# Patient Record
Sex: FEMALE | Race: BLACK OR AFRICAN AMERICAN | NOT HISPANIC OR LATINO | Employment: UNEMPLOYED | URBAN - METROPOLITAN AREA
[De-identification: names, ages, dates, MRNs, and addresses within clinical notes are randomized per-mention and may not be internally consistent; named-entity substitution may affect disease eponyms.]

---

## 2018-05-17 ENCOUNTER — HOSPITAL ENCOUNTER (OUTPATIENT)
Dept: GENERAL RADIOLOGY | Facility: HOSPITAL | Age: 58
Discharge: HOME | End: 2018-05-17
Admitting: INTERNAL MEDICINE

## 2018-05-17 ENCOUNTER — TRANSCRIBE ORDERS (OUTPATIENT)
Dept: ADMINISTRATIVE | Facility: HOSPITAL | Age: 58
End: 2018-05-17

## 2018-05-17 DIAGNOSIS — R52 PAIN: Primary | ICD-10-CM

## 2018-05-17 PROCEDURE — 73560 X-RAY EXAM OF KNEE 1 OR 2: CPT

## 2019-07-25 ENCOUNTER — OFFICE VISIT (OUTPATIENT)
Dept: GASTROENTEROLOGY | Facility: CLINIC | Age: 59
End: 2019-07-25

## 2019-07-25 VITALS
HEART RATE: 100 BPM | RESPIRATION RATE: 20 BRPM | SYSTOLIC BLOOD PRESSURE: 132 MMHG | DIASTOLIC BLOOD PRESSURE: 86 MMHG | WEIGHT: 122 LBS | BODY MASS INDEX: 27.44 KG/M2

## 2019-07-25 DIAGNOSIS — Z83.71 FAMILY HISTORY OF POLYPS IN THE COLON: ICD-10-CM

## 2019-07-25 DIAGNOSIS — K21.9 GASTROESOPHAGEAL REFLUX DISEASE, ESOPHAGITIS PRESENCE NOT SPECIFIED: ICD-10-CM

## 2019-07-25 DIAGNOSIS — K76.0 FATTY LIVER: Primary | ICD-10-CM

## 2019-07-25 PROCEDURE — 99243 OFF/OP CNSLTJ NEW/EST LOW 30: CPT | Performed by: INTERNAL MEDICINE

## 2019-07-25 NOTE — PROGRESS NOTES
PCP:  Marie Morales MD     No referring provider defined for this encounter.    Chief Complaint   Patient presents with   • GI Problem        HPI   The patient is a 58-year-old here for evaluation of elevated liver chemistries.  She has a history of fatty liver.  She has a family history of father with polyps but no other family history to her knowledge.  She does have a history of diabetes.  She also has hypercholesterolemia.  An ultrasound done in 2016 showed fatty liver.  Liver chemistries were elevated at that time with an alkaline phosphatase of 223, AST of 41 and ALT of 66.  Her bilirubin was normal.  At that time she had serologies consistent with immunity to hepatitis B.  Her antimitochondrial antibody was normal.  Her anti-smooth muscle antibody, anti-liver kidney microsomal, hepatitis C testing, and soluble liver antigen were normal.  Her LUI was negative.  Her ceruloplasmin was normal.  Her alpha-1 antitrypsin level was normal.  Her iron studies were not elevated.  She is due for a colonoscopy.  Has had some reflux symptoms but is unsure what medication she is taking at this point.  He states she has a history of Crohn's disease.    No Known Allergies       Current Outpatient Medications:   •  Insulin Pump Accessories misc, , Disp: , Rfl:   •  losartan (COZAAR) 25 MG tablet, Take 25 mg by mouth Daily., Disp: , Rfl:   •  montelukast (SINGULAIR) 10 MG tablet, Take 10 mg by mouth Every Night., Disp: , Rfl:   •  pravastatin (PRAVACHOL) 20 MG tablet, Take 20 mg by mouth Daily., Disp: , Rfl:   •  Vitamin D, Cholecalciferol, (CHOLECALCIFEROL) 400 units tablet, Take 400 Units by mouth Daily., Disp: , Rfl:      Past Medical History:   Diagnosis Date   • Diabetes mellitus (CMS/HCC)    • Hyperlipidemia    • Hypertension    • Overactive bladder        Past Surgical History:   Procedure Laterality Date   • FOOT SURGERY     Tonsillectomy  Cholecystectomy for stones    Social History     Socioeconomic History   •  Marital status:      Spouse name: Not on file   • Number of children: Not on file   • Years of education: Not on file   • Highest education level: Not on file   Tobacco Use   • Smoking status: Never Smoker   • Smokeless tobacco: Never Used   Substance and Sexual Activity   • Alcohol use: No     Frequency: Never   • Drug use: No        History reviewed. No pertinent family history.     Review of Systems   Constitutional: Negative for unexpected weight loss.   HENT: Negative for trouble swallowing.    Eyes: Negative.    Respiratory: Negative.    Gastrointestinal: Positive for GERD. Negative for abdominal distention, abdominal pain, anal bleeding, blood in stool, constipation, diarrhea, nausea, rectal pain, vomiting and indigestion.   Endocrine: Negative.    Genitourinary: Negative.    Musculoskeletal: Negative.    Skin: Negative.    Allergic/Immunologic: Negative.    Neurological: Negative.    Hematological: Negative.    Psychiatric/Behavioral: Negative.         Vitals:    07/25/19 1550   BP: 132/86   Pulse: 100   Resp: 20        Physical Exam   General Appearance: Alert, in no acute distress   Head: Normocephalic, without obvious abnormality, atraumatic   Eyes: Lids and lashes normal, conjunctivae and sclerae normal, no icterus, no pallor, corneas clear, PERRLA   Ears: Ears appear intact with no abnormalities noted   Throat: No oral lesions, no thrush, oral mucosa moist   Neck: No adenopathy, supple, trachea midline, no thyromegaly, no JVD   Lungs: Clear to auscultation,respirations regular, even and unlabored Heart: Regular rhythm and normal rate, normal S1 and S2, no murmur, no gallop, no rub, no click   Chest Wall: Symmetrical respiratory expansion   Abdomen: Normal bowel sounds, no masses, no organomegaly, soft non-tender, non-distended, no guarding, no rebound tenderness   Extremities: Moves all extremities well, no edema, no cyanosis, no redness   Skin: No bleeding, bruising or rash   Neurologic: Cranial  nerves 2 - 12 grossly intact, no focal deficits     Review of systems was reviewed    Emily was seen today for gi problem.    Diagnoses and all orders for this visit:    Fatty liver    Gastroesophageal reflux disease, esophagitis presence not specified    Family history of polyps in the colon    Impressions and plan #1 fatty liver: We will probably need to reevaluate this.  Work-up in the past has been negative.  Serologies have been negative.  May be worthwhile doing a [place] fiber sure.    #2 family history: Polyps and questionable history of Crohn's disease: He seems to be doing fairly well from that standpoint but it is time to reevaluate and she will get that scheduled.  Patients with Crohn's colitis are at increased risk for developing colorectal cancer especially after a decade.    #3 gastroesophageal reflux disease: You are going to find out what she is taking.  If she is having increasing symptoms we can try her on a proton pump inhibitor if she is not on one at this point.    Jp Obnado MD

## 2019-07-29 ENCOUNTER — TELEPHONE (OUTPATIENT)
Dept: GASTROENTEROLOGY | Facility: CLINIC | Age: 59
End: 2019-07-29

## 2019-07-29 PROBLEM — K21.9 GASTROESOPHAGEAL REFLUX DISEASE: Status: ACTIVE | Noted: 2019-07-29

## 2019-07-29 PROBLEM — K76.0 FATTY LIVER: Status: ACTIVE | Noted: 2019-07-29

## 2019-07-29 PROBLEM — Z83.719 FAMILY HISTORY OF POLYPS IN THE COLON: Status: ACTIVE | Noted: 2019-07-29

## 2019-07-29 PROBLEM — Z83.71 FAMILY HISTORY OF POLYPS IN THE COLON: Status: ACTIVE | Noted: 2019-07-29

## 2019-07-29 NOTE — TELEPHONE ENCOUNTER
Dr. Obando wanted to know what medication the patient was taking.  She called back and said she was on ranitidine 300mg at bedtime.  Patient doesn't feel like this is helping and would like to try a different medication.  Omeprazole sent to her pharmacy.

## 2019-07-31 RX ORDER — OMEPRAZOLE 40 MG/1
40 CAPSULE, DELAYED RELEASE ORAL DAILY
Qty: 90 CAPSULE | Refills: 3 | Status: SHIPPED | OUTPATIENT
Start: 2019-07-31 | End: 2020-07-06

## 2019-08-01 RX ORDER — SODIUM, POTASSIUM,MAG SULFATES 17.5-3.13G
2 SOLUTION, RECONSTITUTED, ORAL ORAL TAKE AS DIRECTED
Qty: 354 ML | Refills: 0 | Status: SHIPPED | OUTPATIENT
Start: 2019-08-01 | End: 2021-07-10

## 2019-08-29 ENCOUNTER — OUTSIDE FACILITY SERVICE (OUTPATIENT)
Dept: GASTROENTEROLOGY | Facility: CLINIC | Age: 59
End: 2019-08-29

## 2019-08-29 ENCOUNTER — LAB REQUISITION (OUTPATIENT)
Dept: LAB | Facility: HOSPITAL | Age: 59
End: 2019-08-29

## 2019-08-29 DIAGNOSIS — Z12.11 ENCOUNTER FOR SCREENING FOR MALIGNANT NEOPLASM OF COLON: ICD-10-CM

## 2019-08-29 DIAGNOSIS — Z83.71 FAMILY HISTORY OF COLONIC POLYPS: ICD-10-CM

## 2019-08-29 PROCEDURE — 88305 TISSUE EXAM BY PATHOLOGIST: CPT | Performed by: INTERNAL MEDICINE

## 2019-08-29 PROCEDURE — 45380 COLONOSCOPY AND BIOPSY: CPT | Performed by: INTERNAL MEDICINE

## 2019-08-30 LAB
CYTO UR: NORMAL
LAB AP CASE REPORT: NORMAL
LAB AP CLINICAL INFORMATION: NORMAL
PATH REPORT.FINAL DX SPEC: NORMAL
PATH REPORT.GROSS SPEC: NORMAL

## 2019-11-02 ENCOUNTER — HOSPITAL ENCOUNTER (EMERGENCY)
Facility: HOSPITAL | Age: 59
Discharge: HOME | End: 2019-11-02
Attending: EMERGENCY MEDICINE | Admitting: EMERGENCY MEDICINE

## 2019-11-02 ENCOUNTER — APPOINTMENT (OUTPATIENT)
Dept: GENERAL RADIOLOGY | Facility: HOSPITAL | Age: 59
End: 2019-11-02

## 2019-11-02 VITALS
BODY MASS INDEX: 26.85 KG/M2 | DIASTOLIC BLOOD PRESSURE: 90 MMHG | HEART RATE: 94 BPM | HEIGHT: 55 IN | TEMPERATURE: 98.2 F | SYSTOLIC BLOOD PRESSURE: 132 MMHG | OXYGEN SATURATION: 96 % | WEIGHT: 116 LBS | RESPIRATION RATE: 18 BRPM

## 2019-11-02 DIAGNOSIS — R53.1 GENERALIZED WEAKNESS: ICD-10-CM

## 2019-11-02 DIAGNOSIS — R00.0 SINUS TACHYCARDIA: Primary | ICD-10-CM

## 2019-11-02 DIAGNOSIS — R73.9 HYPERGLYCEMIA: ICD-10-CM

## 2019-11-02 LAB
ALBUMIN SERPL-MCNC: 4 G/DL (ref 3.5–5.2)
ALBUMIN/GLOB SERPL: 1.3 G/DL
ALP SERPL-CCNC: 219 U/L (ref 39–117)
ALT SERPL W P-5'-P-CCNC: 55 U/L (ref 1–33)
ANION GAP SERPL CALCULATED.3IONS-SCNC: 10 MMOL/L (ref 5–15)
ARTERIAL PATENCY WRIST A: ABNORMAL
AST SERPL-CCNC: 34 U/L (ref 1–32)
ATMOSPHERIC PRESS: ABNORMAL MM[HG]
B-OH-BUTYR SERPL-SCNC: 0.1 MMOL/L (ref 0.02–0.27)
BACTERIA UR QL AUTO: NORMAL /HPF
BASE EXCESS BLDA CALC-SCNC: 3.6 MMOL/L (ref 0–2)
BASOPHILS # BLD AUTO: 0.03 10*3/MM3 (ref 0–0.2)
BASOPHILS NFR BLD AUTO: 0.3 % (ref 0–1.5)
BILIRUB SERPL-MCNC: 0.2 MG/DL (ref 0.2–1.2)
BILIRUB UR QL STRIP: NEGATIVE
BODY TEMPERATURE: 37 C
BUN BLD-MCNC: 14 MG/DL (ref 6–20)
BUN/CREAT SERPL: 24.6 (ref 7–25)
CALCIUM SPEC-SCNC: 9.2 MG/DL (ref 8.6–10.5)
CHLORIDE SERPL-SCNC: 107 MMOL/L (ref 98–107)
CK SERPL-CCNC: 141 U/L (ref 20–180)
CLARITY UR: CLEAR
CO2 BLDA-SCNC: 30.9 MMOL/L (ref 22–33)
CO2 SERPL-SCNC: 28 MMOL/L (ref 22–29)
COHGB MFR BLD: 1 % (ref 0–2)
COLOR UR: YELLOW
CREAT BLD-MCNC: 0.57 MG/DL (ref 0.57–1)
D DIMER PPP FEU-MCNC: 0.28 MCGFEU/ML (ref 0–0.56)
DEPRECATED RDW RBC AUTO: 40.9 FL (ref 37–54)
EOSINOPHIL # BLD AUTO: 0.01 10*3/MM3 (ref 0–0.4)
EOSINOPHIL NFR BLD AUTO: 0.1 % (ref 0.3–6.2)
ERYTHROCYTE [DISTWIDTH] IN BLOOD BY AUTOMATED COUNT: 12.4 % (ref 12.3–15.4)
GFR SERPL CREATININE-BSD FRML MDRD: 133 ML/MIN/1.73
GLOBULIN UR ELPH-MCNC: 3.1 GM/DL
GLUCOSE BLD-MCNC: 192 MG/DL (ref 65–99)
GLUCOSE BLDC GLUCOMTR-MCNC: 178 MG/DL (ref 70–130)
GLUCOSE UR STRIP-MCNC: NEGATIVE MG/DL
HCO3 BLDA-SCNC: 29.4 MMOL/L (ref 20–26)
HCT VFR BLD AUTO: 40.5 % (ref 34–46.6)
HCT VFR BLD CALC: 41.4 %
HGB BLD-MCNC: 12.9 G/DL (ref 12–15.9)
HGB BLDA-MCNC: 13.5 G/DL (ref 14–18)
HGB UR QL STRIP.AUTO: NEGATIVE
HOLD SPECIMEN: NORMAL
HOLD SPECIMEN: NORMAL
HOROWITZ INDEX BLD+IHG-RTO: 21 %
HYALINE CASTS UR QL AUTO: NORMAL /LPF
IMM GRANULOCYTES # BLD AUTO: 0.04 10*3/MM3 (ref 0–0.05)
IMM GRANULOCYTES NFR BLD AUTO: 0.4 % (ref 0–0.5)
KETONES UR QL STRIP: NEGATIVE
LEUKOCYTE ESTERASE UR QL STRIP.AUTO: ABNORMAL
LYMPHOCYTES # BLD AUTO: 2.01 10*3/MM3 (ref 0.7–3.1)
LYMPHOCYTES NFR BLD AUTO: 22.2 % (ref 19.6–45.3)
MAGNESIUM SERPL-MCNC: 1.7 MG/DL (ref 1.6–2.6)
MAGNESIUM SERPL-MCNC: 1.8 MG/DL (ref 1.6–2.6)
MCH RBC QN AUTO: 28.7 PG (ref 26.6–33)
MCHC RBC AUTO-ENTMCNC: 31.9 G/DL (ref 31.5–35.7)
MCV RBC AUTO: 90.2 FL (ref 79–97)
METHGB BLD QL: 1.3 % (ref 0–1.5)
MODALITY: ABNORMAL
MONOCYTES # BLD AUTO: 0.73 10*3/MM3 (ref 0.1–0.9)
MONOCYTES NFR BLD AUTO: 8.1 % (ref 5–12)
NEUTROPHILS # BLD AUTO: 6.23 10*3/MM3 (ref 1.7–7)
NEUTROPHILS NFR BLD AUTO: 68.9 % (ref 42.7–76)
NITRITE UR QL STRIP: NEGATIVE
NOTE: ABNORMAL
NRBC BLD AUTO-RTO: 0 /100 WBC (ref 0–0.2)
NT-PROBNP SERPL-MCNC: 32.9 PG/ML (ref 5–900)
OXYHGB MFR BLDV: 91.6 % (ref 94–99)
PCO2 BLDA: 48 MM HG (ref 35–45)
PCO2 TEMP ADJ BLD: 48 MM HG (ref 35–45)
PH BLDA: 7.4 PH UNITS (ref 7.35–7.45)
PH UR STRIP.AUTO: 6.5 [PH] (ref 5–8)
PH, TEMP CORRECTED: 7.4 PH UNITS
PLATELET # BLD AUTO: 167 10*3/MM3 (ref 140–450)
PMV BLD AUTO: 11.6 FL (ref 6–12)
PO2 BLDA: 72.6 MM HG (ref 83–108)
PO2 TEMP ADJ BLD: 72.6 MM HG (ref 83–108)
POTASSIUM BLD-SCNC: 3.5 MMOL/L (ref 3.5–5.2)
PROT SERPL-MCNC: 7.1 G/DL (ref 6–8.5)
PROT UR QL STRIP: NEGATIVE
RBC # BLD AUTO: 4.49 10*6/MM3 (ref 3.77–5.28)
RBC # UR: NORMAL /HPF
REF LAB TEST METHOD: NORMAL
SODIUM BLD-SCNC: 145 MMOL/L (ref 136–145)
SP GR UR STRIP: 1.02 (ref 1–1.03)
SQUAMOUS #/AREA URNS HPF: NORMAL /HPF
TROPONIN T SERPL-MCNC: <0.01 NG/ML (ref 0–0.03)
TSH SERPL DL<=0.05 MIU/L-ACNC: 1.72 UIU/ML (ref 0.27–4.2)
UROBILINOGEN UR QL STRIP: ABNORMAL
VENTILATOR MODE: ABNORMAL
WBC NRBC COR # BLD: 9.05 10*3/MM3 (ref 3.4–10.8)
WBC UR QL AUTO: NORMAL /HPF
WHOLE BLOOD HOLD SPECIMEN: NORMAL
WHOLE BLOOD HOLD SPECIMEN: NORMAL

## 2019-11-02 PROCEDURE — 80053 COMPREHEN METABOLIC PANEL: CPT | Performed by: EMERGENCY MEDICINE

## 2019-11-02 PROCEDURE — 93005 ELECTROCARDIOGRAM TRACING: CPT | Performed by: EMERGENCY MEDICINE

## 2019-11-02 PROCEDURE — 82550 ASSAY OF CK (CPK): CPT | Performed by: PHYSICIAN ASSISTANT

## 2019-11-02 PROCEDURE — 82962 GLUCOSE BLOOD TEST: CPT

## 2019-11-02 PROCEDURE — 85025 COMPLETE CBC W/AUTO DIFF WBC: CPT | Performed by: EMERGENCY MEDICINE

## 2019-11-02 PROCEDURE — 84443 ASSAY THYROID STIM HORMONE: CPT | Performed by: PHYSICIAN ASSISTANT

## 2019-11-02 PROCEDURE — 82010 KETONE BODYS QUAN: CPT | Performed by: PHYSICIAN ASSISTANT

## 2019-11-02 PROCEDURE — 99285 EMERGENCY DEPT VISIT HI MDM: CPT

## 2019-11-02 PROCEDURE — 93005 ELECTROCARDIOGRAM TRACING: CPT | Performed by: PHYSICIAN ASSISTANT

## 2019-11-02 PROCEDURE — 85379 FIBRIN DEGRADATION QUANT: CPT | Performed by: PHYSICIAN ASSISTANT

## 2019-11-02 PROCEDURE — 36600 WITHDRAWAL OF ARTERIAL BLOOD: CPT

## 2019-11-02 PROCEDURE — 82805 BLOOD GASES W/O2 SATURATION: CPT

## 2019-11-02 PROCEDURE — 81001 URINALYSIS AUTO W/SCOPE: CPT | Performed by: EMERGENCY MEDICINE

## 2019-11-02 PROCEDURE — 83735 ASSAY OF MAGNESIUM: CPT | Performed by: EMERGENCY MEDICINE

## 2019-11-02 PROCEDURE — 83880 ASSAY OF NATRIURETIC PEPTIDE: CPT | Performed by: PHYSICIAN ASSISTANT

## 2019-11-02 PROCEDURE — 71045 X-RAY EXAM CHEST 1 VIEW: CPT

## 2019-11-02 PROCEDURE — 83735 ASSAY OF MAGNESIUM: CPT | Performed by: PHYSICIAN ASSISTANT

## 2019-11-02 PROCEDURE — 84484 ASSAY OF TROPONIN QUANT: CPT | Performed by: EMERGENCY MEDICINE

## 2019-11-02 RX ORDER — SODIUM CHLORIDE 0.9 % (FLUSH) 0.9 %
10 SYRINGE (ML) INJECTION AS NEEDED
Status: DISCONTINUED | OUTPATIENT
Start: 2019-11-02 | End: 2019-11-02 | Stop reason: HOSPADM

## 2019-11-02 RX ADMIN — SODIUM CHLORIDE 1000 ML: 9 INJECTION, SOLUTION INTRAVENOUS at 13:48

## 2019-11-02 RX ADMIN — SODIUM CHLORIDE 1000 ML: 9 INJECTION, SOLUTION INTRAVENOUS at 14:44

## 2019-11-02 NOTE — ED PROVIDER NOTES
Subjective   Emily Grove is a 59 y.o. female who presents to the ED with complaints of intermittent generalized weakness for the past 2 weeks, worsening today. Her  relates that she called him around 1230 stating she was in [employer] parking lot and felt weird. She also complains of palpitations, tremors, and nausea today. She notes that she has had bilateral leg paresthesia for a while. However, she denies any chest pain, shortness of breath, leg swelling, fever, appetite change, abdominal pain, vomiting, diarrhea, melena, hematochezia, or any urinary symptoms. She has an insulin pump for type 1 diabetes mellitus, Cozaar, and Prilosec. Her  reports that she started probiotics a month ago. She denies any past cardiac history or any thyroid disease. She has a history of HTN, HLD, and DM. Her PCP is Dr. Morales. There are no other acute complaints at this time.        History provided by:  Patient and spouse  Weakness - Generalized   Severity:  Moderate  Onset quality:  Sudden  Duration:  2 weeks  Timing:  Intermittent  Progression:  Worsening  Chronicity:  New  Relieved by:  None tried  Worsened by:  Nothing  Ineffective treatments:  None tried  Associated symptoms: nausea    Associated symptoms: no abdominal pain, no chest pain, no diarrhea, no dysuria, no fever, no frequency, no headaches, no hematochezia, no melena, no shortness of breath, no urgency and no vomiting    Risk factors: diabetes    Risk factors: no heart disease        Review of Systems   Constitutional: Positive for fatigue. Negative for appetite change and fever.   HENT: Negative for sore throat.    Eyes: Negative for visual disturbance.   Respiratory: Negative for shortness of breath.    Cardiovascular: Negative for chest pain and leg swelling.   Gastrointestinal: Positive for nausea. Negative for abdominal pain, blood in stool, diarrhea, hematochezia, melena and vomiting.        - melena   Genitourinary: Negative for decreased  urine volume, difficulty urinating, dysuria, frequency, hematuria and urgency.   Musculoskeletal: Negative for back pain and neck pain.   Skin: Negative for pallor.   Allergic/Immunologic: Negative for immunocompromised state.   Neurological: Positive for weakness (generalized) and light-headedness. Negative for headaches.        + bilateral leg paresthesia   Hematological: Negative.    Psychiatric/Behavioral: Negative.    All other systems reviewed and are negative.      Past Medical History:   Diagnosis Date   • Diabetes mellitus (CMS/HCC)    • Hyperlipidemia    • Hypertension    • Overactive bladder        No Known Allergies    Past Surgical History:   Procedure Laterality Date   • FOOT SURGERY         No family history on file.    Social History     Socioeconomic History   • Marital status:      Spouse name: Not on file   • Number of children: Not on file   • Years of education: Not on file   • Highest education level: Not on file   Tobacco Use   • Smoking status: Never Smoker   • Smokeless tobacco: Never Used   Substance and Sexual Activity   • Alcohol use: No     Frequency: Never   • Drug use: No         Objective   Physical Exam   Constitutional: She is oriented to person, place, and time. She appears well-developed and well-nourished.   HENT:   Head: Normocephalic and atraumatic.   Nose: Nose normal.   Mouth/Throat: Oropharynx is clear and moist and mucous membranes are normal.   Mucous membranes normal.   Eyes: Conjunctivae are normal. Pupils are equal, round, and reactive to light. No scleral icterus.   Neck: Normal range of motion. Neck supple.   Cardiovascular: Regular rhythm, normal heart sounds and intact distal pulses. Tachycardia present.   No murmur heard.  Tachycardic.   Pulmonary/Chest: Effort normal and breath sounds normal. No respiratory distress.   Abdominal: Soft. Bowel sounds are normal. She exhibits no distension. There is no tenderness.   Soft. Non tender.   Musculoskeletal: Normal  range of motion. She exhibits no edema or deformity.   No lower extremity edema.   Neurological: She is alert and oriented to person, place, and time.   Skin: Skin is warm and dry.   Psychiatric: She has a normal mood and affect. Her behavior is normal.   Nursing note and vitals reviewed.      Procedures         ED Course    Pt appears in no distress.  She was in sinus tach with LBBB on EKG with rate of 133.  Pt was hydrated with two liters saline and rate is now down to 93 with sinus rhythm.  She is afebrile.  Nml TSH.  Nml troponin.  Negative ketones.  Nml white count.  No anemia.  Will plan to d/c home to f/u with her PCP.  Return if worse.    ED Course as of Nov 02 1742   Sat Nov 02, 2019   1501 At bedside re-evaluating the patient and updating her on labs/imaging. -HNB  [NP]      ED Course User Index  [NP] Margaux Watson     Recent Results (from the past 24 hour(s))   Comprehensive Metabolic Panel    Collection Time: 11/02/19  1:47 PM   Result Value Ref Range    Glucose 192 (H) 65 - 99 mg/dL    BUN 14 6 - 20 mg/dL    Creatinine 0.57 0.57 - 1.00 mg/dL    Sodium 145 136 - 145 mmol/L    Potassium 3.5 3.5 - 5.2 mmol/L    Chloride 107 98 - 107 mmol/L    CO2 28.0 22.0 - 29.0 mmol/L    Calcium 9.2 8.6 - 10.5 mg/dL    Total Protein 7.1 6.0 - 8.5 g/dL    Albumin 4.00 3.50 - 5.20 g/dL    ALT (SGPT) 55 (H) 1 - 33 U/L    AST (SGOT) 34 (H) 1 - 32 U/L    Alkaline Phosphatase 219 (H) 39 - 117 U/L    Total Bilirubin 0.2 0.2 - 1.2 mg/dL    eGFR  African Amer 133 >60 mL/min/1.73    Globulin 3.1 gm/dL    A/G Ratio 1.3 g/dL    BUN/Creatinine Ratio 24.6 7.0 - 25.0    Anion Gap 10.0 5.0 - 15.0 mmol/L   Troponin    Collection Time: 11/02/19  1:47 PM   Result Value Ref Range    Troponin T <0.010 0.000 - 0.030 ng/mL   Magnesium    Collection Time: 11/02/19  1:47 PM   Result Value Ref Range    Magnesium 1.7 1.6 - 2.6 mg/dL   Light Blue Top    Collection Time: 11/02/19  1:47 PM   Result Value Ref Range    Extra Tube hold for add-on     Green Top (Gel)    Collection Time: 11/02/19  1:47 PM   Result Value Ref Range    Extra Tube Hold for add-ons.    Lavender Top    Collection Time: 11/02/19  1:47 PM   Result Value Ref Range    Extra Tube hold for add-on    CBC Auto Differential    Collection Time: 11/02/19  1:47 PM   Result Value Ref Range    WBC 9.05 3.40 - 10.80 10*3/mm3    RBC 4.49 3.77 - 5.28 10*6/mm3    Hemoglobin 12.9 12.0 - 15.9 g/dL    Hematocrit 40.5 34.0 - 46.6 %    MCV 90.2 79.0 - 97.0 fL    MCH 28.7 26.6 - 33.0 pg    MCHC 31.9 31.5 - 35.7 g/dL    RDW 12.4 12.3 - 15.4 %    RDW-SD 40.9 37.0 - 54.0 fl    MPV 11.6 6.0 - 12.0 fL    Platelets 167 140 - 450 10*3/mm3    Neutrophil % 68.9 42.7 - 76.0 %    Lymphocyte % 22.2 19.6 - 45.3 %    Monocyte % 8.1 5.0 - 12.0 %    Eosinophil % 0.1 (L) 0.3 - 6.2 %    Basophil % 0.3 0.0 - 1.5 %    Immature Grans % 0.4 0.0 - 0.5 %    Neutrophils, Absolute 6.23 1.70 - 7.00 10*3/mm3    Lymphocytes, Absolute 2.01 0.70 - 3.10 10*3/mm3    Monocytes, Absolute 0.73 0.10 - 0.90 10*3/mm3    Eosinophils, Absolute 0.01 0.00 - 0.40 10*3/mm3    Basophils, Absolute 0.03 0.00 - 0.20 10*3/mm3    Immature Grans, Absolute 0.04 0.00 - 0.05 10*3/mm3    nRBC 0.0 0.0 - 0.2 /100 WBC   BNP    Collection Time: 11/02/19  1:47 PM   Result Value Ref Range    proBNP 32.9 5.0 - 900.0 pg/mL   TSH    Collection Time: 11/02/19  1:47 PM   Result Value Ref Range    TSH 1.720 0.270 - 4.200 uIU/mL   D-dimer, Quantitative    Collection Time: 11/02/19  1:47 PM   Result Value Ref Range    D-Dimer, Quantitative 0.28 0.00 - 0.56 MCGFEU/mL   Gold Top - SST    Collection Time: 11/02/19  1:48 PM   Result Value Ref Range    Extra Tube Hold for add-ons.    Beta Hydroxybutyrate Quantitative    Collection Time: 11/02/19  1:48 PM   Result Value Ref Range    Beta-Hydroxybutyrate Quant 0.099 0.020 - 0.270 mmol/L   CK    Collection Time: 11/02/19  1:48 PM   Result Value Ref Range    Creatine Kinase 141 20 - 180 U/L   Magnesium    Collection Time: 11/02/19   1:48 PM   Result Value Ref Range    Magnesium 1.8 1.6 - 2.6 mg/dL   POC Glucose Once    Collection Time: 11/02/19  1:53 PM   Result Value Ref Range    Glucose 178 (H) 70 - 130 mg/dL   Blood Gas, Arterial With Co-Ox    Collection Time: 11/02/19  1:56 PM   Result Value Ref Range    Enrique's Test N/A     pH, Arterial 7.396 7.350 - 7.450 pH units    pCO2, Arterial 48.0 (H) 35.0 - 45.0 mm Hg    pO2, Arterial 72.6 (L) 83.0 - 108.0 mm Hg    HCO3, Arterial 29.4 (H) 20.0 - 26.0 mmol/L    Base Excess, Arterial 3.6 (H) 0.0 - 2.0 mmol/L    Hemoglobin, Blood Gas 13.5 (L) 14 - 18 g/dL    Hematocrit, Blood Gas 41.4 %    Oxyhemoglobin 91.6 (L) 94 - 99 %    Methemoglobin 1.30 0.00 - 1.50 %    Carboxyhemoglobin 1.0 0 - 2 %    CO2 Content 30.9 22 - 33 mmol/L    Temperature 37.0 C    Barometric Pressure for Blood Gas      Modality Room Air     FIO2 21 %    Ventilator Mode       Note      pH, Temp Corrected 7.396 pH Units    pCO2, Temperature Corrected 48.0 (H) 35 - 45 mm Hg    pO2, Temperature Corrected 72.6 (L) 83 - 108 mm Hg   Urinalysis With Microscopic If Indicated (No Culture) - Urine, Clean Catch    Collection Time: 11/02/19  4:00 PM   Result Value Ref Range    Color, UA Yellow Yellow, Straw    Appearance, UA Clear Clear    pH, UA 6.5 5.0 - 8.0    Specific Gravity, UA 1.018 1.001 - 1.030    Glucose, UA Negative Negative    Ketones, UA Negative Negative    Bilirubin, UA Negative Negative    Blood, UA Negative Negative    Protein, UA Negative Negative    Leuk Esterase, UA Small (1+) (A) Negative    Nitrite, UA Negative Negative    Urobilinogen, UA 0.2 E.U./dL 0.2 - 1.0 E.U./dL   Urinalysis, Microscopic Only - Urine, Clean Catch    Collection Time: 11/02/19  4:00 PM   Result Value Ref Range    RBC, UA None Seen None Seen, 0-2 /HPF    WBC, UA 0-2 None Seen, 0-2 /HPF    Bacteria, UA None Seen None Seen, Trace /HPF    Squamous Epithelial Cells, UA 0-2 None Seen, 0-2 /HPF    Hyaline Casts, UA 0-6 0 - 6 /LPF    Methodology Automated  Microscopy      Note: In addition to lab results from this visit, the labs listed above may include labs taken at another facility or during a different encounter within the last 24 hours. Please correlate lab times with ED admission and discharge times for further clarification of the services performed during this visit.    XR Chest 1 View   Preliminary Result   No evidence of active chest disease.       DICTATED:   11/02/2019   EDITED/ls :   11/02/2019                 Vitals:    11/02/19 1630 11/02/19 1635 11/02/19 1636 11/02/19 1637   BP: 129/99 (S) 136/96 (S) 139/96 (S) 138/92   Patient Position:  (S) Lying (S) Sitting (S) Standing   Pulse:  (S) 102 (S) 110 (S) 116   Resp:       Temp:       TempSrc:       SpO2:       Weight:       Height:         Medications   sodium chloride 0.9 % flush 10 mL (not administered)   sodium chloride 0.9 % bolus 1,000 mL (0 mL Intravenous Stopped 11/2/19 1444)   sodium chloride 0.9 % bolus 1,000 mL (0 mL Intravenous Stopped 11/2/19 1633)     ECG/EMG Results (last 24 hours)     Procedure Component Value Units Date/Time    ECG 12 Lead [[id number]] Collected:  11/02/19 1316     Updated:  11/02/19 1316        ECG 12 Lead   Final Result   Test Reason : tachycardia, generalized weakness   Blood Pressure : **/** mmHG   Vent. Rate : 094 BPM     Atrial Rate : 094 BPM      P-R Int : 124 ms          QRS Dur : 116 ms       QT Int : 382 ms       P-R-T Axes : 057 040 036 degrees      QTc Int : 477 ms      Normal sinus rhythm   Right bundle branch block   Abnormal ECG   When compared with ECG of 02-NOV-2019 13:16,   Non-specific change in ST segment in Inferior leads   T wave inversion no longer evident in Anterior leads   Confirmed by TERI DUNCAN MD ([id number]) on 11/2/2019 5:28:59 PM      Referred By:  DAKOTA           Confirmed By:TERI DUNCAN MD      ECG 12 Lead   Final Result   Test Reason : Weak/Dizzy/AMS protocol   Blood Pressure : **/** mmHG   Vent. Rate : 135 BPM     Atrial Rate : 135 BPM       P-R Int : 094 ms          QRS Dur : 116 ms       QT Int : 338 ms       P-R-T Axes : 038 058 024 degrees      QTc Int : 507 ms      Sinus tachycardia with short NC   Possible Left atrial enlargement   Right bundle branch block   Abnormal ECG   No previous ECGs available   Confirmed by TERI DUNCAN MD ([id number]) on 11/2/2019 5:00:17 PM      Referred By:  DAKOTA           Confirmed By:TERI DUNCAN MD                          MDM    Final diagnoses:   Sinus tachycardia   Generalized weakness   Hyperglycemia       Documentation assistance provided by vj Watson.  Information recorded by the scribe was done at my direction and has been verified and validated by me.         Margaux Watson  11/02/19 1505       Dre Martines, PA  11/02/19 [id number]

## 2019-11-02 NOTE — DISCHARGE INSTRUCTIONS
Rest.  Plenty of fluids.  Continue current meds.  Call your PCP on Monday for first available appointment.  Return to ER if worse.

## 2019-11-06 ENCOUNTER — OFFICE VISIT (OUTPATIENT)
Dept: CARDIOLOGY | Facility: HOSPITAL | Age: 59
End: 2019-11-06

## 2019-11-06 ENCOUNTER — HOSPITAL ENCOUNTER (OUTPATIENT)
Dept: CARDIOLOGY | Facility: HOSPITAL | Age: 59
Discharge: HOME | End: 2019-11-06

## 2019-11-06 ENCOUNTER — HOSPITAL ENCOUNTER (OUTPATIENT)
Dept: CARDIOLOGY | Facility: HOSPITAL | Age: 59
Discharge: HOME | End: 2019-11-06
Admitting: NURSE PRACTITIONER

## 2019-11-06 VITALS
SYSTOLIC BLOOD PRESSURE: 114 MMHG | HEIGHT: 55 IN | WEIGHT: 118.44 LBS | RESPIRATION RATE: 20 BRPM | DIASTOLIC BLOOD PRESSURE: 70 MMHG | TEMPERATURE: 97.8 F | OXYGEN SATURATION: 93 % | BODY MASS INDEX: 27.41 KG/M2 | HEART RATE: 96 BPM

## 2019-11-06 VITALS — SYSTOLIC BLOOD PRESSURE: 154 MMHG | DIASTOLIC BLOOD PRESSURE: 94 MMHG | HEART RATE: 106 BPM

## 2019-11-06 DIAGNOSIS — R42 DIZZINESS: ICD-10-CM

## 2019-11-06 DIAGNOSIS — R53.83 OTHER FATIGUE: ICD-10-CM

## 2019-11-06 DIAGNOSIS — R94.31 ABNORMAL EKG: ICD-10-CM

## 2019-11-06 DIAGNOSIS — R00.0 TACHYCARDIA: ICD-10-CM

## 2019-11-06 DIAGNOSIS — E10.65 TYPE 1 DIABETES MELLITUS WITH HYPERGLYCEMIA (HCC): ICD-10-CM

## 2019-11-06 DIAGNOSIS — R07.2 PRECORDIAL PAIN: Primary | ICD-10-CM

## 2019-11-06 DIAGNOSIS — R07.2 PRECORDIAL PAIN: ICD-10-CM

## 2019-11-06 LAB
BH CV STRESS BP STAGE 1: NORMAL
BH CV STRESS BP STAGE 2: NORMAL
BH CV STRESS DURATION MIN STAGE 1: 3
BH CV STRESS DURATION MIN STAGE 2: 3
BH CV STRESS DURATION SEC STAGE 1: 0
BH CV STRESS DURATION SEC STAGE 2: 0
BH CV STRESS GRADE STAGE 1: 10
BH CV STRESS GRADE STAGE 2: 12
BH CV STRESS HR STAGE 1: 148
BH CV STRESS HR STAGE 2: 166
BH CV STRESS METS STAGE 1: 5
BH CV STRESS METS STAGE 2: 7.5
BH CV STRESS O2 STAGE 1: 98
BH CV STRESS O2 STAGE 2: 98
BH CV STRESS PROTOCOL 1: NORMAL
BH CV STRESS RECOVERY BP: NORMAL MMHG
BH CV STRESS RECOVERY HR: 109 BPM
BH CV STRESS SPEED STAGE 1: 1.7
BH CV STRESS SPEED STAGE 2: 2.5
BH CV STRESS STAGE 1: 1
BH CV STRESS STAGE 2: 2
MAXIMAL PREDICTED HEART RATE: 163 BPM
PERCENT MAX PREDICTED HR: 101.84 %
STRESS BASELINE BP: NORMAL MMHG
STRESS BASELINE HR: 104 BPM
STRESS O2 SAT REST: 98 %
STRESS PERCENT HR: 120 %
STRESS POST ESTIMATED WORKLOAD: 7 METS
STRESS POST EXERCISE DUR MIN: 6 MIN
STRESS POST EXERCISE DUR SEC: 0 SEC
STRESS POST O2 SAT PEAK: 98 %
STRESS POST PEAK BP: NORMAL MMHG
STRESS POST PEAK HR: 166 BPM
STRESS TARGET HR: 139 BPM

## 2019-11-06 PROCEDURE — 0296T HC EXT ECG > 48HR TO 21 DAY RCRD W/CONECT INTL RCRD: CPT

## 2019-11-06 PROCEDURE — 93018 CV STRESS TEST I&R ONLY: CPT | Performed by: INTERNAL MEDICINE

## 2019-11-06 PROCEDURE — 93017 CV STRESS TEST TRACING ONLY: CPT

## 2019-11-06 PROCEDURE — 99203 OFFICE O/P NEW LOW 30 MIN: CPT | Performed by: NURSE PRACTITIONER

## 2019-11-06 RX ORDER — FLUTICASONE PROPIONATE 50 MCG
SPRAY, SUSPENSION (ML) NASAL
COMMUNITY

## 2019-11-06 RX ORDER — OXYBUTYNIN CHLORIDE 10 MG/1
10 TABLET, EXTENDED RELEASE ORAL DAILY
Refills: 1 | COMMUNITY
Start: 2019-10-12 | End: 2022-04-11

## 2019-11-06 RX ORDER — HYDROCODONE BITARTRATE AND ACETAMINOPHEN 5; 325 MG/1; MG/1
5-325 TABLET ORAL EVERY 4 HOURS PRN
COMMUNITY
End: 2021-07-10

## 2019-11-06 RX ORDER — FLASH GLUCOSE SENSOR
KIT MISCELLANEOUS SEE ADMIN INSTRUCTIONS
Refills: 6 | COMMUNITY
Start: 2019-11-01

## 2019-11-06 RX ORDER — IBUPROFEN 800 MG/1
800 TABLET ORAL AS NEEDED
COMMUNITY
End: 2021-07-10

## 2019-11-06 RX ORDER — CLOBETASOL PROPIONATE 0.46 MG/ML
SOLUTION TOPICAL
COMMUNITY

## 2019-11-06 RX ORDER — CYCLOBENZAPRINE HCL 5 MG
5 TABLET ORAL AS NEEDED
COMMUNITY
End: 2022-03-29

## 2019-11-06 RX ORDER — FLUOCINOLONE ACETONIDE 0.11 MG/ML
OIL TOPICAL
COMMUNITY
Start: 2019-09-04

## 2019-11-06 RX ORDER — IPRATROPIUM BROMIDE 42 UG/1
SPRAY, METERED NASAL
COMMUNITY

## 2019-11-06 NOTE — PROGRESS NOTES
[facility] Heart Monitor Documentation    Emily TAYLOR   1960  [id number]  11/06/19    TYLER Reyes    [] ZIO XT Patch  Model [id number] Prescribed for N/A Days    · Serial Number: (N + 9 Digits) N   · Apply-By Date on Box:   · USPS Tracking Number:   · USPS Tracking        [] [employer] MINI PLUS Mobile Cardiac Telemetry  Model BGMINIPLUS Prescribed for N/A Days    · Serial Number: (BGM + 7 Digits) BGM  · Shipped-By Date on Box:   · UPS Tracking Number: [id number]  · UPS Tracking      [x] [employer] MINI Holter Monitor  Model BGMINIEL Prescribed for 14 Days    · Serial Number: (7 Digits) [id number]  · Shipped-By Date on Box: 10/31/19  · UPS Tracking Number: [id number]  · UPS Tracking        This monitor was applied to the patient's chest and checked for proper functioning.  Ms. Emily TAYLOR [place] was instructed in the proper use of this monitor.  She was given the opportunity to ask questions and left the office with the device 's instruction manual.    Stefani Ross MA, 11:02 AM, 11/06/19                  [facility]

## 2019-11-06 NOTE — PROGRESS NOTES
[facility]  [facility]      Encounter Date:11/06/2019     Emily MILAN [facility] [id number]  [unfilled]    1960    Marie Morales MD    Emily Grove is a 59 y.o. female.      Subjective:     Chief Complaint:  Rapid Heart Rate and Establish Care       HPI 59-year-old female presents the office today for ongoing evaluation of her tachycardia.  She presented to [facility] ED on 11/2/2019 with complaints of generalized weakness for the past 2 weeks.  Patient noted associated palpitations, tremors and nausea as well as bilateral leg paresthesia.  Denied any chest pain or shortness of breath.  Past medical history significant for hypertension, hyperlipidemia and diabetes.  She does have an insulin pump.  Upon presentation to the ED her heart rate was elevated at 135 with a right bundle branch block.  She was given 2 L of saline and rate improved to 93 prior to discharge from the ED. She also reports intermittent sharp left sided chest pain. Patient has never had any cardiac testing. She is a nonsmoker. Patient has htn,HLP which is well controlled with medications.      Patient Active Problem List   Diagnosis   • Fatty liver   • Family history of polyps in the colon   • Gastroesophageal reflux disease   • Precordial pain   • Type 1 diabetes mellitus with hyperglycemia (CMS/HCC)   • Abnormal EKG   • Other fatigue   • Dizziness   • Tachycardia       Past Medical History:   Diagnosis Date   • Diabetes mellitus (CMS/HCC)    • Hyperlipidemia    • Hypertension    • Overactive bladder        Past Surgical History:   Procedure Laterality Date   • BLADDER SURGERY      GALL BLADDER   • CARPAL TUNNEL RELEASE     • FINGER FUSION     • FOOT SURGERY     • TONSILLECTOMY         Family History   Problem Relation Age of Onset   • Diabetes Mother    • Stroke Mother    • COPD Father    • Stroke Father    • Hypertension Sister    • Diabetes Brother    • Heart attack  Brother    • Parkinsonism Brother    • No Known Problems Maternal Grandmother    • Diabetes Maternal Grandfather    • Stroke Maternal Grandfather    • Heart failure Paternal Grandmother    • Diabetes Paternal Grandmother    • No Known Problems Paternal Grandfather        Social History     Socioeconomic History   • Marital status:      Spouse name: Not on file   • Number of children: Not on file   • Years of education: Not on file   • Highest education level: Not on file   Tobacco Use   • Smoking status: Never Smoker   • Smokeless tobacco: Never Used   Substance and Sexual Activity   • Alcohol use: No     Frequency: Never   • Drug use: No   • Sexual activity: Defer   Social History Narrative    CAFFEINE 0       No Known Allergies      Current Outpatient Medications:   •  clobetasol (TEMOVATE) 0.05 % external solution, clobetasol 0.05 % scalp solution, Disp: , Rfl:   •  Continuous Blood Gluc Sensor (FREESTYLE ROSA 14 DAY SENSOR) [facility], See Admin Instructions., Disp: , Rfl: 6  •  cyclobenzaprine (FLEXERIL) 5 MG tablet, Take 5 mg by mouth As Needed., Disp: , Rfl:   •  Fluocinolone Acetonide Scalp 0.01 % oil, , Disp: , Rfl:   •  fluticasone (FLONASE) 50 MCG/ACT nasal spray, fluticasone propionate 50 mcg/actuation nasal spray,suspension, Disp: , Rfl:   •  glucose blood test strip, FreeStyle Test strips, Disp: , Rfl:   •  HYDROcodone-acetaminophen (NORCO) 5-325 MG per tablet, Take 5-325 tablets by mouth., Disp: , Rfl:   •  ibuprofen (ADVIL,MOTRIN) 800 MG tablet, Take 800 mg by mouth As Needed., Disp: , Rfl:   •  insulin aspart (NOVOLOG) 100 UNIT/ML injection, Novolog U-100 Insulin aspart 100 unit/mL subcutaneous solution, Disp: , Rfl:   •  Insulin Pump Accessories misc, , Disp: , Rfl:   •  ipratropium (ATROVENT) 0.06 % nasal spray, ipratropium bromide 42 mcg (0.06 %) nasal spray  INSTILL 2 SPRAYS IN EACH NOSTRIL 3 TIMES A DAY AS NEEDED, Disp: , Rfl:   •  losartan (COZAAR) 25 MG tablet, Take 25 mg by mouth Daily.,  Disp: , Rfl:   •  montelukast (SINGULAIR) 10 MG tablet, Take 10 mg by mouth Every Night., Disp: , Rfl:   •  omeprazole (priLOSEC) 40 MG capsule, Take 1 capsule by mouth Daily., Disp: 90 capsule, Rfl: 3  •  oxybutynin XL (DITROPAN-XL) 10 MG 24 hr tablet, Take 10 mg by mouth Daily., Disp: , Rfl: 1  •  pravastatin (PRAVACHOL) 20 MG tablet, Take 40 mg by mouth Daily., Disp: , Rfl:   •  Vitamin D, Cholecalciferol, (CHOLECALCIFEROL) 400 units tablet, Take 400 Units by mouth Daily., Disp: , Rfl:   •  sodium-potassium-magnesium sulfates (SUPREP BOWEL PREP KIT) 17.5-3.13-1.6 GM/177ML solution oral solution, Take 2 bottles by mouth Take As Directed. Do not eat the day before your procedure. If you didn't receive instructions call [phone]., Disp: 354 mL, Rfl: 0    The following portions of the patient's history were reviewed today and updated as appropriate: allergies, current medications, past family history, past medical history, past social history, past surgical history and problem list     Review of Systems   Constitution: Positive for weakness and malaise/fatigue. Negative for chills, decreased appetite, diaphoresis, fever, night sweats, weight gain and weight loss.   HENT: Positive for hearing loss. Negative for congestion, hoarse voice and nosebleeds.    Eyes: Negative for blurred vision, visual disturbance and visual halos.   Cardiovascular: Positive for chest pain and irregular heartbeat. Negative for claudication, cyanosis, dyspnea on exertion, leg swelling, near-syncope, orthopnea, palpitations, paroxysmal nocturnal dyspnea and syncope.   Respiratory: Negative for cough, hemoptysis, shortness of breath, sleep disturbances due to breathing, snoring, sputum production and wheezing.    Hematologic/Lymphatic: Negative for bleeding problem. Does not bruise/bleed easily.   Skin: Negative for dry skin, itching and rash.   Musculoskeletal: Negative for arthritis, falls, joint pain, joint swelling and myalgias.  "  Gastrointestinal: Negative for bloating, abdominal pain, constipation, diarrhea, flatus, heartburn, hematemesis, hematochezia, melena, nausea and vomiting.   Genitourinary: Negative for dysuria, frequency, hematuria, nocturia and urgency.   Neurological: Positive for dizziness and light-headedness. Negative for excessive daytime sleepiness, headaches and loss of balance.   Psychiatric/Behavioral: Negative for depression. The patient does not have insomnia and is not nervous/anxious.        Objective:     Vitals:    11/06/19 1025 11/06/19 1046 11/06/19 1047   BP: 134/78 120/72 114/70   BP Location: Right arm Left arm Left arm   Patient Position: Sitting Sitting Standing   Cuff Size: Adult Adult Adult   Pulse: 102  96   Resp: 20     Temp: 97.8 °F (36.6 °C)     TempSrc: Temporal     SpO2: 98%  93%   Weight: 53.7 kg (118 lb 7 oz)     Height: 139.7 cm (55\")       Body mass index is 27.53 kg/m².  Physical Exam   Constitutional: She is oriented to person, place, and time. She appears well-developed and well-nourished. She is active and cooperative. No distress.   HENT:   Head: Normocephalic and atraumatic.   Mouth/Throat: Oropharynx is clear and moist.   Eyes: Conjunctivae and EOM are normal. Pupils are equal, round, and reactive to light.   Neck: Normal range of motion. Neck supple. No JVD present. No tracheal deviation present. No thyromegaly present.   Cardiovascular: Normal rate, regular rhythm, normal heart sounds and intact distal pulses.   Pulmonary/Chest: Effort normal and breath sounds normal.   Abdominal: Soft. Bowel sounds are normal. She exhibits no distension. There is no tenderness.   Musculoskeletal: Normal range of motion.   Neurological: She is alert and oriented to person, place, and time.   Skin: Skin is warm, dry and intact.   Psychiatric: She has a normal mood and affect. Her behavior is normal.   Nursing note and vitals reviewed.      Lab and Diagnostic Review:  Results for orders placed or " performed during the hospital encounter of 11/02/19   Comprehensive Metabolic Panel   Result Value Ref Range    Glucose 192 (H) 65 - 99 mg/dL    BUN 14 6 - 20 mg/dL    Creatinine 0.57 0.57 - 1.00 mg/dL    Sodium 145 136 - 145 mmol/L    Potassium 3.5 3.5 - 5.2 mmol/L    Chloride 107 98 - 107 mmol/L    CO2 28.0 22.0 - 29.0 mmol/L    Calcium 9.2 8.6 - 10.5 mg/dL    Total Protein 7.1 6.0 - 8.5 g/dL    Albumin 4.00 3.50 - 5.20 g/dL    ALT (SGPT) 55 (H) 1 - 33 U/L    AST (SGOT) 34 (H) 1 - 32 U/L    Alkaline Phosphatase 219 (H) 39 - 117 U/L    Total Bilirubin 0.2 0.2 - 1.2 mg/dL    eGFR  African Amer 133 >60 mL/min/1.73    Globulin 3.1 gm/dL    A/G Ratio 1.3 g/dL    BUN/Creatinine Ratio 24.6 7.0 - 25.0    Anion Gap 10.0 5.0 - 15.0 mmol/L   Troponin   Result Value Ref Range    Troponin T <0.010 0.000 - 0.030 ng/mL   Magnesium   Result Value Ref Range    Magnesium 1.7 1.6 - 2.6 mg/dL   Urinalysis With Microscopic If Indicated (No Culture) - Urine, Clean Catch   Result Value Ref Range    Color, UA Yellow Yellow, Straw    Appearance, UA Clear Clear    pH, UA 6.5 5.0 - 8.0    Specific Gravity, UA 1.018 1.001 - 1.030    Glucose, UA Negative Negative    Ketones, UA Negative Negative    Bilirubin, UA Negative Negative    Blood, UA Negative Negative    Protein, UA Negative Negative    Leuk Esterase, UA Small (1+) (A) Negative    Nitrite, UA Negative Negative    Urobilinogen, UA 0.2 E.U./dL 0.2 - 1.0 E.U./dL   CBC Auto Differential   Result Value Ref Range    WBC 9.05 3.40 - 10.80 10*3/mm3    RBC 4.49 3.77 - 5.28 10*6/mm3    Hemoglobin 12.9 12.0 - 15.9 g/dL    Hematocrit 40.5 34.0 - 46.6 %    MCV 90.2 79.0 - 97.0 fL    MCH 28.7 26.6 - 33.0 pg    MCHC 31.9 31.5 - 35.7 g/dL    RDW 12.4 12.3 - 15.4 %    RDW-SD 40.9 37.0 - 54.0 fl    MPV 11.6 6.0 - 12.0 fL    Platelets 167 140 - 450 10*3/mm3    Neutrophil % 68.9 42.7 - 76.0 %    Lymphocyte % 22.2 19.6 - 45.3 %    Monocyte % 8.1 5.0 - 12.0 %    Eosinophil % 0.1 (L) 0.3 - 6.2 %     Basophil % 0.3 0.0 - 1.5 %    Immature Grans % 0.4 0.0 - 0.5 %    Neutrophils, Absolute 6.23 1.70 - 7.00 10*3/mm3    Lymphocytes, Absolute 2.01 0.70 - 3.10 10*3/mm3    Monocytes, Absolute 0.73 0.10 - 0.90 10*3/mm3    Eosinophils, Absolute 0.01 0.00 - 0.40 10*3/mm3    Basophils, Absolute 0.03 0.00 - 0.20 10*3/mm3    Immature Grans, Absolute 0.04 0.00 - 0.05 10*3/mm3    nRBC 0.0 0.0 - 0.2 /100 WBC   BNP   Result Value Ref Range    proBNP 32.9 5.0 - 900.0 pg/mL   TSH   Result Value Ref Range    TSH 1.720 0.270 - 4.200 uIU/mL   Beta Hydroxybutyrate Quantitative   Result Value Ref Range    Beta-Hydroxybutyrate Quant 0.099 0.020 - 0.270 mmol/L   Blood Gas, Arterial With Co-Ox   Result Value Ref Range    Enrique's Test N/A     pH, Arterial 7.396 7.350 - 7.450 pH units    pCO2, Arterial 48.0 (H) 35.0 - 45.0 mm Hg    pO2, Arterial 72.6 (L) 83.0 - 108.0 mm Hg    HCO3, Arterial 29.4 (H) 20.0 - 26.0 mmol/L    Base Excess, Arterial 3.6 (H) 0.0 - 2.0 mmol/L    Hemoglobin, Blood Gas 13.5 (L) 14 - 18 g/dL    Hematocrit, Blood Gas 41.4 %    Oxyhemoglobin 91.6 (L) 94 - 99 %    Methemoglobin 1.30 0.00 - 1.50 %    Carboxyhemoglobin 1.0 0 - 2 %    CO2 Content 30.9 22 - 33 mmol/L    Temperature 37.0 C    Barometric Pressure for Blood Gas      Modality Room Air     FIO2 21 %    Ventilator Mode       Note      pH, Temp Corrected 7.396 pH Units    pCO2, Temperature Corrected 48.0 (H) 35 - 45 mm Hg    pO2, Temperature Corrected 72.6 (L) 83 - 108 mm Hg   CK   Result Value Ref Range    Creatine Kinase 141 20 - 180 U/L   D-dimer, Quantitative   Result Value Ref Range    D-Dimer, Quantitative 0.28 0.00 - 0.56 MCGFEU/mL   Urinalysis, Microscopic Only - Urine, Clean Catch   Result Value Ref Range    RBC, UA None Seen None Seen, 0-2 /HPF    WBC, UA 0-2 None Seen, 0-2 /HPF    Bacteria, UA None Seen None Seen, Trace /HPF    Squamous Epithelial Cells, UA 0-2 None Seen, 0-2 /HPF    Hyaline Casts, UA 0-6 0 - 6 /LPF    Methodology Automated Microscopy     Magnesium   Result Value Ref Range    Magnesium 1.8 1.6 - 2.6 mg/dL     EKG: Sinus tachycardia with short MI at 135 bpm  Possible Left atrial enlargement  Right bundle branch block  Abnormal ECG  No previous ECGs available  Confirmed by TERI DUNCAN MD ([id number]) on 11/2/2019 5:00:17 PM  EKG 2nd set:   Normal sinus rhythm  Right bundle branch block  Abnormal ECG  When compared with ECG of 02-NOV-2019 13:16,  Non-specific change in ST segment in Inferior leads  T wave inversion no longer evident in Anterior leads  Confirmed by TERI DUNCAN MD ([id number]) on 11/2/2019 5:28:59 PM  Assessment and Plan:   1. Precordial pain  yesy score: 1  - Treadmill Stress Test; Future    2. Type 1 diabetes mellitus with hyperglycemia (CMS/HCC)  Insulin pump in place  - Treadmill Stress Test; Future    3. Abnormal EKG    - Treadmill Stress Test; Future    4. Other fatigue    - Treadmill Stress Test; Future    5. Dizziness    - Treadmill Stress Test; Future  - Holter Monitor - 72 Hour Up To 21 Days; Future    6. Tachycardia    - Holter Monitor - 72 Hour Up To 21 Days; Future      F/u 4 weeks    It has been a pleasure to participate in the care of this patient.  Patient was instructed to call the [facility] with any questions, concerns, or worsening symptoms.    *Please note that portions of this note were completed with a voice recognition program. Efforts were made to edit the dictations, but occasionally words are mistranscribed.

## 2019-11-07 ENCOUNTER — TELEPHONE (OUTPATIENT)
Dept: CARDIOLOGY | Facility: HOSPITAL | Age: 59
End: 2019-11-07

## 2019-11-07 NOTE — TELEPHONE ENCOUNTER
Patient called in and was not sure how to operate to monitor when having a sypmtom.  Advised her on pressing the button, in the center of the monitor and checking the appropriate box on the diary. Patient verbalized understanding and had no further questions.

## 2019-12-04 ENCOUNTER — OFFICE VISIT (OUTPATIENT)
Dept: CARDIOLOGY | Facility: HOSPITAL | Age: 59
End: 2019-12-04

## 2019-12-04 VITALS
OXYGEN SATURATION: 97 % | RESPIRATION RATE: 18 BRPM | TEMPERATURE: 97.2 F | DIASTOLIC BLOOD PRESSURE: 85 MMHG | HEART RATE: 91 BPM | SYSTOLIC BLOOD PRESSURE: 134 MMHG | BODY MASS INDEX: 28.06 KG/M2 | WEIGHT: 121.25 LBS | HEIGHT: 55 IN

## 2019-12-04 DIAGNOSIS — R07.2 PRECORDIAL PAIN: ICD-10-CM

## 2019-12-04 DIAGNOSIS — E10.65 TYPE 1 DIABETES MELLITUS WITH HYPERGLYCEMIA (HCC): ICD-10-CM

## 2019-12-04 DIAGNOSIS — R00.0 TACHYCARDIA: Primary | ICD-10-CM

## 2019-12-04 PROCEDURE — 99214 OFFICE O/P EST MOD 30 MIN: CPT | Performed by: NURSE PRACTITIONER

## 2019-12-04 RX ORDER — DAPAGLIFLOZIN 5 MG/1
5 TABLET, FILM COATED ORAL DAILY
Refills: 1 | COMMUNITY
Start: 2019-11-14 | End: 2021-07-10

## 2019-12-04 RX ORDER — DILTIAZEM HYDROCHLORIDE 120 MG/1
120 CAPSULE, EXTENDED RELEASE ORAL DAILY
Qty: 30 CAPSULE | Refills: 11 | Status: SHIPPED | OUTPATIENT
Start: 2019-12-04 | End: 2020-01-02

## 2019-12-04 NOTE — PROGRESS NOTES
[facility]  Heart and Valve Center      Encounter Date:12/04/2019     Emily Grove  [address] [facility] [address]  [unfilled]    1960    Marie Morales MD    Emily Grove is a 59 y.o. female.      Subjective:     Chief Complaint:  Palpitations and Follow-up       HPI 59-year-old female presents the office today for ongoing evaluation of her palpitations.She was seen last in the office on 11/6/2019 with complaints of generalized weakness palpitations and tachycardia.  History significant for hypertension hyperlipidemia and diabetes.  She does have an insulin pump.  Patient when seen in the ED had an heart rate of 135.  She was given 2 L of saline and rate improved to 93 prior to discharge from the ED.  At that time she was noting intermittent left-sided chest pain.  And underwent a GXT on 11/6/2019 which was negative for ischemia and exercise-induced arrhythmias.  She also were extended Holter and is here today to follow-up on those results.  Notes she is feeling better overall.      Patient Active Problem List   Diagnosis   • Fatty liver   • Family history of polyps in the colon   • Gastroesophageal reflux disease   • Precordial pain   • Type 1 diabetes mellitus with hyperglycemia (CMS/HCC)   • Abnormal EKG   • Other fatigue   • Dizziness   • Tachycardia       Past Medical History:   Diagnosis Date   • Diabetes mellitus (CMS/HCC)    • Hyperlipidemia    • Hypertension    • Overactive bladder        Past Surgical History:   Procedure Laterality Date   • BLADDER SURGERY      GALL BLADDER   • CARPAL TUNNEL RELEASE     • FINGER FUSION     • FOOT SURGERY     • TONSILLECTOMY         Family History   Problem Relation Age of Onset   • Diabetes Mother    • Stroke Mother    • COPD Father    • Stroke Father    • Hypertension Sister    • Diabetes Brother    • Heart attack Brother    • Parkinsonism Brother    • No Known Problems Maternal Grandmother    • Diabetes Maternal Grandfather     • Stroke Maternal Grandfather    • Heart failure Paternal Grandmother    • Diabetes Paternal Grandmother    • No Known Problems Paternal Grandfather        Social History     Socioeconomic History   • Marital status:      Spouse name: Not on file   • Number of children: Not on file   • Years of education: Not on file   • Highest education level: Not on file   Tobacco Use   • Smoking status: Never Smoker   • Smokeless tobacco: Never Used   Substance and Sexual Activity   • Alcohol use: No     Frequency: Never   • Drug use: No   • Sexual activity: Defer   Social History Narrative    CAFFEINE 0       No Known Allergies      Current Outpatient Medications:   •  clobetasol (TEMOVATE) 0.05 % external solution, clobetasol 0.05 % scalp solution, Disp: , Rfl:   •  Continuous Blood Gluc Sensor (FREESTYLE ROSA 14 DAY SENSOR) [facility], See Admin Instructions., Disp: , Rfl: 6  •  cyclobenzaprine (FLEXERIL) 5 MG tablet, Take 5 mg by mouth As Needed., Disp: , Rfl:   •  FARXIGA 5 MG tablet tablet, Take 5 mg by mouth Daily., Disp: , Rfl: 1  •  Fluocinolone Acetonide Scalp 0.01 % oil, , Disp: , Rfl:   •  fluticasone (FLONASE) 50 MCG/ACT nasal spray, fluticasone propionate 50 mcg/actuation nasal spray,suspension, Disp: , Rfl:   •  glucose blood test strip, FreeStyle Test strips, Disp: , Rfl:   •  HYDROcodone-acetaminophen (NORCO) 5-325 MG per tablet, Take 5-325 tablets by mouth., Disp: , Rfl:   •  ibuprofen (ADVIL,MOTRIN) 800 MG tablet, Take 800 mg by mouth As Needed., Disp: , Rfl:   •  insulin aspart (NOVOLOG) 100 UNIT/ML injection, Novolog U-100 Insulin aspart 100 unit/mL subcutaneous solution, Disp: , Rfl:   •  Insulin Pump Accessories misc, , Disp: , Rfl:   •  ipratropium (ATROVENT) 0.06 % nasal spray, ipratropium bromide 42 mcg (0.06 %) nasal spray  INSTILL 2 SPRAYS IN EACH NOSTRIL 3 TIMES A DAY AS NEEDED, Disp: , Rfl:   •  losartan (COZAAR) 25 MG tablet, Take 25 mg by mouth Daily., Disp: , Rfl:   •  montelukast (SINGULAIR)  10 MG tablet, Take 10 mg by mouth Every Night., Disp: , Rfl:   •  omeprazole (priLOSEC) 40 MG capsule, Take 1 capsule by mouth Daily., Disp: 90 capsule, Rfl: 3  •  oxybutynin XL (DITROPAN-XL) 10 MG 24 hr tablet, Take 10 mg by mouth Daily., Disp: , Rfl: 1  •  pravastatin (PRAVACHOL) 20 MG tablet, Take 40 mg by mouth Daily., Disp: , Rfl:   •  sodium-potassium-magnesium sulfates (SUPREP BOWEL PREP KIT) 17.5-3.13-1.6 GM/177ML solution oral solution, Take 2 bottles by mouth Take As Directed. Do not eat the day before your procedure. If you didn't receive instructions call [phone]., Disp: 354 mL, Rfl: 0  •  Vitamin D, Cholecalciferol, (CHOLECALCIFEROL) 400 units tablet, Take 400 Units by mouth Daily., Disp: , Rfl:     The following portions of the patient's history were reviewed today and updated as appropriate: allergies, current medications, past family history, past medical history, past social history, past surgical history and problem list     Review of Systems   Constitution: Negative for chills, decreased appetite, diaphoresis, fever, weakness, malaise/fatigue, night sweats, weight gain and weight loss.   HENT: Negative for congestion, hearing loss, hoarse voice and nosebleeds.    Eyes: Negative for blurred vision, visual disturbance and visual halos.   Cardiovascular: Positive for irregular heartbeat. Negative for chest pain, claudication, cyanosis, dyspnea on exertion, leg swelling, near-syncope, orthopnea, palpitations, paroxysmal nocturnal dyspnea and syncope.   Respiratory: Negative for cough, hemoptysis, shortness of breath, sleep disturbances due to breathing, snoring, sputum production and wheezing.    Hematologic/Lymphatic: Negative for bleeding problem. Does not bruise/bleed easily.   Skin: Negative for dry skin, itching and rash.   Musculoskeletal: Positive for joint pain. Negative for arthritis, falls, joint swelling and myalgias.   Gastrointestinal: Negative for bloating, abdominal pain,  "constipation, diarrhea, flatus, heartburn, hematemesis, hematochezia, melena, nausea and vomiting.   Genitourinary: Negative for dysuria, frequency, hematuria, nocturia and urgency.   Neurological: Positive for dizziness (resolved). Negative for excessive daytime sleepiness, headaches, light-headedness and loss of balance.   Psychiatric/Behavioral: Negative for depression. The patient does not have insomnia and is not nervous/anxious.        Objective:     Vitals:    12/04/19 1228   BP: 134/85   BP Location: Left arm   Patient Position: Sitting   Cuff Size: Adult   Pulse: 91   Resp: 18   Temp: 97.2 °F (36.2 °C)   TempSrc: Temporal   SpO2: 97%   Weight: 55 kg (121 lb 4 oz)   Height: 139.7 cm (55\")     Body mass index is 28.18 kg/m².  Physical Exam   Constitutional: She is oriented to person, place, and time. She appears well-developed and well-nourished. She is active and cooperative. No distress.   HENT:   Head: Normocephalic and atraumatic.   Mouth/Throat: Oropharynx is clear and moist.   Eyes: Conjunctivae and EOM are normal. Pupils are equal, round, and reactive to light.   Neck: Normal range of motion. Neck supple. No JVD present. No tracheal deviation present. No thyromegaly present.   Cardiovascular: Normal rate, regular rhythm, normal heart sounds and intact distal pulses.   Pulmonary/Chest: Effort normal and breath sounds normal.   Abdominal: Soft. Bowel sounds are normal. She exhibits no distension. There is no tenderness.   Musculoskeletal: Normal range of motion.   Neurological: She is alert and oriented to person, place, and time.   Skin: Skin is warm, dry and intact.   Psychiatric: She has a normal mood and affect. Her behavior is normal.   Nursing note and vitals reviewed.      Lab and Diagnostic Review:  Results for orders placed or performed during the hospital encounter of 11/06/19   Treadmill Stress Test   Result Value Ref Range     CV STRESS PROTOCOL 1 Amauri     Stage 1 1     Duration Min Stage " 1 3     Duration Sec Stage 1 0     Grade Stage 1 10     Speed Stage 1 1.7     BH CV STRESS METS STAGE 1 5     Baseline  bpm    Baseline /94 mmHg    Target HR (85%) 139 bpm    Max. Pred. HR (100%) 163 bpm    HR Stage 1 148     BP Stage 1 150/90     O2 Stage 1 98     Stage 2 2     HR Stage 2 166     BP Stage 2 158/86     O2 Stage 2 98     Duration Min Stage 2 3     Duration Sec Stage 2 0     Grade Stage 2 12     Speed Stage 2 2.5     BH CV STRESS METS STAGE 2 7.5     O2 sat rest 98 %    Peak  bpm    Percent Max Pred .84 %    Percent Target  %    Peak /86 mmHg    O2 sat peak 98 %    Exercise duration (min) 6 min    Exercise duration (sec) 0 sec    Recovery /82 mmHg    Recovery  bpm    Estimated workload 7.0 METS     Extended holter: avg hr 100, PAC/PVC <1%  4 runs svt with longest lasting 4 beats     Assessment and Plan:   1. Tachycardia  Begin diltiazem 120 mg daily    2. Precordial pain  Resolved   Normal GXT    3. Type 1 diabetes mellitus with hyperglycemia (CMS/[facility])  Insulin pump    follow up in 4 weeks to reevaluate effects of diltiazem    It has been a pleasure to participate in the care of this patient.  Patient was instructed to call the [facility] with any questions, concerns, or worsening symptoms.    *Please note that portions of this note were completed with a voice recognition program. Efforts were made to edit the dictations, but occasionally words are mistranscribed.

## 2019-12-10 PROCEDURE — 0298T PR EXT ECG > 48HR TO 21 DAY REVIEW AND INTERPRETATN: CPT | Performed by: INTERNAL MEDICINE

## 2020-01-02 ENCOUNTER — OFFICE VISIT (OUTPATIENT)
Dept: CARDIOLOGY | Facility: HOSPITAL | Age: 60
End: 2020-01-02

## 2020-01-02 VITALS
SYSTOLIC BLOOD PRESSURE: 123 MMHG | WEIGHT: 121.13 LBS | DIASTOLIC BLOOD PRESSURE: 74 MMHG | HEART RATE: 95 BPM | TEMPERATURE: 97.3 F | HEIGHT: 55 IN | RESPIRATION RATE: 18 BRPM | OXYGEN SATURATION: 100 % | BODY MASS INDEX: 28.03 KG/M2

## 2020-01-02 DIAGNOSIS — R07.2 PRECORDIAL PAIN: ICD-10-CM

## 2020-01-02 DIAGNOSIS — R42 DIZZINESS: ICD-10-CM

## 2020-01-02 DIAGNOSIS — R00.0 TACHYCARDIA: Primary | ICD-10-CM

## 2020-01-02 PROCEDURE — 99214 OFFICE O/P EST MOD 30 MIN: CPT | Performed by: NURSE PRACTITIONER

## 2020-01-02 RX ORDER — DILTIAZEM HYDROCHLORIDE 180 MG/1
180 CAPSULE, EXTENDED RELEASE ORAL DAILY
Qty: 90 CAPSULE | Refills: 3 | Status: CANCELLED | OUTPATIENT
Start: 2020-01-02

## 2020-01-02 RX ORDER — DILTIAZEM HYDROCHLORIDE 180 MG/1
180 CAPSULE, EXTENDED RELEASE ORAL DAILY
Qty: 30 CAPSULE | Refills: 11 | Status: SHIPPED | OUTPATIENT
Start: 2020-01-02 | End: 2020-12-14 | Stop reason: DRUGHIGH

## 2020-01-02 NOTE — PROGRESS NOTES
[facility]  [facility]      Encounter Date:01/02/2020     Emily MILAN [facility] [address]  [unfilled]    1960    Marie Morales MD    Emily Grove is a 59 y.o. female.      Subjective:     Chief Complaint:  Palpitations and Follow-up       HPI 59-year-old female presents the office today for ongoing evaluation of her palpitations and tachycardia.  She was seen last in the office on 12/4/2019 and diltiazem 120 mg was initiated daily.  She presents today with her blood pressure and heart rate log.  Blood pressure has been 125/ 6/86 with heart rates 90s to low 100s.  She reports she is feeling well and has no complaints today of dyspnea, precordial pain, palpitations, tachycardia, presyncope or syncope.  She notes she is planning to go back to the gym at the beginning of next week.      Patient Active Problem List   Diagnosis   • Fatty liver   • Family history of polyps in the colon   • Gastroesophageal reflux disease   • Precordial pain   • Type 1 diabetes mellitus with hyperglycemia (CMS/HCC)   • Abnormal EKG   • Other fatigue   • Dizziness   • Tachycardia       Past Medical History:   Diagnosis Date   • Diabetes mellitus (CMS/HCC)    • Hyperlipidemia    • Hypertension    • Overactive bladder        Past Surgical History:   Procedure Laterality Date   • BLADDER SURGERY      GALL BLADDER   • CARPAL TUNNEL RELEASE     • FINGER FUSION     • FOOT SURGERY     • TONSILLECTOMY         Family History   Problem Relation Age of Onset   • Diabetes Mother    • Stroke Mother    • COPD Father    • Stroke Father    • Hypertension Sister    • Diabetes Brother    • Heart attack Brother    • Parkinsonism Brother    • No Known Problems Maternal Grandmother    • Diabetes Maternal Grandfather    • Stroke Maternal Grandfather    • Heart failure Paternal Grandmother    • Diabetes Paternal Grandmother    • No Known Problems Paternal Grandfather        Social History      Socioeconomic History   • Marital status:      Spouse name: Not on file   • Number of children: Not on file   • Years of education: Not on file   • Highest education level: Not on file   Tobacco Use   • Smoking status: Never Smoker   • Smokeless tobacco: Never Used   Substance and Sexual Activity   • Alcohol use: No     Frequency: Never   • Drug use: No   • Sexual activity: Defer   Social History Narrative    CAFFEINE 0       No Known Allergies      Current Outpatient Medications:   •  clobetasol (TEMOVATE) 0.05 % external solution, clobetasol 0.05 % scalp solution, Disp: , Rfl:   •  Continuous Blood Gluc Sensor (FREESTYLE ROSA 14 DAY SENSOR) [facility], See Admin Instructions., Disp: , Rfl: 6  •  cyclobenzaprine (FLEXERIL) 5 MG tablet, Take 5 mg by mouth As Needed., Disp: , Rfl:   •  FARXIGA 5 MG tablet tablet, Take 5 mg by mouth Daily., Disp: , Rfl: 1  •  Fluocinolone Acetonide Scalp 0.01 % oil, , Disp: , Rfl:   •  fluticasone (FLONASE) 50 MCG/ACT nasal spray, fluticasone propionate 50 mcg/actuation nasal spray,suspension, Disp: , Rfl:   •  glucose blood test strip, FreeStyle Test strips, Disp: , Rfl:   •  HYDROcodone-acetaminophen (NORCO) 5-325 MG per tablet, Take 5-325 tablets by mouth., Disp: , Rfl:   •  ibuprofen (ADVIL,MOTRIN) 800 MG tablet, Take 800 mg by mouth As Needed., Disp: , Rfl:   •  insulin aspart (NOVOLOG) 100 UNIT/ML injection, Novolog U-100 Insulin aspart 100 unit/mL subcutaneous solution, Disp: , Rfl:   •  Insulin Pump Accessories misc, , Disp: , Rfl:   •  ipratropium (ATROVENT) 0.06 % nasal spray, ipratropium bromide 42 mcg (0.06 %) nasal spray  INSTILL 2 SPRAYS IN EACH NOSTRIL 3 TIMES A DAY AS NEEDED, Disp: , Rfl:   •  losartan (COZAAR) 25 MG tablet, Take 25 mg by mouth Daily., Disp: , Rfl:   •  montelukast (SINGULAIR) 10 MG tablet, Take 10 mg by mouth Every Night., Disp: , Rfl:   •  omeprazole (priLOSEC) 40 MG capsule, Take 1 capsule by mouth Daily., Disp: 90 capsule, Rfl: 3  •   oxybutynin XL (DITROPAN-XL) 10 MG 24 hr tablet, Take 10 mg by mouth Daily., Disp: , Rfl: 1  •  pravastatin (PRAVACHOL) 20 MG tablet, Take 40 mg by mouth Daily., Disp: , Rfl:   •  sodium-potassium-magnesium sulfates (SUPREP BOWEL PREP KIT) 17.5-3.13-1.6 GM/177ML solution oral solution, Take 2 bottles by mouth Take As Directed. Do not eat the day before your procedure. If you didn't receive instructions call [phone]., Disp: 354 mL, Rfl: 0  •  Vitamin D, Cholecalciferol, (CHOLECALCIFEROL) 400 units tablet, Take 400 Units by mouth Daily., Disp: , Rfl:   Diltiazem 120 mg daily     The following portions of the patient's history were reviewed today and updated as appropriate: allergies, current medications, past family history, past medical history, past social history, past surgical history and problem list     Review of Systems   Constitution: Negative for chills, decreased appetite, diaphoresis, fever, malaise/fatigue, night sweats, weight gain and weight loss.   HENT: Positive for hearing loss. Negative for congestion, hoarse voice and nosebleeds.    Eyes: Negative for blurred vision, visual disturbance and visual halos.   Cardiovascular: Negative for chest pain, claudication, cyanosis, dyspnea on exertion, irregular heartbeat, leg swelling, near-syncope, orthopnea, palpitations, paroxysmal nocturnal dyspnea and syncope.   Respiratory: Negative for cough, hemoptysis, shortness of breath, sleep disturbances due to breathing, snoring, sputum production and wheezing.    Endocrine: Positive for polydipsia and polyphagia.   Hematologic/Lymphatic: Negative for bleeding problem. Does not bruise/bleed easily.   Skin: Negative for dry skin, itching and rash.   Musculoskeletal: Negative for arthritis, falls, joint pain, joint swelling and myalgias.   Gastrointestinal: Negative for bloating, abdominal pain, constipation, diarrhea, flatus, heartburn, hematemesis, hematochezia, melena, nausea and vomiting.   Genitourinary:  "Negative for dysuria, frequency, hematuria, nocturia and urgency.   Neurological: Negative for excessive daytime sleepiness, dizziness, headaches, light-headedness, loss of balance and weakness.   Psychiatric/Behavioral: Negative for depression. The patient does not have insomnia and is not nervous/anxious.        Objective:     Vitals:    01/02/20 1431   BP: 123/74   BP Location: Right arm   Patient Position: Sitting   Cuff Size: Adult   Pulse: 95   Resp: 18   Temp: 97.3 °F (36.3 °C)   TempSrc: Temporal   SpO2: 100%   Weight: 54.9 kg (121 lb 2 oz)   Height: 139.7 cm (55\")     Body mass index is 28.15 kg/m².  Physical Exam   Constitutional: She is oriented to person, place, and time. She appears well-developed and well-nourished. She is active and cooperative. No distress.   HENT:   Head: Normocephalic and atraumatic.   Mouth/Throat: Oropharynx is clear and moist.   Eyes: Pupils are equal, round, and reactive to light. Conjunctivae and EOM are normal.   Neck: Normal range of motion. Neck supple. No JVD present. No tracheal deviation present. No thyromegaly present.   Cardiovascular: Normal rate, regular rhythm, normal heart sounds and intact distal pulses.   Pulmonary/Chest: Effort normal and breath sounds normal.   Abdominal: Soft. Bowel sounds are normal. She exhibits no distension. There is no tenderness.   Musculoskeletal: Normal range of motion.   Neurological: She is alert and oriented to person, place, and time.   Skin: Skin is warm, dry and intact.   Psychiatric: She has a normal mood and affect. Her behavior is normal.   Nursing note and vitals reviewed.      Lab and Diagnostic Review:  Results for orders placed or performed during the hospital encounter of 11/06/19   Treadmill Stress Test   Result Value Ref Range    BH CV STRESS PROTOCOL 1 Amauri     Stage 1 1     Duration Min Stage 1 3     Duration Sec Stage 1 0     Grade Stage 1 10     Speed Stage 1 1.7     BH CV STRESS METS STAGE 1 5     Baseline  " bpm    Baseline /94 mmHg    Target HR (85%) 139 bpm    Max. Pred. HR (100%) 163 bpm    HR Stage 1 148     BP Stage 1 150/90     O2 Stage 1 98     Stage 2 2     HR Stage 2 166     BP Stage 2 158/86     O2 Stage 2 98     Duration Min Stage 2 3     Duration Sec Stage 2 0     Grade Stage 2 12     Speed Stage 2 2.5     BH CV STRESS METS STAGE 2 7.5     O2 sat rest 98 %    Peak  bpm    Percent Max Pred .84 %    Percent Target  %    Peak /86 mmHg    O2 sat peak 98 %    Exercise duration (min) 6 min    Exercise duration (sec) 0 sec    Recovery /82 mmHg    Recovery  bpm    Estimated workload 7.0 METS         Assessment and Plan:   1. Tachycardia  Improved with initiation of diltiazem but hrs still 90-low 100s  Increase diltiazem to 180 mg daily  Watch hrs closely    2. Dizziness  resolved    3. Precordial pain  Normal gxt November 2019        It has been a pleasure to participate in the care of this patient.  Patient was instructed to call the [facility] with any questions, concerns, or worsening symptoms.    *Please note that portions of this note were completed with a voice recognition program. Efforts were made to edit the dictations, but occasionally words are mistranscribed.

## 2020-02-20 ENCOUNTER — TRANSCRIBE ORDERS (OUTPATIENT)
Dept: ADMINISTRATIVE | Facility: HOSPITAL | Age: 60
End: 2020-02-20

## 2020-02-20 DIAGNOSIS — H55.00 NYSTAGMUS: Primary | ICD-10-CM

## 2020-02-20 DIAGNOSIS — R42 DIZZINESS: ICD-10-CM

## 2020-03-05 ENCOUNTER — HOSPITAL ENCOUNTER (OUTPATIENT)
Dept: MRI IMAGING | Facility: HOSPITAL | Age: 60
Discharge: HOME | End: 2020-03-05
Admitting: INTERNAL MEDICINE

## 2020-03-05 DIAGNOSIS — H55.00 NYSTAGMUS: ICD-10-CM

## 2020-03-05 DIAGNOSIS — R42 DIZZINESS: ICD-10-CM

## 2020-03-05 PROCEDURE — 70551 MRI BRAIN STEM W/O DYE: CPT

## 2020-07-03 ENCOUNTER — APPOINTMENT (OUTPATIENT)
Dept: PREADMISSION TESTING | Facility: HOSPITAL | Age: 60
End: 2020-07-03

## 2020-07-03 LAB
REF LAB TEST METHOD: NORMAL
SARS-COV-2 RNA RESP QL NAA+PROBE: NOT DETECTED

## 2020-07-03 PROCEDURE — U0002 COVID-19 LAB TEST NON-CDC: HCPCS

## 2020-07-03 PROCEDURE — C9803 HOPD COVID-19 SPEC COLLECT: HCPCS

## 2020-07-03 PROCEDURE — U0004 COV-19 TEST NON-CDC HGH THRU: HCPCS

## 2020-07-06 RX ORDER — OMEPRAZOLE 40 MG/1
CAPSULE, DELAYED RELEASE ORAL
Qty: 90 CAPSULE | Refills: 3 | Status: SHIPPED | OUTPATIENT
Start: 2020-07-06 | End: 2021-07-10

## 2020-07-07 ENCOUNTER — OUTSIDE FACILITY SERVICE (OUTPATIENT)
Dept: GASTROENTEROLOGY | Facility: CLINIC | Age: 60
End: 2020-07-07

## 2020-07-07 PROCEDURE — 43235 EGD DIAGNOSTIC BRUSH WASH: CPT | Performed by: INTERNAL MEDICINE

## 2020-12-14 ENCOUNTER — HOSPITAL ENCOUNTER (OUTPATIENT)
Dept: CARDIOLOGY | Facility: HOSPITAL | Age: 60
Discharge: HOME | End: 2020-12-14

## 2020-12-14 ENCOUNTER — OFFICE VISIT (OUTPATIENT)
Dept: CARDIOLOGY | Facility: HOSPITAL | Age: 60
End: 2020-12-14

## 2020-12-14 VITALS
HEIGHT: 55 IN | WEIGHT: 113.56 LBS | HEART RATE: 100 BPM | DIASTOLIC BLOOD PRESSURE: 71 MMHG | BODY MASS INDEX: 26.28 KG/M2 | RESPIRATION RATE: 18 BRPM | OXYGEN SATURATION: 98 % | SYSTOLIC BLOOD PRESSURE: 131 MMHG | TEMPERATURE: 96.9 F

## 2020-12-14 DIAGNOSIS — R00.2 PALPITATIONS: ICD-10-CM

## 2020-12-14 DIAGNOSIS — R00.0 TACHYCARDIA: ICD-10-CM

## 2020-12-14 DIAGNOSIS — R00.0 TACHYCARDIA: Primary | ICD-10-CM

## 2020-12-14 DIAGNOSIS — R42 DIZZINESS: ICD-10-CM

## 2020-12-14 LAB
QT INTERVAL: 380 MS
QTC INTERVAL: 464 MS

## 2020-12-14 PROCEDURE — 93010 ELECTROCARDIOGRAM REPORT: CPT | Performed by: INTERNAL MEDICINE

## 2020-12-14 PROCEDURE — 93005 ELECTROCARDIOGRAM TRACING: CPT | Performed by: NURSE PRACTITIONER

## 2020-12-14 PROCEDURE — 99213 OFFICE O/P EST LOW 20 MIN: CPT | Performed by: NURSE PRACTITIONER

## 2020-12-14 RX ORDER — DILTIAZEM HYDROCHLORIDE 240 MG/1
240 CAPSULE, COATED, EXTENDED RELEASE ORAL DAILY
Qty: 30 CAPSULE | Refills: 11 | Status: SHIPPED | OUTPATIENT
Start: 2020-12-14 | End: 2021-07-10

## 2020-12-14 NOTE — PROGRESS NOTES
[facility]  [facility]      12/14/2020         Emily MILAN [facility] [address]  [unfilled]    1960    Marie Morales MD    Emily Grove is a 60 y.o. female.      Subjective:     Chief Complaint:  Rapid Heart Rate and Follow-up       HPI 60-year-old female presents the office today for ongoing evaluation of her tachycardia.  She reports she has been experiencing a higher heart rate over the last few weeks.  Notes a fluttering sensation in her heart.  Denies chest pain, dyspnea, syncope, orthopnea, PND or pedal edema.  Notes compliance with her medications.  Notes blood pressures are well controlled in the 130s systolic.  Notes intermittent dizziness that has worsened over the past few weeks.  Believes dizziness is attributed to her higher heart rate.  Denies lightheadedness.      Patient Active Problem List   Diagnosis   • Fatty liver   • Family history of polyps in the colon   • Gastroesophageal reflux disease   • Precordial pain   • Type 1 diabetes mellitus with hyperglycemia (CMS/HCC)   • Abnormal EKG   • Other fatigue   • Dizziness   • Tachycardia   • Palpitations       Past Medical History:   Diagnosis Date   • Diabetes mellitus (CMS/HCC)    • Hyperlipidemia    • Hypertension    • Overactive bladder        Past Surgical History:   Procedure Laterality Date   • BLADDER SURGERY      GALL BLADDER   • CARPAL TUNNEL RELEASE     • FINGER FUSION     • FOOT SURGERY     • TONSILLECTOMY         Family History   Problem Relation Age of Onset   • Diabetes Mother    • Stroke Mother    • COPD Father    • Stroke Father    • Hypertension Sister    • Diabetes Brother    • Heart attack Brother    • Parkinsonism Brother    • No Known Problems Maternal Grandmother    • Diabetes Maternal Grandfather    • Stroke Maternal Grandfather    • Heart failure Paternal Grandmother    • Diabetes Paternal Grandmother    • No Known Problems Paternal Grandfather        Social History      Socioeconomic History   • Marital status:      Spouse name: Not on file   • Number of children: Not on file   • Years of education: Not on file   • Highest education level: Not on file   Tobacco Use   • Smoking status: Never Smoker   • Smokeless tobacco: Never Used   Substance and Sexual Activity   • Alcohol use: No     Frequency: Never   • Drug use: No   • Sexual activity: Defer   Social History Narrative    CAFFEINE 0       No Known Allergies      Current Outpatient Medications:   •  Continuous Blood Gluc Sensor (FREESTYLE ROSA 14 DAY SENSOR) [facility], See Admin Instructions., Disp: , Rfl: 6  •  cyclobenzaprine (FLEXERIL) 5 MG tablet, Take 5 mg by mouth As Needed., Disp: , Rfl:   •  dilTIAZem XR (DILACOR XR) 180 MG 24 hr capsule, Take 1 capsule by mouth Daily., Disp: 30 capsule, Rfl: 11  •  FARXIGA 5 MG tablet tablet, Take 5 mg by mouth Daily., Disp: , Rfl: 1  •  fluticasone (FLONASE) 50 MCG/ACT nasal spray, fluticasone propionate 50 mcg/actuation nasal spray,suspension, Disp: , Rfl:   •  glucose blood test strip, FreeStyle Test strips, Disp: , Rfl:   •  HYDROcodone-acetaminophen (NORCO) 5-325 MG per tablet, Take 5-325 tablets by mouth Every 4 (Four) Hours As Needed., Disp: , Rfl:   •  ibuprofen (ADVIL,MOTRIN) 800 MG tablet, Take 800 mg by mouth As Needed., Disp: , Rfl:   •  Insulin Pump Accessories misc, , Disp: , Rfl:   •  ipratropium (ATROVENT) 0.06 % nasal spray, ipratropium bromide 42 mcg (0.06 %) nasal spray  INSTILL 2 SPRAYS IN EACH NOSTRIL 3 TIMES A DAY AS NEEDED, Disp: , Rfl:   •  losartan (COZAAR) 25 MG tablet, Take 25 mg by mouth Daily., Disp: , Rfl:   •  montelukast (SINGULAIR) 10 MG tablet, Take 10 mg by mouth Every Night., Disp: , Rfl:   •  omeprazole (priLOSEC) 40 MG capsule, TAKE 1 CAPSULE BY MOUTH EVERY DAY, Disp: 90 capsule, Rfl: 3  •  oxybutynin XL (DITROPAN-XL) 10 MG 24 hr tablet, Take 10 mg by mouth Daily., Disp: , Rfl: 1  •  pravastatin (PRAVACHOL) 20 MG tablet, Take 40 mg by mouth  Daily., Disp: , Rfl:   •  Vitamin D, Cholecalciferol, (CHOLECALCIFEROL) 400 units tablet, Take 400 Units by mouth Daily., Disp: , Rfl:   •  clobetasol (TEMOVATE) 0.05 % external solution, clobetasol 0.05 % scalp solution, Disp: , Rfl:   •  Fluocinolone Acetonide Scalp 0.01 % oil, , Disp: , Rfl:   •  insulin aspart (NOVOLOG) 100 UNIT/ML injection, Novolog U-100 Insulin aspart 100 unit/mL subcutaneous solution, Disp: , Rfl:   •  sodium-potassium-magnesium sulfates (SUPREP BOWEL PREP KIT) 17.5-3.13-1.6 GM/177ML solution oral solution, Take 2 bottles by mouth Take As Directed. Do not eat the day before your procedure. If you didn't receive instructions call [phone]., Disp: 354 mL, Rfl: 0    The following portions of the patient's history were reviewed today and updated as appropriate: allergies, current medications, past family history, past medical history, past social history, past surgical history and problem list     Review of Systems   Constitution: Negative for chills, decreased appetite, diaphoresis, fever, malaise/fatigue, night sweats, weight gain and weight loss.   HENT: Negative for congestion, hearing loss, hoarse voice and nosebleeds.    Eyes: Negative for blurred vision, visual disturbance and visual halos.   Cardiovascular: Positive for irregular heartbeat and palpitations. Negative for chest pain, claudication, cyanosis, dyspnea on exertion, leg swelling, near-syncope, orthopnea, paroxysmal nocturnal dyspnea and syncope.   Respiratory: Negative for cough, hemoptysis, shortness of breath, sleep disturbances due to breathing, snoring, sputum production and wheezing.    Hematologic/Lymphatic: Negative for bleeding problem. Does not bruise/bleed easily.   Skin: Negative for dry skin, itching and rash.   Musculoskeletal: Negative for arthritis, falls, joint pain, joint swelling and myalgias.   Gastrointestinal: Negative for bloating, abdominal pain, constipation, diarrhea, flatus, heartburn,  "hematemesis, hematochezia, melena, nausea and vomiting.   Genitourinary: Negative for dysuria, frequency, hematuria, nocturia and urgency.   Neurological: Positive for dizziness. Negative for excessive daytime sleepiness, headaches, light-headedness, loss of balance and weakness.   Psychiatric/Behavioral: Negative for depression. The patient does not have insomnia and is not nervous/anxious.        Objective:     Vitals:    12/14/20 1038   BP: 131/71   BP Location: Left arm   Patient Position: Sitting   Cuff Size: Adult   Pulse: 100   Resp: 18   Temp: 96.9 °F (36.1 °C)   TempSrc: Temporal   SpO2: 98%   Weight: 51.5 kg (113 lb 9 oz)   Height: 139.7 cm (55\")       Body mass index is 26.39 kg/m².    Vitals signs and nursing note reviewed.   Constitutional:       General: Not in acute distress.     Appearance: Well-developed.   Eyes:      Conjunctiva/sclera: Conjunctivae normal.      Pupils: Pupils are equal, round, and reactive to light.   HENT:      Head: Normocephalic and atraumatic.   Neck:      Musculoskeletal: Normal range of motion and neck supple.      Thyroid: No thyromegaly.      Vascular: No JVD.      Trachea: No tracheal deviation.   Pulmonary:      Effort: Pulmonary effort is normal.      Breath sounds: Normal breath sounds.   Cardiovascular:      PMI at left midclavicular line. Normal rate. Regular rhythm. Normal S1. Normal S2.      Murmurs: There is no murmur.      No gallop. No click. No rub.   Pulses:     Intact distal pulses.   Edema:     Peripheral edema absent.   Abdominal:      General: Bowel sounds are normal. There is no distension.      Palpations: Abdomen is soft.      Tenderness: There is no abdominal tenderness.   Musculoskeletal: Normal range of motion.   Skin:     General: Skin is warm and dry.   Neurological:      Mental Status: Alert and oriented to person, place, and time.   Psychiatric:         Behavior: Behavior normal. Behavior is cooperative.         Lab and Diagnostic " Review:  Results for orders placed or performed in visit on 07/03/20   COVID-19,[facility], NP SWAB IN [facility] MEDIA 24-30 HR TAT - Swab, Nasopharynx    Specimen: Nasopharynx; Swab   Result Value Ref Range    Reference Lab Report      COVID19 Not Detected Not Detected - Ref. Range   EKG today normal sinus rhythm with sinus arrhythmia at 90 bpm, right bundle branch block (not a new finding)      Assessment and Plan:   1. Tachycardia  Increase diltiazem to 240 mg daily  - ECG 12 Lead; Future  - Adult Transthoracic Echo Complete W/ Cont if Necessary Per Protocol; Future    2. Dizziness    - Duplex Carotid Ultrasound CAR; Future  - Adult Transthoracic Echo Complete W/ Cont if Necessary Per Protocol; Future    3. Palpitations  Increase diltiazem to 240 mg daily  - Adult Transthoracic Echo Complete W/ Cont if Necessary Per Protocol; Future      Follow-up in 4 weeks    It has been a pleasure to participate in the care of this patient.  Patient was instructed to call the [facility] with any questions, concerns, or worsening symptoms.    *Please note that portions of this note were completed with a voice recognition program. Efforts were made to edit the dictations, but occasionally words are mistranscribed.

## 2020-12-14 NOTE — PATIENT INSTRUCTIONS
Increase diltiazem to 240 mg daily in place of 180 mg tablet  You will be called with an appt for your echo and carotid duplex

## 2020-12-15 ENCOUNTER — TELEPHONE (OUTPATIENT)
Dept: CARDIOLOGY | Facility: HOSPITAL | Age: 60
End: 2020-12-15

## 2020-12-15 NOTE — TELEPHONE ENCOUNTER
Patient would like to have clarification on the Diltiazem dosage. She states that the pharmacy received 240mg but she thought she was supposed to take 180mg daily. Please advise.

## 2020-12-16 ENCOUNTER — TELEPHONE (OUTPATIENT)
Dept: CARDIOLOGY | Facility: HOSPITAL | Age: 60
End: 2020-12-16

## 2020-12-16 NOTE — TELEPHONE ENCOUNTER
Patient notified to increase to 240mg daily per Jaja and stop 180mg. Patient verbalized understanding.

## 2020-12-16 NOTE — TELEPHONE ENCOUNTER
Patient called and stated she forgot to let you know she is on tiadlyt 240 mg from Dr Morales prescribed that. She wants to know if she needs to continue that medication or diltiazem 240 mg that you prescribed on Monday.

## 2021-01-11 ENCOUNTER — OFFICE VISIT (OUTPATIENT)
Dept: CARDIOLOGY | Facility: HOSPITAL | Age: 61
End: 2021-01-11

## 2021-01-11 VITALS
BODY MASS INDEX: 26.38 KG/M2 | HEIGHT: 55 IN | SYSTOLIC BLOOD PRESSURE: 120 MMHG | WEIGHT: 114 LBS | HEART RATE: 81 BPM | OXYGEN SATURATION: 99 % | DIASTOLIC BLOOD PRESSURE: 71 MMHG | TEMPERATURE: 98.3 F | RESPIRATION RATE: 20 BRPM

## 2021-01-11 DIAGNOSIS — I10 ESSENTIAL HYPERTENSION: ICD-10-CM

## 2021-01-11 DIAGNOSIS — R00.2 PALPITATIONS: ICD-10-CM

## 2021-01-11 DIAGNOSIS — R00.0 TACHYCARDIA: Primary | ICD-10-CM

## 2021-01-11 DIAGNOSIS — R53.83 OTHER FATIGUE: ICD-10-CM

## 2021-01-11 DIAGNOSIS — R42 DIZZINESS: ICD-10-CM

## 2021-01-11 PROCEDURE — 99214 OFFICE O/P EST MOD 30 MIN: CPT | Performed by: NURSE PRACTITIONER

## 2021-01-11 RX ORDER — DILTIAZEM HYDROCHLORIDE 240 MG/1
240 CAPSULE, EXTENDED RELEASE ORAL DAILY
COMMUNITY
Start: 2021-01-10 | End: 2022-03-29

## 2021-01-11 RX ORDER — LOSARTAN POTASSIUM 50 MG/1
50 TABLET ORAL DAILY
COMMUNITY
Start: 2020-12-19 | End: 2022-04-11

## 2021-01-11 RX ORDER — ESOMEPRAZOLE MAGNESIUM 40 MG/1
40 CAPSULE, DELAYED RELEASE ORAL DAILY
COMMUNITY
Start: 2020-12-17

## 2021-01-11 NOTE — PROGRESS NOTES
"Chief Complaint  Rapid Heart Rate, Dizziness, and Follow-up    Subjective    History of Present Illness {CC  Problem List  Visit  Diagnosis   Encounters  Notes  Medications  Labs  Result Review Imaging  Media :23}       History of Present Illness   60 year old female presents to the office today for ongoing evaluation of her dizziness and tachycardia.  She reports tachycardia has resolved.  Notes compliance with her medications.  She presents today with home blood pressure logs showing blood pressures 120s to 140s systolic with heart rates 70s to 103.  She reports yesterday her blood sugar dropped to 96/47 and she did experience dizziness.  She also reports dizziness is often associated with hypoglycemia.  Patient reports her blood sugars have been dropping down in the low 40s.  Upcoming appointment with endocrinology in March. Denies chest pain, syncope, orthopnea, pnd, pedal edema.   Objective     Vital Signs:   Vitals:    01/11/21 1031   BP: 120/71   BP Location: Left arm   Patient Position: Sitting   Cuff Size: Adult   Pulse: 81   Resp: 20   Temp: 98.3 °F (36.8 °C)   TempSrc: Temporal   SpO2: 99%   Weight: 51.7 kg (114 lb)   Height: 139.7 cm (55\")     Body mass index is 26.5 kg/m².  Physical Exam  Vitals signs and nursing note reviewed.   Constitutional:       Appearance: Normal appearance.   HENT:      Head: Normocephalic.   Eyes:      Pupils: Pupils are equal, round, and reactive to light.   Neck:      Musculoskeletal: Normal range of motion.   Cardiovascular:      Rate and Rhythm: Normal rate and regular rhythm.      Pulses: Normal pulses.      Heart sounds: Normal heart sounds. No murmur.   Pulmonary:      Effort: Pulmonary effort is normal.      Breath sounds: Normal breath sounds.   Abdominal:      General: Bowel sounds are normal.      Palpations: Abdomen is soft.   Musculoskeletal: Normal range of motion.      Right lower leg: No edema.      Left lower leg: No edema.   Skin:     General: Skin " is warm and dry.      Capillary Refill: Capillary refill takes less than 2 seconds.   Neurological:      Mental Status: She is alert and oriented to person, place, and time.   Psychiatric:         Mood and Affect: Mood normal.         Thought Content: Thought content normal.              Result Review  Data Reviewed:{ Labs  Result Review  Imaging  Med Tab  Media :23}              Assessment and Plan {CC Problem List  Visit Diagnosis  ROS  Review (Popup)  Health Maintenance  Quality  BestPractice  Medications  SmartSets  SnapShot Encounters  Media :23}   1. Tachycardia  Rare in occurrence  Continue diltiazem daily   - Adult Transthoracic Echo Complete W/ Cont if Necessary Per Protocol; Future    2. Other fatigue    - Adult Transthoracic Echo Complete W/ Cont if Necessary Per Protocol; Future    3. Palpitations    - Adult Transthoracic Echo Complete W/ Cont if Necessary Per Protocol; Future    4. Dizziness  Suspect dizziness is mostly related to hypoglycemia  Did encourage patient to notify endocrinologist of lower blood sugar readings   - Adult Transthoracic Echo Complete W/ Cont if Necessary Per Protocol; Future    5. Essential hypertension  Well-controlled on diltiazem and losartan  - Adult Transthoracic Echo Complete W/ Cont if Necessary Per Protocol; Future      Upcoming echo and carotid duplex    Follow Up {Instructions Charge Capture  Follow-up Communications :23}   Return if symptoms worsen or fail to improve.    Patient was given instructions and counseling regarding her condition or for health maintenance advice. Please see specific information pulled into the AVS if appropriate.  Patient was instructed to call the [facility] with any questions, concerns, or worsening symptoms.    *Please note that portions of this note were completed with a voice recognition program. Efforts were made to edit the dictations, but occasionally words are mistranscribed.

## 2021-01-12 ENCOUNTER — APPOINTMENT (OUTPATIENT)
Dept: CARDIOLOGY | Facility: HOSPITAL | Age: 61
End: 2021-01-12

## 2021-01-12 ENCOUNTER — TELEPHONE (OUTPATIENT)
Dept: CARDIOLOGY | Facility: HOSPITAL | Age: 61
End: 2021-01-12

## 2021-01-12 ENCOUNTER — HOSPITAL ENCOUNTER (OUTPATIENT)
Dept: CARDIOLOGY | Facility: HOSPITAL | Age: 61
Discharge: HOME | End: 2021-01-12
Admitting: NURSE PRACTITIONER

## 2021-01-12 VITALS — HEIGHT: 55 IN | WEIGHT: 114 LBS | BODY MASS INDEX: 26.38 KG/M2

## 2021-01-12 DIAGNOSIS — R42 DIZZINESS: ICD-10-CM

## 2021-01-12 LAB
BH CV ECHO MEAS - BSA(HAYCOCK): 1.4 M^2
BH CV ECHO MEAS - BSA: 1.4 M^2
BH CV ECHO MEAS - BZI_BMI: 26.5 KILOGRAMS/M^2
BH CV ECHO MEAS - BZI_METRIC_HEIGHT: 139.7 CM
BH CV ECHO MEAS - BZI_METRIC_WEIGHT: 51.7 KG
BH CV XLRA MEAS LEFT DIST CCA EDV: 21.4 CM/SEC
BH CV XLRA MEAS LEFT DIST CCA PSV: 97.4 CM/SEC
BH CV XLRA MEAS LEFT DIST ICA EDV: 18.9 CM/SEC
BH CV XLRA MEAS LEFT DIST ICA PSV: 60.1 CM/SEC
BH CV XLRA MEAS LEFT ICA/CCA RATIO: 0.87
BH CV XLRA MEAS LEFT MID CCA EDV: 21 CM/SEC
BH CV XLRA MEAS LEFT MID CCA PSV: 102.2 CM/SEC
BH CV XLRA MEAS LEFT MID ICA EDV: 23.6 CM/SEC
BH CV XLRA MEAS LEFT MID ICA PSV: 89.4 CM/SEC
BH CV XLRA MEAS LEFT PROX CCA EDV: 21 CM/SEC
BH CV XLRA MEAS LEFT PROX CCA PSV: 157.2 CM/SEC
BH CV XLRA MEAS LEFT PROX ECA EDV: 18.7 CM/SEC
BH CV XLRA MEAS LEFT PROX ECA PSV: 114.7 CM/SEC
BH CV XLRA MEAS LEFT PROX ICA EDV: 16.5 CM/SEC
BH CV XLRA MEAS LEFT PROX ICA PSV: 68.8 CM/SEC
BH CV XLRA MEAS LEFT PROX SCLA PSV: 145 CM/SEC
BH CV XLRA MEAS LEFT VERTEBRAL A EDV: 19.6 CM/SEC
BH CV XLRA MEAS LEFT VERTEBRAL A PSV: 88.4 CM/SEC
BH CV XLRA MEAS RIGHT DIST CCA EDV: 22.3 CM/SEC
BH CV XLRA MEAS RIGHT DIST CCA PSV: 114.5 CM/SEC
BH CV XLRA MEAS RIGHT DIST ICA EDV: 23.6 CM/SEC
BH CV XLRA MEAS RIGHT DIST ICA PSV: 63.3 CM/SEC
BH CV XLRA MEAS RIGHT ICA/CCA RATIO: 0.94
BH CV XLRA MEAS RIGHT MID CCA EDV: 27.1 CM/SEC
BH CV XLRA MEAS RIGHT MID CCA PSV: 139 CM/SEC
BH CV XLRA MEAS RIGHT MID ICA EDV: 15.2 CM/SEC
BH CV XLRA MEAS RIGHT MID ICA PSV: 66.8 CM/SEC
BH CV XLRA MEAS RIGHT PROX CCA EDV: 22.7 CM/SEC
BH CV XLRA MEAS RIGHT PROX CCA PSV: 112.6 CM/SEC
BH CV XLRA MEAS RIGHT PROX ECA PSV: 128 CM/SEC
BH CV XLRA MEAS RIGHT PROX ICA EDV: 17.5 CM/SEC
BH CV XLRA MEAS RIGHT PROX ICA PSV: 105.5 CM/SEC
BH CV XLRA MEAS RIGHT PROX SCLA EDV: 22.6 CM/SEC
BH CV XLRA MEAS RIGHT PROX SCLA PSV: 154.2 CM/SEC
BH CV XLRA MEAS RIGHT VERTEBRAL A EDV: 18.2 CM/SEC
BH CV XLRA MEAS RIGHT VERTEBRAL A PSV: 97 CM/SEC
LEFT ARM BP: NORMAL MMHG
RIGHT ARM BP: NORMAL MMHG

## 2021-01-12 PROCEDURE — 93880 EXTRACRANIAL BILAT STUDY: CPT | Performed by: INTERNAL MEDICINE

## 2021-01-12 PROCEDURE — 93880 EXTRACRANIAL BILAT STUDY: CPT

## 2021-01-12 NOTE — TELEPHONE ENCOUNTER
[facility] Telephone Note for:    Emily Grove, 1960  Home Phone [phone]   Mobile [phone]       Reason for Call:  Patient called and states that this morning she started having fluttering in her chest and states that her HR was 129/ She states that her blood pressure was 136/91. She states that she could feel her heart racing. She wants to know the status on her getting an ECHO.    Symptoms:  Weakness and dizziness    Onset::  This morning

## 2021-01-12 NOTE — TELEPHONE ENCOUNTER
Spoke with patient who notes that heart rate is now 83. Carotid duplex today at 3pm, upcoming echo.

## 2021-01-13 ENCOUNTER — TELEPHONE (OUTPATIENT)
Dept: CARDIOLOGY | Facility: HOSPITAL | Age: 61
End: 2021-01-13

## 2021-01-19 ENCOUNTER — TELEPHONE (OUTPATIENT)
Dept: CARDIOLOGY | Facility: HOSPITAL | Age: 61
End: 2021-01-19

## 2021-01-19 NOTE — TELEPHONE ENCOUNTER
Patient states that she received a letter in the mail the other day stating that her Echo was not approved through her insurance. Patient wants to know what her next steps are.

## 2021-01-20 ENCOUNTER — APPOINTMENT (OUTPATIENT)
Dept: CARDIOLOGY | Facility: HOSPITAL | Age: 61
End: 2021-01-20

## 2021-02-08 ENCOUNTER — HOSPITAL ENCOUNTER (OUTPATIENT)
Dept: CARDIOLOGY | Facility: HOSPITAL | Age: 61
Discharge: HOME | End: 2021-02-08
Admitting: NURSE PRACTITIONER

## 2021-02-08 VITALS — HEIGHT: 55 IN | WEIGHT: 110 LBS | BODY MASS INDEX: 25.46 KG/M2

## 2021-02-08 DIAGNOSIS — R00.2 PALPITATIONS: ICD-10-CM

## 2021-02-08 DIAGNOSIS — R53.83 OTHER FATIGUE: ICD-10-CM

## 2021-02-08 DIAGNOSIS — R00.0 TACHYCARDIA: ICD-10-CM

## 2021-02-08 DIAGNOSIS — R42 DIZZINESS: ICD-10-CM

## 2021-02-08 DIAGNOSIS — I10 ESSENTIAL HYPERTENSION: ICD-10-CM

## 2021-02-08 LAB
ASCENDING AORTA: 2.5 CM
BH CV ECHO MEAS - AO MAX PG (FULL): 2.4 MMHG
BH CV ECHO MEAS - AO MAX PG: 6.8 MMHG
BH CV ECHO MEAS - AO MEAN PG (FULL): 1.4 MMHG
BH CV ECHO MEAS - AO MEAN PG: 3.7 MMHG
BH CV ECHO MEAS - AO ROOT AREA (BSA CORRECTED): 1.7
BH CV ECHO MEAS - AO ROOT AREA: 4 CM^2
BH CV ECHO MEAS - AO ROOT DIAM: 2.3 CM
BH CV ECHO MEAS - AO V2 MAX: 130.3 CM/SEC
BH CV ECHO MEAS - AO V2 MEAN: 89.6 CM/SEC
BH CV ECHO MEAS - AO V2 VTI: 27.4 CM
BH CV ECHO MEAS - ASC AORTA: 2.5 CM
BH CV ECHO MEAS - AVA(I,A): 1.4 CM^2
BH CV ECHO MEAS - AVA(I,D): 1.4 CM^2
BH CV ECHO MEAS - AVA(V,A): 1.5 CM^2
BH CV ECHO MEAS - AVA(V,D): 1.5 CM^2
BH CV ECHO MEAS - BSA(HAYCOCK): 1.4 M^2
BH CV ECHO MEAS - BSA: 1.4 M^2
BH CV ECHO MEAS - BZI_BMI: 25.6 KILOGRAMS/M^2
BH CV ECHO MEAS - BZI_METRIC_HEIGHT: 139.7 CM
BH CV ECHO MEAS - BZI_METRIC_WEIGHT: 49.9 KG
BH CV ECHO MEAS - EDV(CUBED): 37.7 ML
BH CV ECHO MEAS - EDV(MOD-SP2): 30 ML
BH CV ECHO MEAS - EDV(MOD-SP4): 31 ML
BH CV ECHO MEAS - EDV(TEICH): 45.9 ML
BH CV ECHO MEAS - EF(CUBED): 75.8 %
BH CV ECHO MEAS - EF(MOD-BP): 68 %
BH CV ECHO MEAS - EF(MOD-SP2): 63.3 %
BH CV ECHO MEAS - EF(MOD-SP4): 67.7 %
BH CV ECHO MEAS - EF(TEICH): 69 %
BH CV ECHO MEAS - ESV(CUBED): 9.1 ML
BH CV ECHO MEAS - ESV(MOD-SP2): 11 ML
BH CV ECHO MEAS - ESV(MOD-SP4): 10 ML
BH CV ECHO MEAS - ESV(TEICH): 14.2 ML
BH CV ECHO MEAS - FS: 37.7 %
BH CV ECHO MEAS - IVS/LVPW: 1.1
BH CV ECHO MEAS - IVSD: 1 CM
BH CV ECHO MEAS - LA DIMENSION: 2.8 CM
BH CV ECHO MEAS - LA/AO: 1.2
BH CV ECHO MEAS - LAD MAJOR: 3.7 CM
BH CV ECHO MEAS - LAT PEAK E' VEL: 7.6 CM/SEC
BH CV ECHO MEAS - LATERAL E/E' RATIO: 8.5
BH CV ECHO MEAS - LV DIASTOLIC VOL/BSA (35-75): 22.8 ML/M^2
BH CV ECHO MEAS - LV MASS(C)D: 87.8 GRAMS
BH CV ECHO MEAS - LV MASS(C)DI: 64.6 GRAMS/M^2
BH CV ECHO MEAS - LV MAX PG: 4.4 MMHG
BH CV ECHO MEAS - LV MEAN PG: 2.3 MMHG
BH CV ECHO MEAS - LV SYSTOLIC VOL/BSA (12-30): 7.4 ML/M^2
BH CV ECHO MEAS - LV V1 MAX: 104.6 CM/SEC
BH CV ECHO MEAS - LV V1 MEAN: 70.2 CM/SEC
BH CV ECHO MEAS - LV V1 VTI: 21.3 CM
BH CV ECHO MEAS - LVIDD: 3.4 CM
BH CV ECHO MEAS - LVIDS: 2.1 CM
BH CV ECHO MEAS - LVLD AP2: 5.7 CM
BH CV ECHO MEAS - LVLD AP4: 5.4 CM
BH CV ECHO MEAS - LVLS AP2: 5.2 CM
BH CV ECHO MEAS - LVLS AP4: 5.4 CM
BH CV ECHO MEAS - LVOT AREA (M): 1.8 CM^2
BH CV ECHO MEAS - LVOT AREA: 1.8 CM^2
BH CV ECHO MEAS - LVOT DIAM: 1.5 CM
BH CV ECHO MEAS - LVPWD: 0.9 CM
BH CV ECHO MEAS - MED PEAK E' VEL: 8 CM/SEC
BH CV ECHO MEAS - MEDIAL E/E' RATIO: 8.1
BH CV ECHO MEAS - MV A MAX VEL: 87.4 CM/SEC
BH CV ECHO MEAS - MV DEC SLOPE: 646.5 CM/SEC^2
BH CV ECHO MEAS - MV DEC TIME: 0.17 SEC
BH CV ECHO MEAS - MV E MAX VEL: 66.1 CM/SEC
BH CV ECHO MEAS - MV E/A: 0.76
BH CV ECHO MEAS - MV P1/2T MAX VEL: 146.6 CM/SEC
BH CV ECHO MEAS - MV P1/2T: 66.4 MSEC
BH CV ECHO MEAS - MVA P1/2T LCG: 1.5 CM^2
BH CV ECHO MEAS - MVA(P1/2T): 3.3 CM^2
BH CV ECHO MEAS - PA ACC SLOPE: 596.8 CM/SEC^2
BH CV ECHO MEAS - PA ACC TIME: 0.14 SEC
BH CV ECHO MEAS - PA PR(ACCEL): 14.8 MMHG
BH CV ECHO MEAS - SI(AO): 80.7 ML/M^2
BH CV ECHO MEAS - SI(CUBED): 21.1 ML/M^2
BH CV ECHO MEAS - SI(LVOT): 28.8 ML/M^2
BH CV ECHO MEAS - SI(MOD-SP2): 14 ML/M^2
BH CV ECHO MEAS - SI(MOD-SP4): 15.4 ML/M^2
BH CV ECHO MEAS - SI(TEICH): 23.3 ML/M^2
BH CV ECHO MEAS - SV(AO): 109.7 ML
BH CV ECHO MEAS - SV(CUBED): 28.6 ML
BH CV ECHO MEAS - SV(LVOT): 39.1 ML
BH CV ECHO MEAS - SV(MOD-SP2): 19 ML
BH CV ECHO MEAS - SV(MOD-SP4): 21 ML
BH CV ECHO MEAS - SV(TEICH): 31.7 ML
BH CV ECHO MEAS - TAPSE (>1.6): 1.8 CM
BH CV ECHO MEAS - TR MAX PG: 18 MMHG
BH CV ECHO MEAS - TR MAX VEL: 213.1 CM/SEC
BH CV ECHO MEASUREMENTS AVERAGE E/E' RATIO: 8.47
BH CV VAS BP RIGHT ARM: NORMAL MMHG
BH CV XLRA - RV BASE: 2.3 CM
BH CV XLRA - RV LENGTH: 5.3 CM
BH CV XLRA - RV MID: 2 CM
BH CV XLRA - TDI S': 14.1 CM/SEC
LEFT ATRIUM VOLUME INDEX: 19.1 ML/M^2
LEFT ATRIUM VOLUME: 26 ML
LV EF 2D ECHO EST: 70 %
MAXIMAL PREDICTED HEART RATE: 162 BPM
STRESS TARGET HR: 138 BPM

## 2021-02-08 PROCEDURE — 93306 TTE W/DOPPLER COMPLETE: CPT | Performed by: INTERNAL MEDICINE

## 2021-02-08 PROCEDURE — 93306 TTE W/DOPPLER COMPLETE: CPT

## 2021-02-22 ENCOUNTER — TELEPHONE (OUTPATIENT)
Dept: CARDIOLOGY | Facility: HOSPITAL | Age: 61
End: 2021-02-22

## 2021-02-22 NOTE — TELEPHONE ENCOUNTER
Patient called and wants to know if she needs to continue taking her diltiazem. I explained that this medication was for her palpitations and tachycardia but she states that since her heart tests came back normal she wanted to know if she still had to take it.

## 2021-07-10 PROCEDURE — U0004 COV-19 TEST NON-CDC HGH THRU: HCPCS | Performed by: FAMILY MEDICINE

## 2021-12-30 PROCEDURE — U0004 COV-19 TEST NON-CDC HGH THRU: HCPCS | Performed by: EMERGENCY MEDICINE

## 2022-03-30 ENCOUNTER — TELEPHONE (OUTPATIENT)
Dept: URGENT CARE | Facility: CLINIC | Age: 62
End: 2022-03-30

## 2022-03-30 ENCOUNTER — HOSPITAL ENCOUNTER (OUTPATIENT)
Dept: GENERAL RADIOLOGY | Facility: HOSPITAL | Age: 62
Discharge: HOME | End: 2022-03-30
Admitting: NURSE PRACTITIONER

## 2022-03-30 DIAGNOSIS — S92.401A CLOSED FRACTURE OF PHALANX OF BOTH GREAT TOES, INITIAL ENCOUNTER: Primary | ICD-10-CM

## 2022-03-30 DIAGNOSIS — S92.402A CLOSED FRACTURE OF PHALANX OF BOTH GREAT TOES, INITIAL ENCOUNTER: Primary | ICD-10-CM

## 2022-03-30 PROCEDURE — 73660 X-RAY EXAM OF TOE(S): CPT

## 2022-04-11 ENCOUNTER — OFFICE VISIT (OUTPATIENT)
Dept: ORTHOPEDIC SURGERY | Facility: CLINIC | Age: 62
End: 2022-04-11

## 2022-04-11 VITALS
BODY MASS INDEX: 27.76 KG/M2 | SYSTOLIC BLOOD PRESSURE: 130 MMHG | HEIGHT: 55 IN | WEIGHT: 119.93 LBS | DIASTOLIC BLOOD PRESSURE: 84 MMHG

## 2022-04-11 DIAGNOSIS — S99.921A INJURY OF TOE ON RIGHT FOOT, INITIAL ENCOUNTER: Primary | ICD-10-CM

## 2022-04-11 DIAGNOSIS — Z79.4 TYPE 2 DIABETES MELLITUS WITH DIABETIC POLYNEUROPATHY, WITH LONG-TERM CURRENT USE OF INSULIN: ICD-10-CM

## 2022-04-11 DIAGNOSIS — E11.42 TYPE 2 DIABETES MELLITUS WITH DIABETIC POLYNEUROPATHY, WITH LONG-TERM CURRENT USE OF INSULIN: ICD-10-CM

## 2022-04-11 DIAGNOSIS — I87.8 VENOUS STASIS: ICD-10-CM

## 2022-04-11 PROCEDURE — 99204 OFFICE O/P NEW MOD 45 MIN: CPT | Performed by: ORTHOPAEDIC SURGERY

## 2022-04-11 RX ORDER — FESOTERODINE FUMARATE 8 MG/1
8 TABLET, FILM COATED, EXTENDED RELEASE ORAL
COMMUNITY
Start: 2022-01-17

## 2022-04-11 RX ORDER — VALSARTAN 80 MG/1
80 TABLET ORAL DAILY
COMMUNITY
Start: 2022-02-15

## 2022-04-11 RX ORDER — TRIPROLIDINE/PSEUDOEPHEDRINE 2.5MG-60MG
TABLET ORAL
COMMUNITY
Start: 2022-01-27

## 2022-04-11 RX ORDER — MIRABEGRON 50 MG/1
50 TABLET, FILM COATED, EXTENDED RELEASE ORAL DAILY
COMMUNITY
Start: 2022-03-23

## 2022-04-11 NOTE — PROGRESS NOTES
NEW PATIENT    Patient: Emily Grove  : 1960    Primary Care Provider: Marie Morales MD    Requesting Provider: As above    Pain (Right second toe pain/)      History    Chief Complaint: Right foot injury    History of Present Illness: This is a very pleasant 61-year-old woman who injured her right foot about 2 weeks ago.  She was walking in socks and jammed her forefoot against a heavy piece of furniture.  She did not have any bleeding no wounds.  She was seen at urgent treatment on 3/29/2022.  I reviewed the x-rays of the forefoot from that day, my interpretation is no definite fracture, moderate hammertoes.  I reviewed the report.  They noted a possible nondisplaced fracture of the distal phalanx of the great toe.  She reports her pain has improved but not completely resolved.  The pain now is only in the second toe.  Medical history is significant for 30-year history of diabetes.  Her glucose control is not good.  She has neuropathy, she has an appointment with neurology at  Dr. Rodolfo Childers in 2022.  She has not had any ulcers on her feet no amputations.  22 hgaic is 8.5   she also has swelling in both legs, she does not wear compression socks.  She reports the pain in the toe right now is 2-3 out of 10, she is able to wear her shoes and do her activities.  She is a stay-at-home mom.    Current Outpatient Medications on File Prior to Visit   Medication Sig Dispense Refill   • calcium citrate-vitamin d (CITRACAL) 200-250 MG-UNIT tablet tablet      • clobetasol (TEMOVATE) 0.05 % external solution clobetasol 0.05 % scalp solution     • Continuous Blood Gluc Sensor (Dexcom G4 Sensor) misc Dexcom G6 Sensor device     • Continuous Blood Gluc Sensor (Dexcom G6 Sensor) CHANGE EVERY 10 DAYS     • Continuous Blood Gluc Sensor (FREESTYLE ROSA 14 DAY SENSOR) [facility] [place] Admin Instructions.  6   • Continuous Blood Gluc Transmit (Dexcom G6 Transmitter) misc NEED ONE NEW TRANSMITTER FOR EVERY 3  MONTHS     • cyclobenzaprine (FLEXERIL) 10 MG tablet cyclobenzaprine 10 mg tablet     • dilTIAZem (TIAZAC) 300 MG 24 hr capsule Tiadylt  mg capsule,extended release     • Durezol 0.05 % ophthalmic emulsion INSTILL 1 DROP IN RIGHT EYE FOUR TIMES DAILY     • esomeprazole (nexIUM) 40 MG capsule Take 40 mg by mouth Daily.     • estradiol (VAGIFEM) 10 MCG tablet vaginal tablet Yuvafem 10 mcg vaginal tablet     • ESTRADIOL PO      • Fluocinolone Acetonide Scalp 0.01 % oil      • fluticasone (FLONASE) 50 MCG/ACT nasal spray fluticasone propionate 50 mcg/actuation nasal spray,suspension     • glucose blood test strip FreeStyle Test strips     • HumaLOG 100 UNIT/ML injection USE WITH INSULIN PUMP UP  UNITS PER DAY     • insulin lispro (humaLOG) 100 UNIT/ML injection Use with insulin pump. MDD 100u     • Insulin Pump Accessories misc      • ipratropium (ATROVENT) 0.06 % nasal spray ipratropium bromide 42 mcg (0.06 %) nasal spray   INSTILL 2 SPRAYS IN EACH NOSTRIL 3 TIMES A DAY AS NEEDED     • ketoconazole (NIZORAL) 2 % cream ketoconazole 2 % topical cream     • montelukast (SINGULAIR) 10 MG tablet Take 10 mg by mouth Every Night.     • Myrbetriq 50 MG tablet sustained-release 24 hour 24 hr tablet Take 50 mg by mouth Daily.     • pravastatin (PRAVACHOL) 20 MG tablet Take 40 mg by mouth Daily.     • Toviaz 8 MG tablet sustained-release 24 hour tablet Take 8 mg by mouth every night at bedtime.     • triamcinolone (KENALOG) 0.1 % ointment Apply a thin film on a qtip to the opening of both ear canals twice daily for 3-5 days at a time prn itching.     • valsartan (DIOVAN) 80 MG tablet Take 80 mg by mouth Daily.     • Vitamin D, Cholecalciferol, (CHOLECALCIFEROL) 400 units tablet Take 400 Units by mouth Daily.     • [DISCONTINUED] losartan (COZAAR) 25 MG tablet Take 25 mg by mouth Daily.     • [DISCONTINUED] losartan (COZAAR) 50 MG tablet Take 50 mg by mouth Daily.     • [DISCONTINUED] oxybutynin XL (DITROPAN-XL) 10 MG  24 hr tablet Take 10 mg by mouth Daily.  1     No current facility-administered medications on file prior to visit.      Allergies   Allergen Reactions   • Sulfa Antibiotics Anaphylaxis      Past Medical History:   Diagnosis Date   • Acid reflux    • Diabetes mellitus (HCC)    • Hyperlipidemia    • Hypertension    • Overactive bladder      Past Surgical History:   Procedure Laterality Date   • BLADDER SURGERY      GALL BLADDER   • CARPAL TUNNEL RELEASE     • FINGER FUSION     • FOOT SURGERY     • TONSILLECTOMY       Family History   Problem Relation Age of Onset   • Diabetes Mother    • Stroke Mother    • COPD Father    • Stroke Father    • Hypertension Sister    • Diabetes Brother    • Heart attack Brother    • Parkinsonism Brother    • No Known Problems Maternal Grandmother    • Diabetes Maternal Grandfather    • Stroke Maternal Grandfather    • Heart failure Paternal Grandmother    • Diabetes Paternal Grandmother    • No Known Problems Paternal Grandfather       Social History     Socioeconomic History   • Marital status:    Tobacco Use   • Smoking status: Never Smoker   • Smokeless tobacco: Never Used   Vaping Use   • Vaping Use: Never used   Substance and Sexual Activity   • Alcohol use: No   • Drug use: No   • Sexual activity: Defer        Review of Systems   Constitutional: Negative.    HENT: Positive for hearing loss.    Eyes: Positive for visual disturbance.   Respiratory: Negative.    Cardiovascular: Positive for leg swelling.   Gastrointestinal: Negative.    Endocrine: Negative.    Genitourinary: Negative.    Musculoskeletal: Positive for arthralgias.   Skin: Negative.    Allergic/Immunologic: Negative.    Neurological: Negative.    Hematological: Negative.    Psychiatric/Behavioral: Negative.        The following portions of the patient's history were reviewed and updated as appropriate: allergies, current medications, past family history, past medical history, past social history, past surgical  "history and problem list.    Physical Exam:   /84   Ht 139.7 cm (55\")   Wt 54.4 kg (119 lb 14.9 oz)   BMI 27.87 kg/m²   GENERAL: Body habitus: overweight    Lower extremity edema: Right: 1+ pitting; Left: 1+ pitting    Varicose veins:  Right: none; Left: none    Gait: normal     Mental Status:  awake and alert; oriented to person, place, and time    Voice:  clear  SKIN:  Lower extremity: Normal    Hair Growth(lower extremity):  Right:normal; Left:  normal  NAILS: Toenails: normal  HEENT: Head: Normocephalic, atraumatic,  without obvious abnormality.  eye: normal external eye, no icterus  ears:normal external ears  hard of hearing  PULM:  Repiratory effort normal  CV:  Dorsalis Pedis:  Right: 2+; Left:2+    Posterior Tibial: Right:2+; Left:2+    Capillary Refill:  Brisk  MSK:  Hand:mild arthritis, Heberden's nodes      Tibia:  Right:  tender over subcutaneous border; Left:  tender over subcutaneous border      Ankle:  Right: non tender; Left:  non tender      Foot:  Right:  Mild flexible hammertoes, mildly tender over the middle and distal phalanx of the second toe, normal range of motion, no swelling, entirely nontender in the great toe; Left:  non tender      NEURO:      [place] 5.07 monofilament test: patchy decrease    Lower extremity sensation: diminished       Calf Atrophy:none    Motor Function: all 5/5         Medical Decision Making    Data Review:   reviewed prior lab results, reviewed radiology images, reviewed radiology results and reviewed outside records    Assessment and Plan/ Diagnosis/Treatment options:   1. Injury of toe on right foot, initial encounter  I do not see any fracture in the second toe, the great toes are entirely nontender so I do not think that line on the x-ray is a fracture.  She has normal range of motion no swelling.  I explained that even a jamming injury to the toe can remain tender for several months.  She is back in a shoe and doing her activities.  I " explained there is no further treatment that is needed.  I explained its normal for her to notice soreness when she curls her toes in her shoes for probably 3 to 4 months.  I will be happy to see her anytime.    2. Venous stasis   She does have significant venous stasis. I explained edema and venous stasis to the patient, and the often hereditary nature of the problem, and the contribution of weight, trauma, age, etc. I explained how these factors cause edema (due to gravity, etc) I explained how the edema can lead to ulceration.  I explained how the edema can cause pain, stiffness, aching, cramping, etc. I explained that it is a very very common problem, so common that even [employer] markets compression stockings.  I explained that diuretics cannot correct the problem. I recommend wearing support stockings (compression stockings) daily, we discussed what type and where to find them          3. Type 2 diabetes mellitus with diabetic polyneuropathy, with long-term current use of insulin (HCC)  I encouraged her to improve her glucose control.  I explained the risk of amputation with neuropathy.  We discussed diabetic foot care.            Part of this encounter note is an electronic transcription/translation of spoken language to printed text. The electronic translation of spoken language may permit erroneous, or at times, nonsensical words or phrases to be inadvertently transcribed; Although I have reviewed the note for such errors, some may still exist.          Martha Dan MD

## 2022-10-21 ENCOUNTER — TELEPHONE (OUTPATIENT)
Dept: CARDIOLOGY | Facility: HOSPITAL | Age: 62
End: 2022-10-21

## 2022-10-21 NOTE — TELEPHONE ENCOUNTER
----- Message from Eliezer Alvarado MA sent at 10/20/2022  3:39 PM EDT -----  I called her back and told her what you said and she really is persistent about hearing what you have to say about, she looked it up online and is afraid her heart is acting up now, I said for her to refer to her PCP but, she asks if you have time to call her tomorrow, please do so.  ----- Message -----  From: Jaja John APRN  Sent: 10/20/2022   1:41 PM EDT  To: Eliezer Alvarado MA    She will need to follow up with the physician that ordered that lab bc that is a lab that I do not normally draw.  Thanks  ----- Message -----  From: Eliezer Alvarado MA  Sent: 10/20/2022  12:53 PM EDT  To: TYLER Soni    Patient had a physical the other day, about a month ago, had some labs checked, her PCP brought up that her Myeloperoxidase enzyme was a little elevated, , seemed concerned but did not elaborate on the matter any further, wants to know if she should be concerned.

## 2023-08-24 ENCOUNTER — APPOINTMENT (OUTPATIENT)
Dept: GENERAL RADIOLOGY | Facility: HOSPITAL | Age: 63
End: 2023-08-24
Payer: MEDICAID

## 2023-08-24 ENCOUNTER — APPOINTMENT (OUTPATIENT)
Dept: CT IMAGING | Facility: HOSPITAL | Age: 63
End: 2023-08-24
Payer: MEDICAID

## 2023-08-24 ENCOUNTER — APPOINTMENT (OUTPATIENT)
Dept: MRI IMAGING | Facility: HOSPITAL | Age: 63
End: 2023-08-24
Payer: MEDICAID

## 2023-08-24 ENCOUNTER — HOSPITAL ENCOUNTER (OUTPATIENT)
Facility: HOSPITAL | Age: 63
Setting detail: OBSERVATION
Discharge: HOME | End: 2023-08-25
Attending: EMERGENCY MEDICINE | Admitting: INTERNAL MEDICINE
Payer: MEDICAID

## 2023-08-24 ENCOUNTER — APPOINTMENT (OUTPATIENT)
Dept: CARDIOLOGY | Facility: HOSPITAL | Age: 63
End: 2023-08-24
Payer: MEDICAID

## 2023-08-24 DIAGNOSIS — I63.9 ACUTE CVA (CEREBROVASCULAR ACCIDENT): Primary | ICD-10-CM

## 2023-08-24 DIAGNOSIS — R73.9 HYPERGLYCEMIA: ICD-10-CM

## 2023-08-24 LAB
ALBUMIN SERPL-MCNC: 4.3 G/DL (ref 3.5–5.2)
ALBUMIN/GLOB SERPL: 1.2 G/DL
ALP SERPL-CCNC: 151 U/L (ref 39–117)
ALT SERPL W P-5'-P-CCNC: 14 U/L (ref 1–33)
ALT SERPL W P-5'-P-CCNC: 15 U/L (ref 1–33)
ANION GAP SERPL CALCULATED.3IONS-SCNC: 15 MMOL/L (ref 5–15)
APTT PPP: 26 SECONDS (ref 22–39)
AST SERPL-CCNC: 22 U/L (ref 1–32)
AST SERPL-CCNC: 26 U/L (ref 1–32)
BASOPHILS # BLD AUTO: 0.04 10*3/MM3 (ref 0–0.2)
BASOPHILS NFR BLD AUTO: 0.4 % (ref 0–1.5)
BH CV ECHO MEAS - AO MAX PG: 8.3 MMHG
BH CV ECHO MEAS - AO MEAN PG: 4 MMHG
BH CV ECHO MEAS - AO ROOT DIAM: 2.4 CM
BH CV ECHO MEAS - AO V2 MAX: 144 CM/SEC
BH CV ECHO MEAS - AO V2 VTI: 24.3 CM
BH CV ECHO MEAS - AVA(I,D): 1.66 CM2
BH CV ECHO MEAS - EDV(CUBED): 59.3 ML
BH CV ECHO MEAS - EDV(MOD-SP2): 38.8 ML
BH CV ECHO MEAS - EDV(MOD-SP4): 39.1 ML
BH CV ECHO MEAS - EF(MOD-BP): 63.4 %
BH CV ECHO MEAS - EF(MOD-SP2): 68.3 %
BH CV ECHO MEAS - EF(MOD-SP4): 60.4 %
BH CV ECHO MEAS - ESV(CUBED): 10.6 ML
BH CV ECHO MEAS - ESV(MOD-SP2): 12.3 ML
BH CV ECHO MEAS - ESV(MOD-SP4): 15.5 ML
BH CV ECHO MEAS - FS: 43.6 %
BH CV ECHO MEAS - IVS/LVPW: 0.67 CM
BH CV ECHO MEAS - IVSD: 0.6 CM
BH CV ECHO MEAS - LA DIMENSION: 3 CM
BH CV ECHO MEAS - LAT PEAK E' VEL: 9.7 CM/SEC
BH CV ECHO MEAS - LV DIASTOLIC VOL/BSA (35-75): 28.5 CM2
BH CV ECHO MEAS - LV MASS(C)D: 82.3 GRAMS
BH CV ECHO MEAS - LV MAX PG: 4.5 MMHG
BH CV ECHO MEAS - LV MEAN PG: 2 MMHG
BH CV ECHO MEAS - LV SYSTOLIC VOL/BSA (12-30): 11.3 CM2
BH CV ECHO MEAS - LV V1 MAX: 106 CM/SEC
BH CV ECHO MEAS - LV V1 VTI: 17.8 CM
BH CV ECHO MEAS - LVIDD: 3.9 CM
BH CV ECHO MEAS - LVIDS: 2.2 CM
BH CV ECHO MEAS - LVOT AREA: 2.27 CM2
BH CV ECHO MEAS - LVOT DIAM: 1.7 CM
BH CV ECHO MEAS - LVPWD: 0.9 CM
BH CV ECHO MEAS - MED PEAK E' VEL: 9.5 CM/SEC
BH CV ECHO MEAS - MV A MAX VEL: 130 CM/SEC
BH CV ECHO MEAS - MV DEC SLOPE: 506 CM/SEC2
BH CV ECHO MEAS - MV DEC TIME: 0.23 MSEC
BH CV ECHO MEAS - MV E MAX VEL: 117 CM/SEC
BH CV ECHO MEAS - MV E/A: 0.9
BH CV ECHO MEAS - MV MAX PG: 7.4 MMHG
BH CV ECHO MEAS - MV MEAN PG: 4 MMHG
BH CV ECHO MEAS - MV V2 VTI: 37.7 CM
BH CV ECHO MEAS - MVA(VTI): 1.07 CM2
BH CV ECHO MEAS - PA ACC TIME: 0.14 SEC
BH CV ECHO MEAS - PA V2 MAX: 104 CM/SEC
BH CV ECHO MEAS - RAP SYSTOLE: 3 MMHG
BH CV ECHO MEAS - RVSP: 22 MMHG
BH CV ECHO MEAS - SI(MOD-SP2): 19.3 ML/M2
BH CV ECHO MEAS - SI(MOD-SP4): 17.2 ML/M2
BH CV ECHO MEAS - SV(LVOT): 40.4 ML
BH CV ECHO MEAS - SV(MOD-SP2): 26.5 ML
BH CV ECHO MEAS - SV(MOD-SP4): 23.6 ML
BH CV ECHO MEAS - TAPSE (>1.6): 1.9 CM
BH CV ECHO MEAS - TR MAX PG: 19 MMHG
BH CV ECHO MEAS - TR MAX VEL: 218 CM/SEC
BH CV ECHO MEASUREMENTS AVERAGE E/E' RATIO: 12.19
BH CV LOWER VASCULAR LEFT COMMON FEMORAL AUGMENT: NORMAL
BH CV LOWER VASCULAR LEFT COMMON FEMORAL COMPRESS: NORMAL
BH CV LOWER VASCULAR LEFT COMMON FEMORAL PHASIC: NORMAL
BH CV LOWER VASCULAR LEFT COMMON FEMORAL SPONT: NORMAL
BH CV LOWER VASCULAR LEFT DISTAL FEMORAL AUGMENT: NORMAL
BH CV LOWER VASCULAR LEFT DISTAL FEMORAL COMPRESS: NORMAL
BH CV LOWER VASCULAR LEFT DISTAL FEMORAL PHASIC: NORMAL
BH CV LOWER VASCULAR LEFT DISTAL FEMORAL SPONT: NORMAL
BH CV LOWER VASCULAR LEFT GASTRONEMIUS COMPRESS: NORMAL
BH CV LOWER VASCULAR LEFT GREATER SAPH AK COMPRESS: NORMAL
BH CV LOWER VASCULAR LEFT GREATER SAPH BK COMPRESS: NORMAL
BH CV LOWER VASCULAR LEFT LESSER SAPH COMPRESS: NORMAL
BH CV LOWER VASCULAR LEFT MID FEMORAL AUGMENT: NORMAL
BH CV LOWER VASCULAR LEFT MID FEMORAL COMPRESS: NORMAL
BH CV LOWER VASCULAR LEFT MID FEMORAL PHASIC: NORMAL
BH CV LOWER VASCULAR LEFT MID FEMORAL SPONT: NORMAL
BH CV LOWER VASCULAR LEFT PERONEAL AUGMENT: NORMAL
BH CV LOWER VASCULAR LEFT PERONEAL COMPRESS: NORMAL
BH CV LOWER VASCULAR LEFT POPLITEAL AUGMENT: NORMAL
BH CV LOWER VASCULAR LEFT POPLITEAL COMPRESS: NORMAL
BH CV LOWER VASCULAR LEFT POPLITEAL PHASIC: NORMAL
BH CV LOWER VASCULAR LEFT POPLITEAL SPONT: NORMAL
BH CV LOWER VASCULAR LEFT POSTERIOR TIBIAL AUGMENT: NORMAL
BH CV LOWER VASCULAR LEFT POSTERIOR TIBIAL COMPRESS: NORMAL
BH CV LOWER VASCULAR LEFT PROFUNDA FEMORAL AUGMENT: NORMAL
BH CV LOWER VASCULAR LEFT PROFUNDA FEMORAL PHASIC: NORMAL
BH CV LOWER VASCULAR LEFT PROFUNDA FEMORAL SPONT: NORMAL
BH CV LOWER VASCULAR LEFT PROXIMAL FEMORAL AUGMENT: NORMAL
BH CV LOWER VASCULAR LEFT PROXIMAL FEMORAL COMPRESS: NORMAL
BH CV LOWER VASCULAR LEFT PROXIMAL FEMORAL PHASIC: NORMAL
BH CV LOWER VASCULAR LEFT PROXIMAL FEMORAL SPONT: NORMAL
BH CV LOWER VASCULAR LEFT SAPHENOFEMORAL JUNCTION AUGMENT: NORMAL
BH CV LOWER VASCULAR LEFT SAPHENOFEMORAL JUNCTION COMPRESS: NORMAL
BH CV LOWER VASCULAR LEFT SAPHENOFEMORAL JUNCTION PHASIC: NORMAL
BH CV LOWER VASCULAR LEFT SAPHENOFEMORAL JUNCTION SPONT: NORMAL
BH CV LOWER VASCULAR RIGHT COMMON FEMORAL AUGMENT: NORMAL
BH CV LOWER VASCULAR RIGHT COMMON FEMORAL COMPRESS: NORMAL
BH CV LOWER VASCULAR RIGHT COMMON FEMORAL PHASIC: NORMAL
BH CV LOWER VASCULAR RIGHT COMMON FEMORAL SPONT: NORMAL
BH CV LOWER VASCULAR RIGHT DISTAL FEMORAL AUGMENT: NORMAL
BH CV LOWER VASCULAR RIGHT DISTAL FEMORAL COMPRESS: NORMAL
BH CV LOWER VASCULAR RIGHT DISTAL FEMORAL PHASIC: NORMAL
BH CV LOWER VASCULAR RIGHT DISTAL FEMORAL SPONT: NORMAL
BH CV LOWER VASCULAR RIGHT GASTRONEMIUS COMPRESS: NORMAL
BH CV LOWER VASCULAR RIGHT GREATER SAPH AK COMPRESS: NORMAL
BH CV LOWER VASCULAR RIGHT GREATER SAPH BK COMPRESS: NORMAL
BH CV LOWER VASCULAR RIGHT LESSER SAPH COMPRESS: NORMAL
BH CV LOWER VASCULAR RIGHT MID FEMORAL AUGMENT: NORMAL
BH CV LOWER VASCULAR RIGHT MID FEMORAL COMPRESS: NORMAL
BH CV LOWER VASCULAR RIGHT MID FEMORAL PHASIC: NORMAL
BH CV LOWER VASCULAR RIGHT MID FEMORAL SPONT: NORMAL
BH CV LOWER VASCULAR RIGHT PERONEAL AUGMENT: NORMAL
BH CV LOWER VASCULAR RIGHT PERONEAL COMPRESS: NORMAL
BH CV LOWER VASCULAR RIGHT POPLITEAL AUGMENT: NORMAL
BH CV LOWER VASCULAR RIGHT POPLITEAL COMPRESS: NORMAL
BH CV LOWER VASCULAR RIGHT POPLITEAL PHASIC: NORMAL
BH CV LOWER VASCULAR RIGHT POPLITEAL SPONT: NORMAL
BH CV LOWER VASCULAR RIGHT POSTERIOR TIBIAL AUGMENT: NORMAL
BH CV LOWER VASCULAR RIGHT POSTERIOR TIBIAL COMPRESS: NORMAL
BH CV LOWER VASCULAR RIGHT PROFUNDA FEMORAL AUGMENT: NORMAL
BH CV LOWER VASCULAR RIGHT PROFUNDA FEMORAL PHASIC: NORMAL
BH CV LOWER VASCULAR RIGHT PROFUNDA FEMORAL SPONT: NORMAL
BH CV LOWER VASCULAR RIGHT PROXIMAL FEMORAL AUGMENT: NORMAL
BH CV LOWER VASCULAR RIGHT PROXIMAL FEMORAL COMPRESS: NORMAL
BH CV LOWER VASCULAR RIGHT PROXIMAL FEMORAL PHASIC: NORMAL
BH CV LOWER VASCULAR RIGHT PROXIMAL FEMORAL SPONT: NORMAL
BH CV LOWER VASCULAR RIGHT SAPHENOFEMORAL JUNCTION AUGMENT: NORMAL
BH CV LOWER VASCULAR RIGHT SAPHENOFEMORAL JUNCTION COMPRESS: NORMAL
BH CV LOWER VASCULAR RIGHT SAPHENOFEMORAL JUNCTION PHASIC: NORMAL
BH CV LOWER VASCULAR RIGHT SAPHENOFEMORAL JUNCTION SPONT: NORMAL
BH CV XLRA - RV BASE: 2.8 CM
BH CV XLRA - RV LENGTH: 5.4 CM
BH CV XLRA - RV MID: 2.7 CM
BH CV XLRA - TDI S': 11.6 CM/SEC
BILIRUB SERPL-MCNC: <0.2 MG/DL (ref 0–1.2)
BUN BLDA-MCNC: 16 MG/DL
BUN BLDA-MCNC: 16 MG/DL (ref 8–26)
BUN SERPL-MCNC: 16 MG/DL (ref 8–23)
BUN/CREAT SERPL: 22.5 (ref 7–25)
CA-I BLDA-SCNC: 1.18 MMOL/L (ref 1.2–1.32)
CALCIUM BLD QL: 1.18 MG/DL
CALCIUM SPEC-SCNC: 10.1 MG/DL (ref 8.6–10.5)
CHLORIDE BLDA-SCNC: 103 MMOL/L (ref 98–109)
CHLORIDE BLDA-SCNC: 103 MMOL/L (ref 98–109)
CHLORIDE SERPL-SCNC: 104 MMOL/L (ref 98–107)
CO2 BLDA-SCNC: 27 MMOL/L (ref 24–29)
CO2 SERPL-SCNC: 24 MMOL/L (ref 22–29)
CREAT BLDA-MCNC: 0.6 MG/DL
CREAT BLDA-MCNC: 0.6 MG/DL (ref 0.6–1.3)
CREAT SERPL-MCNC: 0.71 MG/DL (ref 0.57–1)
DEPRECATED RDW RBC AUTO: 41 FL (ref 37–54)
EGFRCR SERPLBLD CKD-EPI 2021: 102.9 ML/MIN/1.73
EGFRCR SERPLBLD CKD-EPI 2021: 97.5 ML/MIN/1.73
EOSINOPHIL # BLD AUTO: 0.05 10*3/MM3 (ref 0–0.4)
EOSINOPHIL NFR BLD AUTO: 0.5 % (ref 0.3–6.2)
ERYTHROCYTE [DISTWIDTH] IN BLOOD BY AUTOMATED COUNT: 12.1 % (ref 12.3–15.4)
GLOBULIN UR ELPH-MCNC: 3.7 GM/DL
GLUCOSE BLDC GLUCOMTR-MCNC: 133 MG/DL (ref 70–130)
GLUCOSE BLDC GLUCOMTR-MCNC: 133 MG/DL (ref 70–130)
GLUCOSE BLDC GLUCOMTR-MCNC: 173 MG/DL (ref 70–130)
GLUCOSE BLDC GLUCOMTR-MCNC: 75 MG/DL (ref 70–130)
GLUCOSE SERPL-MCNC: 136 MG/DL (ref 65–99)
HBA1C MFR BLD: 7.8 % (ref 4.8–5.6)
HCT VFR BLD AUTO: 44.3 % (ref 34–46.6)
HCT VFR BLDA CALC: 40 % (ref 38–51)
HCT VFR BLDA CALC: 40 % (ref 38–51)
HGB BLD-MCNC: 14 G/DL (ref 12–15.9)
HGB BLDA-MCNC: 13.6 G/DL (ref 12–17)
HGB BLDA-MCNC: 13.6 G/DL (ref 12–17)
HOLD SPECIMEN: NORMAL
IMM GRANULOCYTES # BLD AUTO: 0.03 10*3/MM3 (ref 0–0.05)
IMM GRANULOCYTES NFR BLD AUTO: 0.3 % (ref 0–0.5)
INR PPP: 1 (ref 0.8–1.2)
INR PPP: 1 (ref 0.9–1.1)
LYMPHOCYTES # BLD AUTO: 3.5 10*3/MM3 (ref 0.7–3.1)
LYMPHOCYTES NFR BLD AUTO: 35 % (ref 19.6–45.3)
MAGNESIUM SERPL-MCNC: 2.1 MG/DL (ref 1.6–2.4)
MCH RBC QN AUTO: 29.1 PG (ref 26.6–33)
MCHC RBC AUTO-ENTMCNC: 31.6 G/DL (ref 31.5–35.7)
MCV RBC AUTO: 92.1 FL (ref 79–97)
MONOCYTES # BLD AUTO: 0.83 10*3/MM3 (ref 0.1–0.9)
MONOCYTES NFR BLD AUTO: 8.3 % (ref 5–12)
NEUTROPHILS NFR BLD AUTO: 5.55 10*3/MM3 (ref 1.7–7)
NEUTROPHILS NFR BLD AUTO: 55.5 % (ref 42.7–76)
NRBC BLD AUTO-RTO: 0 /100 WBC (ref 0–0.2)
PHOSPHATE SERPL-MCNC: 2.6 MG/DL (ref 2.5–4.5)
PLATELET # BLD AUTO: 185 10*3/MM3 (ref 140–450)
PMV BLD AUTO: 11.8 FL (ref 6–12)
POTASSIUM BLDA-SCNC: 3.4 MMOL/L (ref 3.5–4.9)
POTASSIUM SERPL-SCNC: 3.7 MMOL/L (ref 3.5–5.2)
PROT SERPL-MCNC: 8 G/DL (ref 6–8.5)
PROTHROMBIN TIME: 12.1 SECONDS
PROTHROMBIN TIME: 12.1 SECONDS (ref 12.8–15.2)
RBC # BLD AUTO: 4.81 10*6/MM3 (ref 3.77–5.28)
SODIUM BLD-SCNC: 143 MMOL/L (ref 138–146)
SODIUM BLD-SCNC: 143 MMOL/L (ref 138–146)
SODIUM SERPL-SCNC: 143 MMOL/L (ref 136–145)
TROPONIN T SERPL HS-MCNC: 17 NG/L
WBC NRBC COR # BLD: 10 10*3/MM3 (ref 3.4–10.8)
WHOLE BLOOD HOLD COAG: NORMAL
WHOLE BLOOD HOLD SPECIMEN: NORMAL

## 2023-08-24 PROCEDURE — G0378 HOSPITAL OBSERVATION PER HR: HCPCS

## 2023-08-24 PROCEDURE — 99215 OFFICE O/P EST HI 40 MIN: CPT

## 2023-08-24 PROCEDURE — 85025 COMPLETE CBC W/AUTO DIFF WBC: CPT | Performed by: EMERGENCY MEDICINE

## 2023-08-24 PROCEDURE — 0042T HC CT CEREBRAL PERFUSION W/WO CONTRAST: CPT

## 2023-08-24 PROCEDURE — 99285 EMERGENCY DEPT VISIT HI MDM: CPT

## 2023-08-24 PROCEDURE — 99214 OFFICE O/P EST MOD 30 MIN: CPT

## 2023-08-24 PROCEDURE — 92523 SPEECH SOUND LANG COMPREHEN: CPT | Performed by: SPEECH-LANGUAGE PATHOLOGIST

## 2023-08-24 PROCEDURE — 25510000001 IOPAMIDOL PER 1 ML: Performed by: EMERGENCY MEDICINE

## 2023-08-24 PROCEDURE — 80047 BASIC METABLC PNL IONIZED CA: CPT

## 2023-08-24 PROCEDURE — 84484 ASSAY OF TROPONIN QUANT: CPT | Performed by: EMERGENCY MEDICINE

## 2023-08-24 PROCEDURE — 96372 THER/PROPH/DIAG INJ SC/IM: CPT

## 2023-08-24 PROCEDURE — 70496 CT ANGIOGRAPHY HEAD: CPT

## 2023-08-24 PROCEDURE — 70498 CT ANGIOGRAPHY NECK: CPT

## 2023-08-24 PROCEDURE — 85730 THROMBOPLASTIN TIME PARTIAL: CPT | Performed by: EMERGENCY MEDICINE

## 2023-08-24 PROCEDURE — 93970 EXTREMITY STUDY: CPT | Performed by: INTERNAL MEDICINE

## 2023-08-24 PROCEDURE — 82948 REAGENT STRIP/BLOOD GLUCOSE: CPT

## 2023-08-24 PROCEDURE — 71045 X-RAY EXAM CHEST 1 VIEW: CPT

## 2023-08-24 PROCEDURE — 85014 HEMATOCRIT: CPT

## 2023-08-24 PROCEDURE — 93306 TTE W/DOPPLER COMPLETE: CPT

## 2023-08-24 PROCEDURE — 92610 EVALUATE SWALLOWING FUNCTION: CPT | Performed by: SPEECH-LANGUAGE PATHOLOGIST

## 2023-08-24 PROCEDURE — 85610 PROTHROMBIN TIME: CPT

## 2023-08-24 PROCEDURE — 84100 ASSAY OF PHOSPHORUS: CPT | Performed by: INTERNAL MEDICINE

## 2023-08-24 PROCEDURE — 70551 MRI BRAIN STEM W/O DYE: CPT

## 2023-08-24 PROCEDURE — 96360 HYDRATION IV INFUSION INIT: CPT

## 2023-08-24 PROCEDURE — 83735 ASSAY OF MAGNESIUM: CPT | Performed by: INTERNAL MEDICINE

## 2023-08-24 PROCEDURE — 70450 CT HEAD/BRAIN W/O DYE: CPT

## 2023-08-24 PROCEDURE — 25010000002 HEPARIN (PORCINE) PER 1000 UNITS: Performed by: STUDENT IN AN ORGANIZED HEALTH CARE EDUCATION/TRAINING PROGRAM

## 2023-08-24 PROCEDURE — 93005 ELECTROCARDIOGRAM TRACING: CPT | Performed by: EMERGENCY MEDICINE

## 2023-08-24 PROCEDURE — 83036 HEMOGLOBIN GLYCOSYLATED A1C: CPT | Performed by: INTERNAL MEDICINE

## 2023-08-24 PROCEDURE — 93306 TTE W/DOPPLER COMPLETE: CPT | Performed by: INTERNAL MEDICINE

## 2023-08-24 PROCEDURE — 99222 1ST HOSP IP/OBS MODERATE 55: CPT | Performed by: INTERNAL MEDICINE

## 2023-08-24 PROCEDURE — 93970 EXTREMITY STUDY: CPT

## 2023-08-24 PROCEDURE — 80053 COMPREHEN METABOLIC PANEL: CPT | Performed by: EMERGENCY MEDICINE

## 2023-08-24 RX ORDER — INSULIN LISPRO 100 [IU]/ML
2-7 INJECTION, SOLUTION INTRAVENOUS; SUBCUTANEOUS
Status: DISCONTINUED | OUTPATIENT
Start: 2023-08-24 | End: 2023-08-25 | Stop reason: HOSPADM

## 2023-08-24 RX ORDER — ONDANSETRON 4 MG/1
4 TABLET, FILM COATED ORAL EVERY 6 HOURS PRN
Status: DISCONTINUED | OUTPATIENT
Start: 2023-08-24 | End: 2023-08-25 | Stop reason: HOSPADM

## 2023-08-24 RX ORDER — PREDNISOLONE ACETATE 10 MG/ML
1 SUSPENSION/ DROPS OPHTHALMIC 3 TIMES DAILY
Status: DISCONTINUED | OUTPATIENT
Start: 2023-08-24 | End: 2023-08-25 | Stop reason: HOSPADM

## 2023-08-24 RX ORDER — SODIUM CHLORIDE 0.9 % (FLUSH) 0.9 %
10 SYRINGE (ML) INJECTION AS NEEDED
Status: DISCONTINUED | OUTPATIENT
Start: 2023-08-24 | End: 2023-08-25 | Stop reason: HOSPADM

## 2023-08-24 RX ORDER — FLUTICASONE PROPIONATE 50 MCG
2 SPRAY, SUSPENSION (ML) NASAL DAILY PRN
Status: DISCONTINUED | OUTPATIENT
Start: 2023-08-24 | End: 2023-08-25 | Stop reason: HOSPADM

## 2023-08-24 RX ORDER — SODIUM CHLORIDE 9 MG/ML
40 INJECTION, SOLUTION INTRAVENOUS AS NEEDED
Status: DISCONTINUED | OUTPATIENT
Start: 2023-08-24 | End: 2023-08-25 | Stop reason: HOSPADM

## 2023-08-24 RX ORDER — MONTELUKAST SODIUM 10 MG/1
10 TABLET ORAL NIGHTLY
Status: DISCONTINUED | OUTPATIENT
Start: 2023-08-24 | End: 2023-08-25 | Stop reason: HOSPADM

## 2023-08-24 RX ORDER — DICLOFENAC SODIUM 75 MG/1
75 TABLET, DELAYED RELEASE ORAL 2 TIMES DAILY
COMMUNITY
End: 2023-08-24

## 2023-08-24 RX ORDER — ATORVASTATIN CALCIUM 40 MG/1
80 TABLET, FILM COATED ORAL NIGHTLY
Status: DISCONTINUED | OUTPATIENT
Start: 2023-08-24 | End: 2023-08-25 | Stop reason: HOSPADM

## 2023-08-24 RX ORDER — PANTOPRAZOLE SODIUM 40 MG/1
40 TABLET, DELAYED RELEASE ORAL
Status: DISCONTINUED | OUTPATIENT
Start: 2023-08-24 | End: 2023-08-25 | Stop reason: HOSPADM

## 2023-08-24 RX ORDER — BISACODYL 10 MG
10 SUPPOSITORY, RECTAL RECTAL DAILY PRN
Status: DISCONTINUED | OUTPATIENT
Start: 2023-08-24 | End: 2023-08-25 | Stop reason: HOSPADM

## 2023-08-24 RX ORDER — ASPIRIN 81 MG/1
81 TABLET ORAL DAILY
COMMUNITY

## 2023-08-24 RX ORDER — HEPARIN SODIUM 5000 [USP'U]/ML
2500 INJECTION, SOLUTION INTRAVENOUS; SUBCUTANEOUS EVERY 12 HOURS SCHEDULED
Status: DISCONTINUED | OUTPATIENT
Start: 2023-08-24 | End: 2023-08-25 | Stop reason: HOSPADM

## 2023-08-24 RX ORDER — DEXTROSE MONOHYDRATE 25 G/50ML
25 INJECTION, SOLUTION INTRAVENOUS
Status: DISCONTINUED | OUTPATIENT
Start: 2023-08-24 | End: 2023-08-25 | Stop reason: HOSPADM

## 2023-08-24 RX ORDER — CLOPIDOGREL BISULFATE 75 MG/1
75 TABLET ORAL DAILY
Status: DISCONTINUED | OUTPATIENT
Start: 2023-08-25 | End: 2023-08-25 | Stop reason: HOSPADM

## 2023-08-24 RX ORDER — CLOPIDOGREL BISULFATE 75 MG/1
300 TABLET ORAL ONCE
Status: COMPLETED | OUTPATIENT
Start: 2023-08-24 | End: 2023-08-24

## 2023-08-24 RX ORDER — BRIMONIDINE TARTRATE AND TIMOLOL MALEATE 2; 5 MG/ML; MG/ML
1 SOLUTION OPHTHALMIC 2 TIMES DAILY
COMMUNITY

## 2023-08-24 RX ORDER — DILTIAZEM HYDROCHLORIDE 60 MG/1
60 TABLET, FILM COATED ORAL EVERY 6 HOURS SCHEDULED
Status: DISCONTINUED | OUTPATIENT
Start: 2023-08-24 | End: 2023-08-24

## 2023-08-24 RX ORDER — DILTIAZEM HYDROCHLORIDE 300 MG/1
300 CAPSULE, EXTENDED RELEASE ORAL DAILY
COMMUNITY

## 2023-08-24 RX ORDER — TRAMADOL HYDROCHLORIDE 50 MG/1
50 TABLET ORAL EVERY 6 HOURS PRN
Status: DISCONTINUED | OUTPATIENT
Start: 2023-08-24 | End: 2023-08-25 | Stop reason: HOSPADM

## 2023-08-24 RX ORDER — CALCIUM CARBONATE 500 MG/1
1 TABLET, CHEWABLE ORAL 3 TIMES DAILY PRN
Status: DISCONTINUED | OUTPATIENT
Start: 2023-08-24 | End: 2023-08-25 | Stop reason: HOSPADM

## 2023-08-24 RX ORDER — ASPIRIN 81 MG/1
81 TABLET, CHEWABLE ORAL DAILY
Status: DISCONTINUED | OUTPATIENT
Start: 2023-08-24 | End: 2023-08-25 | Stop reason: HOSPADM

## 2023-08-24 RX ORDER — PREDNISOLONE ACETATE 10 MG/ML
1 SUSPENSION/ DROPS OPHTHALMIC 3 TIMES DAILY
COMMUNITY
End: 2023-08-28

## 2023-08-24 RX ORDER — AMOXICILLIN 250 MG
2 CAPSULE ORAL 2 TIMES DAILY
Status: DISCONTINUED | OUTPATIENT
Start: 2023-08-24 | End: 2023-08-25 | Stop reason: HOSPADM

## 2023-08-24 RX ORDER — ACETAMINOPHEN 160 MG/5ML
650 SOLUTION ORAL EVERY 4 HOURS PRN
Status: DISCONTINUED | OUTPATIENT
Start: 2023-08-24 | End: 2023-08-25 | Stop reason: HOSPADM

## 2023-08-24 RX ORDER — HEPARIN SODIUM 5000 [USP'U]/ML
5000 INJECTION, SOLUTION INTRAVENOUS; SUBCUTANEOUS EVERY 12 HOURS SCHEDULED
Status: DISCONTINUED | OUTPATIENT
Start: 2023-08-24 | End: 2023-08-24

## 2023-08-24 RX ORDER — ONDANSETRON 2 MG/ML
4 INJECTION INTRAMUSCULAR; INTRAVENOUS EVERY 6 HOURS PRN
Status: DISCONTINUED | OUTPATIENT
Start: 2023-08-24 | End: 2023-08-25 | Stop reason: HOSPADM

## 2023-08-24 RX ORDER — SODIUM CHLORIDE 0.9 % (FLUSH) 0.9 %
10 SYRINGE (ML) INJECTION EVERY 12 HOURS SCHEDULED
Status: DISCONTINUED | OUTPATIENT
Start: 2023-08-24 | End: 2023-08-25 | Stop reason: HOSPADM

## 2023-08-24 RX ORDER — IBUPROFEN 600 MG/1
1 TABLET ORAL
Status: DISCONTINUED | OUTPATIENT
Start: 2023-08-24 | End: 2023-08-25 | Stop reason: HOSPADM

## 2023-08-24 RX ORDER — ACETAMINOPHEN 325 MG/1
650 TABLET ORAL EVERY 4 HOURS PRN
Status: DISCONTINUED | OUTPATIENT
Start: 2023-08-24 | End: 2023-08-25 | Stop reason: HOSPADM

## 2023-08-24 RX ORDER — LATANOPROST 50 UG/ML
1 SOLUTION/ DROPS OPHTHALMIC NIGHTLY
COMMUNITY

## 2023-08-24 RX ORDER — POLYETHYLENE GLYCOL 3350 17 G/17G
17 POWDER, FOR SOLUTION ORAL DAILY PRN
Status: DISCONTINUED | OUTPATIENT
Start: 2023-08-24 | End: 2023-08-25 | Stop reason: HOSPADM

## 2023-08-24 RX ORDER — NICOTINE POLACRILEX 4 MG
15 LOZENGE BUCCAL
Status: DISCONTINUED | OUTPATIENT
Start: 2023-08-24 | End: 2023-08-25 | Stop reason: HOSPADM

## 2023-08-24 RX ORDER — SEMAGLUTIDE 1.34 MG/ML
1 INJECTION, SOLUTION SUBCUTANEOUS WEEKLY
COMMUNITY

## 2023-08-24 RX ORDER — IPRATROPIUM BROMIDE AND ALBUTEROL SULFATE 2.5; .5 MG/3ML; MG/3ML
3 SOLUTION RESPIRATORY (INHALATION) EVERY 4 HOURS PRN
Status: DISCONTINUED | OUTPATIENT
Start: 2023-08-24 | End: 2023-08-25 | Stop reason: HOSPADM

## 2023-08-24 RX ORDER — LATANOPROST 50 UG/ML
1 SOLUTION/ DROPS OPHTHALMIC NIGHTLY
Status: DISCONTINUED | OUTPATIENT
Start: 2023-08-24 | End: 2023-08-25 | Stop reason: HOSPADM

## 2023-08-24 RX ORDER — VALSARTAN 80 MG/1
80 TABLET ORAL DAILY
Status: DISCONTINUED | OUTPATIENT
Start: 2023-08-24 | End: 2023-08-24

## 2023-08-24 RX ORDER — GLIMEPIRIDE 2 MG/1
1 TABLET ORAL NIGHTLY
COMMUNITY
End: 2023-08-24

## 2023-08-24 RX ORDER — ASPIRIN 300 MG/1
300 SUPPOSITORY RECTAL DAILY
Status: DISCONTINUED | OUTPATIENT
Start: 2023-08-24 | End: 2023-08-25 | Stop reason: HOSPADM

## 2023-08-24 RX ORDER — BISACODYL 5 MG/1
5 TABLET, DELAYED RELEASE ORAL DAILY PRN
Status: DISCONTINUED | OUTPATIENT
Start: 2023-08-24 | End: 2023-08-25 | Stop reason: HOSPADM

## 2023-08-24 RX ORDER — ACETAMINOPHEN 650 MG/1
650 SUPPOSITORY RECTAL EVERY 4 HOURS PRN
Status: DISCONTINUED | OUTPATIENT
Start: 2023-08-24 | End: 2023-08-25 | Stop reason: HOSPADM

## 2023-08-24 RX ADMIN — CLOPIDOGREL BISULFATE 300 MG: 75 TABLET ORAL at 07:00

## 2023-08-24 RX ADMIN — HEPARIN SODIUM 2500 UNITS: 5000 INJECTION INTRAVENOUS; SUBCUTANEOUS at 20:19

## 2023-08-24 RX ADMIN — ASPIRIN 81 MG: 81 TABLET, CHEWABLE ORAL at 18:21

## 2023-08-24 RX ADMIN — LATANOPROST 1 DROP: 50 SOLUTION OPHTHALMIC at 20:21

## 2023-08-24 RX ADMIN — MONTELUKAST 10 MG: 10 TABLET, FILM COATED ORAL at 20:19

## 2023-08-24 RX ADMIN — Medication 10 ML: at 20:23

## 2023-08-24 RX ADMIN — ATORVASTATIN CALCIUM 80 MG: 40 TABLET, FILM COATED ORAL at 20:19

## 2023-08-24 RX ADMIN — PANTOPRAZOLE SODIUM 40 MG: 40 TABLET, DELAYED RELEASE ORAL at 18:21

## 2023-08-24 RX ADMIN — IOPAMIDOL 115 ML: 755 INJECTION, SOLUTION INTRAVENOUS at 04:45

## 2023-08-24 RX ADMIN — Medication 10 ML: at 20:20

## 2023-08-24 RX ADMIN — SODIUM CHLORIDE 1000 ML: 9 INJECTION, SOLUTION INTRAVENOUS at 07:37

## 2023-08-24 NOTE — H&P
[facility] Medicine Services  HISTORY AND PHYSICAL    Patient Name: Emily Grove  : 1960  MRN: [id number]  Primary Care Physician: Marie Morales MD  Date of admission: 2023      Subjective   Subjective     Chief Complaint:  Right corner of the mouth and right foot tingling and numbness    HPI:  Emily Grove is a 62 y.o. female, unfortunately poor historian, with past medical history significant for hypertension, hyperlipidemia, diabetes mellitus, recent surgery for cataract, GERD.  Patient tells that she has been in her usual state of health over the past weeks except past Saturday she had a sudden fall.  But other than that she did not have any cough or coryza or fever or chills or any sign of acute illness.  This morning she woke up and she felt numbness and tingling over the right corner of her mouth and also right foot.  I questioned the patient several times in she denies any weakness.  Also she insisted that those symptoms were both on the right side.  No headache, no visual changes, no nausea or vomiting, no dizziness.  Patient tells me that since her mother was diabetic and had a stroke she got scared and came to the emergency room.  Here at the emergency room patient was evaluated by the stroke team.  Also MRI of the brain was done which does not show any acute process at this point.      Review of Systems   No weight loss or weight gain recently, no lumps or bumps, no unusual headaches, no visual changes, has had falls in the past but not very frequently, no chest pain or shortness of breath, no rashes or hives.    Personal History     Past Medical History:   Diagnosis Date    Acid reflux     Diabetes mellitus     Hyperlipidemia     Hypertension     Overactive bladder              Past Surgical History:   Procedure Laterality Date    BLADDER SURGERY      GALL BLADDER    CARPAL TUNNEL RELEASE      FINGER FUSION      FOOT SURGERY      TONSILLECTOMY          Family History: family history includes COPD in her father; Diabetes in her brother, maternal grandfather, mother, and paternal grandmother; Heart attack in her brother; Heart failure in her paternal grandmother; Hypertension in her sister; No Known Problems in her maternal grandmother and paternal grandfather; Parkinsonism in her brother; Stroke in her father, maternal grandfather, and mother.     Social History:  reports that she has never smoked. She has never used smokeless tobacco. She reports that she does not drink alcohol and does not use drugs.  Social History     Social History Narrative    CAFFEINE 0       Medications:  Available home medication information reviewed.  Medications Prior to Admission   Medication Sig Dispense Refill Last Dose    aspirin 81 MG EC tablet Take 1 tablet by mouth Daily. OTC   8/23/2023    brimonidine-timolol (COMBIGAN) 0.2-0.5 % ophthalmic solution Administer 1 drop to the right eye 2 (Two) Times a Day.   8/23/2023    Diclofenac Sodium (VOLTAREN) 1 % gel gel Apply 4 g topically to the appropriate area as directed 4 (Four) Times a Day As Needed.   Past Month    dilTIAZem (TIAZAC) 300 MG 24 hr capsule Take 1 capsule by mouth Daily.   8/23/2023    esomeprazole (nexIUM) 40 MG capsule Take 1 capsule by mouth Daily.   8/23/2023    fluticasone (FLONASE) 50 MCG/ACT nasal spray 2 sprays into the nostril(s) as directed by provider Daily As Needed for Rhinitis or Allergies. OTC   Past Week    insulin lispro (humaLOG) 100 UNIT/ML injection Use with insulin pump. MDD 100u   8/24/2023    latanoprost (XALATAN) 0.005 % ophthalmic solution Administer 1 drop to both eyes Every Night.   8/23/2023    montelukast (SINGULAIR) 10 MG tablet Take 1 tablet by mouth Every Night.   8/23/2023    pravastatin (PRAVACHOL) 40 MG tablet Take 1 tablet by mouth Every Night.   8/23/2023    prednisoLONE acetate (PRED FORTE) 1 % ophthalmic suspension Administer 1 drop to both eyes 3 (Three) Times a Day.    8/23/2023    Semaglutide,0.25 or 0.5MG/DOS, (Ozempic, 0.25 or 0.5 MG/DOSE,) 2 MG/1.5ML solution pen-injector Inject 1 mg under the skin into the appropriate area as directed 1 (One) Time Per Week. Friday   Past Week    valsartan (DIOVAN) 80 MG tablet Take 1 tablet by mouth Daily.   8/23/2023    Vitamin D, Cholecalciferol, 50 MCG (2000 UT) capsule Take 1 capsule by mouth Daily. OTC   8/23/2023       Allergies   Allergen Reactions    Sulfa Antibiotics Anaphylaxis       Objective   Objective     Vital Signs:   Temp:  [97.8 °F (36.6 °C)-98.3 °F (36.8 °C)] 98.3 °F (36.8 °C)  Heart Rate:  [75-96] 90  Resp:  [16-17] 17  BP: ()/(75-88) 96/84  Total (NIH Stroke Scale): 2    Physical Exam   Constitutional: No acute distress, awake, alert  HENT: NCAT, mucous membranes moist  Respiratory: Clear to auscultation bilaterally, respiratory effort normal   Cardiovascular: RRR, no murmurs, rubs, or gallops  Gastrointestinal: Positive bowel sounds, soft, nontender, nondistended  Musculoskeletal: No bilateral ankle edema  Psychiatric: Appropriate affect, cooperative  Neurologic: Awake, alert, oriented x3, no focality appreciated, speech clear  Skin: No rashes     Result Review:  I have personally reviewed the results from the time of this admission to 8/24/2023 15:56 EDT and agree with these findings:  [x]  Laboratory list / accordion  []  Microbiology  [x]  Radiology  []  EKG/Telemetry   []  Cardiology/Vascular   []  Pathology  [x]  Old records  []  Other:  Most notable findings include: Negative MRI for acute process.  Hemoglobin A1c is 7.80.  Comprehensive metabolic panel is basically normal study except glucose of 136 which is not a fasting glucose also elevation of alkaline phosphatase at 151.        LAB RESULTS:      Lab 08/24/23  0440 08/24/23  0439 08/24/23  0436 08/24/23  0433   WBC  --   --  10.00  --    HEMOGLOBIN  --   --  14.0  --    HEMOGLOBIN, POC  --  13.6  --  13.6   HEMATOCRIT  --   --  44.3  --    HEMATOCRIT POC   --  40  --  40   PLATELETS  --   --  185  --    NEUTROS ABS  --   --  5.55  --    IMMATURE GRANS (ABS)  --   --  0.03  --    LYMPHS ABS  --   --  3.50*  --    MONOS ABS  --   --  0.83  --    EOS ABS  --   --  0.05  --    MCV  --   --  92.1  --    PROTIME 12.1  --   --  12.1*   INR 1  --   --  1.0   APTT  --   --  26.0  --          Lab 08/24/23  0439 08/24/23  0436 08/24/23 0433   SODIUM  --  143  --    POTASSIUM  --  3.7  --    CHLORIDE  --  104  --    CO2  --  24.0  --    ANION GAP  --  15.0  --    BUN  --  16  --    CREATININE 0.60 0.71 0.60   EGFR  --  97.5 102.9   GLUCOSE  --  136*  --    CALCIUM  --  10.1  --    MAGNESIUM  --  2.1  --    PHOSPHORUS  --  2.6  --    HEMOGLOBIN A1C  --  7.80*  --          Lab 08/24/23 0436   TOTAL PROTEIN 8.0   ALBUMIN 4.3   GLOBULIN 3.7   ALT (SGPT) 15  14   AST (SGOT) 22  26   BILIRUBIN <0.2   ALK PHOS 151*         Lab 08/24/23 0436   HSTROP T 17*                     Microbiology Results (last 10 days)       ** No results found for the last 240 hours. **            Adult Transthoracic Echo Complete W/ Cont if Necessary Per Protocol (With Agitated Saline)    Result Date: 8/24/2023    Left ventricular systolic function is normal. Left ventricular ejection fraction appears to be 61 - 65%.   Saline test results are positive with valsalva manuever.     CT Angiogram Neck    Result Date: 8/24/2023  CT ANGIOGRAM HEAD W AI ANALYSIS OF LVO, CT ANGIOGRAM NECK Date of Exam: 8/24/2023 4:38 AM EDT Indication: Neuro deficit, acute stroke suspected Neuro deficit, acute stroke suspected. Comparison: None available. Technique: CTA of the head was performed after the uneventful intravenous administration of 115 mL Isovue-370. Reconstructed coronal and sagittal images were also obtained. In addition, a 3-D volume rendered image was created for interpretation. Automated exposure control and iterative reconstruction methods were used. Findings: Aortic arch: The aortic arch is unremarkable.   There is conventional 3 vessel arch anatomy.  The right brachiocephalic and visualized bilateral subclavian arteries are within normal limits. Right carotid: The right CCA arises as expected from the brachiocephalic trunk.  The CCA follows a normal course and appears normal caliber.  The carotid bifurcation is unremarkable.  The external carotid artery and distal branches appear within normal limits.  The cervical internal carotid artery follows a normal course and appears normal caliber throughout the neck and into the head.  The intracranial ICA segments appear within normal limits.  The ophthalmic artery origin is normal.  The A1 and M1 segments appear within normal limits.  The visualized distal SCOTT and MCA branches appear patent.  There is  a patent  anterior communicating artery. There is a patent  posterior communicating artery. Left carotid: The left CCA arises as expected from the aortic arch.   The CCA follows a normal course and appears normal caliber.  The carotid bifurcation is unremarkable.  The external carotid artery and distal branches appear within normal limits.  The  cervical internal carotid artery follows a normal course and appears normal caliber throughout the neck and into the head.  The intracranial ICA segments appear within normal limits.  The ophthalmic artery origin is normal.  The A1 and M1 segments appear within normal limits.  The visualized distal SCOTT and MCA branches appear patent.  There is a patent  posterior communicating artery. Posterior circulation: Vertebral arteries arise as expected from ipsilateral subclavian arteries.  The vertebral arteries are codominant.  The vertebral arteries follow a normal course and appear normal caliber throughout the neck and into the head.  The  V4 segments are patent.  Visualized posterior inferior cerebellar arteries are within normal limits.  The basilar artery is normal caliber.  Superior cerebellar arteries are patent.  Bilateral P1  segments and posterior cerebral arteries appear within normal limits. Nonvascular findings: Intracranial structures appear unremarkable.  Orbits are within normal limits.  Paranasal sinuses and mastoid air cells appear well aerated.  Superficial soft tissues and underlying musculature appear within normal limits.  The lung  apices are clear.  The thyroid and salivary glands appear unremarkable.  The suprahyoid and infrahyoid spaces of the neck appear unremarkable.  There is no evidence of cervical lymphadenopathy or a neck mass.  There are no acute osseous abnormalities or  destructive bone lesions.     Impression: Impression: Normal CT angiogram of the head and neck Electronically Signed: Rodolfo Bauer MD  8/24/2023 5:04 AM EDT  Workstation ID: [id number]    XR Chest 1 View    Result Date: 8/24/2023  XR CHEST 1 VW Date of Exam: 8/24/2023 4:45 AM EDT Indication: Acute Stroke Protocol (onset < 12 hrs) Comparison: Chest radiograph dated November 2, 2019 Findings: There are no airspace consolidations. No pleural fluid. No pneumothorax. The pulmonary vasculature appears within normal limits. The cardiac and mediastinal silhouette appear unremarkable. No acute osseous abnormality identified.     Impression: Impression: No active disease Electronically Signed: Rodolfo Bauer MD  8/24/2023 5:08 AM EDT  Workstation ID: [id number]    CT Head Without Contrast Stroke Protocol    Result Date: 8/24/2023  CT HEAD WO CONTRAST STROKE PROTOCOL Date of Exam: 8/24/2023 4:22 AM EDT Indication: Neuro deficit, acute, stroke suspected Neuro deficit, acute stroke suspected. Comparison: None available. Technique: Axial CT images were obtained of the head without contrast administration.  Reconstructed coronal images were also obtained. Automated exposure control and iterative construction methods were used. Scan Time: 4:28 a.m. Results discussed with stroke  Nitesh at 4:37 a.m. Findings: There is questionable increased density of the  proximal right middle cerebral artery. The patient is slightly asymmetric in the scanner so this may represent artifact. There is no evidence of acute hemorrhage. There is mild generalized atrophy. There is mild chronic microvascular ischemia. There is no mass or mass effect. There are calcific changes of both vertebral arteries.     Impression: Impression: Questionable increased density of the right middle cerebral artery versus artifact. Mild atrophy and chronic microvascular ischemia. No acute hemorrhage. Electronically Signed: Rodolfo Bauer MD  8/24/2023 4:38 AM EDT  Workstation ID: [id number]    CT Angiogram Head w AI Analysis of LVO    Result Date: 8/24/2023  CT ANGIOGRAM HEAD W AI ANALYSIS OF LVO, CT ANGIOGRAM NECK Date of Exam: 8/24/2023 4:38 AM EDT Indication: Neuro deficit, acute stroke suspected Neuro deficit, acute stroke suspected. Comparison: None available. Technique: CTA of the head was performed after the uneventful intravenous administration of 115 mL Isovue-370. Reconstructed coronal and sagittal images were also obtained. In addition, a 3-D volume rendered image was created for interpretation. Automated exposure control and iterative reconstruction methods were used. Findings: Aortic arch: The aortic arch is unremarkable.  There is conventional 3 vessel arch anatomy.  The right brachiocephalic and visualized bilateral subclavian arteries are within normal limits. Right carotid: The right CCA arises as expected from the brachiocephalic trunk.  The CCA follows a normal course and appears normal caliber.  The carotid bifurcation is unremarkable.  The external carotid artery and distal branches appear within normal limits.  The cervical internal carotid artery follows a normal course and appears normal caliber throughout the neck and into the head.  The intracranial ICA segments appear within normal limits.  The ophthalmic artery origin is normal.  The A1 and M1 segments appear within normal limits.   The visualized distal SCOTT and MCA branches appear patent.  There is  a patent  anterior communicating artery. There is a patent  posterior communicating artery. Left carotid: The left CCA arises as expected from the aortic arch.   The CCA follows a normal course and appears normal caliber.  The carotid bifurcation is unremarkable.  The external carotid artery and distal branches appear within normal limits.  The  cervical internal carotid artery follows a normal course and appears normal caliber throughout the neck and into the head.  The intracranial ICA segments appear within normal limits.  The ophthalmic artery origin is normal.  The A1 and M1 segments appear within normal limits.  The visualized distal SCOTT and MCA branches appear patent.  There is a patent  posterior communicating artery. Posterior circulation: Vertebral arteries arise as expected from ipsilateral subclavian arteries.  The vertebral arteries are codominant.  The vertebral arteries follow a normal course and appear normal caliber throughout the neck and into the head.  The  V4 segments are patent.  Visualized posterior inferior cerebellar arteries are within normal limits.  The basilar artery is normal caliber.  Superior cerebellar arteries are patent.  Bilateral P1 segments and posterior cerebral arteries appear within normal limits. Nonvascular findings: Intracranial structures appear unremarkable.  Orbits are within normal limits.  Paranasal sinuses and mastoid air cells appear well aerated.  Superficial soft tissues and underlying musculature appear within normal limits.  The lung  apices are clear.  The thyroid and salivary glands appear unremarkable.  The suprahyoid and infrahyoid spaces of the neck appear unremarkable.  There is no evidence of cervical lymphadenopathy or a neck mass.  There are no acute osseous abnormalities or  destructive bone lesions.     Impression: Impression: Normal CT angiogram of the head and neck Electronically  Signed: Rodolfo Bauer MD  8/24/2023 5:04 AM EDT  Workstation ID: [id number]    MRI Brain WO CON Hyper Acute Stroke Protocol    Result Date: 8/24/2023  MRI BRAIN WO CON HYPER ACUTE STROKE PROTOCOL Date of Exam: 8/24/2023 5:39 AM EDT Indication: Stroke, follow up.  Comparison: CT scan of the head 8/24/2023 at 0442 hours Technique:  Routine multiplanar/multisequence sequence images of the brain were obtained without contrast administration. Findings: Limited MRI for evaluation of stroke demonstrates no evidence of pathologic restricted diffusion to indicate an acute or subacute infarct. There is mild generalized atrophy. There is mild chronic microvascular ischemic change.     Impression: Impression: No acute infarct Electronically Signed: Rodolfo Bauer MD  8/24/2023 6:03 AM EDT  Workstation ID: [id number]    CT CEREBRAL PERFUSION WITH & WITHOUT CONTRAST    Result Date: 8/24/2023  CT CEREBRAL PERFUSION W WO CONTRAST Date of Exam: 8/24/2023 4:38 AM EDT Indication: Neuro deficit, acute stroke suspected.  Comparison: None available. Technique: Axial CT images of the brain were obtained prior to and after the administration of 115 mL Isovue-370. Core blood volume, core blood flow, mean transit time, and Tmax images were obtained utilizing the Rapid software protocol. A limited CT angiogram of the head was also performed to measure the blood vessel density. The radiation dose reduction device was turned on for each scan per the ALARA (As Low as Reasonably Achievable) protocol. Findings: Cerebral blood flow, blood volume and mean transit time maps are symmetric without large perfusion defect. CBF<30% volume: 0 mL Tmax>6sec volume: 0 mL Mismatch volume: 0 mL Mismatch ratio: None.     Impression: Impression: Normal CT perfusion Electronically Signed: Rodolfo Bauer MD  8/24/2023 5:01 AM EDT  Workstation ID: [id number]     Results for orders placed during the hospital encounter of 08/24/23    Adult Transthoracic Echo Complete W/ Cont if  Necessary Per Protocol (With Agitated Saline)    Interpretation Summary    Left ventricular systolic function is normal. Left ventricular ejection fraction appears to be 61 - 65%.    Saline test results are positive with valsalva manuever.      Assessment & Plan   Assessment & Plan     Active Hospital Problems    Diagnosis  POA    **CVA (cerebral vascular accident) [I63.9]  Yes       Assessment and plan:  62-year-old -American female with past medical history of hypertension, hyperlipidemia, diabetes mellitus, GERD, recent surgery for cataract.  Patient was brought to the emergency room for sudden onset of right corner of Mouth Numbness and Tingling and also right foot numbness and tingling.      **Acute onset of numbness of right corner of the mouth, right foot.  Of note patient has told the stroke team that it was her left foot that was numb and tingling.  MRI of the brain is negative for any acute process.  However, this could possibly be a posterior infarct.  Stroke team is following.    **Hypertension and patient was on losartan and Cardizem.  Will hold these medications for now.    **Diabetes mellitus.  This condition sometimes causes transient tingling.  Hemoglobin A1c is not too bad at 7.80.  We will hold home medication and start patient on sliding scale Humalog.    **Recent cataract surgery, continue home medication including the eyedrops.     **Hyperlipidemia, currently on high-dose statin.    DVT prophylaxis:  SCD      CODE STATUS:    Code Status and Medical Interventions:   Ordered at: 08/24/23 1246     Code Status (Patient has no pulse and is not breathing):    CPR (Attempt to Resuscitate)     Medical Interventions (Patient has pulse or is breathing):    Full Support       Expected Discharge home in a day or 2    Expected discharge date/ time has not been documented.   Total time spent was 60 minutes.  Phani Mazariegos MD  08/24/23

## 2023-08-24 NOTE — THERAPY EVALUATION
Acute Care - Speech Language Pathology Initial Evaluation  [facility]  Cognitive-Communication Evaluation  Clinical Swallow Evaluation       Patient Name: Emily Grove  : 1960  MRN: [id number]  Today's Date: 2023               Admit Date: 2023     Visit Dx:    ICD-10-CM ICD-9-CM   1. Acute CVA (cerebrovascular accident)  I63.9 434.91   2. Hyperglycemia  R73.9 790.29     Patient Active Problem List   Diagnosis    Fatty liver    Family history of polyps in the colon    Gastroesophageal reflux disease    Precordial pain    Type 1 diabetes mellitus with hyperglycemia    Abnormal EKG    Other fatigue    Dizziness    Tachycardia    Palpitations    CVA (cerebral vascular accident)     Past Medical History:   Diagnosis Date    Acid reflux     Diabetes mellitus     Hyperlipidemia     Hypertension     Overactive bladder      Past Surgical History:   Procedure Laterality Date    BLADDER SURGERY      GALL BLADDER    CARPAL TUNNEL RELEASE      FINGER FUSION      FOOT SURGERY      TONSILLECTOMY     SLP Recommendation and Plan  SLP Swallowing Diagnosis: swallow WFL/no suspected pharyngeal impairment (23 0850)  SLP Diet Recommendation: regular textures, thin liquids (23 0850)  Recommended Precautions and Strategies: upright posture during/after eating, general aspiration precautions (23 0850)  SLP Rec. for Method of Medication Administration: meds whole, with thin liquids, with puree, as tolerated (23 0850)     Monitor for Signs of Aspiration: yes, notify SLP if any concerns (23 0850)  Recommended Diagnostics: No further SLP services recommended (23 0850)  Swallow Criteria for Skilled Therapeutic Interventions Met: no problems identified which require skilled intervention (23 0850)  Anticipated Discharge Disposition (SLP): No further SLP services warranted (23 0850)     Therapy Frequency (Swallow): evaluation only (23 0850)     Oral Care  Recommendations: Oral Care BID/PRN, Toothbrush (08/24/23 0850)             SLP Recommendation and Plan  SLP Diagnosis: functional speech/language skills, functional cognitive-linguistic skills (08/24/23 0850)           Swallow Criteria for Skilled Therapeutic Interventions Met: no problems identified which require skilled intervention (08/24/23 0850)  SLC Criteria for Skilled Therapy Interventions Met: no problems identified which require skilled intervention (08/24/23 0850)  Anticipated Discharge Disposition (SLP): No further SLP services warranted (08/24/23 0850)     Therapy Frequency (Swallow): evaluation only (08/24/23 0850)     Oral Care Recommendations: Oral Care BID/PRN, Toothbrush (08/24/23 0850)                                 SLP EVALUATION (last 72 hours)       SLP SLC Evaluation       Row Name 08/24/23 0850                   Communication Assessment/Intervention    Document Type evaluation  -CJ        Subjective Information no complaints  -CJ        Patient Observations alert;cooperative  -CJ        Patient/Family/Caregiver Comments/Observations no family present  -CJ        Patient Effort good  -CJ        Symptoms Noted During/After Treatment none  -CJ           General Information    Patient Profile Reviewed yes  -CJ        Pertinent History Of Current Problem Pt adm w/ possible CVA on stroke pathway w/ R sided weakness; sig h/o fatty liver, GERD, precordial pain, DM, dizziness, tachycardia. Imaging negative so far. CXR clear  -CJ        Precautions/Limitations, Vision WFL;for purposes of eval  -CJ        Precautions/Limitations, Hearing WFL;for purposes of eval  -CJ        Prior Level of Function-Communication WFL  -CJ        Plans/Goals Discussed with patient;agreed upon  -CJ        Barriers to Rehab none identified  -CJ        Patient's Goals for Discharge patient did not state  -CJ           Pain    Additional Documentation Pain Scale: FACES Pre/Post-Treatment (Group)  -CJ           Pain Scale:  FACES Pre/Post-Treatment    Pain: FACES Scale, Pretreatment 0-->no hurt  -CJ        Posttreatment Pain Rating 0-->no hurt  -CJ           Comprehension Assessment/Intervention    Comprehension Assessment/Intervention Reading Comprehension;Auditory Comprehension  -           Auditory Comprehension Assessment/Intervention    Auditory Comprehension (Communication) WFL  -           Reading Comprehension Assessment/Intervention    Reading Comprehension (Communication) WFL  -           Expression Assessment/Intervention    Expression Assessment/Intervention verbal expression;graphic expression  -           Verbal Expression Assessment/Intervention    Verbal Expression WFL  -           Graphic Expression Assessment/Intervention    Graphic Expression WFL  -           Oral Motor Structure and Function    Oral Motor Structure and Function WF  -        Dentition Assessment natural, present and adequate  -        Mucosal Quality moist, healthy  -           Oral Musculature and Cranial Nerve Assessment    Oral Motor General Assessment WFL;other (see comments)  pt reports R sided facial numbness  -           Motor Speech Assessment/Intervention    Motor Speech Function WFL  -        Speech intelligibility 100%;in quiet environment;in connected speech;with unfamiliar listener  -        Motor Speech, Comment Pt reports R facial numbness, however doesn't impact speech  -           Cognitive Assessment Intervention- SLP    Cognitive Function (Cognition) WFL  -           SLP Evaluation Clinical Impressions    SLP Diagnosis functional speech/language skills;functional cognitive-linguistic skills  -        Rehab Potential/Prognosis good  -        SLC Criteria for Skilled Therapy Interventions Met no problems identified which require skilled intervention  -        Functional Impact no impact on function  -CJ           Recommendations    Therapy Frequency (SLP SLC) evaluation only  -CJ        Anticipated  Discharge Disposition (SLP) No further SLP services warranted  -                  User Key  (r) = Recorded By, (t) = Taken By, (c) = Cosigned By      Initials Name Effective Dates    Karena Barkley MS CCC-SLP 23 -                        EDUCATION  The patient has been educated in the following areas:     Cognitive Impairment Communication Impairment Dysphagia (Swallowing Impairment) Oral Care/Hydration.                      Time Calculation:      Time Calculation- SLP       Row Name 23 1020             Time Calculation- SLP    SLP Start Time 0850  -      SLP Received On 23  -         Untimed Charges    23942-RX Eval Speech and Production w/ Language Minutes 24  -CJ      00138-MQ Eval Oral Pharyng Swallow Minutes 25  -CJ         Total Minutes    Untimed Charges Total Minutes 49  -       Total Minutes 49  -                User Key  (r) = Recorded By, (t) = Taken By, (c) = Cosigned By      Initials Name Provider Type    Karena Barkley MS CCC-SLP Speech and Language Pathologist                    Therapy Charges for Today       Code Description Service Date Service Provider Modifiers Qty    60731480358 HC ST EVAL ORAL PHARYNG SWALLOW 2 2023 Karena Nixon MS CCC-SLP GN 1    37158389137 HC ST EVAL SPEECH AND PROD W LANG  2 2023 Karena Nixon MS CCC-SLP GN 1                       Karena Nixon MS CCC-SLP  2023   and Acute Care - Speech Language Pathology   Swallow Initial Evaluation [facility]     Patient Name: Emily Grove  : 1960  MRN: [id number]  Today's Date: 2023               Admit Date: 2023    Visit Dx:     ICD-10-CM ICD-9-CM   1. Acute CVA (cerebrovascular accident)  I63.9 434.91   2. Hyperglycemia  R73.9 790.29     Patient Active Problem List   Diagnosis    Fatty liver    Family history of polyps in the colon    Gastroesophageal reflux disease    Precordial pain    Type 1 diabetes mellitus with hyperglycemia    Abnormal  EKG    Other fatigue    Dizziness    Tachycardia    Palpitations    CVA (cerebral vascular accident)     Past Medical History:   Diagnosis Date    Acid reflux     Diabetes mellitus     Hyperlipidemia     Hypertension     Overactive bladder      Past Surgical History:   Procedure Laterality Date    BLADDER SURGERY      GALL BLADDER    CARPAL TUNNEL RELEASE      FINGER FUSION      FOOT SURGERY      TONSILLECTOMY         SLP Recommendation and Plan  SLP Swallowing Diagnosis: swallow WFL/no suspected pharyngeal impairment (08/24/23 0850)  SLP Diet Recommendation: regular textures, thin liquids (08/24/23 0850)  Recommended Precautions and Strategies: upright posture during/after eating, general aspiration precautions (08/24/23 0850)  SLP Rec. for Method of Medication Administration: meds whole, with thin liquids, with puree, as tolerated (08/24/23 0850)     Monitor for Signs of Aspiration: yes, notify SLP if any concerns (08/24/23 0850)  Recommended Diagnostics: No further SLP services recommended (08/24/23 0850)  Swallow Criteria for Skilled Therapeutic Interventions Met: no problems identified which require skilled intervention (08/24/23 0850)  Anticipated Discharge Disposition (SLP): No further SLP services warranted (08/24/23 0850)     Therapy Frequency (Swallow): evaluation only (08/24/23 0850)     Oral Care Recommendations: Oral Care BID/PRN, Toothbrush (08/24/23 0850)                                      Oral Care Recommendations: Oral Care BID/PRN, Toothbrush (08/24/23 0850)           SWALLOW EVALUATION (last 72 hours)       SLP Adult Swallow Evaluation       Row Name 08/24/23 0850       General Information    Current Method of Nutrition NPO  -CJ    Prior Level of Function-Communication WFL  -CJ    Prior Level of Function-Swallowing no diet consistency restrictions  -CJ    Patient's Goals for Discharge patient did not state  -CJ       Oral Motor Structure and Function    Secretion Management WNL/WFL  -CJ        General Eating/Swallowing Observations    Respiratory Support Currently in Use room air  -    Eating/Swallowing Skills self-fed;appropriate self-feeding skills observed  -    Positioning During Eating upright in bed  -    Utensils Used spoon;cup;straw  -    Consistencies Trialed regular textures;ice chips;thin liquids;pureed;mixed consistency  -       Clinical Swallow Eval    Pharyngeal Phase no overt signs/symptoms of pharyngeal impairment  -    Clinical Swallow Evaluation Summary Oral phase is seemingly wfl. No oral residue, adequate mastication w/ solids. No overt s/s of aspiration. No clinical s/s concerning silent aspiration as pt is w/o changes in vocal quality, respirations or secretions post po presentations. Pt denies any difficulty w/ po intake. Recommend regular diet w/thin liquids. No further SLP f/u warranted at this time  -       SLP Evaluation Clinical Impression    SLP Swallowing Diagnosis swallow WFL/no suspected pharyngeal impairment  -    Functional Impact no impact on function  -    Swallow Criteria for Skilled Therapeutic Interventions Met no problems identified which require skilled intervention  -       Recommendations    Therapy Frequency (Swallow) evaluation only  -    SLP Diet Recommendation regular textures;thin liquids  -    Recommended Diagnostics No further SLP services recommended  -    Recommended Precautions and Strategies upright posture during/after eating;general aspiration precautions  -    Oral Care Recommendations Oral Care BID/PRN;Toothbrush  -    SLP Rec. for Method of Medication Administration meds whole;with thin liquids;with puree;as tolerated  -    Monitor for Signs of Aspiration yes;notify SLP if any concerns  -              User Key  (r) = Recorded By, (t) = Taken By, (c) = Cosigned By      Initials Name Effective Dates    Karena Barkley, MS CCC-SLP 07/11/23 -                     EDUCATION  The patient has been educated in the  following areas:   Dysphagia (Swallowing Impairment) Oral Care/Hydration.              Time Calculation:    Time Calculation- SLP       Row Name 08/24/23 1020             Time Calculation- SLP    SLP Start Time 0850  -      SLP Received On 08/24/23  -         Untimed Charges    20134-[id number] Eval Speech and Production w/ Language Minutes 24  -CJ      93419-OA Eval Oral Pharyng Swallow Minutes 25  -CJ         Total Minutes    Untimed Charges Total Minutes 49  -CJ       Total Minutes 49  -CJ                User Key  (r) = Recorded By, (t) = Taken By, (c) = Cosigned By      Initials Name Provider Type    Karena Barkley MS CCC-SLP Speech and Language Pathologist                    Therapy Charges for Today       Code Description Service Date Service Provider Modifiers Qty    96004267353 HC ST EVAL ORAL PHARYNG SWALLOW 2 8/24/2023 Karena Nixon MS CCC-SLP GN 1    25842529505 HC ST EVAL SPEECH AND PROD W LANG  2 8/24/2023 Karena Nixon MS CCC-SLP GN 1                 Karena Nixon MS CCC-BRIANA  8/24/2023

## 2023-08-24 NOTE — CASE MANAGEMENT/SOCIAL WORK
Discharge Planning Assessment  [facility]     Patient Name: Emily Grove  MRN: [id number]  Today's Date: 8/24/2023    Admit Date: 8/24/2023    Plan: IDP   Discharge Needs Assessment       Row Name 08/24/23 1420       Living Environment    People in Home spouse    Name(s) of People in Home Gasper Grove    Current Living Arrangements home    Potentially Unsafe Housing Conditions unable to assess    Primary Care Provided by self    Provides Primary Care For no one    Family Caregiver if Needed spouse    Family Caregiver Names Gasper Grove    Quality of Family Relationships unable to assess       Resource/Environmental Concerns    Transportation Concerns none       Transition Planning    Patient/Family Anticipates Transition to home with family    Transportation Anticipated family or friend will provide       Discharge Needs Assessment    Readmission Within the Last 30 Days no previous admission in last 30 days    Equipment Currently Used at Home none                   Discharge Plan       Row Name 08/24/23 1422       Plan    Plan IDP    Plan Comments MSW met with pt. at bedside. Pt. lives with spouse Gasper Grove in [facility]. Pt.'s PCP is Marie Morales. Pt.'s pharmacy is [facility]. Pt.'s insurance is [email or web address] and [facility] [employer] of [place].  Pt. reports that she is independent at bedside. Pt. denies DME, O2, or HH at this time. Pt. reports she has transportation back home when she is medically ready to d/c. Pt. doesn't have an advanced directive or ACP documentation on file. CM will continue to follow pt. throughout her stay.    Final Discharge Disposition Code 30 - still a patient                  Continued Care and Services - Admitted Since 8/24/2023    Coordination has not been started for this encounter.          Demographic Summary       Row Name 08/24/23 1419       General Information    Admission Type observation    Arrived From home    Referral Source admission list;emergency  department    Reason for Consult discharge planning    Preferred Language English                   Functional Status       Row Name 08/24/23 1420       Functional Status, IADL    Medications independent    Meal Preparation independent    Housekeeping independent    Laundry independent    Shopping independent       Mental Status Summary    Recent Changes in Mental Status/Cognitive Functioning unable to assess                   Psychosocial    No documentation.                  Abuse/Neglect    No documentation.                  Legal    No documentation.                  Substance Abuse    No documentation.                  Patient Forms    No documentation.                     ROXANA Orozco

## 2023-08-24 NOTE — NURSING NOTE
Patient wishes to use own insulin pump during hospital stay, Educated patient on patient using own insulin pump while in hospital. Patient still refuses and wishes to use own pump. Will continue to educate patient and check blood glucose. Notified . Pt's blood sugar is WNL at the moment

## 2023-08-24 NOTE — ED PROVIDER NOTES
Subjective   History of Present Illness  This is a 62-year-old female with past medical history of hypertension diabetes presented to the emergency department with some paresthesias and weakness.  The patient states that she went to bed at 10 PM and was feeling fine.  She woke up at 3 AM to go to the restroom when she noticed she had some numbness on the right side of her face.  She felt like it was weak compared to the other side as well.  The patient states that her left side felt heavy as well.  Symptoms persisted which prompted her to call EMS.  On arrival to the emergency department, the patient is having some numbness and tingling on the right lower side of her face.  She is also having some weakness in her left upper arm as well as her left leg.  Patient is not having any headache.  She denies any change in vision.  No neck pain.  No fevers or chills.  No chest pain or shortness of breath or abdominal pain or vomiting.    History provided by:  Patient   used: No      Review of Systems   Constitutional:  Negative for chills and fever.   HENT:  Negative for congestion, ear pain and sore throat.    Eyes:  Negative for visual disturbance.   Respiratory:  Negative for shortness of breath.    Cardiovascular:  Negative for chest pain.   Gastrointestinal:  Negative for abdominal pain.   Genitourinary:  Negative for difficulty urinating.   Musculoskeletal:  Negative for arthralgias.   Skin:  Negative for rash.   Neurological:  Positive for weakness and numbness. Negative for dizziness.   Psychiatric/Behavioral:  Negative for agitation.      Past Medical History:   Diagnosis Date    Acid reflux     Diabetes mellitus     Hyperlipidemia     Hypertension     Overactive bladder        Allergies   Allergen Reactions    Sulfa Antibiotics Anaphylaxis       Past Surgical History:   Procedure Laterality Date    BLADDER SURGERY      GALL BLADDER    CARPAL TUNNEL RELEASE      FINGER FUSION      FOOT SURGERY       TONSILLECTOMY         Family History   Problem Relation Age of Onset    Diabetes Mother     Stroke Mother     COPD Father     Stroke Father     Hypertension Sister     Diabetes Brother     Heart attack Brother     Parkinsonism Brother     No Known Problems Maternal Grandmother     Diabetes Maternal Grandfather     Stroke Maternal Grandfather     Heart failure Paternal Grandmother     Diabetes Paternal Grandmother     No Known Problems Paternal Grandfather        Social History     Socioeconomic History    Marital status:    Tobacco Use    Smoking status: Never    Smokeless tobacco: Never   Vaping Use    Vaping Use: Never used   Substance and Sexual Activity    Alcohol use: No    Drug use: No    Sexual activity: Defer           Objective   Physical Exam  Vitals and nursing note reviewed.   Constitutional:       General: She is not in acute distress.     Appearance: She is not ill-appearing or toxic-appearing.   HENT:      Mouth/Throat:      Pharynx: No posterior oropharyngeal erythema.   Eyes:      Conjunctiva/sclera: Conjunctivae normal.      Pupils: Pupils are equal, round, and reactive to light.   Cardiovascular:      Rate and Rhythm: Normal rate and regular rhythm.   Pulmonary:      Effort: Pulmonary effort is normal. No respiratory distress.   Abdominal:      General: Abdomen is flat. There is no distension.      Palpations: There is no mass.      Tenderness: There is no abdominal tenderness. There is no guarding or rebound.   Musculoskeletal:         General: No deformity. Normal range of motion.   Skin:     General: Skin is warm.      Findings: No rash.   Neurological:      Mental Status: She is alert and oriented to person, place, and time.      Comments: Patient does have some mildly decreased sensation to the right lower half of the face.  Some minimal facial droop.  No dysarthria.  Patient strength on the left side is a 4 out of 5 in the upper and lower extremity.  Sensation appears to be intact.        Procedures           ED Course  ED Course as of 08/24/23 0713   Thu Aug 24, 2023   0526 BP: 139/88 [JK]   0526 Temp: 97.8 °F (36.6 °C) [JK]   0526 Temp src: Oral [JK]   0526 Heart Rate: 84 [JK]   0526 Resp: 16 [JK]   0526 SpO2: 97 %  Patient's repeat vitals, telemetry tracing, and pulse oximetry tracing were directly viewed and interpreted by myself.  Patient hemodynamically stable [JK]   0527 CT Head Without Contrast Stroke Protocol  Imaging was directly visualized by myself, per my interpretations, CT of the head was unremarkable. [JK]   0527 CT CEREBRAL PERFUSION WITH & WITHOUT CONTRAST [JK]   0528 CT Angiogram Neck [JK]   0528 CT Angiogram Head w AI Analysis of LVO  Imaging was directly visualized by myself, per my interpretations, CT perfusion, CT angiogram of the head and neck were all unremarkable. [JK]   0528 Patient states that the paresthesias are slightly improved as well as the weakness, however continues to endorse symptoms.  We did have a discussion with the patient about the administration of thrombolytics.  She is initially outside of the window given her time of wake-up symptoms.  However I do feel it is appropriate to obtain wake-up MRI in order to further assess the patient's symptoms.  We did have long discussion with the patient as well as stroke team at bedside.  She is agreeable with this medical management. [JK]   0711 POC CHEM 8(!) [JK]   0711 aPTT [JK]   0711 AST [JK]   0711 Single High Sensitivity Troponin T(!) [JK]   0711 POCT Protime/INR [JK]   0711 ALT [JK]   0711 CBC & Differential(!)  Laboratory studies were reviewed and interpreted directly by myself.  Coagulation studies were normal, Chem-8 was normal, initial troponin was marginally 17, CBC unremarkable [JK]   0712 MRI Brain WO CON Hyper Acute Stroke Protocol  Imaging was directly visualized by myself, per my interpretations, MRI of the brain was unremarkable. [JK]   0712 On reevaluation, patient symptoms continue to improve,  however she is still experiencing symptoms.  We did have another discussion regarding thrombolytics and given the normal imaging studies and improving symptoms, patient is declining at this time.  I do feel this is appropriate.  Patient be admitted for further evaluation and treatment. [JK]   0787 Based on the patient's presentation, history and diffuse work-up in the emergency department, the patient is deemed appropriate for admission to the hospital for further evaluation and treatment.  This was discussed with the patient at bedside.  They are in agreement with the current medical management.    Admitting physician: Dr. Batista    Discussion was had with admitting physician regarding the laboratory and imaging findings.  We did discuss current therapeutics in the emergency department and progression of the patient.  Working diagnosis was conveyed to the admitting physician, as well as current status and prognosis for the patient.  They are in agreement with these findings and have accepted admission.    Shared decision making:   After full review of the patient's clinical presentation, review of any work-up including but not limited to laboratory studies and radiology obtained, I had a discussion with the patient.  Treatment options were discussed as well as the risks, benefits and consequences.  I discussed all findings with the patient and family members if available.  During the discussion, treatment goals were understood by all as well as any misconceptions which were addressed with the patient.  Ample time was given for any questions they may have had.  They are in agreement with the treatment plan as well as final disposition. [JK]      ED Course User Index  [JK] Ray Young MD                                           Medical Decision Making  This is a 62-year-old female presented to the emergency department with some right-sided facial paresthesias and some left-sided weakness.  Patient went to bed  at 10 PM and was asymptomatic.  She woke up with symptoms that 3 AM.  Patient's last known well was over 6 hours ago.  However symptoms are concerning for CVA.  Code stroke was initiated immediately.  Patient was transferred to CT imaging.  We did rapidly obtain laboratories and IV access.  The patient initially was hemodynamically stable.  Patient's initial NIH stroke scale was 5.  Stroke team was at bedside.  We did proceed with Noncon head CT.      Differential diagnosis: CVA, TIA, hypoglycemia, hypertension, seizure, MS, paresthesias      Amount and/or Complexity of Data Reviewed  External Data Reviewed: labs, radiology and notes.     Details: External laboratories, imaging as well as notes were reviewed personally by myself.  All relevant studies were used to guide decision making.     Date of previous record: 5/24/2023    Source of note: Primary endocrinologist    Summary: Patient was seen and evaluated for her chronic diabetes with hypertension.  Patient has multiple laboratory studies as well as radiology's on file which was reviewed.  Labs: ordered. Decision-making details documented in ED Course.  Radiology: ordered and independent interpretation performed. Decision-making details documented in ED Course.  ECG/medicine tests: ordered and independent interpretation performed. Decision-making details documented in ED Course.    Risk  Prescription drug management.  Decision regarding hospitalization.    Critical Care  Total time providing critical care: 38 minutes (Authorized and performed by: Ray Young MD  I personally spent a total of 38 minutes of critical care time with the patient.  Due to the high probability of clinically significant, life-threatening deterioration, the patient required my highest level of care to intervene emergently.  These interventions, including, but not limited to, establishing IV access, continuous pulse oximeter and telemetry monitoring, frequent monitoring and  reevaluations, management the patient's airway and cardiovascular system, discussion with other consultants as needed, which bear directly on the management the patient.  This also includes obtaining history, examining the patient, frequent reevaluations and coordinating high level of care.  Failure to emergently initiate these interventions would carry a high probability of resulting in sudden, clinically significant or life threatening deterioration in the patient's condition.  This does not include time spent on separately reported billable procedures.)      Final diagnoses:   Acute CVA (cerebrovascular accident)   Hyperglycemia       ED Disposition  ED Disposition       ED Disposition   Decision to Admit    Condition   --    Comment   Level of Care: Telemetry [5]   Diagnosis: CVA (cerebral vascular accident) [180776]                 No follow-up provider specified.       Medication List      No changes were made to your prescriptions during this visit.            Ray Young MD  08/24/23 0709

## 2023-08-24 NOTE — CONSULTS
Stroke Consult Note    Patient Name: Emily Grove   MRN: [id number]  Age: 62 y.o.  Sex: female  : 1960    Primary Care Physician: Marie Morales MD  Referring Physician: Dr. Young    TIME STROKE TEAM CALLED: 0418 EST     TIME PATIENT SEEN: 0430 EST    Handedness: Right  Race: -American    Chief Complaint/Reason for Consultation: Right facial droop, left-sided weakness    HPI: Mrs. Grove is a 62-year-old female with PMH of GERD, diabetes, HLD, HTN and overactive bladder.  She presents to [facility] ED with acute onset of right facial droop and left-sided weakness.  Patient reports getting up at 0330 to use the restroom.  Upon laying back down in bed patient experienced acute onset of presenting symptoms.  She arrived to [facility] ED via POV for further evaluation.    On arrival to [facility] ED NIH 5.  Blood pressure 139/88.  On exam patient is able to answer questions appropriately and follow two-step commands.  Decreased sensitivity to right side of face.  Right facial droop with expressive aphasia and mild dysarthria.  Strength in all extremities 5/5.  Ataxia noted to LLE.  No headaches, falls or recent trauma.  Non-smoker and no EtOH use.  Takes prescribed medications routinely    Last Known Normal Date/Time: 2023 at 2200 EST     Review of Systems   Constitutional:  Negative for fever.   HENT:  Negative for trouble swallowing and voice change.    Eyes:  Negative for visual disturbance.   Respiratory:  Negative for shortness of breath.    Cardiovascular:  Negative for chest pain.   Gastrointestinal:  Negative for abdominal pain and nausea.   Neurological:  Positive for facial asymmetry, speech difficulty, weakness and numbness. Negative for dizziness, light-headedness and headaches.   Psychiatric/Behavioral:  Negative for confusion.     Past Medical History:   Diagnosis Date    Acid reflux     Diabetes mellitus     Hyperlipidemia     Hypertension     Overactive bladder      Past Surgical History:    Procedure Laterality Date    BLADDER SURGERY      GALL BLADDER    CARPAL TUNNEL RELEASE      FINGER FUSION      FOOT SURGERY      TONSILLECTOMY       Family History   Problem Relation Age of Onset    Diabetes Mother     Stroke Mother     COPD Father     Stroke Father     Hypertension Sister     Diabetes Brother     Heart attack Brother     Parkinsonism Brother     No Known Problems Maternal Grandmother     Diabetes Maternal Grandfather     Stroke Maternal Grandfather     Heart failure Paternal Grandmother     Diabetes Paternal Grandmother     No Known Problems Paternal Grandfather      Social History     Socioeconomic History    Marital status:    Tobacco Use    Smoking status: Never    Smokeless tobacco: Never   Vaping Use    Vaping Use: Never used   Substance and Sexual Activity    Alcohol use: No    Drug use: No    Sexual activity: Defer     Allergies   Allergen Reactions    Sulfa Antibiotics Anaphylaxis     Prior to Admission medications    Medication Sig Start Date End Date Taking? Authorizing Provider   calcium citrate-vitamin d (CITRACAL) 200-250 MG-UNIT tablet tablet     Jose, Nurse Brisa RN   clobetasol (TEMOVATE) 0.05 % external solution clobetasol 0.05 % scalp solution    ProviderMaynor MD   Continuous Blood Gluc Sensor (Dexcom G4 Sensor) misc Dexcom G6 Sensor device    Jose, Nurse Epic, RN   Continuous Blood Gluc Sensor (Dexcom G6 Sensor) CHANGE EVERY 10 DAYS 2/9/21   Jose, Nurse Epic, RN   Continuous Blood Gluc Sensor (FREESTYLE ROSA 14 DAY SENSOR) [facility] See Admin Instructions. 11/1/19   Maynor Brady MD   Continuous Blood Gluc Transmit (Dexcom G6 Transmitter) misc NEED ONE NEW TRANSMITTER FOR EVERY 3 MONTHS 1/27/21   Emergency, Nurse Epic, RN   cyclobenzaprine (FLEXERIL) 10 MG tablet cyclobenzaprine 10 mg tablet    Jose, Nurse Brisa RN   dilTIAZem (TIAZAC) 300 MG 24 hr capsule Tiadylt  mg capsule,extended release    Nurse Brisa Garcia RN   Durezol  0.05 % ophthalmic emulsion INSTILL 1 DROP IN RIGHT EYE FOUR TIMES DAILY 1/27/22   Maynor Brady MD   esomeprazole (nexIUM) 40 MG capsule Take 40 mg by mouth Daily. 12/17/20   Maynor Brady MD   estradiol (VAGIFEM) 10 MCG tablet vaginal tablet Yuvafem 10 mcg vaginal tablet    Emergency, Nurse Epic, RN   ESTRADIOL PO  3/11/21   Emergency, Nurse Epic, RN   Fluocinolone Acetonide Scalp 0.01 % oil  9/4/19   Maynor Brady MD   fluticasone (FLONASE) 50 MCG/ACT nasal spray fluticasone propionate 50 mcg/actuation nasal spray,suspension    Maynor Brady MD   glucose blood test strip FreeStyle Test strips    Maynor Brady MD   HumaLOG 100 UNIT/ML injection USE WITH INSULIN PUMP UP  UNITS PER DAY 12/15/20   Maynor Brady MD   insulin lispro (humaLOG) 100 UNIT/ML injection Use with insulin pump. MDD 100u 6/16/21   Emergency, Nurse Epic, RN   Insulin Pump Accessories misc     Emergency, Nurse Epic, RN   ipratropium (ATROVENT) 0.06 % nasal spray ipratropium bromide 42 mcg (0.06 %) nasal spray   INSTILL 2 SPRAYS IN EACH NOSTRIL 3 TIMES A DAY AS NEEDED    Maynor Brady MD   ketoconazole (NIZORAL) 2 % cream ketoconazole 2 % topical cream    Emergency, Nurse Epic, RN   montelukast (SINGULAIR) 10 MG tablet Take 10 mg by mouth Every Night.    Emergency, Nurse Epic, RN   Myrbetriq 50 MG tablet sustained-release 24 hour 24 hr tablet Take 50 mg by mouth Daily. 3/23/22   Maynor Brady MD   pravastatin (PRAVACHOL) 20 MG tablet Take 40 mg by mouth Daily.    Emergency, Nurse Epic, RN   Toviaz 8 MG tablet sustained-release 24 hour tablet Take 8 mg by mouth every night at bedtime. 1/17/22   Maynor Brady MD   triamcinolone (KENALOG) 0.1 % ointment Apply a thin film on a qtip to the opening of both ear canals twice daily for 3-5 days at a time prn itching. 3/28/22   Emergency, Nurse Epic, RN   valsartan (DIOVAN) 80 MG tablet Take 80 mg by mouth Daily. 2/15/22    Provider, MD Maynor   Vitamin D, Cholecalciferol, (CHOLECALCIFEROL) 400 units tablet Take 400 Units by mouth Daily.    Emergency, Nurse Epic, RN         Temp:  [97.8 °F (36.6 °C)] 97.8 °F (36.6 °C)  Heart Rate:  [84] 84  Resp:  [16] 16  BP: (139)/(88) 139/88  Neurological Exam  Mental Status  Awake, alert and oriented to person, place and time. Oriented to person, place and time. Oriented to person, place, and time. Mild dysarthria present. Expressive aphasia present.    Cranial Nerves  CN II: Visual fields full to confrontation.  CN III, IV, VI: Extraocular movements intact bilaterally. Pupils equal round and reactive to light bilaterally.  CN V:  Right: Diminished sensation of the entire right side of the face.  Left: Facial sensation is normal on the left.  CN VII:  Right: There is central facial weakness.  CN VIII: Hearing intact.  CN XI: Shoulder shrug strength is normal.  CN XII: Tongue midline without atrophy or fasciculations.    Motor  Normal muscle bulk throughout. Normal muscle tone. Strength is 5/5 in all four extremities except as noted.  Left hand  weak..    Sensory  Light touch abnormality: Sensation: Decreased sensitivity right side of face.     Coordination  Right: Finger-to-nose normal. Heel-to-shin normal.Left: Finger-to-nose normal. Heel-to-shin abnormality:  Ataxia noted left lower extremity.    Gait    Unable to assess.    Physical Exam  Constitutional:       General: She is not in acute distress.     Appearance: Normal appearance. She is not ill-appearing.   HENT:      Head: Normocephalic and atraumatic.      Nose: Nose normal.      Mouth/Throat:      Mouth: Mucous membranes are dry.   Eyes:      Extraocular Movements: Extraocular movements intact.      Pupils: Pupils are equal, round, and reactive to light.   Cardiovascular:      Pulses: Normal pulses.   Pulmonary:      Effort: Pulmonary effort is normal. No respiratory distress.   Abdominal:      General: Abdomen is flat.    Musculoskeletal:         General: Normal range of motion.      Cervical back: Normal range of motion.   Skin:     General: Skin is warm and dry.   Neurological:      Mental Status: She is oriented to person, place, and time.      Cranial Nerves: Cranial nerve deficit and dysarthria present.      Sensory: Sensory deficit present.      Motor: Weakness present.      Coordination: Coordination normal.   Psychiatric:         Mood and Affect: Mood normal.         Behavior: Behavior normal.       Acute Stroke Data    Thrombolytic Inclusion / Exclusion Criteria    Time: 04:53 EDT  Person Administering Scale: TYLER Sanchez    Inclusion Criteria  [x]   18 years of age or greater   [x]   Onset of symptoms < 4.5 hours before beginning treatment (stroke onset = time patient was last seen well or without symptoms).   []   Diagnosis of acute ischemic stroke causing measurable disabling deficit (Complete Hemianopia, Any Aphasia, Visual or Sensory Extinction, Any weakness limiting sustained effort against gravity)   []   Any remaining deficit considered potentially disabling in view of patient and practitioner   Exclusion criteria (Do not proceed with Alteplase if any are checked under exclusion criteria)  []   Onset unknown or GREATER than 4.5 hours   []   ICH on CT/MRI   []   CT demonstrates hypodensity representing acute or subacute infarct   []   Significant head trauma or prior stroke in the previous 3 months   []   Symptoms suggestive of subarachnoid hemorrhage   []   History of un-ruptured intracranial aneurysm GREATER than 10 mm   []   Recent intracranial or intraspinal surgery within the last 3 months   []   Arterial puncture at a non-compressible site in the previous 7 days   []   Active internal bleeding   []   Acute bleeding tendency   []   Platelet count LESS than 100,000 for known hematological diseases such as leukemia, thrombocytopenia or chronic cirrhosis   []   Current use of anticoagulant with INR GREATER  than 1.7 or PT GREATER than 15 seconds, aPTT GREATER than 40 seconds   []   Heparin received within 48 hours, resulting in abnormally elevated aPTT GREATER than upper limit of normal   []   Current use of direct thrombin inhibitors or direct factor Xa inhibitors in the past 48 hours   []   Elevated blood pressure refractory to treatment (systolic GREATER than 185 mm/Hg or diastolic  GREATER than 110 mm/Hg   []   Suspected infective endocarditis and aortic arch dissection   []   Current use of therapeutic treatment dose of low-molecular-weight heparin (LMWH) within the previous 24 hours   []   Structural GI malignancy or bleed   Relative exclusion for all patients  []   Only minor non-disabling symptoms   []   Pregnancy   []   Seizure at onset with postictal residual neurological impairments   []   Major surgery or previous trauma within past 14 days   []   History of previous spontaneous ICH, intracranial neoplasm, or AV malformation   []   Postpartum (within previous 14 days)   []   Recent GI or urinary tract hemorrhage (within previous 21 days)   []   Recent acute MI (within previous 3 months)   []   History of un-ruptured intracranial aneurysm LESS than 10 mm   []   History of ruptured intracranial aneurysm   []   Blood glucose LESS than 50 mg/dL (2.7 mmol/L)   []   Dural puncture within the last 7 days   []   Known GREATER than 10 cerebral microbleeds   Additional exclusions for patients with symptoms onset between 3 and 4.5 hours.  []   Age > 80.   []   On any anticoagulants regardless of INR  >>> Warfarin (Coumadin), Heparin, Enoxaparin (Lovenox), fondaparinux (Arixtra), bivalirudin (Angiomax), Argatroban, dabigatran (Pradaxa), rivaroxaban (Xarelto), or apixaban (Eliquis)   []   Severe stroke (NIHSS > 25).   []   History of BOTH diabetes and previous ischemic stroke.   []   The risks and benefits have been discussed with the patient or family related to the administration of IV thrombolytic therapy for stroke  symptoms.   []   I have discussed and reviewed the patient's case and imaging with the attending prior to IV thrombolytic therapy.   NA Time IV thrombolytic administered   Discussed plan of care with Dr. Bustillo.  Patient deemed not a candidate for IV thrombolytic therapy.    Hospital Meds:  Scheduled-    Infusions-     PRNs-   sodium chloride    Functional Status Prior to Current Stroke/Jaylan Score:   MODIFIED JAYLAN SCALE (to be assessed for each patient having history of stroke) []Stroke history but not assessed  [x]0: No symptoms at all  []1: No significant disability despite symptoms  []2: Slight disability  []3: Moderate disability  []4: Moderately severe disability  []5: Severe disability  []6: Death        NIH Stroke Scale  Time: 04:53 EDT  Person Administering Scale: TYLER Sanchez  Interval: baseline  1a. Level of Consciousness: 0-->Alert, keenly responsive  1b. LOC Questions: 0-->Answers both questions correctly  1c. LOC Commands: 0-->Performs both tasks correctly  2. Best Gaze: 0-->Normal  3. Visual: 0-->No visual loss  4. Facial Palsy: 1-->Minor paralysis (flattened nasolabial fold, asymmetry on smiling)  5a. Motor Arm, Left: 0-->No drift, limb holds 90 (or 45) degrees for full 10 secs  5b. Motor Arm, Right: 0-->No drift, limb holds 90 (or 45) degrees for full 10 secs  6a. Motor Leg, Left: 0-->No drift, leg holds 30 degree position for full 5 secs  6b. Motor Leg, Right: 0-->No drift, leg holds 30 degree position for full 5 secs  7. Limb Ataxia: 1-->Present in one limb  8. Sensory: 1-->Mild-to-moderate sensory loss, patient feels pinprick is less sharp or is dull on the affected side, or there is a loss of superficial pain with pinprick, but patient is aware of being touched  9. Best Language: 1-->Mild-to-moderate aphasia, some obvious loss of fluency or facility of comprehension, without significant limitation on ideas expressed or form of expression. Reduction of speech and/or comprehension,  however, makes conversation. . . (see row details)  10. Dysarthria: 1-->Mild-to-moderate dysarthria, patient slurs at least some words and, at worst, can be understood with some difficulty  11. Extinction and Inattention (formerly Neglect): 0-->No abnormality    Total (NIH Stroke Scale): 5       Results Reviewed:  I have personally reviewed current lab, radiology, and data and agree with results.    Results for orders placed during the hospital encounter of 02/08/21    Adult Transthoracic Echo Complete W/ Cont if Necessary Per Protocol    Interpretation Summary  · Estimated left ventricular EF = 70% Estimated left ventricular EF was in agreement with the calculated left ventricular EF. Left ventricular ejection fraction appears to be 66 - 70%. Left ventricular systolic function is normal.  · The right atrial cavity is borderline dilated.  · Left ventricular diastolic function is consistent with (grade I) impaired relaxation.  · Estimated right ventricular systolic pressure from tricuspid regurgitation is normal (<35 mmHg).  · Normal right ventricular cavity size, wall thickness, systolic function and septal motion noted.  · No evidence of pulmonary hypertension is present.  · There is no evidence of pericardial effusion.  · No significant structural or functional valvular abnormality demonstrated.     WBC   Date Value Ref Range Status   08/24/2023 10.00 3.40 - 10.80 10*3/mm3 Final     RBC   Date Value Ref Range Status   08/24/2023 4.81 3.77 - 5.28 10*6/mm3 Final     Hemoglobin   Date Value Ref Range Status   08/24/2023 13.6 12.0 - 17.0 g/dL Final   08/24/2023 14.0 12.0 - 15.9 g/dL Final     Hematocrit   Date Value Ref Range Status   08/24/2023 40 38 - 51 % Final   08/24/2023 44.3 34.0 - 46.6 % Final     MCV   Date Value Ref Range Status   08/24/2023 92.1 79.0 - 97.0 fL Final     MCH   Date Value Ref Range Status   08/24/2023 29.1 26.6 - 33.0 pg Final     MCHC   Date Value Ref Range Status   08/24/2023 31.6 31.5 -  35.7 g/dL Final     RDW   Date Value Ref Range Status   08/24/2023 12.1 (L) 12.3 - 15.4 % Final     RDW-SD   Date Value Ref Range Status   08/24/2023 41.0 37.0 - 54.0 fl Final     MPV   Date Value Ref Range Status   08/24/2023 11.8 6.0 - 12.0 fL Final     Platelets   Date Value Ref Range Status   08/24/2023 185 140 - 450 10*3/mm3 Final     Neutrophil %   Date Value Ref Range Status   08/24/2023 55.5 42.7 - 76.0 % Final     Lymphocyte %   Date Value Ref Range Status   08/24/2023 35.0 19.6 - 45.3 % Final     Monocyte %   Date Value Ref Range Status   08/24/2023 8.3 5.0 - 12.0 % Final     Eosinophil %   Date Value Ref Range Status   08/24/2023 0.5 0.3 - 6.2 % Final     Basophil %   Date Value Ref Range Status   08/24/2023 0.4 0.0 - 1.5 % Final     Immature Grans %   Date Value Ref Range Status   08/24/2023 0.3 0.0 - 0.5 % Final     Neutrophils, Absolute   Date Value Ref Range Status   08/24/2023 5.55 1.70 - 7.00 10*3/mm3 Final     Lymphocytes, Absolute   Date Value Ref Range Status   08/24/2023 3.50 (H) 0.70 - 3.10 10*3/mm3 Final     Monocytes, Absolute   Date Value Ref Range Status   08/24/2023 0.83 0.10 - 0.90 10*3/mm3 Final     Eosinophils, Absolute   Date Value Ref Range Status   08/24/2023 0.05 0.00 - 0.40 10*3/mm3 Final     Basophils, Absolute   Date Value Ref Range Status   08/24/2023 0.04 0.00 - 0.20 10*3/mm3 Final     Immature Grans, Absolute   Date Value Ref Range Status   08/24/2023 0.03 0.00 - 0.05 10*3/mm3 Final     nRBC   Date Value Ref Range Status   08/24/2023 0.0 0.0 - 0.2 /100 WBC Final      Lab Results   Component Value Date    GLUCOSE 136 (H) 08/24/2023    BUN 16 08/24/2023    CREATININE 0.60 08/24/2023    EGFR 97.5 08/24/2023    BCR 22.5 08/24/2023    K 3.7 08/24/2023    CO2 24.0 08/24/2023    CALCIUM 10.1 08/24/2023    ALBUMIN 4.3 08/24/2023    BILITOT <0.2 08/24/2023    AST 26 08/24/2023    AST 22 08/24/2023    ALT 14 08/24/2023    ALT 15 08/24/2023    CT Angiogram Neck    Result Date:  8/24/2023  Impression: Normal CT angiogram of the head and neck Electronically Signed: Rodolfo Bauer MD  8/24/2023 5:04 AM EDT  Workstation ID: [id number]    XR Chest 1 View    Result Date: 8/24/2023  Impression: No active disease Electronically Signed: Rodolfo Bauer MD  8/24/2023 5:08 AM EDT  Workstation ID: [id number]    CT Head Without Contrast Stroke Protocol    Result Date: 8/24/2023  Impression: Questionable increased density of the right middle cerebral artery versus artifact. Mild atrophy and chronic microvascular ischemia. No acute hemorrhage. Electronically Signed: Rodolfo Bauer MD  8/24/2023 4:38 AM EDT  Workstation ID: [id number]    CT Angiogram Head w AI Analysis of LVO    Result Date: 8/24/2023  Impression: Normal CT angiogram of the head and neck Electronically Signed: Rodolfo Bauer MD  8/24/2023 5:04 AM EDT  Workstation ID: [id number]    MRI Brain WO CON Hyper Acute Stroke Protocol    Result Date: 8/24/2023  Impression: No acute infarct Electronically Signed: Rodolfo Bauer MD  8/24/2023 6:03 AM EDT  Workstation ID: [id number]    CT CEREBRAL PERFUSION WITH & WITHOUT CONTRAST    Result Date: 8/24/2023  Impression: Normal CT perfusion Electronically Signed: Rodolfo Bauer MD  8/24/2023 5:01 AM EDT  Workstation ID: [id number]      Assessment/Plan:  Mrs. Grove is a 62-year-old female with PMH of GERD, diabetes, HLD, HTN and overactive bladder.  She presents to [facility] ED with acute onset of right facial droop and left-sided weakness.  Patient reports getting up at 0330 to use the restroom.  Upon laying back down in bed patient experienced acute onset of presenting symptoms.  She arrived to [facility] ED via POV for further evaluation. Patient is not a candidate for IV thrombolytic therapy due to risks outweighing benefits.  Patient is not a candidate for endovascular therapy due to no LVO on CT scans.    Antiplatelet PTA: Aspirin  Anticoagulant PTA: None        Right facial droop, left-sided weakness  Differentials to include TIA,  CVA  Initiate TIA/CVA without IV thrombolytic order set  N.p.o. until bedside dysphagia screening completed by RN  Activity as tolerated  Aspirin 81 mg p.o. daily  Plavix 300 mg p.o. x1 and then 75 mg p.o. daily  Okay to give Plavix load with facial droop, benefits outweigh risks  Atorvastatin 80 mg p.o. nightly  TTE routine  A1C, lipid panel routine  Blood pressure goals, SBP less than 220  Diabetes educator to see if appropriate  PT/OT/SLP eval and treat  Case management to follow      Plan of care discussed with Dr. Young, Dr. Bustillo, patient and bedside RN.  Stroke neurology will continue to follow.  Thank you for this consult.  Call with any questions or concerns.      Nitesh Bonilla, APRN  August 24, 2023  04:53 EDT

## 2023-08-24 NOTE — PLAN OF CARE
Problem: Adult Inpatient Plan of Care  Goal: Plan of Care Review  8/24/2023 1810 by Arash Castillo RN  Outcome: Ongoing, Progressing  8/24/2023 1809 by Arash Castillo RN  Outcome: Ongoing, Progressing  Goal: Patient-Specific Goal (Individualized)  8/24/2023 1810 by Arash Castillo RN  Outcome: Ongoing, Progressing  8/24/2023 1809 by Arash Castillo RN  Outcome: Ongoing, Progressing  Goal: Absence of Hospital-Acquired Illness or Injury  8/24/2023 1810 by Arash Castillo RN  Outcome: Ongoing, Progressing  8/24/2023 1809 by Arash Castillo RN  Outcome: Ongoing, Progressing  Intervention: Identify and Manage Fall Risk  Recent Flowsheet Documentation  Taken 8/24/2023 1806 by Arash Castillo RN  Safety Promotion/Fall Prevention:   activity supervised   safety round/check completed   room organization consistent   toileting scheduled  Taken 8/24/2023 1633 by Arash Castillo RN  Safety Promotion/Fall Prevention:   activity supervised   safety round/check completed   room organization consistent  Intervention: Prevent Skin Injury  Recent Flowsheet Documentation  Taken 8/24/2023 1806 by Arash Castillo RN  Body Position:   weight shifting   turned  Taken 8/24/2023 1633 by Arash Csatillo RN  Body Position:   weight shifting   upper extremity elevated  Skin Protection:   adhesive use limited   drying agents applied  Intervention: Prevent and Manage VTE (Venous Thromboembolism) Risk  Recent Flowsheet Documentation  Taken 8/24/2023 1806 by Arash Castillo RN  Activity Management: activity encouraged  Taken 8/24/2023 1633 by Arash Castillo RN  Activity Management:   activity encouraged   up ad joleen  VTE Prevention/Management:   bilateral   sequential compression devices on  Range of Motion: active ROM (range of motion) encouraged  Intervention: Prevent Infection  Recent Flowsheet Documentation  Taken 8/24/2023 1806 by Arash Castillo RN  Infection Prevention:   environmental surveillance performed   single patient room provided   rest/sleep  promoted  Taken 8/24/2023 1633 by Arash Castillo RN  Infection Prevention:   rest/sleep promoted   single patient room provided  Goal: Optimal Comfort and Wellbeing  8/24/2023 1810 by Arash Castillo RN  Outcome: Ongoing, Progressing  8/24/2023 1809 by Arash Castillo RN  Outcome: Ongoing, Progressing  Intervention: Monitor Pain and Promote Comfort  Recent Flowsheet Documentation  Taken 8/24/2023 1806 by Arash Castillo RN  Pain Management Interventions:   relaxation techniques promoted   care clustered  Taken 8/24/2023 1633 by Arash Castillo RN  Pain Management Interventions:   care clustered   no interventions per patient request  Intervention: Provide Person-Centered Care  Recent Flowsheet Documentation  Taken 8/24/2023 1806 by Arash Castillo RN  Trust Relationship/Rapport:   care explained   choices provided  Taken 8/24/2023 1633 by Arash Castillo RN  Trust Relationship/Rapport:   care explained   choices provided  Goal: Readiness for Transition of Care  8/24/2023 1810 by Arash Castillo RN  Outcome: Ongoing, Progressing  8/24/2023 1809 by Arash Castillo RN  Outcome: Ongoing, Progressing  Intervention: Mutually Develop Transition Plan  Recent Flowsheet Documentation  Taken 8/24/2023 1628 by Arash Castillo RN  Equipment Currently Used at Home: none  Transportation Anticipated: car, drives self  Transportation Concerns: none  Patient/Family Anticipated Services at Transition: none  Patient/Family Anticipates Transition to: home with family  Goal: Plan of Care Review  8/24/2023 1810 by Arash Castillo RN  Outcome: Ongoing, Progressing  8/24/2023 1809 by Arash Castillo RN  Outcome: Ongoing, Progressing  Goal: Patient-Specific Goal (Individualized)  8/24/2023 1810 by Arash Castillo RN  Outcome: Ongoing, Progressing  8/24/2023 1809 by Arash Castillo RN  Outcome: Ongoing, Progressing  Goal: Absence of Hospital-Acquired Illness or Injury  8/24/2023 1810 by Arash Castillo RN  Outcome: Ongoing, Progressing  8/24/2023 1809 by Arash Castillo  RN  Outcome: Ongoing, Progressing  Intervention: Identify and Manage Fall Risk  Recent Flowsheet Documentation  Taken 8/24/2023 1806 by Arash Castillo RN  Safety Promotion/Fall Prevention:   activity supervised   safety round/check completed   room organization consistent   toileting scheduled  Taken 8/24/2023 1633 by Arash Castillo RN  Safety Promotion/Fall Prevention:   activity supervised   safety round/check completed   room organization consistent  Intervention: Prevent Skin Injury  Recent Flowsheet Documentation  Taken 8/24/2023 1806 by Arash Castillo RN  Body Position:   weight shifting   turned  Taken 8/24/2023 1633 by Arash Castillo RN  Body Position:   weight shifting   upper extremity elevated  Skin Protection:   adhesive use limited   drying agents applied  Intervention: Prevent and Manage VTE (Venous Thromboembolism) Risk  Recent Flowsheet Documentation  Taken 8/24/2023 1806 by Arash Castillo RN  Activity Management: activity encouraged  Taken 8/24/2023 1633 by Arash Castillo RN  Activity Management:   activity encouraged   up ad joleen  VTE Prevention/Management:   bilateral   sequential compression devices on  Range of Motion: active ROM (range of motion) encouraged  Intervention: Prevent Infection  Recent Flowsheet Documentation  Taken 8/24/2023 1806 by Arash Castillo RN  Infection Prevention:   environmental surveillance performed   single patient room provided   rest/sleep promoted  Taken 8/24/2023 1633 by Arash Castillo RN  Infection Prevention:   rest/sleep promoted   single patient room provided  Goal: Optimal Comfort and Wellbeing  8/24/2023 1810 by Arash Castillo RN  Outcome: Ongoing, Progressing  8/24/2023 1809 by Arash Castillo RN  Outcome: Ongoing, Progressing  Intervention: Monitor Pain and Promote Comfort  Recent Flowsheet Documentation  Taken 8/24/2023 1806 by Arash Castillo RN  Pain Management Interventions:   relaxation techniques promoted   care clustered  Taken 8/24/2023 1633 by Arash Castillo  RN  Pain Management Interventions:   care clustered   no interventions per patient request  Intervention: Provide Person-Centered Care  Recent Flowsheet Documentation  Taken 8/24/2023 1806 by Arash Castillo RN  Trust Relationship/Rapport:   care explained   choices provided  Taken 8/24/2023 1633 by Arash Castillo RN  Trust Relationship/Rapport:   care explained   choices provided  Goal: Readiness for Transition of Care  8/24/2023 1810 by Arash Castillo, RN  Outcome: Ongoing, Progressing  8/24/2023 1809 by Arash Castillo RN  Outcome: Ongoing, Progressing  Intervention: Mutually Develop Transition Plan  Recent Flowsheet Documentation  Taken 8/24/2023 1628 by Arash Castillo, RN  Equipment Currently Used at Home: none  Transportation Anticipated: car, drives self  Transportation Concerns: none  Patient/Family Anticipated Services at Transition: none  Patient/Family Anticipates Transition to: home with family

## 2023-08-25 ENCOUNTER — APPOINTMENT (OUTPATIENT)
Dept: CT IMAGING | Facility: HOSPITAL | Age: 63
End: 2023-08-25
Payer: MEDICAID

## 2023-08-25 ENCOUNTER — READMISSION MANAGEMENT (OUTPATIENT)
Dept: CALL CENTER | Facility: HOSPITAL | Age: 63
End: 2023-08-25
Payer: MEDICAID

## 2023-08-25 VITALS
SYSTOLIC BLOOD PRESSURE: 134 MMHG | HEART RATE: 105 BPM | RESPIRATION RATE: 16 BRPM | HEIGHT: 55 IN | OXYGEN SATURATION: 98 % | TEMPERATURE: 97.8 F | WEIGHT: 112 LBS | DIASTOLIC BLOOD PRESSURE: 84 MMHG | BODY MASS INDEX: 25.92 KG/M2

## 2023-08-25 LAB
CHOLEST SERPL-MCNC: 100 MG/DL (ref 0–200)
GLUCOSE BLDC GLUCOMTR-MCNC: 101 MG/DL (ref 70–130)
GLUCOSE BLDC GLUCOMTR-MCNC: 136 MG/DL (ref 70–130)
GLUCOSE BLDC GLUCOMTR-MCNC: 207 MG/DL (ref 70–130)
GLUCOSE BLDC GLUCOMTR-MCNC: 73 MG/DL (ref 70–130)
GLUCOSE BLDC GLUCOMTR-MCNC: 86 MG/DL (ref 70–130)
HDLC SERPL-MCNC: 50 MG/DL (ref 40–60)
LDLC SERPL CALC-MCNC: 27 MG/DL (ref 0–100)
LDLC/HDLC SERPL: 0.48 {RATIO}
QT INTERVAL: 378 MS
QT INTERVAL: 410 MS
QTC INTERVAL: 463 MS
QTC INTERVAL: 499 MS
TRIGL SERPL-MCNC: 130 MG/DL (ref 0–150)
VLDLC SERPL-MCNC: 23 MG/DL (ref 5–40)

## 2023-08-25 PROCEDURE — 82948 REAGENT STRIP/BLOOD GLUCOSE: CPT

## 2023-08-25 PROCEDURE — 80061 LIPID PANEL: CPT

## 2023-08-25 PROCEDURE — G0378 HOSPITAL OBSERVATION PER HR: HCPCS

## 2023-08-25 PROCEDURE — 25010000002 HEPARIN (PORCINE) PER 1000 UNITS: Performed by: STUDENT IN AN ORGANIZED HEALTH CARE EDUCATION/TRAINING PROGRAM

## 2023-08-25 PROCEDURE — 97165 OT EVAL LOW COMPLEX 30 MIN: CPT

## 2023-08-25 PROCEDURE — 93010 ELECTROCARDIOGRAM REPORT: CPT | Performed by: INTERNAL MEDICINE

## 2023-08-25 PROCEDURE — 97161 PT EVAL LOW COMPLEX 20 MIN: CPT

## 2023-08-25 PROCEDURE — 93005 ELECTROCARDIOGRAM TRACING: CPT

## 2023-08-25 PROCEDURE — 96361 HYDRATE IV INFUSION ADD-ON: CPT

## 2023-08-25 PROCEDURE — 70450 CT HEAD/BRAIN W/O DYE: CPT

## 2023-08-25 PROCEDURE — 99213 OFFICE O/P EST LOW 20 MIN: CPT | Performed by: NURSE PRACTITIONER

## 2023-08-25 PROCEDURE — 96372 THER/PROPH/DIAG INJ SC/IM: CPT

## 2023-08-25 RX ORDER — ATORVASTATIN CALCIUM 80 MG/1
80 TABLET, FILM COATED ORAL NIGHTLY
Qty: 90 TABLET | Refills: 0 | Status: SHIPPED | OUTPATIENT
Start: 2023-08-25

## 2023-08-25 RX ORDER — CLOPIDOGREL BISULFATE 75 MG/1
75 TABLET ORAL DAILY
Qty: 21 TABLET | Refills: 0 | Status: SHIPPED | OUTPATIENT
Start: 2023-08-25

## 2023-08-25 RX ADMIN — ASPIRIN 81 MG: 81 TABLET, CHEWABLE ORAL at 07:57

## 2023-08-25 RX ADMIN — CLOPIDOGREL BISULFATE 75 MG: 75 TABLET ORAL at 07:58

## 2023-08-25 RX ADMIN — HEPARIN SODIUM 2500 UNITS: 5000 INJECTION INTRAVENOUS; SUBCUTANEOUS at 07:55

## 2023-08-25 RX ADMIN — PANTOPRAZOLE SODIUM 40 MG: 40 TABLET, DELAYED RELEASE ORAL at 07:56

## 2023-08-25 RX ADMIN — Medication 10 ML: at 08:06

## 2023-08-25 RX ADMIN — Medication 10 ML: at 08:05

## 2023-08-25 RX ADMIN — SODIUM CHLORIDE 1000 ML: 9 INJECTION, SOLUTION INTRAVENOUS at 13:46

## 2023-08-25 NOTE — THERAPY EVALUATION
Patient Name: Emily Grove  : 1960    MRN: [id number]                              Today's Date: 2023       Admit Date: 2023    Visit Dx:     ICD-10-CM ICD-9-CM   1. Acute CVA (cerebrovascular accident)  I63.9 434.91   2. Hyperglycemia  R73.9 790.29     Patient Active Problem List   Diagnosis    Fatty liver    Family history of polyps in the colon    Gastroesophageal reflux disease    Precordial pain    Type 1 diabetes mellitus with hyperglycemia    Abnormal EKG    Other fatigue    Dizziness    Tachycardia    Palpitations    CVA (cerebral vascular accident)     Past Medical History:   Diagnosis Date    Acid reflux     Diabetes mellitus     Hyperlipidemia     Hypertension     Overactive bladder      Past Surgical History:   Procedure Laterality Date    BLADDER SURGERY      GALL BLADDER    CARPAL TUNNEL RELEASE      FINGER FUSION      FOOT SURGERY      TONSILLECTOMY        General Information       Row Name 23 0759          OT Time and Intention    Document Type discharge evaluation/summary  -CS     Mode of Treatment occupational therapy  -CS       Row Name 23 0759          General Information    Patient Profile Reviewed yes  -CS     Prior Level of Function independent:;all household mobility;ADL's;driving;using stairs  Pt reports no AD/DME at baseline for ADL completion or related mobility.  -CS     Existing Precautions/Restrictions no known precautions/restrictions  CTS with mild R baseline numbness  -CS     Barriers to Rehab none identified  -CS       Row Name 23 0759          Living Environment    People in Home spouse  -CS       Row Name 23 0759          Home Main Entrance    Number of Stairs, Main Entrance none  -CS       Row Name 23 0759          Stairs Within Home, Primary    Stairs, Within Home, Primary Pt reports 2 + 2 stair indoors to reach kitchen (sunken living room?)  -CS     Number of Stairs, Within Home, Primary other (see comments)  -CS       Row  Name 08/25/23 0759          Cognition    Orientation Status (Cognition) oriented x 4  -CS       Row Name 08/25/23 0759          Safety Issues, Functional Mobility    Impairments Affecting Function (Mobility) sensation/sensory awareness  -CS               User Key  (r) = Recorded By, (t) = Taken By, (c) = Cosigned By      Initials Name Provider Type    CS Joseph Worley OT Occupational Therapist                     Mobility/ADL's       Row Name 08/25/23 0802          Bed Mobility    Bed Mobility supine-sit;scooting/bridging  -CS     Scooting/Bridging [place] (Bed Mobility) independent  -CS     Supine-Sit [place] (Bed Mobility) modified independence  -CS     Assistive Device (Bed Mobility) head of bed elevated  -CS     Comment, (Bed Mobility) appropriate sequencing intact, no dizziness reported  -CS       Row Name 08/25/23 0802          Transfers    Transfers sit-stand transfer;bed-chair transfer;toilet transfer  -CS       Row Name 08/25/23 0802          Bed-Chair Transfer    Bed-Chair [place] (Transfers) independent  -CS       Row Name 08/25/23 0802          Sit-Stand Transfer    Sit-Stand [place] (Transfers) independent  -CS       Row Name 08/25/23 0802          Toilet Transfer    Type (Toilet Transfer) sit-stand;stand-sit  -CS     [place] Level (Toilet Transfer) independent  -CS       Row Name 08/25/23 0802          Activities of Daily Living    BADL Assessment/Intervention lower body dressing;grooming;feeding;toileting  -CS       Row Name 08/25/23 0802          Lower Body Dressing Assessment/Training    [place] Level (Lower Body Dressing) don;socks;independent  -CS     Position (Lower Body Dressing) edge of bed sitting  -CS       Row Name 08/25/23 0802          Grooming Assessment/Training    [place] Level (Grooming) grooming skills;independent  -CS       Row Name 08/25/23 0802          Self-Feeding Assessment/Training    [place] Level (Feeding) feeding skills;independent   -CS     Position (Self-Feeding) supported sitting  -       Row Name 08/25/23 0802          Toileting Assessment/Training    [place] Level (Toileting) toileting skills;independent  -               User Key  (r) = Recorded By, (t) = Taken By, (c) = Cosigned By      Initials Name Provider Type     Joseph Worley, OT Occupational Therapist                   Obj/Interventions       Row Name 08/25/23 0814          Sensory Assessment (Somatosensory)    Sensory Assessment (Somatosensory) UE sensation intact;other (see comments)  -     Sensory Assessment R hand fingertips numbness due to baseline Carpal Tunnel Syndrome, WFL for safe ADL completion  -       Row Name 08/25/23 0814          Vision Assessment/Intervention    Visual Impairment/Limitations WFL;corrective lenses full-time;visual/perceptual impairments present;other (see comments)  -     Vision Assessment Comment history of cataracts, large print as needed, able to read signage in room w/ corrective lenses  -       Row Name 08/25/23 0814          Range of Motion Comprehensive    General Range of Motion no range of motion deficits identified;bilateral upper extremity ROM WFL  -       Row Name 08/25/23 0814          Strength Comprehensive (MMT)    General Manual Muscle Testing (MMT) Assessment no strength deficits identified  -     Comment, General Manual Muscle Testing (MMT) Assessment BUE grossly 5/5, no significant asymmetry observed  -       Row Name 08/25/23 0814          Motor Skills    Motor Skills coordination;functional endurance  -     Coordination bilateral;upper extremity;finger to nose;bimanual skills;WFL  -     Functional Endurance O2 sats stable on RA  -       Row Name 08/25/23 0814          Balance    Balance Assessment sitting static balance;sitting dynamic balance;standing static balance;standing dynamic balance  -     Static Sitting Balance independent  -CS     Dynamic Sitting Balance independent  -      Position, Sitting Balance unsupported;sitting edge of bed  -CS     Static Standing Balance independent  -CS     Dynamic Standing Balance independent  -CS     Position/Device Used, Standing Balance unsupported  -CS     Balance Interventions sit to stand;standing;occupation based/functional task;sitting  -CS     Comment, Balance no LOB during ADL completion  -CS               User Key  (r) = Recorded By, (t) = Taken By, (c) = Cosigned By      Initials Name Provider Type    CS Joseph Worley, OT Occupational Therapist                   Goals/Plan    No documentation.                  Clinical Impression       Row Name 08/25/23 0818          Pain Assessment    Pretreatment Pain Rating 0/10 - no pain  -CS     Posttreatment Pain Rating 0/10 - no pain  -CS       Row Name 08/25/23 0818          Plan of Care Review    Plan of Care Reviewed With patient  -CS     Progress no change  -CS     Outcome Evaluation Pt presents at baseline for ADL completion with symmetrical BUE strength and coordination intact. Mild numbness in R fingertips due to baseline carpal tunnel syndrome, otherwise intact. OT signing off, please reconsult if needed. Rec d/c to home when medically appropriate.  -CS       Row Name 08/25/23 0818          Therapy Assessment/Plan (OT)    Criteria for Skilled Therapeutic Interventions Met (OT) yes;no;does not meet criteria for skilled intervention  -CS     Therapy Frequency (OT) evaluation only  -       Row Name 08/25/23 0818          Therapy Plan Review/Discharge Plan (OT)    Anticipated Discharge Disposition (OT) home  -       Row Name 08/25/23 0818          Vital Signs    Pre Systolic BP Rehab 120  RN cleared for eval  -CS     Pre Treatment Diastolic BP 90  -CS     Post Systolic BP Rehab 132  -CS     Post Treatment Diastolic BP 97  -CS     Posttreatment Heart Rate (beats/min) 85  -CS     Pre SpO2 (%) 96  -CS     O2 Delivery Pre Treatment room air  -CS     O2 Delivery Intra Treatment room air  -CS     Post  SpO2 (%) 95  -CS     O2 Delivery Post Treatment room air  -CS     Pre Patient Position Supine  -CS     Intra Patient Position Standing  -CS     Post Patient Position Sitting  -CS       Row Name 08/25/23 0818          Positioning and Restraints    Pre-Treatment Position in bed  -CS     Post Treatment Position chair  -CS     In Chair reclined;sitting;call light within reach;encouraged to call for assist;with nsg;notified nsg  no alarm per RN, up ad joleen  -CS               User Key  (r) = Recorded By, (t) = Taken By, (c) = Cosigned By      Initials Name Provider Type    Joseph Richter OT Occupational Therapist                   Outcome Measures       Row Name 08/25/23 0824          How much help from another is currently needed...    Putting on and taking off regular lower body clothing? 4  -CS     Bathing (including washing, rinsing, and drying) 4  -CS     Toileting (which includes using toilet bed pan or urinal) 4  -CS     Putting on and taking off regular upper body clothing 4  -CS     Taking care of personal grooming (such as brushing teeth) 4  -CS     Eating meals 4  -CS     AM-PAC 6 Clicks Score (OT) 24  -CS       Row Name 08/25/23 0824          Modified [place] Scale    Modified [place] Scale 1 - No significant disability despite symptoms.  Able to carry out all usual duties and activities.  -CS       Row Name 08/25/23 0824          Functional Assessment    Outcome Measure Options AM-PAC 6 Clicks Daily Activity (OT);Modified [place]  -CS               User Key  (r) = Recorded By, (t) = Taken By, (c) = Cosigned By      Initials Name Provider Type    Joseph Richter OT Occupational Therapist                    Occupational Therapy Education       Title: PT OT SLP Therapies (In Progress)       Topic: Occupational Therapy (In Progress)       Point: ADL training (Not Started)       Description:   Instruct learner(s) on proper safety adaptation and remediation techniques during self care or transfers.    Instruct in proper use of assistive devices.                  Learner Progress:  Not documented in this visit.              Point: Home exercise program (Not Started)       Description:   Instruct learner(s) on appropriate technique for monitoring, assisting and/or progressing therapeutic exercises/activities.                  Learner Progress:  Not documented in this visit.              Point: Precautions (Done)       Description:   Instruct learner(s) on prescribed precautions during self-care and functional transfers.                  Learning Progress Summary             Patient Acceptance, E,D, VU,DU by MAMADOU at 8/25/2023 0824    Comment: in-room safety awareness                         Point: Body mechanics (Not Started)       Description:   Instruct learner(s) on proper positioning and spine alignment during self-care, functional mobility activities and/or exercises.                  Learner Progress:  Not documented in this visit.                              User Key       Initials Effective Dates Name Provider Type Discipline    MAMADOU 06/16/21 -  Joseph Worley, OT Occupational Therapist OT                  OT Recommendation and Plan  Therapy Frequency (OT): evaluation only  Plan of Care Review  Plan of Care Reviewed With: patient  Progress: no change  Outcome Evaluation: Pt presents at baseline for ADL completion with symmetrical BUE strength and coordination intact. Mild numbness in R fingertips due to baseline carpal tunnel syndrome, otherwise intact. OT signing off, please reconsult if needed. Rec d/c to home when medically appropriate.     Time Calculation:   Evaluation Complexity (OT)  Review Occupational Profile/Medical/Therapy History Complexity: brief/low complexity  Assessment, Occupational Performance/Identification of Deficit Complexity: 1-3 performance deficits  Clinical Decision Making Complexity (OT): problem focused assessment/low complexity  Overall Complexity of Evaluation (OT): low  complexity     Time Calculation- OT       Row Name 08/25/23 0825             Time Calculation- OT    OT Start Time 0736  -CS      OT Received On 08/25/23  -CS         Untimed Charges    OT Eval/Re-eval Minutes 47  -CS         Total Minutes    Untimed Charges Total Minutes 47  -CS       Total Minutes 47  -CS                User Key  (r) = Recorded By, (t) = Taken By, (c) = Cosigned By      Initials Name Provider Type    CS Joseph Worley, OT Occupational Therapist                  Therapy Charges for Today       Code Description Service Date Service Provider Modifiers Qty    [id number] HC OT EVAL LOW COMPLEXITY 4 8/25/2023 Joseph Worley, RANDAL GO 1                 Joseph Worley OT  8/25/2023

## 2023-08-25 NOTE — PLAN OF CARE
Goal Outcome Evaluation:  Plan of Care Reviewed With: patient        Progress: no change  Outcome Evaluation: Pt presents at baseline for ADL completion with symmetrical BUE strength and coordination intact. Mild numbness in R fingertips due to baseline carpal tunnel syndrome, otherwise intact. OT signing off, please reconsult if needed. Rec d/c to home when medically appropriate.      Anticipated Discharge Disposition (OT): home

## 2023-08-25 NOTE — PROGRESS NOTES
[facility]  PROGRESS NOTE    Patient Name: Emily Grove  : 1960  MRN: [id number]    Date of Admission: 2023  Primary Care Physician: Marie Morales MD    Subjective   Subjective     CC:  F/u stroke    HPI:  Patient still with some numbnes/tingling around mouth on the right side    ROS:  Gen- No fevers, chills  CV- No chest pain, palpitations  Resp- No cough, dyspnea  GI- No N/V/D, abd pain      Objective   Objective     Vital Signs:   Temp:  [97.6 °F (36.4 °C)-98.3 °F (36.8 °C)] 97.6 °F (36.4 °C)  Heart Rate:  [] 102  Resp:  [16-18] 16  BP: ()/(69-92) 120/90     Physical Exam:  Constitutional: No acute distress, awake, alert  HENT: NCAT, mucous membranes moist  Respiratory: Clear to auscultation bilaterally, respiratory effort normal   Cardiovascular: RRR, no murmurs, rubs, or gallops  Gastrointestinal: Positive bowel sounds, soft, nontender, nondistended  Musculoskeletal: No bilateral ankle edema  Psychiatric: Appropriate affect, cooperative  Neurologic: Oriented x 3, speech clear, no focal deficits  Skin: No rashes      Results Reviewed:  LAB RESULTS:      Lab 23   WBC  --   --  10.00  --    HEMOGLOBIN  --   --  14.0  --    HEMOGLOBIN, POC  --  13.6  --  13.6   HEMATOCRIT  --   --  44.3  --    HEMATOCRIT POC  --  40  --  40   PLATELETS  --   --  185  --    NEUTROS ABS  --   --  5.55  --    IMMATURE GRANS (ABS)  --   --  0.03  --    LYMPHS ABS  --   --  3.50*  --    MONOS ABS  --   --  0.83  --    EOS ABS  --   --  0.05  --    MCV  --   --  92.1  --    PROTIME 12.1  --   --  12.1*   APTT  --   --  26.0  --          Lab 23  04323   SODIUM  --  143  --    POTASSIUM  --  3.7  --    CHLORIDE  --  104  --    CO2  --  24.0  --    ANION GAP  --  15.0  --    BUN  --  16  --    CREATININE 0.60 0.71 0.60   EGFR  --  97.5 102.9   GLUCOSE  --  136*  --     CALCIUM  --  10.1  --    MAGNESIUM  --  2.1  --    PHOSPHORUS  --  2.6  --    HEMOGLOBIN A1C  --  7.80*  --          Lab 08/24/23  0436   TOTAL PROTEIN 8.0   ALBUMIN 4.3   GLOBULIN 3.7   ALT (SGPT) 15  14   AST (SGOT) 22  26   BILIRUBIN <0.2   ALK PHOS 151*         Lab 08/24/23  0440 08/24/23  0436 08/24/23  0433   HSTROP T  --  17*  --    PROTIME 12.1  --  12.1*   INR 1  --  1.0         Lab 08/25/23  0353   CHOLESTEROL 100   LDL CHOL 27   HDL CHOL 50   TRIGLYCERIDES 130             Brief Urine Lab Results       None            Microbiology Results Abnormal       None            Adult Transthoracic Echo Complete W/ Cont if Necessary Per Protocol (With Agitated Saline)    Result Date: 8/24/2023    Left ventricular systolic function is normal. Left ventricular ejection fraction appears to be 61 - 65%.   Saline test results are positive with valsalva manuever.     CT Angiogram Neck    Result Date: 8/24/2023  CT ANGIOGRAM HEAD W AI ANALYSIS OF LVO, CT ANGIOGRAM NECK Date of Exam: 8/24/2023 4:38 AM EDT Indication: Neuro deficit, acute stroke suspected Neuro deficit, acute stroke suspected. Comparison: None available. Technique: CTA of the head was performed after the uneventful intravenous administration of 115 mL Isovue-370. Reconstructed coronal and sagittal images were also obtained. In addition, a 3-D volume rendered image was created for interpretation. Automated exposure control and iterative reconstruction methods were used. Findings: Aortic arch: The aortic arch is unremarkable.  There is conventional 3 vessel arch anatomy.  The right brachiocephalic and visualized bilateral subclavian arteries are within normal limits. Right carotid: The right CCA arises as expected from the brachiocephalic trunk.  The CCA follows a normal course and appears normal caliber.  The carotid bifurcation is unremarkable.  The external carotid artery and distal branches appear within normal limits.  The cervical internal carotid  artery follows a normal course and appears normal caliber throughout the neck and into the head.  The intracranial ICA segments appear within normal limits.  The ophthalmic artery origin is normal.  The A1 and M1 segments appear within normal limits.  The visualized distal SCOTT and MCA branches appear patent.  There is  a patent  anterior communicating artery. There is a patent  posterior communicating artery. Left carotid: The left CCA arises as expected from the aortic arch.   The CCA follows a normal course and appears normal caliber.  The carotid bifurcation is unremarkable.  The external carotid artery and distal branches appear within normal limits.  The  cervical internal carotid artery follows a normal course and appears normal caliber throughout the neck and into the head.  The intracranial ICA segments appear within normal limits.  The ophthalmic artery origin is normal.  The A1 and M1 segments appear within normal limits.  The visualized distal SCOTT and MCA branches appear patent.  There is a patent  posterior communicating artery. Posterior circulation: Vertebral arteries arise as expected from ipsilateral subclavian arteries.  The vertebral arteries are codominant.  The vertebral arteries follow a normal course and appear normal caliber throughout the neck and into the head.  The  V4 segments are patent.  Visualized posterior inferior cerebellar arteries are within normal limits.  The basilar artery is normal caliber.  Superior cerebellar arteries are patent.  Bilateral P1 segments and posterior cerebral arteries appear within normal limits. Nonvascular findings: Intracranial structures appear unremarkable.  Orbits are within normal limits.  Paranasal sinuses and mastoid air cells appear well aerated.  Superficial soft tissues and underlying musculature appear within normal limits.  The lung  apices are clear.  The thyroid and salivary glands appear unremarkable.  The suprahyoid and infrahyoid spaces of  the neck appear unremarkable.  There is no evidence of cervical lymphadenopathy or a neck mass.  There are no acute osseous abnormalities or  destructive bone lesions.     Impression: Impression: Normal CT angiogram of the head and neck Electronically Signed: Rodolfo Bauer MD  8/24/2023 5:04 AM EDT  Workstation ID: [id number]    XR Chest 1 View    Result Date: 8/24/2023  XR CHEST 1 VW Date of Exam: 8/24/2023 4:45 AM EDT Indication: Acute Stroke Protocol (onset < 12 hrs) Comparison: Chest radiograph dated November 2, 2019 Findings: There are no airspace consolidations. No pleural fluid. No pneumothorax. The pulmonary vasculature appears within normal limits. The cardiac and mediastinal silhouette appear unremarkable. No acute osseous abnormality identified.     Impression: Impression: No active disease Electronically Signed: Rodolfo Bauer MD  8/24/2023 5:08 AM EDT  Workstation ID: [id number]    Duplex Venous Lower Extremity - Bilateral CAR    Result Date: 8/24/2023    The bilateral lower extremity venous duplex scan is negative for evidence of a DVT and SVT.     CT Head Without Contrast Stroke Protocol    Result Date: 8/25/2023  CT HEAD WO CONTRAST STROKE PROTOCOL Date of Exam: 8/25/2023 12:29 AM EDT Indication: Stroke, follow up increased numbness right fingers. Comparison: CT scan of the head dated August 24, 2023 Technique: Axial CT images were obtained of the head without contrast administration.  Reconstructed coronal images were also obtained. Automated exposure control and iterative construction methods were used. Scan Time: 12:31 a.m. Results discussed with Nitesh of the stroke team at 12:37 a.m. Findings: There is a very small focus of low density just adjacent to an area of encephalomalacia from a previous infarct within the posterior left thalamic region. This area was weakly positive diffusion but without definitive ADC correlation on the previous rapid MR I. It does appear as though this is of slight lower  attenuation than on previous CT which suggests this may have been a very subtle subacute infarct. It is about 4 mm and best seen on axial CT image #30. There is no evidence of acute hemorrhage.  There are no abnormal extra-axial fluid collections. There is atrophy and chronic microvascular ischemic change. There is an old right basal ganglia infarct.     Impression: 4 mm focus of low density is adjacent to a previous area of encephalomalacia within the posterior left thalamic region. Given the change from previous CT, the area on the MRI which demonstrated very weak diffusion positivity may in fact represent a subacute infarct with evolution. There is no hemorrhage. Electronically Signed: Rodolfo Bauer MD  8/25/2023 12:44 AM EDT  Workstation ID: [id number]    CT Head Without Contrast Stroke Protocol    Result Date: 8/24/2023  CT HEAD WO CONTRAST STROKE PROTOCOL Date of Exam: 8/24/2023 4:22 AM EDT Indication: Neuro deficit, acute, stroke suspected Neuro deficit, acute stroke suspected. Comparison: None available. Technique: Axial CT images were obtained of the head without contrast administration.  Reconstructed coronal images were also obtained. Automated exposure control and iterative construction methods were used. Scan Time: 4:28 a.m. Results discussed with stroke  Nitesh at 4:37 a.m. Findings: There is questionable increased density of the proximal right middle cerebral artery. The patient is slightly asymmetric in the scanner so this may represent artifact. There is no evidence of acute hemorrhage. There is mild generalized atrophy. There is mild chronic microvascular ischemia. There is no mass or mass effect. There are calcific changes of both vertebral arteries.     Impression: Impression: Questionable increased density of the right middle cerebral artery versus artifact. Mild atrophy and chronic microvascular ischemia. No acute hemorrhage. Electronically Signed: Rodolfo Bauer MD  8/24/2023 4:38 AM  EDT  Workstation ID: [id number]    CT Angiogram Head w AI Analysis of LVO    Result Date: 8/24/2023  CT ANGIOGRAM HEAD W AI ANALYSIS OF LVO, CT ANGIOGRAM NECK Date of Exam: 8/24/2023 4:38 AM EDT Indication: Neuro deficit, acute stroke suspected Neuro deficit, acute stroke suspected. Comparison: None available. Technique: CTA of the head was performed after the uneventful intravenous administration of 115 mL Isovue-370. Reconstructed coronal and sagittal images were also obtained. In addition, a 3-D volume rendered image was created for interpretation. Automated exposure control and iterative reconstruction methods were used. Findings: Aortic arch: The aortic arch is unremarkable.  There is conventional 3 vessel arch anatomy.  The right brachiocephalic and visualized bilateral subclavian arteries are within normal limits. Right carotid: The right CCA arises as expected from the brachiocephalic trunk.  The CCA follows a normal course and appears normal caliber.  The carotid bifurcation is unremarkable.  The external carotid artery and distal branches appear within normal limits.  The cervical internal carotid artery follows a normal course and appears normal caliber throughout the neck and into the head.  The intracranial ICA segments appear within normal limits.  The ophthalmic artery origin is normal.  The A1 and M1 segments appear within normal limits.  The visualized distal SCOTT and MCA branches appear patent.  There is  a patent  anterior communicating artery. There is a patent  posterior communicating artery. Left carotid: The left CCA arises as expected from the aortic arch.   The CCA follows a normal course and appears normal caliber.  The carotid bifurcation is unremarkable.  The external carotid artery and distal branches appear within normal limits.  The  cervical internal carotid artery follows a normal course and appears normal caliber throughout the neck and into the head.  The intracranial ICA segments  appear within normal limits.  The ophthalmic artery origin is normal.  The A1 and M1 segments appear within normal limits.  The visualized distal SCOTT and MCA branches appear patent.  There is a patent  posterior communicating artery. Posterior circulation: Vertebral arteries arise as expected from ipsilateral subclavian arteries.  The vertebral arteries are codominant.  The vertebral arteries follow a normal course and appear normal caliber throughout the neck and into the head.  The  V4 segments are patent.  Visualized posterior inferior cerebellar arteries are within normal limits.  The basilar artery is normal caliber.  Superior cerebellar arteries are patent.  Bilateral P1 segments and posterior cerebral arteries appear within normal limits. Nonvascular findings: Intracranial structures appear unremarkable.  Orbits are within normal limits.  Paranasal sinuses and mastoid air cells appear well aerated.  Superficial soft tissues and underlying musculature appear within normal limits.  The lung  apices are clear.  The thyroid and salivary glands appear unremarkable.  The suprahyoid and infrahyoid spaces of the neck appear unremarkable.  There is no evidence of cervical lymphadenopathy or a neck mass.  There are no acute osseous abnormalities or  destructive bone lesions.     Impression: Impression: Normal CT angiogram of the head and neck Electronically Signed: Rodolfo Bauer MD  8/24/2023 5:04 AM EDT  Workstation ID: [id number]    MRI Brain WO CON Hyper Acute Stroke Protocol    Result Date: 8/24/2023  MRI BRAIN WO CON HYPER ACUTE STROKE PROTOCOL Date of Exam: 8/24/2023 5:39 AM EDT Indication: Stroke, follow up.  Comparison: CT scan of the head 8/24/2023 at 0442 hours Technique:  Routine multiplanar/multisequence sequence images of the brain were obtained without contrast administration. Findings: Limited MRI for evaluation of stroke demonstrates no evidence of pathologic restricted diffusion to indicate an acute or  subacute infarct. There is mild generalized atrophy. There is mild chronic microvascular ischemic change.     Impression: Impression: No acute infarct Electronically Signed: Rodolfo Bauer MD  8/24/2023 6:03 AM EDT  Workstation ID: [id number]    CT CEREBRAL PERFUSION WITH & WITHOUT CONTRAST    Result Date: 8/24/2023  CT CEREBRAL PERFUSION W WO CONTRAST Date of Exam: 8/24/2023 4:38 AM EDT Indication: Neuro deficit, acute stroke suspected.  Comparison: None available. Technique: Axial CT images of the brain were obtained prior to and after the administration of 115 mL Isovue-370. Core blood volume, core blood flow, mean transit time, and Tmax images were obtained utilizing the Rapid software protocol. A limited CT angiogram of the head was also performed to measure the blood vessel density. The radiation dose reduction device was turned on for each scan per the ALARA (As Low as Reasonably Achievable) protocol. Findings: Cerebral blood flow, blood volume and mean transit time maps are symmetric without large perfusion defect. CBF<30% volume: 0 mL Tmax>6sec volume: 0 mL Mismatch volume: 0 mL Mismatch ratio: None.     Impression: Impression: Normal CT perfusion Electronically Signed: Rodolfo Bauer MD  8/24/2023 5:01 AM EDT  Workstation ID: [id number]     Results for orders placed during the hospital encounter of 08/24/23    Adult Transthoracic Echo Complete W/ Cont if Necessary Per Protocol (With Agitated Saline)    Interpretation Summary    Left ventricular systolic function is normal. Left ventricular ejection fraction appears to be 61 - 65%.    Saline test results are positive with valsalva manuever.      Current medications:  Scheduled Meds:aspirin, 81 mg, Oral, Daily   Or  aspirin, 300 mg, Rectal, Daily  atorvastatin, 80 mg, Oral, Nightly  clopidogrel, 75 mg, Oral, Daily  heparin (porcine), 2,500 Units, Subcutaneous, Q12H  insulin lispro, 2-7 Units, Subcutaneous, 4x Daily AC & at Bedtime  latanoprost, 1 drop, Both Eyes,  Nightly  montelukast, 10 mg, Oral, Nightly  pantoprazole, 40 mg, Oral, BID AC  prednisoLONE acetate, 1 drop, Both Eyes, TID  senna-docusate sodium, 2 tablet, Oral, BID  sodium chloride, 10 mL, Intravenous, Q12H  sodium chloride, 10 mL, Intravenous, Q12H      Continuous Infusions:   PRN Meds:.  acetaminophen **OR** acetaminophen **OR** acetaminophen    senna-docusate sodium **AND** polyethylene glycol **AND** bisacodyl **AND** bisacodyl    calcium carbonate    dextrose    dextrose    fluticasone    glucagon (human recombinant)    ipratropium-albuterol    ondansetron **OR** ondansetron    sodium chloride    sodium chloride    sodium chloride    sodium chloride    sodium chloride    traMADol    Assessment & Plan   Assessment & Plan     Active Hospital Problems    Diagnosis  POA    **CVA (cerebral vascular accident) [I63.9]  Yes      Resolved Hospital Problems   No resolved problems to display.        Brief Hospital Course to date:  Emily Grove is a 62 y.o. female with PMHx significant for HTN, DMII, HLD, GERD, recent cataract surgery who presented with right facial numbness/tingling.    Acute Left Thalamic Stroke  - Stroke neurology following, plavix load then 75mg daily  - ASA, statin  - PT/OT/SLP evaluation    HTN  - resume home losartan and cardizem when okay with neurology team    DMII  - A1c 7.8  - patient using her home insulin pump    Recent Cataract Surgery:  - continue home eye drops    Expected Discharge Location and Transportation: Home vs. rehab  Expected Discharge   Expected Discharge Date: 8/29/2023; Expected Discharge Time:      DVT prophylaxis:  Medical and mechanical DVT prophylaxis orders are present.     AM-PAC 6 Clicks Score (PT): 24 (08/25/23 1840)    CODE STATUS:   Code Status and Medical Interventions:   Ordered at: 08/24/23 7930     Code Status (Patient has no pulse and is not breathing):    CPR (Attempt to Resuscitate)     Medical Interventions (Patient has pulse or is breathing):     Full Support       Gala Mccauley, DO  08/25/23

## 2023-08-25 NOTE — PLAN OF CARE
0853 Goal Outcome Evaluation:  Plan of Care Reviewed With: patient           Outcome Evaluation: GOALS NOT ESTABLISHED AS PT IS AT BASELINE WITH FUNCTIONAL MOBILITY. NO MOTOR, BALANCE OR COORDINATION DEFICITS. NOTED. PT IS A&O AND FOLLOWS ALL COMMANDS. C/O MILD NUMBNESS R FINGERS DUE TO CTS AND PLANS FOR SURGERY. PT AMBULATED 400 FEET AND COMPLETED DYNAMIC BALANCE ACTIVITY WITHOUT LOB OR DIFFICULTY. VITALS STABLE. P.T. IS SIGNING OFF., PT IS SAFE TO BE UP AD ALFA. RECOMMEND D/C HOME. DISCUSSED SAFETY WITH STAIRS AT HOME AND HOLDING TO RAIL AT ALL TIMES.      Anticipated Discharge Disposition (PT): home

## 2023-08-25 NOTE — PLAN OF CARE
Goal Outcome Evaluation:      Pt A&Ox4, RA, tele d/c'd. Pt has questions regarding symptoms, Stroke Navigator called to speak with pt at bedside. Pt satisfied with information. Discharge teaching complete, pt in agreement with plan. IV removed, tele box removed, cleaned and returned to [facility]. Transport requested. Pt waiting in room, up to chair. Spouse to transport home.

## 2023-08-25 NOTE — CONSULTS
"Diabetes Education  Assessment/Teaching    Patient Name:  Emily Grove  YOB: 1960  MRN: [id number]  Admit Date:  8/24/2023      Assessment Date:  8/25/2023  Flowsheet Row Most Recent Value   General Information     Referral From: Other (comment)  [stroke protocol]   Height 139.7 cm (55\")   Height Method Stated   Weight 50.8 kg (112 lb)   Weight Method Stated   Pregnancy Assessment    Diabetes History    What type of diabetes do you have? Type 2   Length of Diabetes Diagnosis 10 + years   Current DM knowledge fair   Have you had diabetes education/teaching in the past? yes   When and where was your diabetes education? [facility]   Do you test your blood sugar at home? yes   Frequency of checks Using CGM, frequent   Meter type Dexcom G6   Who performs the test? self   Typical readings variable   Have you had low blood sugar? (<70mg/dl) yes   How often do you have low blood sugar? occasionally   Have you had high blood sugar? (>140mg/dl) yes   How often do you have high blood sugar? occasionally   Do you have any diabetes complications? none   Education Preferences    What areas of diabetes would you like to learn about? insulin pumps   Barriers to Learning vision loss (cannot read newspaper), other (comment)  [difficulty reading nutrition labels]   Nutrition Information    Assessment Topics    Healthy Eating - Assessment Needs education   Being Active - Assessment Competent   Taking Medication - Assessment Needs education   Problem Solving - Assessment Needs education   Reducing Risk - Assessment Competent   Healthy Coping - Assessment Competent   Monitoring - Assessment Needs education   DM Goals             Flowsheet Row Most Recent Value   DM Education Needs    Meter Has own   Meter Type Other (comment)  [CGMS]   Blood Glucose Target 110   Blood Glucose Target Range    Medication Insulin, Other injectables   Problem Solving Hypoglycemia   Reducing Risks A1C testing   Healthy Eating Reviewed " "meal plan   Healthy Coping Appropriate   Discharge Plan Home   Motivation Engaged   Teaching Method Explanation, Teach back   Patient Response Verbalized understanding              Other Comments:  Total time spent reviewing chart, preparing education/materials, providing education at bedside, and coordinating care approx 40 minutes.    Consult for diabetes education received per stroke protocol. Chart reviewed. Pt was seen at bedside today. Permission given for visit. Noted pt wearing Dexcom G6 and Omnipod5 on upper abd. States she believes she may have \"overcounted carbs\" at breakfast, episode of hypoglycemia this AM. Reviewed basic carb counting with pt, including counting carbs of lunchtime meal delivered during our encounter today. Discussed carb servings with pt, typical amounts of carbs in common foods served in hospital.    Pt is seen by endo provider at [facility]. We reviewed her insulin pump settings together today. They are as follows:    Omnipod 5 pump in auto mode paired with dexcom G6  Humalog insulin    Basal rates:  8628-6452-  1.5 u/hr  2201-9223-   1.3 u/hr  8614-3209- 0.8u/hr  1311-7972-  2.9 u/hr  3611-7083- 3.2 u/hr    Bolus rates:  1 u per 6 grams of carbs    Correction dose:  1 unit for every 15 mg/dl >120.    Target:   110    She reports her last pod change was yesterday. Reviewed placement of Dexcom and Omnipod with her. Reviewed insulin pump contract and obtained pts signature; provided log sheets and explained how to fill them out and why this important. Pt advised that she must be able to competently and independently operate her pump while hospitalized in order to wear it here. Provided to pts Katie HUTCHINSON. Discussed pts insulin pump use with RN. Pt may benefit from receiving meal ticket with carb amounts written to help her more accurately count carbohydrates while in the hospital.     Pt reports she typically treats lows with peanut butter and juice; discussed rule of 15's and why it is " important to treat low blood sugars this way.     Patient has been scheduled for outpatient diabetes education follow up visit on 9/25 @ 1:30 pm via phone. Outpatient staff will provide reminder call prior to appointment. Patient was given reminder card with date and time of appointment and our contact information.     We will cont to follow this patient during her hospital stay while wearing pump.     Thank you for this consult.       Electronically signed by:  Samantha Villatoro RN  08/25/23 13:22 EDT

## 2023-08-25 NOTE — PROGRESS NOTES
Stroke Progress Note       Chief Complaint:  Right facial droop, left-sided weakness     Subjective     Subjective:  Patient had an episode of worsening symptoms overnight. She had low blood sugar in the 50's at the time. This morning she reports she is feeling better. She isn't completely back to baseline but has improved a lot.     Review of Systems   Constitutional:  Negative for fever.   HENT:  Negative for trouble swallowing and voice change.    Eyes:  Negative for visual disturbance.   Respiratory:  Negative for shortness of breath.    Cardiovascular:  Negative for chest pain.   Gastrointestinal:  Negative for abdominal pain and nausea.   Neurological:  Positive for facial asymmetry, speech difficulty, weakness and numbness. Negative for dizziness, light-headedness and headaches.   Psychiatric/Behavioral:  Negative for confusion.      Objective      Temp:  [97.6 °F (36.4 °C)-98.3 °F (36.8 °C)] 97.6 °F (36.4 °C)  Heart Rate:  [] 102  Resp:  [16-18] 16  BP: ()/(69-92) 120/90    Neurological Exam  Mental Status  Awake and alert. Oriented to person, place, time and situation. Oriented to person, place, and time. Speech is normal. Language is fluent with no aphasia. Attention and concentration are normal. Fund of knowledge is appropriate for level of education.    Cranial Nerves  CN II: Right visual acuity: Counts fingers. Left visual acuity: Counts fingers. Visual fields full to confrontation.  CN III, IV, VI: Extraocular movements intact bilaterally. Normal lids and orbits bilaterally. Pupils equal round and reactive to light bilaterally.  CN V:  Right: Diminished sensation of the entire right side of the face. Right perioral paresthesias.  CN VII:  Right: There is central facial weakness.  CN VIII: Hearing is normal to speech .    Motor  Normal muscle bulk throughout. No fasciculations present. Normal muscle tone. Strength is 5/5 throughout all four extremities.    Sensory  Light touch is normal in  upper and lower extremities.     Coordination    Finger-to-nose, rapid alternating movements and heel-to-shin normal bilaterally without dysmetria.  No obvious dysmetria .    Gait  Casual gait is normal including stance, stride, and arm swing.    Physical Exam  Vitals and nursing note reviewed.   Constitutional:       General: She is awake. She is not in acute distress.     Appearance: Normal appearance. She is not ill-appearing.   HENT:      Head: Normocephalic and atraumatic.      Nose: Nose normal.      Mouth/Throat:      Mouth: Mucous membranes are moist.   Eyes:      General: Lids are normal.      Extraocular Movements: Extraocular movements intact.      Pupils: Pupils are equal, round, and reactive to light.   Cardiovascular:      Rate and Rhythm: Normal rate and regular rhythm.      Pulses: Normal pulses.   Pulmonary:      Effort: Pulmonary effort is normal. No respiratory distress.   Skin:     General: Skin is warm and dry.   Neurological:      Mental Status: She is alert and oriented to person, place, and time.      Sensory: Sensory deficit present.      Motor: Motor strength is normal.      Coordination: Coordination is intact.   Psychiatric:         Mood and Affect: Mood normal.         Speech: Speech normal.         Behavior: Behavior normal.       Results Review:    I reviewed the patient's new clinical results.    Lab Results (last 24 hours)       Procedure Component Value Units Date/Time    POC Glucose Once [[id number]]  (Abnormal) Collected: 08/25/23 0601    Specimen: Blood Updated: 08/25/23 0602     Glucose 136 mg/dL     Lipid Panel [[id number]] Collected: 08/25/23 0353    Specimen: Blood Updated: 08/25/23 0449     Total Cholesterol 100 mg/dL      Triglycerides 130 mg/dL      HDL Cholesterol 50 mg/dL      LDL Cholesterol  27 mg/dL      VLDL Cholesterol 23 mg/dL      LDL/HDL Ratio 0.48    Narrative:      Cholesterol Reference Ranges  (U.S. Department of Health and Human Services ATP III  Classifications)    Desirable          <200 mg/dL  Borderline High    200-239 mg/dL  High Risk          >240 mg/dL      Triglyceride Reference Ranges  (U.S. Department of Health and Human Services ATP III Classifications)    Normal           <150 mg/dL  Borderline High  150-199 mg/dL  High             200-499 mg/dL  Very High        >500 mg/dL    HDL Reference Ranges  (U.S. Department of Health and Human Services ATP III Classifications)    Low     <40 mg/dl (major risk factor for CHD)  High    >60 mg/dl ('negative' risk factor for CHD)        LDL Reference Ranges  (U.S. Department of Health and Human Services ATP III Classifications)    Optimal          <100 mg/dL  Near Optimal     100-129 mg/dL  Borderline High  130-159 mg/dL  High             160-189 mg/dL  Very High        >189 mg/dL    POC Glucose Once [[id number]]  (Normal) Collected: 08/25/23 0012    Specimen: Blood Updated: 08/25/23 0013     Glucose 73 mg/dL     POC Glucose Once [[id number]]  (Normal) Collected: 08/24/23 2336    Specimen: Blood Updated: 08/24/23 2337     Glucose 75 mg/dL     POC Glucose Once [[id number]]  (Abnormal) Collected: 08/24/23 1725    Specimen: Blood Updated: 08/24/23 1726     Glucose 173 mg/dL     Hemoglobin A1c [[id number]]  (Abnormal) Collected: 08/24/23 0436    Specimen: Blood Updated: 08/24/23 1134     Hemoglobin A1C 7.80 %     Narrative:      Hemoglobin A1C Ranges:    Increased Risk for Diabetes  5.7% to 6.4%  Diabetes                     >= 6.5%  Diabetic Goal                < 7.0%    [place] Draw [[id number]] Collected: 08/24/23 0436    Specimen: Blood Updated: 08/24/23 0845    Narrative:      The following orders were created for panel order [place] Draw.  Procedure                               Abnormality         Status                     ---------                               -----------         ------                     Green Top (Gel)[[id number]]                                  Final result               Lavender  Top[[id number]]                                     Final result               Gold Top - SST[[id number]]                                   Final result               Dawson Top[[id number]]                                         Final result               Light Blue Top[[id number]]                                   Final result                 Please view results for these tests on the individual orders.    Gray Top [[id number]] Collected: 08/24/23 0436    Specimen: Blood Updated: 08/24/23 0845     Extra Tube Hold for add-ons.     Comment: Auto resulted.       Comprehensive Metabolic Panel [[id number]]  (Abnormal) Collected: 08/24/23 0436    Specimen: Blood Updated: 08/24/23 0739     Glucose 136 mg/dL      BUN 16 mg/dL      Creatinine 0.71 mg/dL      Sodium 143 mmol/L      Potassium 3.7 mmol/L      Comment: Slight hemolysis detected by analyzer. Results may be affected.        Chloride 104 mmol/L      CO2 24.0 mmol/L      Calcium 10.1 mg/dL      Total Protein 8.0 g/dL      Albumin 4.3 g/dL      ALT (SGPT) 15 U/L      AST (SGOT) 22 U/L      Alkaline Phosphatase 151 U/L      Total Bilirubin <0.2 mg/dL      Globulin 3.7 gm/dL      Comment: Calculated Result        A/G Ratio 1.2 g/dL      BUN/Creatinine Ratio 22.5     Anion Gap 15.0 mmol/L      eGFR 97.5 mL/min/1.73     Narrative:      GFR Normal >60  Chronic Kidney Disease <60  Kidney Failure <15      Magnesium [[id number]]  (Normal) Collected: 08/24/23 0436    Specimen: Blood Updated: 08/24/23 0739     Magnesium 2.1 mg/dL     Phosphorus [[id number]]  (Normal) Collected: 08/24/23 0436    Specimen: Blood Updated: 08/24/23 0736     Phosphorus 2.6 mg/dL           CT Angiogram Neck    Result Date: 8/24/2023  Impression: Normal CT angiogram of the head and neck Electronically Signed: Rodolfo Bauer MD  8/24/2023 5:04 AM EDT  Workstation ID: [id number]    XR Chest 1 View    Result Date: 8/24/2023  Impression: No active disease Electronically Signed: Rodolfo Bauer MD  8/24/2023 5:08 AM EDT   Workstation ID: [id number]    CT Head Without Contrast Stroke Protocol    Result Date: 8/25/2023  4 mm focus of low density is adjacent to a previous area of encephalomalacia within the posterior left thalamic region. Given the change from previous CT, the area on the MRI which demonstrated very weak diffusion positivity may in fact represent a subacute infarct with evolution. There is no hemorrhage. Electronically Signed: Rodolfo Bauer MD  8/25/2023 12:44 AM EDT  Workstation ID: [id number]    CT Head Without Contrast Stroke Protocol    Result Date: 8/24/2023  Impression: Questionable increased density of the right middle cerebral artery versus artifact. Mild atrophy and chronic microvascular ischemia. No acute hemorrhage. Electronically Signed: Rodolfo Bauer MD  8/24/2023 4:38 AM EDT  Workstation ID: [id number]    CT Angiogram Head w AI Analysis of LVO    Result Date: 8/24/2023  Impression: Normal CT angiogram of the head and neck Electronically Signed: Rodolfo Bauer MD  8/24/2023 5:04 AM EDT  Workstation ID: [id number]    MRI Brain WO CON Hyper Acute Stroke Protocol    Result Date: 8/24/2023  Impression: No acute infarct Electronically Signed: Rodolfo Bauer MD  8/24/2023 6:03 AM EDT  Workstation ID: [id number]    CT CEREBRAL PERFUSION WITH & WITHOUT CONTRAST    Result Date: 8/24/2023  Impression: Normal CT perfusion Electronically Signed: Rodolfo Bauer MD  8/24/2023 5:01 AM EDT  Workstation ID: [id number]   Results for orders placed during the hospital encounter of 08/24/23    Adult Transthoracic Echo Complete W/ Cont if Necessary Per Protocol (With Agitated Saline)    Interpretation Summary    Left ventricular systolic function is normal. Left ventricular ejection fraction appears to be 61 - 65%.    Saline test results are positive with valsalva manuever.         H&H-14/44  Plts-185  A1C-7.8  LDL 27  AST/ALT-22/15    Assessment/Plan     Assessment/Plan:  This is a 62-year-old female with PMH of GERD, diabetes, HLD, HTN and  overactive bladder.  She presents to [facility] ED with acute onset of right facial droop and left-sided weakness.  Patient reports getting up at 0330 to use the restroom.  Upon laying back down in bed patient experienced acute onset of presenting symptoms.  She arrived to [facility] ED via POV for further evaluation. Patient is not a candidate for IV thrombolytic therapy due to risks outweighing benefits.  Patient is not a candidate for endovascular therapy due to no LVO on CT scans.     Antiplatelet PTA: Aspirin  Anticoagulant PTA: None        Acute ischemic stroke, left thalamic stroke, likely etiology small vessel disease  -300 Plavix load, followed by 75 daily  -DAPT for 21 days, followed by aspirin 325 mg   -Lipitor 80 mg nightly   -normal blood pressure goals, <130/80   -A1C 7.8; goal <7%. Currently using insulin pump. Continue to work with PCP on better blood glucose control.   -TTE + PFO. BLE duplex negative for DVT  -PT/OT/Speech have worked with patient. They have signed off. Recommend home on discharge.   -follow up in stroke clinic in 3 months       Discussed plan of care with patient, primary team and Dr. Ford. Please call with questions or concerns.     Teri Johnson, APRN  08/25/23  07:36 EDT

## 2023-08-25 NOTE — DISCHARGE SUMMARY
[facility] Medicine Services  DISCHARGE SUMMARY    Patient Name: Emily Grove  : 1960  MRN: [id number]    Date of Admission: 2023  4:18 AM  Date of Discharge:  2023  Primary Care Physician: Marie Morales MD    Consults       Date and Time Order Name Status Description    2023  4:19 AM Inpatient Neurology Consult Stroke Completed             Hospital Course     Presenting Problem: Stroke    Active Hospital Problems    Diagnosis  POA    **CVA (cerebral vascular accident) [I63.9]  Yes      Resolved Hospital Problems   No resolved problems to display.          Hospital Course:  Emily Grove is a 62 y.o. female with PMHx significant for HTN, DMII, HLD, GERD, recent cataract surgery who presented with right facial numbness/tingling.     Acute Left Thalamic Stroke  - Stroke neurology followed  - DAPT x 21 days then 325mg ASA daily  - continue high intensity statin  - PT/OT/SLP, okay for discharge home  - stroke clinic f/u in 3 months     HTN  - resume home losartan and cardizem      DMII  - A1c 7.8  - patient uses home insulin pump     Recent Cataract Surgery:  - continue home eye drops    Discharge Follow Up Recommendations for outpatient labs/diagnostics:  - f/u with stroke clinic in 3 months  - DAPT x 21 days, then ASA 325mg daily  - f/u PCP in 1-2 weeks    Day of Discharge     HPI:   Patient still with some numbnes/tingling around mouth on the right side     Review of Systems  Gen- No fevers, chills  CV- No chest pain, palpitations  Resp- No cough, dyspnea  GI- No N/V/D, abd pain    Vital Signs:   Temp:  [97.6 °F (36.4 °C)-98.3 °F (36.8 °C)] 98 °F (36.7 °C)  Heart Rate:  [] 103  Resp:  [16-18] 17  BP: ()/(69-95) 137/95      Physical Exam:  Constitutional: No acute distress, awake, alert  HENT: NCAT, mucous membranes moist  Respiratory: Clear to auscultation bilaterally, respiratory effort normal   Cardiovascular: RRR, no murmurs, rubs, or  gallops  Gastrointestinal: Positive bowel sounds, soft, nontender, nondistended  Musculoskeletal: No bilateral ankle edema  Psychiatric: Appropriate affect, cooperative  Neurologic: Oriented x 3, speech clear, no focal deficits  Skin: No rashes    Pertinent  and/or Most Recent Results     LAB RESULTS:      Lab 08/24/23  0440 08/24/23 0439 08/24/23 0436 08/24/23 0433   WBC  --   --  10.00  --    HEMOGLOBIN  --   --  14.0  --    HEMOGLOBIN, POC  --  13.6  --  13.6   HEMATOCRIT  --   --  44.3  --    HEMATOCRIT POC  --  40  --  40   PLATELETS  --   --  185  --    NEUTROS ABS  --   --  5.55  --    IMMATURE GRANS (ABS)  --   --  0.03  --    LYMPHS ABS  --   --  3.50*  --    MONOS ABS  --   --  0.83  --    EOS ABS  --   --  0.05  --    MCV  --   --  92.1  --    PROTIME 12.1  --   --  12.1*   APTT  --   --  26.0  --          Lab 08/24/23  0439 08/24/23 0436 08/24/23 0433   SODIUM  --  143  --    POTASSIUM  --  3.7  --    CHLORIDE  --  104  --    CO2  --  24.0  --    ANION GAP  --  15.0  --    BUN  --  16  --    CREATININE 0.60 0.71 0.60   EGFR  --  97.5 102.9   GLUCOSE  --  136*  --    CALCIUM  --  10.1  --    MAGNESIUM  --  2.1  --    PHOSPHORUS  --  2.6  --    HEMOGLOBIN A1C  --  7.80*  --          Lab 08/24/23 0436   TOTAL PROTEIN 8.0   ALBUMIN 4.3   GLOBULIN 3.7   ALT (SGPT) 15  14   AST (SGOT) 22  26   BILIRUBIN <0.2   ALK PHOS 151*         Lab 08/24/23  0440 08/24/23 0436 08/24/23 0433   HSTROP T  --  17*  --    PROTIME 12.1  --  12.1*   INR 1  --  1.0         Lab 08/25/23  0353   CHOLESTEROL 100   LDL CHOL 27   HDL CHOL 50   TRIGLYCERIDES 130             Brief Urine Lab Results       None          Microbiology Results (last 10 days)       ** No results found for the last 240 hours. **            Adult Transthoracic Echo Complete W/ Cont if Necessary Per Protocol (With Agitated Saline)    Result Date: 8/24/2023    Left ventricular systolic function is normal. Left ventricular ejection fraction appears to be  61 - 65%.   Saline test results are positive with valsalva manuever.     CT Angiogram Neck    Result Date: 8/24/2023  CT ANGIOGRAM HEAD W AI ANALYSIS OF LVO, CT ANGIOGRAM NECK Date of Exam: 8/24/2023 4:38 AM EDT Indication: Neuro deficit, acute stroke suspected Neuro deficit, acute stroke suspected. Comparison: None available. Technique: CTA of the head was performed after the uneventful intravenous administration of 115 mL Isovue-370. Reconstructed coronal and sagittal images were also obtained. In addition, a 3-D volume rendered image was created for interpretation. Automated exposure control and iterative reconstruction methods were used. Findings: Aortic arch: The aortic arch is unremarkable.  There is conventional 3 vessel arch anatomy.  The right brachiocephalic and visualized bilateral subclavian arteries are within normal limits. Right carotid: The right CCA arises as expected from the brachiocephalic trunk.  The CCA follows a normal course and appears normal caliber.  The carotid bifurcation is unremarkable.  The external carotid artery and distal branches appear within normal limits.  The cervical internal carotid artery follows a normal course and appears normal caliber throughout the neck and into the head.  The intracranial ICA segments appear within normal limits.  The ophthalmic artery origin is normal.  The A1 and M1 segments appear within normal limits.  The visualized distal SCOTT and MCA branches appear patent.  There is  a patent  anterior communicating artery. There is a patent  posterior communicating artery. Left carotid: The left CCA arises as expected from the aortic arch.   The CCA follows a normal course and appears normal caliber.  The carotid bifurcation is unremarkable.  The external carotid artery and distal branches appear within normal limits.  The  cervical internal carotid artery follows a normal course and appears normal caliber throughout the neck and into the head.  The  intracranial ICA segments appear within normal limits.  The ophthalmic artery origin is normal.  The A1 and M1 segments appear within normal limits.  The visualized distal SCOTT and MCA branches appear patent.  There is a patent  posterior communicating artery. Posterior circulation: Vertebral arteries arise as expected from ipsilateral subclavian arteries.  The vertebral arteries are codominant.  The vertebral arteries follow a normal course and appear normal caliber throughout the neck and into the head.  The  V4 segments are patent.  Visualized posterior inferior cerebellar arteries are within normal limits.  The basilar artery is normal caliber.  Superior cerebellar arteries are patent.  Bilateral P1 segments and posterior cerebral arteries appear within normal limits. Nonvascular findings: Intracranial structures appear unremarkable.  Orbits are within normal limits.  Paranasal sinuses and mastoid air cells appear well aerated.  Superficial soft tissues and underlying musculature appear within normal limits.  The lung  apices are clear.  The thyroid and salivary glands appear unremarkable.  The suprahyoid and infrahyoid spaces of the neck appear unremarkable.  There is no evidence of cervical lymphadenopathy or a neck mass.  There are no acute osseous abnormalities or  destructive bone lesions.     Impression: Normal CT angiogram of the head and neck Electronically Signed: Rodolfo Bauer MD  8/24/2023 5:04 AM EDT  Workstation ID: [id number]    XR Chest 1 View    Result Date: 8/24/2023  XR CHEST 1 VW Date of Exam: 8/24/2023 4:45 AM EDT Indication: Acute Stroke Protocol (onset < 12 hrs) Comparison: Chest radiograph dated November 2, 2019 Findings: There are no airspace consolidations. No pleural fluid. No pneumothorax. The pulmonary vasculature appears within normal limits. The cardiac and mediastinal silhouette appear unremarkable. No acute osseous abnormality identified.     Impression: No active disease  Electronically Signed: Rodolfo Bauer MD  8/24/2023 5:08 AM EDT  Workstation ID: [id number]    Duplex Venous Lower Extremity - Bilateral CAR    Result Date: 8/24/2023    The bilateral lower extremity venous duplex scan is negative for evidence of a DVT and SVT.     CT Head Without Contrast Stroke Protocol    Result Date: 8/25/2023  CT HEAD WO CONTRAST STROKE PROTOCOL Date of Exam: 8/25/2023 12:29 AM EDT Indication: Stroke, follow up increased numbness right fingers. Comparison: CT scan of the head dated August 24, 2023 Technique: Axial CT images were obtained of the head without contrast administration.  Reconstructed coronal images were also obtained. Automated exposure control and iterative construction methods were used. Scan Time: 12:31 a.m. Results discussed with Nitesh of the stroke team at 12:37 a.m. Findings: There is a very small focus of low density just adjacent to an area of encephalomalacia from a previous infarct within the posterior left thalamic region. This area was weakly positive diffusion but without definitive ADC correlation on the previous rapid MR I. It does appear as though this is of slight lower attenuation than on previous CT which suggests this may have been a very subtle subacute infarct. It is about 4 mm and best seen on axial CT image #30. There is no evidence of acute hemorrhage.  There are no abnormal extra-axial fluid collections. There is atrophy and chronic microvascular ischemic change. There is an old right basal ganglia infarct.     4 mm focus of low density is adjacent to a previous area of encephalomalacia within the posterior left thalamic region. Given the change from previous CT, the area on the MRI which demonstrated very weak diffusion positivity may in fact represent a subacute infarct with evolution. There is no hemorrhage. Electronically Signed: Rodolfo Bauer MD  8/25/2023 12:44 AM EDT  Workstation ID: [id number]    CT Head Without Contrast Stroke Protocol    Result Date:  8/24/2023  CT HEAD WO CONTRAST STROKE PROTOCOL Date of Exam: 8/24/2023 4:22 AM EDT Indication: Neuro deficit, acute, stroke suspected Neuro deficit, acute stroke suspected. Comparison: None available. Technique: Axial CT images were obtained of the head without contrast administration.  Reconstructed coronal images were also obtained. Automated exposure control and iterative construction methods were used. Scan Time: 4:28 a.m. Results discussed with stroke  Nitesh at 4:37 a.m. Findings: There is questionable increased density of the proximal right middle cerebral artery. The patient is slightly asymmetric in the scanner so this may represent artifact. There is no evidence of acute hemorrhage. There is mild generalized atrophy. There is mild chronic microvascular ischemia. There is no mass or mass effect. There are calcific changes of both vertebral arteries.     Impression: Questionable increased density of the right middle cerebral artery versus artifact. Mild atrophy and chronic microvascular ischemia. No acute hemorrhage. Electronically Signed: Rodolfo Bauer MD  8/24/2023 4:38 AM EDT  Workstation ID: [id number]    CT Angiogram Head w AI Analysis of LVO    Result Date: 8/24/2023  CT ANGIOGRAM HEAD W AI ANALYSIS OF LVO, CT ANGIOGRAM NECK Date of Exam: 8/24/2023 4:38 AM EDT Indication: Neuro deficit, acute stroke suspected Neuro deficit, acute stroke suspected. Comparison: None available. Technique: CTA of the head was performed after the uneventful intravenous administration of 115 mL Isovue-370. Reconstructed coronal and sagittal images were also obtained. In addition, a 3-D volume rendered image was created for interpretation. Automated exposure control and iterative reconstruction methods were used. Findings: Aortic arch: The aortic arch is unremarkable.  There is conventional 3 vessel arch anatomy.  The right brachiocephalic and visualized bilateral subclavian arteries are within normal limits. Right  carotid: The right CCA arises as expected from the brachiocephalic trunk.  The CCA follows a normal course and appears normal caliber.  The carotid bifurcation is unremarkable.  The external carotid artery and distal branches appear within normal limits.  The cervical internal carotid artery follows a normal course and appears normal caliber throughout the neck and into the head.  The intracranial ICA segments appear within normal limits.  The ophthalmic artery origin is normal.  The A1 and M1 segments appear within normal limits.  The visualized distal SCOTT and MCA branches appear patent.  There is  a patent  anterior communicating artery. There is a patent  posterior communicating artery. Left carotid: The left CCA arises as expected from the aortic arch.   The CCA follows a normal course and appears normal caliber.  The carotid bifurcation is unremarkable.  The external carotid artery and distal branches appear within normal limits.  The  cervical internal carotid artery follows a normal course and appears normal caliber throughout the neck and into the head.  The intracranial ICA segments appear within normal limits.  The ophthalmic artery origin is normal.  The A1 and M1 segments appear within normal limits.  The visualized distal SCOTT and MCA branches appear patent.  There is a patent  posterior communicating artery. Posterior circulation: Vertebral arteries arise as expected from ipsilateral subclavian arteries.  The vertebral arteries are codominant.  The vertebral arteries follow a normal course and appear normal caliber throughout the neck and into the head.  The  V4 segments are patent.  Visualized posterior inferior cerebellar arteries are within normal limits.  The basilar artery is normal caliber.  Superior cerebellar arteries are patent.  Bilateral P1 segments and posterior cerebral arteries appear within normal limits. Nonvascular findings: Intracranial structures appear unremarkable.  Orbits are  within normal limits.  Paranasal sinuses and mastoid air cells appear well aerated.  Superficial soft tissues and underlying musculature appear within normal limits.  The lung  apices are clear.  The thyroid and salivary glands appear unremarkable.  The suprahyoid and infrahyoid spaces of the neck appear unremarkable.  There is no evidence of cervical lymphadenopathy or a neck mass.  There are no acute osseous abnormalities or  destructive bone lesions.     Impression: Normal CT angiogram of the head and neck Electronically Signed: Rodolfo Bauer MD  8/24/2023 5:04 AM EDT  Workstation ID: [id number]    MRI Brain WO CON Hyper Acute Stroke Protocol    Result Date: 8/24/2023  MRI BRAIN WO CON HYPER ACUTE STROKE PROTOCOL Date of Exam: 8/24/2023 5:39 AM EDT Indication: Stroke, follow up.  Comparison: CT scan of the head 8/24/2023 at 0442 hours Technique:  Routine multiplanar/multisequence sequence images of the brain were obtained without contrast administration. Findings: Limited MRI for evaluation of stroke demonstrates no evidence of pathologic restricted diffusion to indicate an acute or subacute infarct. There is mild generalized atrophy. There is mild chronic microvascular ischemic change.     Impression: No acute infarct Electronically Signed: Rodolfo Bauer MD  8/24/2023 6:03 AM EDT  Workstation ID: [id number]    CT CEREBRAL PERFUSION WITH & WITHOUT CONTRAST    Result Date: 8/24/2023  CT CEREBRAL PERFUSION W WO CONTRAST Date of Exam: 8/24/2023 4:38 AM EDT Indication: Neuro deficit, acute stroke suspected.  Comparison: None available. Technique: Axial CT images of the brain were obtained prior to and after the administration of 115 mL Isovue-370. Core blood volume, core blood flow, mean transit time, and Tmax images were obtained utilizing the Rapid software protocol. A limited CT angiogram of the head was also performed to measure the blood vessel density. The radiation dose reduction device was turned on for each scan  per the ALARA (As Low as Reasonably Achievable) protocol. Findings: Cerebral blood flow, blood volume and mean transit time maps are symmetric without large perfusion defect. CBF<30% volume: 0 mL Tmax>6sec volume: 0 mL Mismatch volume: 0 mL Mismatch ratio: None.     Impression: Normal CT perfusion Electronically Signed: Rodolfo Bauer MD  8/24/2023 5:01 AM EDT  Workstation ID: [id number]     Results for orders placed during the hospital encounter of 08/24/23    Duplex Venous Lower Extremity - Bilateral CAR    Interpretation Summary    The bilateral lower extremity venous duplex scan is negative for evidence of a DVT and SVT.      Results for orders placed during the hospital encounter of 08/24/23    Duplex Venous Lower Extremity - Bilateral CAR    Interpretation Summary    The bilateral lower extremity venous duplex scan is negative for evidence of a DVT and SVT.      Results for orders placed during the hospital encounter of 08/24/23    Adult Transthoracic Echo Complete W/ Cont if Necessary Per Protocol (With Agitated Saline)    Interpretation Summary    Left ventricular systolic function is normal. Left ventricular ejection fraction appears to be 61 - 65%.    Saline test results are positive with valsalva manuever.      Plan for Follow-up of Pending Labs/Results:     Discharge Details        Discharge Medications        New Medications        Instructions Start Date   atorvastatin 80 MG tablet  Commonly known as: LIPITOR   80 mg, Oral, Nightly      clopidogrel 75 MG tablet  Commonly known as: PLAVIX   75 mg, Oral, Daily             Continue These Medications        Instructions Start Date   aspirin 81 MG EC tablet   81 mg, Oral, Daily, OTC      brimonidine-timolol 0.2-0.5 % ophthalmic solution  Commonly known as: COMBIGAN   1 drop, Right Eye, 2 Times Daily      Diclofenac Sodium 1 % gel gel  Commonly known as: VOLTAREN   4 g, Topical, 4 Times Daily PRN      dilTIAZem 300 MG 24 hr capsule  Commonly known as: TIAZAC    300 mg, Oral, Daily      esomeprazole 40 MG capsule  Commonly known as: nexIUM   40 mg, Oral, Daily      fluticasone 50 MCG/ACT nasal spray  Commonly known as: FLONASE   2 sprays into the nostril(s) as directed by provider Daily As Needed for Rhinitis or Allergies. OTC      insulin lispro 100 UNIT/ML injection  Commonly known as: humaLOG   Use with insulin pump. MDD 100u      latanoprost 0.005 % ophthalmic solution  Commonly known as: XALATAN   1 drop, Both Eyes, Nightly      montelukast 10 MG tablet  Commonly known as: SINGULAIR   10 mg, Oral, Nightly      Ozempic (0.25 or 0.5 MG/DOSE) 2 MG/1.5ML solution pen-injector  Generic drug: Semaglutide(0.25 or 0.5MG/DOS)   1 mg, Subcutaneous, Weekly, Friday      prednisoLONE acetate 1 % ophthalmic suspension  Commonly known as: PRED FORTE   1 drop, Both Eyes, 3 Times Daily      valsartan 80 MG tablet  Commonly known as: DIOVAN   80 mg, Oral, Daily      Vitamin D (Cholecalciferol) 50 MCG (2000 UT) capsule   2,000 Units, Oral, Daily, OTC             Stop These Medications      pravastatin 40 MG tablet  Commonly known as: PRAVACHOL              Allergies   Allergen Reactions    Sulfa Antibiotics Anaphylaxis         Discharge Disposition:  Home or Self Care    Diet:  Hospital:  Diet Order   Procedures    Diet: Diabetic Diets, Cardiac Diets; Healthy Heart (2-3 Na+); Consistent Carbohydrate; Texture: Regular Texture (IDDSI 7); Fluid Consistency: Thin (IDDSI 0)       Activity:      Restrictions or Other Recommendations:  - none       CODE STATUS:    Code Status and Medical Interventions:   Ordered at: 08/24/23 1246     Code Status (Patient has no pulse and is not breathing):    CPR (Attempt to Resuscitate)     Medical Interventions (Patient has pulse or is breathing):    Full Support       Future Appointments   Date Time Provider Department Center   9/25/2023  1:30 PM [facility] ISIDRA Mccauley DO  08/25/23      Time Spent on Discharge:  I spent   35  minutes on this discharge activity which included: face-to-face encounter with the patient, reviewing the data in the system, coordination of the care with the nursing staff as well as consultants, documentation, and entering orders.

## 2023-08-25 NOTE — PLAN OF CARE
Goal Outcome Evaluation:  Plan of Care Reviewed With: patient        Progress: no change  Outcome Evaluation: pt AXO4, NSR-ST on tele, VSS on RA, NIH 0 at the beginning of the shift with slight numbness around thumb of right hand, pt reported her dexcome showed BS of 58 around 2330, gave some crackers, orange juice, got her BS to 75 code stroke called around 0030 after pt c/o of increased numbness from right side of the face to feet with NIH 4, did CT, pt reported numbness getting better around her right side of the face, plan of care ongoing.

## 2023-08-25 NOTE — THERAPY DISCHARGE NOTE
Patient Name: Emily Grove  : 1960    MRN: [id number]                              Today's Date: 2023       Admit Date: 2023    Visit Dx:     ICD-10-CM ICD-9-CM   1. Acute CVA (cerebrovascular accident)  I63.9 434.91   2. Hyperglycemia  R73.9 790.29     Patient Active Problem List   Diagnosis    Fatty liver    Family history of polyps in the colon    Gastroesophageal reflux disease    Precordial pain    Type 1 diabetes mellitus with hyperglycemia    Abnormal EKG    Other fatigue    Dizziness    Tachycardia    Palpitations    CVA (cerebral vascular accident)     Past Medical History:   Diagnosis Date    Acid reflux     Diabetes mellitus     Hyperlipidemia     Hypertension     Overactive bladder      Past Surgical History:   Procedure Laterality Date    BLADDER SURGERY      GALL BLADDER    CARPAL TUNNEL RELEASE      FINGER FUSION      FOOT SURGERY      TONSILLECTOMY        General Information       Row Name 23 0923          Physical Therapy Time and Intention    Document Type discharge evaluation/summary  -CD     Mode of Treatment physical therapy  -CD       Row Name 23 0923          General Information    Patient Profile Reviewed yes  -CD     Prior Level of Function independent:;all household mobility;community mobility;gait;transfer;bed mobility;ADL's;driving;shopping;cleaning;using stairs  -CD     Existing Precautions/Restrictions no known precautions/restrictions  -CD     Barriers to Rehab none identified  -CD       Row Name 23 0923          Living Environment    People in Home spouse  -CD       Row Name 23 0923          Home Main Entrance    Number of Stairs, Main Entrance none  -CD       Row Name 23 0923          Stairs Within Home, Primary    Stairs, Within Home, Primary Pt reports 2 + 2 stair indoors to reach kitchen and steps to basement laundry.  -CD     Stair Railings, Within Home, Primary railings safe and in good condition  -CD       Row Name 23  0923          Cognition    Orientation Status (Cognition) oriented x 4  -CD       Row Name 08/25/23 0923          Safety Issues, Functional Mobility    Safety Issues Affecting Function (Mobility) insight into deficits/self-awareness  -CD     Comment, Safety Issues/Impairments (Mobility) NO SAFETY CONCERNS OBSERVED.  -CD               User Key  (r) = Recorded By, (t) = Taken By, (c) = Cosigned By      Initials Name Provider Type    Sherry Mazariegos PT Physical Therapist                   Mobility       Row Name 08/25/23 0927          Bed Mobility    Comment, (Bed Mobility) PT [facility] UPON ARRIVAL. WAS INDEPENDENT WITH BED MOBILITY EARLIER WITH O.T.  -CD       Row Name 08/25/23 0927          Sit-Stand Transfer    Sit-Stand [place] (Transfers) independent  -CD       Row Name 08/25/23 0927          Gait/Stairs (Locomotion)    [place] Level (Gait) independent  -CD     Distance in Feet (Gait) 400 FEET  -CD     Comment, (Gait/Stairs) NO LOB OR DIFFICULTY WITH BACKWARDS GAIT, TURNING 360 DEGREES OR RETRIEVING ITEM FROM FLOOR. PT DENIES PAIN OR DIZZINESS.  -CD               User Key  (r) = Recorded By, (t) = Taken By, (c) = Cosigned By      Initials Name Provider Type    CD Sherry Nolen, PT Physical Therapist                   Obj/Interventions       Row Name 08/25/23 0929          Range of Motion Comprehensive    General Range of Motion no range of motion deficits identified  -CD       Row Name 08/25/23 0929          Strength Comprehensive (MMT)    General Manual Muscle Testing (MMT) Assessment no strength deficits identified  -CD     Comment, General Manual Muscle Testing (MMT) Assessment B LE GROSSLY 5/5.  -CD       Row Name 08/25/23 0929          Motor Skills    Motor Skills coordination;functional endurance  -CD     Coordination gross motor deficit;bilateral;lower extremity;heel to shin;WFL  -CD     Functional Endurance O2 SATS STABLE ON RA.  -CD       Row Name 08/25/23 0929          Balance    Static Sitting  Balance independent  -CD     Dynamic Sitting Balance independent  -CD     Static Standing Balance independent  -CD     Dynamic Standing Balance independent  -CD     Position/Device Used, Standing Balance unsupported  -CD     Comment, Balance SEE GAIT NOTE.  -CD       Row Name 08/25/23 0929          Sensory Assessment (Somatosensory)    Sensory Assessment (Somatosensory) LE sensation intact;bilateral LE  -CD               User Key  (r) = Recorded By, (t) = Taken By, (c) = Cosigned By      Initials Name Provider Type    CD Sherry Nolen, PT Physical Therapist                   Goals/Plan    No documentation.                  Clinical Impression       Row Name 08/25/23 1040          Pain    Pretreatment Pain Rating 0/10 - no pain  -CD     Posttreatment Pain Rating 0/10 - no pain  -CD       Row Name 08/25/23 1040          Plan of Care Review    Plan of Care Reviewed With patient  -CD     Outcome Evaluation GOALS NOT ESTABLISHED AS PT IS AT BASELINE WITH FUNCTIONAL MOBILITY. NO MOTOR, BALANCE OR COORDINATION DEFICITS. NOTED. PT IS A&O AND FOLLOWS ALL COMMANDS. C/O MILD NUMBNESS R FINGERS DUE TO CTS AND PLANS FOR SURGERY. PT AMBULATED 400 FEET AND COMPLETED DYNAMIC BALANCE ACTIVITY WITHOUT LOB OR DIFFICULTY. VITALS STABLE. P.T. IS SIGNING OFF., PT IS SAFE TO BE UP AD ALFA. RECOMMEND D/C HOME. DISCUSSED SAFETY WITH STAIRS AT HOME AND HOLDING TO RAIL AT ALL TIMES.  -       Row Name 08/25/23 1040          Therapy Assessment/Plan (PT)    Patient/Family Therapy Goals Statement (PT) TO GO HOME.  -CD     Criteria for Skilled Interventions Met (PT) no;no problems identified which require skilled intervention  -CD     Therapy Frequency (PT) evaluation only  -CD       Row Name 08/25/23 1040          Vital Signs    Pre Systolic BP Rehab 132  -CD     Pre Treatment Diastolic BP 97  -CD     Post Systolic BP Rehab 137  -CD     Post Treatment Diastolic BP 84  -CD     Posttreatment Heart Rate (beats/min) 88  -CD     Pre SpO2 (%) 96  -CD      O2 Delivery Pre Treatment room air  -CD     Post SpO2 (%) 96  -CD     O2 Delivery Post Treatment room air  -CD     Pre Patient Position Supine  -CD     Intra Patient Position Standing  -CD     Post Patient Position Sitting  -CD       Row Name 08/25/23 1040          Positioning and Restraints    Pre-Treatment Position sitting in chair/recliner  -CD     Post Treatment Position chair  -CD     In Chair reclined;call light within reach;legs elevated;notified nsg  -CD               User Key  (r) = Recorded By, (t) = Taken By, (c) = Cosigned By      Initials Name Provider Type    Sherry Mazariegos, PT Physical Therapist                   Outcome Measures       Row Name 08/25/23 1044          How much help from another person do you currently need...    Turning from your back to your side while in flat bed without using bedrails? 4  -CD     Moving from lying on back to sitting on the side of a flat bed without bedrails? 4  -CD     Moving to and from a bed to a chair (including a wheelchair)? 4  -CD     Standing up from a chair using your arms (e.g., wheelchair, bedside chair)? 4  -CD     Climbing 3-5 steps with a railing? 4  -CD     To walk in hospital room? 4  -CD     AM-PAC 6 Clicks Score (PT) 24  -CD     Highest level of mobility 8 --> Walked 250 feet or more  -CD       Row Name 08/25/23 1044 08/25/23 0824       Modified [place] Scale    Modified [place] Scale 1 - No significant disability despite symptoms.  Able to carry out all usual duties and activities.  -CD 1 - No significant disability despite symptoms.  Able to carry out all usual duties and activities.  -      Row Name 08/25/23 1044 08/25/23 0824       Functional Assessment    Outcome Measure Options AM-PAC 6 Clicks Basic Mobility (PT)  -CD AM-PAC 6 Clicks Daily Activity (OT);Modified [place]  -CS              User Key  (r) = Recorded By, (t) = Taken By, (c) = Cosigned By      Initials Name Provider Type    Sherry Mazariegos, PT Physical Therapist    CS  Joseph Worley, OT Occupational Therapist                  Physical Therapy Education       Title: PT OT SLP Therapies (In Progress)       Topic: Physical Therapy (Done)       Point: Mobility training (Done)       Learning Progress Summary             Patient Acceptance, E, VU by CD at 8/25/2023 1045    Comment: BENEFITS OF OOB ACTIVITY, S&S CVA, F.A.S.T.                         Point: Home exercise program (Done)       Learning Progress Summary             Patient Acceptance, E, VU by CD at 8/25/2023 1045    Comment: BENEFITS OF OOB ACTIVITY, S&S CVA, F.A.S.T.                         Point: Body mechanics (Done)       Learning Progress Summary             Patient Acceptance, E, VU by CD at 8/25/2023 1045    Comment: BENEFITS OF OOB ACTIVITY, S&S CVA, F.A.S.T.                         Point: Precautions (Done)       Learning Progress Summary             Patient Acceptance, E, VU by CD at 8/25/2023 1045    Comment: BENEFITS OF OOB ACTIVITY, S&S CVA, F.A.S.T.                                         User Key       Initials Effective Dates Name Provider Type Discipline    CD 02/03/23 -  Sherry Nolen, PT Physical Therapist PT                  PT Recommendation and Plan     Plan of Care Reviewed With: patient  Outcome Evaluation: GOALS NOT ESTABLISHED AS PT IS AT BASELINE WITH FUNCTIONAL MOBILITY. NO MOTOR, BALANCE OR COORDINATION DEFICITS. NOTED. PT IS A&O AND FOLLOWS ALL COMMANDS. C/O MILD NUMBNESS R FINGERS DUE TO CTS AND PLANS FOR SURGERY. PT AMBULATED 400 FEET AND COMPLETED DYNAMIC BALANCE ACTIVITY WITHOUT LOB OR DIFFICULTY. VITALS STABLE. P.T. IS SIGNING OFF., PT IS SAFE TO BE UP AD ALFA. RECOMMEND D/C HOME. DISCUSSED SAFETY WITH STAIRS AT HOME AND HOLDING TO RAIL AT ALL TIMES.     Time Calculation:   PT Evaluation Complexity  History, PT Evaluation Complexity: 3 or more personal factors and/or comorbidities  Examination of Body Systems (PT Eval Complexity): total of 4 or more elements  Clinical Presentation (PT  Evaluation Complexity): stable  Clinical Decision Making (PT Evaluation Complexity): low complexity  Overall Complexity (PT Evaluation Complexity): low complexity     PT Charges       Row Name 08/25/23 1046             Time Calculation    Start Time 0853  -CD      PT Received On 08/25/23  -CD      PT Goal Re-Cert Due Date 09/04/23  -CD         Untimed Charges    PT Eval/Re-eval Minutes 54  -CD         Total Minutes    Untimed Charges Total Minutes 54  -CD       Total Minutes 54  -CD                User Key  (r) = Recorded By, (t) = Taken By, (c) = Cosigned By      Initials Name Provider Type    CD Sherry Nolen, PT Physical Therapist                  Therapy Charges for Today       Code Description Service Date Service Provider Modifiers Qty    86668259466 HC PT EVAL LOW COMPLEXITY 4 8/25/2023 Sherry Nolen, PT GP 1            PT G-Codes  Outcome Measure Options: AM-PAC 6 Clicks Basic Mobility (PT)  AM-PAC 6 Clicks Score (PT): 24  AM-PAC 6 Clicks Score (OT): 24  Modified [place] Scale: 1 - No significant disability despite symptoms.  Able to carry out all usual duties and activities.    PT Discharge Summary  Anticipated Discharge Disposition (PT): home    Sherry Nolen, PT  8/25/2023

## 2023-08-25 NOTE — CONSULTS
Diabetes Education follow up. Met with patient earlier this morning to review her Omnipod settings. See earlier note from diabetes education.   This afternoon, patient stated her BG readings have improved after her low blood sugar this morning. We discussed her low blood sugar and she felt that it could have been due to not counting her carbs correctly for breakfast. She entered 45 instead of 30. She said she does well at home counting the carbs because she eats the same things. If she is not discharged today she would benefit from a carb counting guide for her meals. All questions answered.       ~45 minutes spent reviewing chart, patient education, documentation and care coordination.

## 2023-08-26 NOTE — OUTREACH NOTE
Prep Survey      Flowsheet Row Responses   [facility] facility patient discharged from? [place]   Is LACE score < 7 ? Yes   Eligibility Readm Mgmt   Discharge diagnosis Acute Left Thalamic Stroke   Does the patient have one of the following disease processes/diagnoses(primary or secondary)? Stroke   Does the patient have Home health ordered? No   Is there a DME ordered? No   Prep survey completed? Yes            Sadaf CONCEPCION - Registered Nurse

## 2023-08-28 ENCOUNTER — READMISSION MANAGEMENT (OUTPATIENT)
Dept: CALL CENTER | Facility: HOSPITAL | Age: 63
End: 2023-08-28
Payer: MEDICAID

## 2023-08-28 NOTE — OUTREACH NOTE
Stroke Week 1 Survey      Flowsheet Row Responses   [facility] patient discharged from? [place]   Does the patient have one of the following disease processes/diagnoses(primary or secondary)? Stroke   Week 1 attempt successful? Yes   Call start time 1736   Call end time 1743   Discharge diagnosis Acute Left Thalamic Stroke   Meds reviewed with patient/caregiver? Yes   Is the patient having any side effects they believe may be caused by any medication additions or changes? No   Does the patient have all medications ordered at discharge? Yes   Is the patient taking all medications as directed (includes completed medication regime)? Yes   Does the patient have a primary care provider?  Yes   Does the patient have an appointment with their PCP within 7 days of discharge? Yes   Has the patient kept scheduled appointments due by today? N/A   Comments PCP on 9/5, Neuro on 11/27   Psychosocial issues? No   Does the patient require any assistance with activities of daily living such as eating, bathing, dressing, walking, etc.? No   Does the patient have any residual symptoms from stroke/TIA? No   Did the patient receive a copy of their discharge instructions? Yes   Nursing interventions Reviewed instructions with patient   What is the patient's perception of their health status since discharge? Improving   Nursing interventions Nurse provided patient education   Is the patient/caregiver able to teach back the risk factors for a stroke? High blood pressure-goal below 120/80, Diabetes, High Cholesterol, Physical inactivity and obesity, Sleep apnea, History of TIAs   Is the patient/caregiver able to teach back signs and symptoms related to disease process for when to call PCP? Yes   Is the patient/caregiver able to teach back signs and symptoms related to disease process for when to call 911? Yes   Is the patient/caregiver able to teach back the hierarchy of who to call/visit for symptoms/problems? PCP, Specialist, Home  health nurse, Urgent Care, ED, 911 Yes   Additional teach back comments States she is just weak but doing better.   Is the patient able to teach back FAST for Stroke? B alance: Watch for sudden loss of balance, T cliff: Call 9-1-1 right away, S peech: Listen for slurred speech, A rm: Check if one arm is weak, F ace: Look for an uneven smile, E yes: Check for vision loss   Week 1 call completed? Yes   Revoked No further contact(revokes)-requires comment   Graduated/Revoked comments Denies questions or needs at this time.   Call end time [id number]            Louise MOCK - Licensed Nurse

## 2023-09-25 ENCOUNTER — HOSPITAL ENCOUNTER (OUTPATIENT)
Dept: DIABETES SERVICES | Facility: HOSPITAL | Age: 63
Setting detail: RECURRING SERIES
Discharge: HOME | End: 2023-09-25
Payer: MEDICAID

## 2023-09-25 NOTE — CONSULTS
"Ms. Grove attended follow up outpatient diabetes management education class via phone call-30 minute class. Educated on basic diabetes pathophysiology and how related to increased risk for complications, specifically stroke. Discussed s/sx of hyperglycemia and hypoglycemia and self management of both. Education provided on TLC diet, the plate method, as well as exercise recommendations. Stressed importance of ongoing, lifelong follow up with PCP for diabetes and other chronic health condition management. Stressed importance of taking all medications as prescribed. Patient was given opportunity to ask questions, and was also encouraged to pursue further diabetes education class with both RN and RD. Class educational materials were mailed to patient's home: [facility]'s \"Diabetes Basics\" booklet, as well as our contact information for any further questions or needs.   "

## 2023-11-27 ENCOUNTER — OFFICE VISIT (OUTPATIENT)
Dept: NEUROLOGY | Facility: CLINIC | Age: 63
End: 2023-11-27
Payer: MEDICAID

## 2023-11-27 VITALS
HEIGHT: 55 IN | OXYGEN SATURATION: 96 % | HEART RATE: 86 BPM | WEIGHT: 116 LBS | TEMPERATURE: 98.2 F | DIASTOLIC BLOOD PRESSURE: 64 MMHG | BODY MASS INDEX: 26.85 KG/M2 | SYSTOLIC BLOOD PRESSURE: 106 MMHG

## 2023-11-27 DIAGNOSIS — Z86.73 HISTORY OF STROKE: Primary | ICD-10-CM

## 2023-11-27 PROCEDURE — 99214 OFFICE O/P EST MOD 30 MIN: CPT | Performed by: NURSE PRACTITIONER

## 2023-11-27 RX ORDER — ASPIRIN 325 MG
325 TABLET ORAL DAILY
Qty: 100 TABLET | Refills: 3 | Status: SHIPPED | OUTPATIENT
Start: 2023-11-27 | End: 2024-11-26

## 2023-11-27 RX ORDER — ATORVASTATIN CALCIUM 20 MG/1
20 TABLET, FILM COATED ORAL DAILY
Qty: 30 TABLET | Refills: 11 | Status: SHIPPED | OUTPATIENT
Start: 2023-11-27 | End: 2024-11-26

## 2023-11-27 NOTE — PROGRESS NOTES
New Patient Office Visit      Encounter Date: 2023   Patient Name: Emily Grove  : 1960   MRN: [id number]   PCP: Marie Morales MD    Chief Complaint:    Chief Complaint   Patient presents with    Follow-up       History of Present Illness: Emily Grove is a 63 y.o. female who is here today to establish care.  Pertinent medical history includes T2DM, HTN, HLD.  Patient was admitted to [facility] 2023 after presenting with expressive aphasia, dysarthria, right facial droop and left-sided weakness.  Initial NIHSS 5, /88.  CT head negative for acute abnormality.  CTA head and neck negative for significant flow-limiting stenosis, no LVO or aneurysm.  CT perfusion negative.  MRI showed left thalamic stroke; suspected etiology likely small vessel disease.  TTE showed EF 61-65%, normal left atrium, positive bubble study.  Bilateral lower extremity duplex negative for DVT.  Patient was discharged home on DAPT with plan to continue for 21 days followed by aspirin 325 mg as well as statin.    Today's clinic visit 2023: Patient presents today for clinic follow-up.  She notes intermittent paresthesias around her right upper lip.  Her facial droop and speech abnormalities have resolved.  She also notes mild paresthesias involving her right first and second digits that she attributes to her recent carpal tunnel surgery in September.  She tells me she has episodes of lightheadedness after eating; this occurs occasionally and she is unable to specify a frequency.  Typically she has to lay down for an hour or 2 when these events happen.  This is not accompanied by nausea vomiting, no visual disturbances, no dizziness or altered gait.  She denies any new weakness, no visual changes, no speech disturbance.  She has remained active and goes to the gym 3 days a week.  She has continued taking both aspirin and Plavix as well as her atorvastatin.  She does state that she has been having leg pain  since beginning taking Lipitor.    Stroke Risk Factors: diabetes mellitus, hyperlipidemia, and hypertension      Systems:   Review of Systems   Constitutional:  Negative for chills and fever.   HENT:  Negative for trouble swallowing.    Eyes:  Negative for visual disturbance.   Respiratory:  Negative for cough and shortness of breath.    Cardiovascular:  Negative for chest pain and palpitations.   Neurological:  Positive for light-headedness and numbness. Negative for facial asymmetry, speech difficulty and weakness.   Psychiatric/Behavioral: Negative.         Past Medical History:   Past Medical History:   Diagnosis Date    Acid reflux     Diabetes mellitus     Hyperlipidemia     Hypertension     Overactive bladder     Stroke        Past Surgical History:   Past Surgical History:   Procedure Laterality Date    BLADDER SURGERY      GALL BLADDER    CARPAL TUNNEL RELEASE      FINGER FUSION      FOOT SURGERY      TONSILLECTOMY         Family History:   Family History   Problem Relation Age of Onset    Diabetes Mother     Stroke Mother     COPD Father     Stroke Father     Hypertension Sister     Diabetes Brother     Heart attack Brother     Parkinsonism Brother     No Known Problems Maternal Grandmother     Diabetes Maternal Grandfather     Stroke Maternal Grandfather     Heart failure Paternal Grandmother     Diabetes Paternal Grandmother     No Known Problems Paternal Grandfather        Social History:   Social History     Socioeconomic History    Marital status:    Tobacco Use    Smoking status: Never     Passive exposure: Never    Smokeless tobacco: Never   Vaping Use    Vaping Use: Never used   Substance and Sexual Activity    Alcohol use: No    Drug use: No    Sexual activity: Defer       Medications:     Current Outpatient Medications:     brimonidine-timolol (COMBIGAN) 0.2-0.5 % ophthalmic solution, Administer 1 drop to the right eye 2 (Two) Times a Day., Disp: , Rfl:     dilTIAZem (TIAZAC) 300 MG 24 hr  "capsule, Take 1 capsule by mouth Daily., Disp: , Rfl:     esomeprazole (nexIUM) 40 MG capsule, Take 1 capsule by mouth Daily., Disp: , Rfl:     fluticasone (FLONASE) 50 MCG/ACT nasal spray, 2 sprays into the nostril(s) as directed by provider Daily As Needed for Rhinitis or Allergies. OTC, Disp: , Rfl:     insulin lispro (humaLOG) 100 UNIT/ML injection, Use with insulin pump. MDD 100u, Disp: , Rfl:     montelukast (SINGULAIR) 10 MG tablet, Take 1 tablet by mouth Every Night., Disp: , Rfl:     Semaglutide,0.25 or 0.5MG/DOS, (Ozempic, 0.25 or 0.5 MG/DOSE,) 2 MG/1.5ML solution pen-injector, Inject 1 mg under the skin into the appropriate area as directed 1 (One) Time Per Week. Friday, Disp: , Rfl:     valsartan (DIOVAN) 80 MG tablet, Take 1 tablet by mouth Daily., Disp: , Rfl:     Vitamin D, Cholecalciferol, 50 MCG (2000 UT) capsule, Take 1 capsule by mouth Daily. OTC, Disp: , Rfl:     aspirin (Dorys Aspirin) 325 MG tablet, Take 1 tablet by mouth Daily., Disp: 100 tablet, Rfl: 3    atorvastatin (Lipitor) 20 MG tablet, Take 1 tablet by mouth Daily., Disp: 30 tablet, Rfl: 11    Allergies:   Allergies   Allergen Reactions    Sulfa Antibiotics Anaphylaxis       Objective     Physical Exam:  Vital Signs:   Vitals:    11/27/23 1511   BP: 106/64   Pulse: 86   Temp: 98.2 °F (36.8 °C)   SpO2: 96%   Weight: 52.6 kg (116 lb)   Height: 139.7 cm (55\")     Body mass index is 26.96 kg/m².     Physical Exam  Vitals reviewed.   Constitutional:       Appearance: Normal appearance.   HENT:      Head: Normocephalic and atraumatic.   Eyes:      General: Lids are normal.      Extraocular Movements: Extraocular movements intact.      Pupils: Pupils are equal, round, and reactive to light.   Cardiovascular:      Rate and Rhythm: Normal rate.   Pulmonary:      Effort: Pulmonary effort is normal. No respiratory distress.   Musculoskeletal:         General: No swelling. Normal range of motion.      Cervical back: Normal range of motion and neck " supple.   Skin:     General: Skin is warm and dry.   Neurological:      General: No focal deficit present.      Mental Status: She is alert and oriented to person, place, and time. Mental status is at baseline.      Cranial Nerves: No cranial nerve deficit.      Sensory: No sensory deficit.      Motor: Motor strength is normal.No weakness.      Coordination: Coordination is intact.   Psychiatric:         Mood and Affect: Mood normal.         Speech: Speech normal.         Behavior: Behavior normal.       Neurological Exam  Mental Status  Alert. Oriented to person, place, and time. Speech is normal. Language is fluent with no aphasia. Attention and concentration are normal.    Cranial Nerves  CN II: Visual fields full to confrontation.  CN III, IV, VI: Extraocular movements intact bilaterally. Normal lids and orbits bilaterally. Pupils equal round and reactive to light bilaterally.  CN V: Facial sensation is normal.  CN VII: Full and symmetric facial movement.  CN XI: Shoulder shrug strength is normal.  CN XII: Tongue midline without atrophy or fasciculations.    Motor  Normal muscle bulk throughout. No fasciculations present. Normal muscle tone. No abnormal involuntary movements. Strength is 5/5 throughout all four extremities.    Sensory  Light touch is normal in upper and lower extremities.     Reflexes  Right Plantar: downgoing  Left Plantar: downgoing    Coordination    Finger-to-nose, rapid alternating movements and heel-to-shin normal bilaterally without dysmetria.    Gait    Not tested.         NIH Stroke Scale  Time: 15:45 EST  Person Administering Scale: TYLER Massey    1a  Level of consciousness: 0=alert; keenly responsive   1b. LOC questions:  0=Answers both questions correctly   1c. LOC commands: 0=Performs both tasks correctly   2.  Best Gaze: 0=normal   3.  Visual: 0=No visual loss   4. Facial Palsy: 0=Normal symmetric movement   5a.  Motor left arm: 0=No drift, limb holds 90 (or 45) degrees  "for full 10 seconds   5b.  Motor right arm: 0=No drift, limb holds 90 (or 45) degrees for full 10 seconds   6a. motor left le=No drift, limb holds 90 (or 45) degrees for full 10 seconds   6b  Motor right le=No drift, limb holds 90 (or 45) degrees for full 10 seconds   7. Limb Ataxia: 0=Absent   8.  Sensory: 0=Normal; no sensory loss   9. Best Language:  0=No aphasia, normal   10. Dysarthria: 0=Normal   11. Extinction and Inattention: 0=No abnormality    Total:   0       Modified Jaylan Score:     MODIFIED JAYLAN SCALE (to be assessed for each patient having history of stroke) []Stroke history but not assessed  [x]0: No symptoms at all  []1: No significant disability despite symptoms  []2: Slight disability  []3: Moderate disability  []4: Moderately severe disability  []5: Severe disability  []6: Death     PHQ-2 Depression Screening  Little interest or pleasure in doing things? 0-->not at all   Feeling down, depressed, or hopeless? 0-->not at all   PHQ-2 Total Score 0     STOP-Bang Questionnaire :    STOP-Bang Score  Have you been diagnosed with Sleep Apnea?: no  Snoring?: no  Tired?: no  Observed?: no  Pressure?: yes  Stop Score: 1  Body Mass Index more than 35 kg/m2?: no  Age older than 50 year old?: yes  Neck large? \">17\"/43cm-M, >16\"/41cm-F: no  Gender=Male?: no  Total Stop-Bang Score: 2       STEADI Fall Risk Clinician Key Questions   Have you fallen in the past year?: Yes  Do you feel unsteady with walking?: No  Are you worried about falling?: No       Results Reviewed:     Labs  H&H 13.6/40  Platelets 185  A1c 7.8  LDL 27  AST 26  ALT 14    Imaging    CT head negative for acute abnormality.    CTA head and neck negative for significant flow-limiting stenosis, no LVO or aneurysm.      CT perfusion negative.      MRI showed acute left thalamic stroke    TTE showed EF 61-65%, normal left atrium, positive bubble study.      Bilateral lower extremity duplex negative for DVT.        Assessment / Plan  "     Assessment/Plan:   There are no diagnoses linked to this encounter.     History of stroke  -Left thalamic stroke, etiology likely small vessel disease  -Stop plavix  -Change aspirin 81 to aspirin 325 mg daily  -Decrease atorvastatin to 20 mg d/t myalgias, LDL was 27, goal<70  -Reviewed reduction strategies for modifiable stroke risk factors    2.  HTN  -Goal BP <130/80  -/64 today    3.  Type 2 diabetes  -A1c 7.8 in the hospital, goal <7.0  -Continue to work closely with PCP to optimize glycemic control    Discussed the importance of medication compliance Aspirin 325mg daily and Atorvastatin 20 mg nightly and lifestyle modifications adequate control of blood pressure, adequate control of cholesterol (goal LDL <70), adequate control of glucose (<140, A1c goal <7), increased physical activity, and implementation of healthy diet to help reduce the risk of future cerebrovascular events.  Also discussed the signs symptoms that would warrant the patient return back to the emergency department including unilateral weakness, unilateral numbness, visual disturbances, loss of balance, speech difficulties, and/or a sudden severe headache.  Patient verbalized understanding.        Follow Up:   Return in about 3 months (around 2/27/2024).    TYLER Massey  [facility]

## 2023-11-27 NOTE — PATIENT INSTRUCTIONS
Stop taking plavix     Stop taking aspirin 81 mg and change to 325 mg once a day to prevent future strokes    Stop taking atorvastatin 80 mg and begin taking 20 mg once at night    Follow up with your endocrinologist

## 2023-11-29 ENCOUNTER — PATIENT ROUNDING (BHMG ONLY) (OUTPATIENT)
Dept: NEUROLOGY | Facility: CLINIC | Age: 63
End: 2023-11-29
Payer: MEDICAID

## 2023-11-29 NOTE — PROGRESS NOTES
November 29, 2023    Hello, may I speak with Emily Grove?    My name is Kinga Snell      I am  with [facility]  [address]  [place] [facility] [place] [address] [phone].    Before we get started may I verify your date of birth? 1960    I am calling to officially welcome you to our practice and ask about your recent visit. Is this a good time to talk? yes    Tell me about your visit with us. What things went well?  Pt states that everything I was fine.       We're always looking for ways to make our patients' experiences even better. Do you have recommendations on ways we may improve?  no    Overall were you satisfied with your first visit to our practice? yes       I appreciate you taking the time to speak with me today. Is there anything else I can do for you? yes      Thank you, and have a great day.

## 2023-12-26 ENCOUNTER — APPOINTMENT (OUTPATIENT)
Dept: GENERAL RADIOLOGY | Facility: HOSPITAL | Age: 63
End: 2023-12-26
Payer: MEDICAID

## 2023-12-26 ENCOUNTER — HOSPITAL ENCOUNTER (EMERGENCY)
Facility: HOSPITAL | Age: 63
Discharge: HOME | End: 2023-12-27
Attending: EMERGENCY MEDICINE | Admitting: EMERGENCY MEDICINE
Payer: MEDICAID

## 2023-12-26 DIAGNOSIS — R55 NEAR SYNCOPE: Primary | ICD-10-CM

## 2023-12-26 DIAGNOSIS — N30.00 ACUTE CYSTITIS WITHOUT HEMATURIA: ICD-10-CM

## 2023-12-26 LAB
ALBUMIN SERPL-MCNC: 4.3 G/DL (ref 3.5–5.2)
ALBUMIN/GLOB SERPL: 1.4 G/DL
ALP SERPL-CCNC: 175 U/L (ref 39–117)
ALT SERPL W P-5'-P-CCNC: 36 U/L (ref 1–33)
ANION GAP SERPL CALCULATED.3IONS-SCNC: 11 MMOL/L (ref 5–15)
AST SERPL-CCNC: 27 U/L (ref 1–32)
BACTERIA UR QL AUTO: ABNORMAL /HPF
BASOPHILS # BLD AUTO: 0.03 10*3/MM3 (ref 0–0.2)
BASOPHILS NFR BLD AUTO: 0.3 % (ref 0–1.5)
BILIRUB SERPL-MCNC: <0.2 MG/DL (ref 0–1.2)
BILIRUB UR QL STRIP: NEGATIVE
BUN SERPL-MCNC: 14 MG/DL (ref 8–23)
BUN/CREAT SERPL: 20.3 (ref 7–25)
CALCIUM SPEC-SCNC: 9.5 MG/DL (ref 8.6–10.5)
CHLORIDE SERPL-SCNC: 105 MMOL/L (ref 98–107)
CLARITY UR: CLEAR
CO2 SERPL-SCNC: 28 MMOL/L (ref 22–29)
COLOR UR: YELLOW
CREAT SERPL-MCNC: 0.69 MG/DL (ref 0.57–1)
DEPRECATED RDW RBC AUTO: 40.6 FL (ref 37–54)
EGFRCR SERPLBLD CKD-EPI 2021: 98.9 ML/MIN/1.73
EOSINOPHIL # BLD AUTO: 0.07 10*3/MM3 (ref 0–0.4)
EOSINOPHIL NFR BLD AUTO: 0.8 % (ref 0.3–6.2)
ERYTHROCYTE [DISTWIDTH] IN BLOOD BY AUTOMATED COUNT: 12.6 % (ref 12.3–15.4)
GLOBULIN UR ELPH-MCNC: 3 GM/DL
GLUCOSE SERPL-MCNC: 148 MG/DL (ref 65–99)
GLUCOSE UR STRIP-MCNC: NEGATIVE MG/DL
HCT VFR BLD AUTO: 39.2 % (ref 34–46.6)
HGB BLD-MCNC: 12.7 G/DL (ref 12–15.9)
HGB UR QL STRIP.AUTO: NEGATIVE
HOLD SPECIMEN: NORMAL
HOLD SPECIMEN: NORMAL
HYALINE CASTS UR QL AUTO: ABNORMAL /LPF
IMM GRANULOCYTES # BLD AUTO: 0.03 10*3/MM3 (ref 0–0.05)
IMM GRANULOCYTES NFR BLD AUTO: 0.3 % (ref 0–0.5)
KETONES UR QL STRIP: ABNORMAL
LEUKOCYTE ESTERASE UR QL STRIP.AUTO: ABNORMAL
LYMPHOCYTES # BLD AUTO: 2.22 10*3/MM3 (ref 0.7–3.1)
LYMPHOCYTES NFR BLD AUTO: 24.1 % (ref 19.6–45.3)
MAGNESIUM SERPL-MCNC: 1.9 MG/DL (ref 1.6–2.4)
MCH RBC QN AUTO: 28.5 PG (ref 26.6–33)
MCHC RBC AUTO-ENTMCNC: 32.4 G/DL (ref 31.5–35.7)
MCV RBC AUTO: 88.1 FL (ref 79–97)
MONOCYTES # BLD AUTO: 0.66 10*3/MM3 (ref 0.1–0.9)
MONOCYTES NFR BLD AUTO: 7.2 % (ref 5–12)
NEUTROPHILS NFR BLD AUTO: 6.22 10*3/MM3 (ref 1.7–7)
NEUTROPHILS NFR BLD AUTO: 67.3 % (ref 42.7–76)
NITRITE UR QL STRIP: NEGATIVE
NRBC BLD AUTO-RTO: 0 /100 WBC (ref 0–0.2)
PH UR STRIP.AUTO: 6 [PH] (ref 5–8)
PLATELET # BLD AUTO: 200 10*3/MM3 (ref 140–450)
PMV BLD AUTO: 11.2 FL (ref 6–12)
POTASSIUM SERPL-SCNC: 3.5 MMOL/L (ref 3.5–5.2)
PROT SERPL-MCNC: 7.3 G/DL (ref 6–8.5)
PROT UR QL STRIP: NEGATIVE
RBC # BLD AUTO: 4.45 10*6/MM3 (ref 3.77–5.28)
RBC # UR STRIP: ABNORMAL /HPF
REF LAB TEST METHOD: ABNORMAL
SODIUM SERPL-SCNC: 144 MMOL/L (ref 136–145)
SP GR UR STRIP: 1.03 (ref 1–1.03)
SQUAMOUS #/AREA URNS HPF: ABNORMAL /HPF
TROPONIN T SERPL HS-MCNC: 13 NG/L
UROBILINOGEN UR QL STRIP: ABNORMAL
WBC # UR STRIP: ABNORMAL /HPF
WBC NRBC COR # BLD AUTO: 9.23 10*3/MM3 (ref 3.4–10.8)
WHOLE BLOOD HOLD COAG: NORMAL
WHOLE BLOOD HOLD SPECIMEN: NORMAL

## 2023-12-26 PROCEDURE — 0202U NFCT DS 22 TRGT SARS-COV-2: CPT | Performed by: EMERGENCY MEDICINE

## 2023-12-26 PROCEDURE — 71045 X-RAY EXAM CHEST 1 VIEW: CPT

## 2023-12-26 PROCEDURE — 84484 ASSAY OF TROPONIN QUANT: CPT | Performed by: EMERGENCY MEDICINE

## 2023-12-26 PROCEDURE — 93005 ELECTROCARDIOGRAM TRACING: CPT

## 2023-12-26 PROCEDURE — 85025 COMPLETE CBC W/AUTO DIFF WBC: CPT

## 2023-12-26 PROCEDURE — 99284 EMERGENCY DEPT VISIT MOD MDM: CPT

## 2023-12-26 PROCEDURE — 93005 ELECTROCARDIOGRAM TRACING: CPT | Performed by: EMERGENCY MEDICINE

## 2023-12-26 PROCEDURE — 80053 COMPREHEN METABOLIC PANEL: CPT | Performed by: EMERGENCY MEDICINE

## 2023-12-26 PROCEDURE — 81001 URINALYSIS AUTO W/SCOPE: CPT

## 2023-12-26 PROCEDURE — 83735 ASSAY OF MAGNESIUM: CPT | Performed by: EMERGENCY MEDICINE

## 2023-12-26 RX ORDER — SODIUM CHLORIDE 0.9 % (FLUSH) 0.9 %
10 SYRINGE (ML) INJECTION AS NEEDED
Status: DISCONTINUED | OUTPATIENT
Start: 2023-12-26 | End: 2023-12-27 | Stop reason: HOSPADM

## 2023-12-27 ENCOUNTER — TRANSCRIBE ORDERS (OUTPATIENT)
Dept: ADMINISTRATIVE | Facility: HOSPITAL | Age: 63
End: 2023-12-27
Payer: MEDICAID

## 2023-12-27 VITALS
BODY MASS INDEX: 25.92 KG/M2 | DIASTOLIC BLOOD PRESSURE: 84 MMHG | HEIGHT: 55 IN | OXYGEN SATURATION: 99 % | RESPIRATION RATE: 18 BRPM | WEIGHT: 112 LBS | HEART RATE: 61 BPM | TEMPERATURE: 97.7 F | SYSTOLIC BLOOD PRESSURE: 127 MMHG

## 2023-12-27 DIAGNOSIS — R42 DIZZINESS: Primary | ICD-10-CM

## 2023-12-27 LAB
B PARAPERT DNA SPEC QL NAA+PROBE: NOT DETECTED
B PERT DNA SPEC QL NAA+PROBE: NOT DETECTED
C PNEUM DNA NPH QL NAA+NON-PROBE: NOT DETECTED
FLUAV SUBTYP SPEC NAA+PROBE: NOT DETECTED
FLUBV RNA ISLT QL NAA+PROBE: NOT DETECTED
HADV DNA SPEC NAA+PROBE: NOT DETECTED
HCOV 229E RNA SPEC QL NAA+PROBE: NOT DETECTED
HCOV HKU1 RNA SPEC QL NAA+PROBE: NOT DETECTED
HCOV NL63 RNA SPEC QL NAA+PROBE: NOT DETECTED
HCOV OC43 RNA SPEC QL NAA+PROBE: NOT DETECTED
HMPV RNA NPH QL NAA+NON-PROBE: NOT DETECTED
HOLD SPECIMEN: NORMAL
HPIV1 RNA ISLT QL NAA+PROBE: NOT DETECTED
HPIV2 RNA SPEC QL NAA+PROBE: NOT DETECTED
HPIV3 RNA NPH QL NAA+PROBE: NOT DETECTED
HPIV4 P GENE NPH QL NAA+PROBE: NOT DETECTED
M PNEUMO IGG SER IA-ACNC: NOT DETECTED
QT INTERVAL: 444 MS
QTC INTERVAL: 428 MS
RHINOVIRUS RNA SPEC NAA+PROBE: NOT DETECTED
RSV RNA NPH QL NAA+NON-PROBE: NOT DETECTED
SARS-COV-2 RNA NPH QL NAA+NON-PROBE: NOT DETECTED

## 2023-12-27 RX ORDER — NITROFURANTOIN 25; 75 MG/1; MG/1
100 CAPSULE ORAL 2 TIMES DAILY
Qty: 14 CAPSULE | Refills: 0 | Status: SHIPPED | OUTPATIENT
Start: 2023-12-27

## 2023-12-27 NOTE — DISCHARGE INSTRUCTIONS
Continue your usual medications as prescribed.  Drink plenty of clear liquids to stay hydrated.  Return to the emergency department for any new, concerning, or worsening symptoms.  Follow-up with your primary care provider for further evaluation and treatment.

## 2023-12-27 NOTE — ED PROVIDER NOTES
"Subjective   History of Present Illness  63 year old very pleasant female presents to the emergency department accompanied by her  with concerns about intermittent episodes of dizziness and generalized weakness which have been happening off and on for \"years.\"  She estimates the symptoms have been intermittent for approximately 4 years. Yesterday she had an episodes of dizziness and near syncope, followed by generalized weakness, fatigue, and sleeping more than usual. She denies abnormal sensation of movement such as a spinning sensation when the episodes occur. She denies recent chest pain or palpitations. She denies syncope with loss of consciousness recently. She reports she has had a Holter monitor study done a couple of times over the past few years, but results have not found an explanation for her symptoms per patient. She feels weak and \"drained.\" She says \"I keep getting these spells since Halloween of 2019. My head feels funny and it feels like I'm going to fall out. I don't feel right in my body.\" She had stayed overnight in the hospital previously in September per patient, was diagnosed with possible TIA, and was prescribed Plavix which she took for a while but has since stopped taking. She denies recent fever or known sick contacts.      Review of Systems   Constitutional:  Positive for activity change and fatigue. Negative for fever.   Eyes:  Negative for photophobia and discharge.   Respiratory:  Negative for stridor.    Cardiovascular:  Negative for chest pain and palpitations.   Endocrine:        Has insulin pump   Genitourinary:  Negative for dysuria and hematuria.   Musculoskeletal:         She has had pain in both legs at night for years.    Neurological:  Positive for dizziness. Negative for speech difficulty.       Past Medical History:   Diagnosis Date    Acid reflux     Diabetes mellitus     Hyperlipidemia     Hypertension     Overactive bladder     Stroke        Allergies   Allergen " Reactions    Sulfa Antibiotics Anaphylaxis       Past Surgical History:   Procedure Laterality Date    BLADDER SURGERY      GALL BLADDER    CARPAL TUNNEL RELEASE      FINGER FUSION      FOOT SURGERY      TONSILLECTOMY         Family History   Problem Relation Age of Onset    Diabetes Mother     Stroke Mother     COPD Father     Stroke Father     Hypertension Sister     Diabetes Brother     Heart attack Brother     Parkinsonism Brother     No Known Problems Maternal Grandmother     Diabetes Maternal Grandfather     Stroke Maternal Grandfather     Heart failure Paternal Grandmother     Diabetes Paternal Grandmother     No Known Problems Paternal Grandfather        Social History     Socioeconomic History    Marital status:    Tobacco Use    Smoking status: Never     Passive exposure: Never    Smokeless tobacco: Never   Vaping Use    Vaping Use: Never used   Substance and Sexual Activity    Alcohol use: No    Drug use: No    Sexual activity: Defer           Objective   Physical Exam  Vitals and nursing note reviewed.   Constitutional:       General: She is not in acute distress.     Appearance: She is not diaphoretic.      Comments: BMI 26. Patient is initially evaluated in the provider in triage area due to a full emergency department.   Eyes:      General: No scleral icterus.     Extraocular Movements: Extraocular movements intact.      Pupils: Pupils are equal, round, and reactive to light.      Comments: No photophobia or nystagmus.   Cardiovascular:      Rate and Rhythm: Normal rate and regular rhythm.      Heart sounds: Normal heart sounds. No murmur heard.     No friction rub. No gallop.      Comments: S1, S2  Pulmonary:      Effort: Pulmonary effort is normal. No respiratory distress.      Breath sounds: Normal breath sounds. No stridor. No wheezing, rhonchi or rales.   Abdominal:      General: There is no distension.      Palpations: Abdomen is soft.      Tenderness: There is no abdominal tenderness.    Musculoskeletal:         General: No swelling.      Comments: No calf tenderness bilaterally.   Skin:     General: Skin is warm and dry.   Neurological:      Mental Status: She is alert.      Comments: Normal speech, no dysarthria. No facial droop. Motor 5/5 power x 4 extremities, symmetric. Sensation grossly intact to light touch. Rapid alternating movements within normal limits. No dysmetria or past-pointing on finger-to-nose testing. Normal gait. No ataxia.           Procedures           ED Course                          Total (NIH Stroke Scale): 0      Unremarkable ED course. No arrhythmias on telemetry monitoring. Results and plan discussed with patient and her , all questions addressed. She appears stable for further outpatient evaluation.             Medical Decision Making  Differential diagnosis includes dehydration, acute kidney injury, labile blood pressure, hypoglycemia, electrolyte abnormality, viral illness, acute cystitis, pneumonia, and others. Less likely cardiac arrhythmia.    Problems Addressed:  Acute cystitis without hematuria: complicated acute illness or injury  Near syncope: complicated acute illness or injury    Amount and/or Complexity of Data Reviewed  Independent Historian:      Details:  at bedside  Labs: ordered. Decision-making details documented in ED Course.  Radiology: ordered. Decision-making details documented in ED Course.  ECG/medicine tests: ordered and independent interpretation performed. Decision-making details documented in ED Course.     Details: EKG at 2048 shows sinus bradycardia at a rate of 56 beats per minute, incomplete right bundle branch block, no acute ischemic changes. KCo=210 ms.    Risk  Prescription drug management.      Recent Results (from the past 24 hour(s))   Comprehensive Metabolic Panel    Collection Time: 12/26/23  8:36 PM    Specimen: Blood   Result Value Ref Range    Glucose 148 (H) 65 - 99 mg/dL    BUN 14 8 - 23 mg/dL    Creatinine  0.69 0.57 - 1.00 mg/dL    Sodium 144 136 - 145 mmol/L    Potassium 3.5 3.5 - 5.2 mmol/L    Chloride 105 98 - 107 mmol/L    CO2 28.0 22.0 - 29.0 mmol/L    Calcium 9.5 8.6 - 10.5 mg/dL    Total Protein 7.3 6.0 - 8.5 g/dL    Albumin 4.3 3.5 - 5.2 g/dL    ALT (SGPT) 36 (H) 1 - 33 U/L    AST (SGOT) 27 1 - 32 U/L    Alkaline Phosphatase 175 (H) 39 - 117 U/L    Total Bilirubin <0.2 0.0 - 1.2 mg/dL    Globulin 3.0 gm/dL    A/G Ratio 1.4 g/dL    BUN/Creatinine Ratio 20.3 7.0 - 25.0    Anion Gap 11.0 5.0 - 15.0 mmol/L    eGFR 98.9 >60.0 mL/min/1.73   Single High Sensitivity Troponin T    Collection Time: 12/26/23  8:36 PM    Specimen: Blood   Result Value Ref Range    HS Troponin T 13 <14 ng/L   Magnesium    Collection Time: 12/26/23  8:36 PM    Specimen: Blood   Result Value Ref Range    Magnesium 1.9 1.6 - 2.4 mg/dL   Green Top (Gel)    Collection Time: 12/26/23  8:36 PM   Result Value Ref Range    Extra Tube Hold for add-ons.    Lavender Top    Collection Time: 12/26/23  8:36 PM   Result Value Ref Range    Extra Tube hold for add-on    Gold Top - SST    Collection Time: 12/26/23  8:36 PM   Result Value Ref Range    Extra Tube Hold for add-ons.    Gray Top    Collection Time: 12/26/23  8:36 PM   Result Value Ref Range    Extra Tube Hold for add-ons.    Light Blue Top    Collection Time: 12/26/23  8:36 PM   Result Value Ref Range    Extra Tube Hold for add-ons.    CBC Auto Differential    Collection Time: 12/26/23  8:36 PM    Specimen: Blood   Result Value Ref Range    WBC 9.23 3.40 - 10.80 10*3/mm3    RBC 4.45 3.77 - 5.28 10*6/mm3    Hemoglobin 12.7 12.0 - 15.9 g/dL    Hematocrit 39.2 34.0 - 46.6 %    MCV 88.1 79.0 - 97.0 fL    MCH 28.5 26.6 - 33.0 pg    MCHC 32.4 31.5 - 35.7 g/dL    RDW 12.6 12.3 - 15.4 %    RDW-SD 40.6 37.0 - 54.0 fl    MPV 11.2 6.0 - 12.0 fL    Platelets 200 140 - 450 10*3/mm3    Neutrophil % 67.3 42.7 - 76.0 %    Lymphocyte % 24.1 19.6 - 45.3 %    Monocyte % 7.2 5.0 - 12.0 %    Eosinophil % 0.8 0.3 -  6.2 %    Basophil % 0.3 0.0 - 1.5 %    Immature Grans % 0.3 0.0 - 0.5 %    Neutrophils, Absolute 6.22 1.70 - 7.00 10*3/mm3    Lymphocytes, Absolute 2.22 0.70 - 3.10 10*3/mm3    Monocytes, Absolute 0.66 0.10 - 0.90 10*3/mm3    Eosinophils, Absolute 0.07 0.00 - 0.40 10*3/mm3    Basophils, Absolute 0.03 0.00 - 0.20 10*3/mm3    Immature Grans, Absolute 0.03 0.00 - 0.05 10*3/mm3    nRBC 0.0 0.0 - 0.2 /100 WBC   Urinalysis With Microscopic If Indicated (No Culture) - Urine, Clean Catch    Collection Time: 12/26/23  8:42 PM    Specimen: Urine, Clean Catch   Result Value Ref Range    Color, UA Yellow Yellow, Straw    Appearance, UA Clear Clear    pH, UA 6.0 5.0 - 8.0    Specific Gravity, UA 1.026 1.001 - 1.030    Glucose, UA Negative Negative    Ketones, UA Trace (A) Negative    Bilirubin, UA Negative Negative    Blood, UA Negative Negative    Protein, UA Negative Negative    Leuk Esterase, UA Moderate (2+) (A) Negative    Nitrite, UA Negative Negative    Urobilinogen, UA 0.2 E.U./dL 0.2 - 1.0 E.U./dL   Urinalysis, Microscopic Only - Urine, Clean Catch    Collection Time: 12/26/23  8:42 PM    Specimen: Urine, Clean Catch   Result Value Ref Range    RBC, UA 0-2 None Seen, 0-2 /HPF    WBC, UA 21-50 (A) None Seen, 0-2 /HPF    Bacteria, UA None Seen None Seen, Trace /HPF    Squamous Epithelial Cells, UA 3-6 (A) None Seen, 0-2 /HPF    Hyaline Casts, UA 13-20 0 - 6 /LPF    Methodology Automated Microscopy    ECG 12 Lead ED Triage Standing Order; Weak / Dizzy / AMS    Collection Time: 12/26/23  8:48 PM   Result Value Ref Range    QT Interval 444 ms    QTC Interval 428 ms   Respiratory Panel PCR w/COVID-19(SARS-CoV-2) CRUZITO/GEORGE/DAKOTA/PAD/COR/REY In-House, NP Swab in UTM/VTM, 2 HR TAT - Swab, Nasopharynx    Collection Time: 12/26/23 10:10 PM    Specimen: Nasopharynx; Swab   Result Value Ref Range    ADENOVIRUS, PCR Not Detected Not Detected    Coronavirus 229E Not Detected Not Detected    Coronavirus HKU1 Not Detected Not Detected     Coronavirus NL63 Not Detected Not Detected    Coronavirus OC43 Not Detected Not Detected    COVID19 Not Detected Not Detected - Ref. Range    Human Metapneumovirus Not Detected Not Detected    Human Rhinovirus/Enterovirus Not Detected Not Detected    Influenza A PCR Not Detected Not Detected    Influenza B PCR Not Detected Not Detected    Parainfluenza Virus 1 Not Detected Not Detected    Parainfluenza Virus 2 Not Detected Not Detected    Parainfluenza Virus 3 Not Detected Not Detected    Parainfluenza Virus 4 Not Detected Not Detected    RSV, PCR Not Detected Not Detected    Bordetella pertussis pcr Not Detected Not Detected    Bordetella parapertussis PCR Not Detected Not Detected    Chlamydophila pneumoniae PCR Not Detected Not Detected    Mycoplasma pneumo by PCR Not Detected Not Detected     Note: In addition to lab results from this visit, the labs listed above may include labs taken at another facility or during a different encounter within the last 24 hours. Please correlate lab times with ED admission and discharge times for further clarification of the services performed during this visit.    XR Chest 1 View   Final Result   Impression:   No acute process.            Electronically Signed: Vinny Abarca MD     12/26/2023 10:30 PM EST     Workstation ID: [id number]        Vitals:    12/27/23 0116 12/27/23 0117 12/27/23 0118 12/27/23 0130   BP: 123/80 123/80 130/95 127/84   Patient Position:  Sitting Standing    Pulse: 65 67 64 61   Resp:       Temp:       TempSrc:       SpO2: 98% 98%  99%   Weight:       Height:         Medications   sodium chloride 0.9 % flush 10 mL (has no administration in time range)     ECG/EMG Results (last 24 hours)       Procedure Component Value Units Date/Time    ECG 12 Lead ED Triage Standing Order; Weak / Dizzy / AMS [[id number]] Collected: 12/26/23 2048     Updated: 12/26/23 2045     QT Interval 444 ms      QTC Interval 428 ms     Narrative:      Test Reason : ED Triage Standing  Order~  Blood Pressure :   */*   mmHG  Vent. Rate :  56 BPM     Atrial Rate :  56 BPM     P-R Int : 104 ms          QRS Dur : 108 ms      QT Int : 444 ms       P-R-T Axes :  51  40  50 degrees     QTc Int : 428 ms    Sinus bradycardia with short VA  Possible Left atrial enlargement  Incomplete right bundle branch block  Borderline ECG  When compared with ECG of 25-AUG-2023 00:20,  Vent. rate has decreased BY  49 BPM  Non-specific change in ST segment in Anterior leads  T wave inversion no longer evident in Inferior leads  T wave inversion no longer evident in Anterior leads  QT has shortened    Referred By: ED MD           Confirmed By:           ECG 12 Lead ED Triage Standing Order; Weak / Dizzy / AMS   Preliminary Result   Test Reason : ED Triage Standing Order~   Blood Pressure :   */*   mmHG   Vent. Rate :  56 BPM     Atrial Rate :  56 BPM      P-R Int : 104 ms          QRS Dur : 108 ms       QT Int : 444 ms       P-R-T Axes :  51  40  50 degrees      QTc Int : 428 ms      Sinus bradycardia with short VA   Possible Left atrial enlargement   Incomplete right bundle branch block   Borderline ECG   When compared with ECG of 25-AUG-2023 00:20,   Vent. rate has decreased BY  49 BPM   Non-specific change in ST segment in Anterior leads   T wave inversion no longer evident in Inferior leads   T wave inversion no longer evident in Anterior leads   QT has shortened      Referred By: ED MD           Confirmed By:               Final diagnoses:   Near syncope   Acute cystitis without hematuria       ED Disposition  ED Disposition       ED Disposition   Discharge    Condition   Stable    Comment   --               Marie Morales MD  [address] [address]  [phone]    Schedule an appointment as soon as possible for a visit in 3 days  follow up with primary care provider for further evaluation and treatment of spells.         Medication List        New Prescriptions      nitrofurantoin  (macrocrystal-monohydrate) 100 MG capsule  Commonly known as: MACROBID  Take 1 capsule by mouth 2 (Two) Times a Day.               Where to Get Your Medications        These medications were sent to [facility]/pharmacy #[id number] - [place], [place] - [address] - [phone]  - [phone]   [address] [address]      Phone: [phone]   nitrofurantoin (macrocrystal-monohydrate) 100 MG capsule            Mira Delgadillo MD  12/30/23 0059       Mira Delgadillo MD  12/30/23 0105

## 2023-12-30 PROCEDURE — 87086 URINE CULTURE/COLONY COUNT: CPT | Performed by: NURSE PRACTITIONER

## 2024-01-14 ENCOUNTER — APPOINTMENT (OUTPATIENT)
Dept: GENERAL RADIOLOGY | Facility: HOSPITAL | Age: 64
End: 2024-01-14
Payer: MEDICAID

## 2024-01-14 ENCOUNTER — HOSPITAL ENCOUNTER (INPATIENT)
Facility: HOSPITAL | Age: 64
LOS: 4 days | Discharge: NURSING, REHAB OR LONG-TERM CARE | End: 2024-01-19
Attending: EMERGENCY MEDICINE | Admitting: INTERNAL MEDICINE
Payer: MEDICAID

## 2024-01-14 DIAGNOSIS — R00.1 JUNCTIONAL BRADYCARDIA: ICD-10-CM

## 2024-01-14 DIAGNOSIS — I21.4 NON-ST ELEVATION MI (NSTEMI): ICD-10-CM

## 2024-01-14 DIAGNOSIS — R13.10 DYSPHAGIA, UNSPECIFIED TYPE: ICD-10-CM

## 2024-01-14 DIAGNOSIS — I95.9 HYPOTENSION, UNSPECIFIED HYPOTENSION TYPE: Primary | ICD-10-CM

## 2024-01-14 DIAGNOSIS — R47.01 APHASIA: ICD-10-CM

## 2024-01-14 DIAGNOSIS — N17.9 ACUTE KIDNEY INJURY (NONTRAUMATIC): ICD-10-CM

## 2024-01-14 PROBLEM — N39.0 UTI (URINARY TRACT INFECTION): Status: ACTIVE | Noted: 2024-01-14

## 2024-01-14 LAB
ALBUMIN SERPL-MCNC: 4.2 G/DL (ref 3.5–5.2)
ALBUMIN/GLOB SERPL: 1.3 G/DL
ALP SERPL-CCNC: 198 U/L (ref 39–117)
ALT SERPL W P-5'-P-CCNC: 50 U/L (ref 1–33)
ANION GAP SERPL CALCULATED.3IONS-SCNC: 10 MMOL/L (ref 5–15)
AST SERPL-CCNC: 74 U/L (ref 1–32)
BACTERIA UR QL AUTO: ABNORMAL /HPF
BASOPHILS # BLD AUTO: 0.03 10*3/MM3 (ref 0–0.2)
BASOPHILS NFR BLD AUTO: 0.3 % (ref 0–1.5)
BILIRUB SERPL-MCNC: 0.3 MG/DL (ref 0–1.2)
BILIRUB UR QL STRIP: NEGATIVE
BUN SERPL-MCNC: 32 MG/DL (ref 8–23)
BUN/CREAT SERPL: 16.4 (ref 7–25)
CALCIUM SPEC-SCNC: 9.8 MG/DL (ref 8.6–10.5)
CHLORIDE SERPL-SCNC: 100 MMOL/L (ref 98–107)
CLARITY UR: CLEAR
CO2 SERPL-SCNC: 27 MMOL/L (ref 22–29)
COLOR UR: YELLOW
CREAT SERPL-MCNC: 1.95 MG/DL (ref 0.57–1)
DEPRECATED RDW RBC AUTO: 41.5 FL (ref 37–54)
EGFRCR SERPLBLD CKD-EPI 2021: 28.8 ML/MIN/1.73
EOSINOPHIL # BLD AUTO: 0.02 10*3/MM3 (ref 0–0.4)
EOSINOPHIL NFR BLD AUTO: 0.2 % (ref 0.3–6.2)
ERYTHROCYTE [DISTWIDTH] IN BLOOD BY AUTOMATED COUNT: 12.6 % (ref 12.3–15.4)
GLOBULIN UR ELPH-MCNC: 3.3 GM/DL
GLUCOSE SERPL-MCNC: 150 MG/DL (ref 65–99)
GLUCOSE UR STRIP-MCNC: ABNORMAL MG/DL
HCT VFR BLD AUTO: 41.2 % (ref 34–46.6)
HGB BLD-MCNC: 13.5 G/DL (ref 12–15.9)
HGB UR QL STRIP.AUTO: ABNORMAL
HOLD SPECIMEN: NORMAL
HOLD SPECIMEN: NORMAL
HYALINE CASTS UR QL AUTO: ABNORMAL /LPF
IMM GRANULOCYTES # BLD AUTO: 0.06 10*3/MM3 (ref 0–0.05)
IMM GRANULOCYTES NFR BLD AUTO: 0.6 % (ref 0–0.5)
KETONES UR QL STRIP: NEGATIVE
LEUKOCYTE ESTERASE UR QL STRIP.AUTO: ABNORMAL
LYMPHOCYTES # BLD AUTO: 1.73 10*3/MM3 (ref 0.7–3.1)
LYMPHOCYTES NFR BLD AUTO: 18 % (ref 19.6–45.3)
MAGNESIUM SERPL-MCNC: 2 MG/DL (ref 1.6–2.4)
MCH RBC QN AUTO: 29.3 PG (ref 26.6–33)
MCHC RBC AUTO-ENTMCNC: 32.8 G/DL (ref 31.5–35.7)
MCV RBC AUTO: 89.4 FL (ref 79–97)
MONOCYTES # BLD AUTO: 0.91 10*3/MM3 (ref 0.1–0.9)
MONOCYTES NFR BLD AUTO: 9.5 % (ref 5–12)
NEUTROPHILS NFR BLD AUTO: 6.87 10*3/MM3 (ref 1.7–7)
NEUTROPHILS NFR BLD AUTO: 71.4 % (ref 42.7–76)
NITRITE UR QL STRIP: NEGATIVE
NRBC BLD AUTO-RTO: 0 /100 WBC (ref 0–0.2)
PH UR STRIP.AUTO: 6 [PH] (ref 5–8)
PLATELET # BLD AUTO: 265 10*3/MM3 (ref 140–450)
PMV BLD AUTO: 11.5 FL (ref 6–12)
POTASSIUM SERPL-SCNC: 4.9 MMOL/L (ref 3.5–5.2)
PROT SERPL-MCNC: 7.5 G/DL (ref 6–8.5)
PROT UR QL STRIP: ABNORMAL
RBC # BLD AUTO: 4.61 10*6/MM3 (ref 3.77–5.28)
RBC # UR STRIP: ABNORMAL /HPF
REF LAB TEST METHOD: ABNORMAL
SODIUM SERPL-SCNC: 137 MMOL/L (ref 136–145)
SP GR UR STRIP: >=1.03 (ref 1–1.03)
SQUAMOUS #/AREA URNS HPF: ABNORMAL /HPF
TROPONIN T SERPL HS-MCNC: 265 NG/L
UROBILINOGEN UR QL STRIP: ABNORMAL
WBC # UR STRIP: ABNORMAL /HPF
WBC NRBC COR # BLD AUTO: 9.62 10*3/MM3 (ref 3.4–10.8)
WHOLE BLOOD HOLD COAG: NORMAL
WHOLE BLOOD HOLD SPECIMEN: NORMAL

## 2024-01-14 PROCEDURE — 83605 ASSAY OF LACTIC ACID: CPT | Performed by: INTERNAL MEDICINE

## 2024-01-14 PROCEDURE — 99291 CRITICAL CARE FIRST HOUR: CPT | Performed by: INTERNAL MEDICINE

## 2024-01-14 PROCEDURE — 84484 ASSAY OF TROPONIN QUANT: CPT | Performed by: EMERGENCY MEDICINE

## 2024-01-14 PROCEDURE — 85025 COMPLETE CBC W/AUTO DIFF WBC: CPT | Performed by: EMERGENCY MEDICINE

## 2024-01-14 PROCEDURE — 80053 COMPREHEN METABOLIC PANEL: CPT | Performed by: EMERGENCY MEDICINE

## 2024-01-14 PROCEDURE — 71045 X-RAY EXAM CHEST 1 VIEW: CPT

## 2024-01-14 PROCEDURE — 87086 URINE CULTURE/COLONY COUNT: CPT | Performed by: INTERNAL MEDICINE

## 2024-01-14 PROCEDURE — 25810000003 SODIUM CHLORIDE 0.9 % SOLUTION: Performed by: EMERGENCY MEDICINE

## 2024-01-14 PROCEDURE — 25010000002 DOPAMINE PER 40 MG: Performed by: EMERGENCY MEDICINE

## 2024-01-14 PROCEDURE — 36415 COLL VENOUS BLD VENIPUNCTURE: CPT

## 2024-01-14 PROCEDURE — 84300 ASSAY OF URINE SODIUM: CPT | Performed by: INTERNAL MEDICINE

## 2024-01-14 PROCEDURE — 83735 ASSAY OF MAGNESIUM: CPT | Performed by: EMERGENCY MEDICINE

## 2024-01-14 PROCEDURE — 93005 ELECTROCARDIOGRAM TRACING: CPT | Performed by: EMERGENCY MEDICINE

## 2024-01-14 PROCEDURE — 99291 CRITICAL CARE FIRST HOUR: CPT

## 2024-01-14 PROCEDURE — 87040 BLOOD CULTURE FOR BACTERIA: CPT | Performed by: INTERNAL MEDICINE

## 2024-01-14 PROCEDURE — 81001 URINALYSIS AUTO W/SCOPE: CPT | Performed by: EMERGENCY MEDICINE

## 2024-01-14 PROCEDURE — 80053 COMPREHEN METABOLIC PANEL: CPT | Performed by: INTERNAL MEDICINE

## 2024-01-14 RX ORDER — SODIUM CHLORIDE 0.9 % (FLUSH) 0.9 %
10 SYRINGE (ML) INJECTION AS NEEDED
Status: DISCONTINUED | OUTPATIENT
Start: 2024-01-14 | End: 2024-01-15

## 2024-01-14 RX ORDER — DEXTROSE MONOHYDRATE 25 G/50ML
10-50 INJECTION, SOLUTION INTRAVENOUS
Status: DISCONTINUED | OUTPATIENT
Start: 2024-01-14 | End: 2024-01-15

## 2024-01-14 RX ORDER — HEPARIN SODIUM 5000 [USP'U]/ML
5000 INJECTION, SOLUTION INTRAVENOUS; SUBCUTANEOUS EVERY 8 HOURS SCHEDULED
Status: DISCONTINUED | OUTPATIENT
Start: 2024-01-14 | End: 2024-01-19 | Stop reason: HOSPADM

## 2024-01-14 RX ORDER — IBUPROFEN 600 MG/1
1 TABLET ORAL
Status: DISCONTINUED | OUTPATIENT
Start: 2024-01-14 | End: 2024-01-15

## 2024-01-14 RX ORDER — ASPIRIN 81 MG/1
324 TABLET, CHEWABLE ORAL ONCE
Status: COMPLETED | OUTPATIENT
Start: 2024-01-14 | End: 2024-01-14

## 2024-01-14 RX ORDER — ASPIRIN 325 MG
325 TABLET ORAL DAILY
Status: DISCONTINUED | OUTPATIENT
Start: 2024-01-15 | End: 2024-01-19 | Stop reason: HOSPADM

## 2024-01-14 RX ORDER — PANTOPRAZOLE SODIUM 40 MG/10ML
40 INJECTION, POWDER, LYOPHILIZED, FOR SOLUTION INTRAVENOUS
Status: DISCONTINUED | OUTPATIENT
Start: 2024-01-15 | End: 2024-01-16

## 2024-01-14 RX ORDER — NICOTINE POLACRILEX 4 MG
15 LOZENGE BUCCAL
Status: DISCONTINUED | OUTPATIENT
Start: 2024-01-14 | End: 2024-01-15

## 2024-01-14 RX ORDER — SODIUM CHLORIDE 0.9 % (FLUSH) 0.9 %
10 SYRINGE (ML) INJECTION EVERY 12 HOURS SCHEDULED
Status: DISCONTINUED | OUTPATIENT
Start: 2024-01-14 | End: 2024-01-15

## 2024-01-14 RX ORDER — DOPAMINE HYDROCHLORIDE 160 MG/100ML
2-20 INJECTION, SOLUTION INTRAVENOUS
Status: DISCONTINUED | OUTPATIENT
Start: 2024-01-14 | End: 2024-01-18

## 2024-01-14 RX ORDER — ATORVASTATIN CALCIUM 20 MG/1
20 TABLET, FILM COATED ORAL DAILY
Status: DISCONTINUED | OUTPATIENT
Start: 2024-01-15 | End: 2024-01-15

## 2024-01-14 RX ORDER — SODIUM CHLORIDE 0.9 % (FLUSH) 0.9 %
10 SYRINGE (ML) INJECTION AS NEEDED
Status: DISCONTINUED | OUTPATIENT
Start: 2024-01-14 | End: 2024-01-16 | Stop reason: SDUPTHER

## 2024-01-14 RX ORDER — SODIUM CHLORIDE 9 MG/ML
40 INJECTION, SOLUTION INTRAVENOUS AS NEEDED
Status: DISCONTINUED | OUTPATIENT
Start: 2024-01-14 | End: 2024-01-15

## 2024-01-14 RX ADMIN — ASPIRIN 324 MG: 81 TABLET, CHEWABLE ORAL at 23:07

## 2024-01-14 RX ADMIN — SODIUM CHLORIDE 1000 ML: 9 INJECTION, SOLUTION INTRAVENOUS at 22:09

## 2024-01-14 RX ADMIN — DOPAMINE HYDROCHLORIDE 2 MCG/KG/MIN: 160 INJECTION, SOLUTION INTRAVENOUS at 23:08

## 2024-01-14 RX ADMIN — SODIUM CHLORIDE 1000 ML: 9 INJECTION, SOLUTION INTRAVENOUS at 23:12

## 2024-01-14 NOTE — Clinical Note
Level of Care: Critical Care [6]   Admitting Physician: ENRIQUE HOLM [[id number]]   Attending Physician: ENRIQUE HOLM [[id number]]

## 2024-01-15 ENCOUNTER — APPOINTMENT (OUTPATIENT)
Dept: MRI IMAGING | Facility: HOSPITAL | Age: 64
End: 2024-01-15
Payer: MEDICAID

## 2024-01-15 ENCOUNTER — APPOINTMENT (OUTPATIENT)
Dept: CT IMAGING | Facility: HOSPITAL | Age: 64
End: 2024-01-15
Payer: MEDICAID

## 2024-01-15 ENCOUNTER — APPOINTMENT (OUTPATIENT)
Dept: CARDIOLOGY | Facility: HOSPITAL | Age: 64
End: 2024-01-15
Payer: MEDICAID

## 2024-01-15 PROBLEM — R00.1 JUNCTIONAL BRADYCARDIA: Status: ACTIVE | Noted: 2024-01-15

## 2024-01-15 PROBLEM — I48.0 PAROXYSMAL ATRIAL FIBRILLATION: Status: ACTIVE | Noted: 2024-01-15

## 2024-01-15 LAB
ALBUMIN SERPL-MCNC: 3.2 G/DL (ref 3.5–5.2)
ALBUMIN SERPL-MCNC: 3.9 G/DL (ref 3.5–5.2)
ALBUMIN/GLOB SERPL: 1.2 G/DL
ALBUMIN/GLOB SERPL: 1.6 G/DL
ALP SERPL-CCNC: 162 U/L (ref 39–117)
ALP SERPL-CCNC: 215 U/L (ref 39–117)
ALT SERPL W P-5'-P-CCNC: 58 U/L (ref 1–33)
ALT SERPL W P-5'-P-CCNC: 89 U/L (ref 1–33)
ANION GAP SERPL CALCULATED.3IONS-SCNC: 10 MMOL/L (ref 5–15)
ANION GAP SERPL CALCULATED.3IONS-SCNC: 13 MMOL/L (ref 5–15)
AST SERPL-CCNC: 85 U/L (ref 1–32)
AST SERPL-CCNC: 85 U/L (ref 1–32)
BH CV ECHO MEAS - AO MAX PG: 6.5 MMHG
BH CV ECHO MEAS - AO MEAN PG: 3 MMHG
BH CV ECHO MEAS - AO ROOT DIAM: 2.8 CM
BH CV ECHO MEAS - AO V2 MAX: 127 CM/SEC
BH CV ECHO MEAS - AO V2 VTI: 23.1 CM
BH CV ECHO MEAS - AVA(I,D): 2.6 CM2
BH CV ECHO MEAS - EDV(CUBED): 39.3 ML
BH CV ECHO MEAS - EDV(MOD-SP2): 50 ML
BH CV ECHO MEAS - EDV(MOD-SP4): 52 ML
BH CV ECHO MEAS - EF(MOD-BP): 64.6 %
BH CV ECHO MEAS - EF(MOD-SP2): 62.8 %
BH CV ECHO MEAS - EF(MOD-SP4): 66.3 %
BH CV ECHO MEAS - ESV(CUBED): 13.8 ML
BH CV ECHO MEAS - ESV(MOD-SP2): 18.6 ML
BH CV ECHO MEAS - ESV(MOD-SP4): 17.5 ML
BH CV ECHO MEAS - FS: 29.4 %
BH CV ECHO MEAS - IVS/LVPW: 1 CM
BH CV ECHO MEAS - IVSD: 0.9 CM
BH CV ECHO MEAS - LA DIMENSION: 2.9 CM
BH CV ECHO MEAS - LAT PEAK E' VEL: 10.2 CM/SEC
BH CV ECHO MEAS - LV MASS(C)D: 84.9 GRAMS
BH CV ECHO MEAS - LV MAX PG: 4.4 MMHG
BH CV ECHO MEAS - LV MEAN PG: 2 MMHG
BH CV ECHO MEAS - LV V1 MAX: 105 CM/SEC
BH CV ECHO MEAS - LV V1 VTI: 19.1 CM
BH CV ECHO MEAS - LVIDD: 3.4 CM
BH CV ECHO MEAS - LVIDS: 2.4 CM
BH CV ECHO MEAS - LVOT AREA: 3.1 CM2
BH CV ECHO MEAS - LVOT DIAM: 2 CM
BH CV ECHO MEAS - LVPWD: 0.9 CM
BH CV ECHO MEAS - MED PEAK E' VEL: 7.8 CM/SEC
BH CV ECHO MEAS - MV A MAX VEL: 133 CM/SEC
BH CV ECHO MEAS - MV DEC TIME: 0.24 SEC
BH CV ECHO MEAS - MV E MAX VEL: 110 CM/SEC
BH CV ECHO MEAS - MV E/A: 0.83
BH CV ECHO MEAS - PA ACC TIME: 0.19 SEC
BH CV ECHO MEAS - PA V2 MAX: 86.6 CM/SEC
BH CV ECHO MEAS - RAP SYSTOLE: 5 MMHG
BH CV ECHO MEAS - RVSP: 20 MMHG
BH CV ECHO MEAS - SV(LVOT): 60 ML
BH CV ECHO MEAS - SV(MOD-SP2): 31.4 ML
BH CV ECHO MEAS - SV(MOD-SP4): 34.5 ML
BH CV ECHO MEAS - TAPSE (>1.6): 1.87 CM
BH CV ECHO MEAS - TR MAX PG: 14.5 MMHG
BH CV ECHO MEAS - TR MAX VEL: 190.5 CM/SEC
BH CV ECHO MEASUREMENTS AVERAGE E/E' RATIO: 12.22
BH CV VAS BP LEFT ARM: NORMAL MMHG
BH CV XLRA - TDI S': 12.2 CM/SEC
BILIRUB SERPL-MCNC: 0.2 MG/DL (ref 0–1.2)
BILIRUB SERPL-MCNC: 0.4 MG/DL (ref 0–1.2)
BUN SERPL-MCNC: 21 MG/DL (ref 8–23)
BUN SERPL-MCNC: 33 MG/DL (ref 8–23)
BUN/CREAT SERPL: 20.6 (ref 7–25)
BUN/CREAT SERPL: 25 (ref 7–25)
CA-I SERPL ISE-MCNC: 1.23 MMOL/L (ref 1.12–1.32)
CALCIUM SPEC-SCNC: 8.1 MG/DL (ref 8.6–10.5)
CALCIUM SPEC-SCNC: 8.6 MG/DL (ref 8.6–10.5)
CHLORIDE SERPL-SCNC: 107 MMOL/L (ref 98–107)
CHLORIDE SERPL-SCNC: 108 MMOL/L (ref 98–107)
CO2 SERPL-SCNC: 22 MMOL/L (ref 22–29)
CO2 SERPL-SCNC: 23 MMOL/L (ref 22–29)
CREAT SERPL-MCNC: 0.84 MG/DL (ref 0.57–1)
CREAT SERPL-MCNC: 1.6 MG/DL (ref 0.57–1)
D-LACTATE SERPL-SCNC: 1.4 MMOL/L (ref 0.5–2)
D-LACTATE SERPL-SCNC: 1.6 MMOL/L (ref 0.5–2)
DEPRECATED RDW RBC AUTO: 39.1 FL (ref 37–54)
EGFRCR SERPLBLD CKD-EPI 2021: 36.5 ML/MIN/1.73
EGFRCR SERPLBLD CKD-EPI 2021: 79.2 ML/MIN/1.73
ERYTHROCYTE [DISTWIDTH] IN BLOOD BY AUTOMATED COUNT: 12.6 % (ref 12.3–15.4)
GEN 5 2HR TROPONIN T REFLEX: 194 NG/L
GLOBULIN UR ELPH-MCNC: 2.4 GM/DL
GLOBULIN UR ELPH-MCNC: 2.6 GM/DL
GLUCOSE BLDC GLUCOMTR-MCNC: 107 MG/DL (ref 70–130)
GLUCOSE BLDC GLUCOMTR-MCNC: 116 MG/DL (ref 70–130)
GLUCOSE BLDC GLUCOMTR-MCNC: 128 MG/DL (ref 70–130)
GLUCOSE BLDC GLUCOMTR-MCNC: 133 MG/DL (ref 70–130)
GLUCOSE BLDC GLUCOMTR-MCNC: 137 MG/DL (ref 70–130)
GLUCOSE BLDC GLUCOMTR-MCNC: 145 MG/DL (ref 70–130)
GLUCOSE BLDC GLUCOMTR-MCNC: 149 MG/DL (ref 70–130)
GLUCOSE BLDC GLUCOMTR-MCNC: 149 MG/DL (ref 70–130)
GLUCOSE BLDC GLUCOMTR-MCNC: 167 MG/DL (ref 70–130)
GLUCOSE BLDC GLUCOMTR-MCNC: 267 MG/DL (ref 70–130)
GLUCOSE BLDC GLUCOMTR-MCNC: 346 MG/DL (ref 70–130)
GLUCOSE BLDC GLUCOMTR-MCNC: 66 MG/DL (ref 70–130)
GLUCOSE BLDC GLUCOMTR-MCNC: 86 MG/DL (ref 70–130)
GLUCOSE BLDC GLUCOMTR-MCNC: 90 MG/DL (ref 70–130)
GLUCOSE SERPL-MCNC: 120 MG/DL (ref 65–99)
GLUCOSE SERPL-MCNC: 83 MG/DL (ref 65–99)
HCT VFR BLD AUTO: 36.4 % (ref 34–46.6)
HGB BLD-MCNC: 12.3 G/DL (ref 12–15.9)
HOLD SPECIMEN: NORMAL
LEFT ATRIUM VOLUME INDEX: 27.9 ML/M2
MAGNESIUM SERPL-MCNC: 1.8 MG/DL (ref 1.6–2.4)
MCH RBC QN AUTO: 29 PG (ref 26.6–33)
MCHC RBC AUTO-ENTMCNC: 33.8 G/DL (ref 31.5–35.7)
MCV RBC AUTO: 85.8 FL (ref 79–97)
PHOSPHATE SERPL-MCNC: 3.2 MG/DL (ref 2.5–4.5)
PLATELET # BLD AUTO: 159 10*3/MM3 (ref 140–450)
PMV BLD AUTO: 11.2 FL (ref 6–12)
POTASSIUM SERPL-SCNC: 4.2 MMOL/L (ref 3.5–5.2)
POTASSIUM SERPL-SCNC: 4.7 MMOL/L (ref 3.5–5.2)
PROT SERPL-MCNC: 5.8 G/DL (ref 6–8.5)
PROT SERPL-MCNC: 6.3 G/DL (ref 6–8.5)
RBC # BLD AUTO: 4.24 10*6/MM3 (ref 3.77–5.28)
SODIUM SERPL-SCNC: 140 MMOL/L (ref 136–145)
SODIUM SERPL-SCNC: 143 MMOL/L (ref 136–145)
SODIUM UR-SCNC: 60 MMOL/L
TROPONIN T DELTA: -71 NG/L
WBC NRBC COR # BLD AUTO: 6.76 10*3/MM3 (ref 3.4–10.8)

## 2024-01-15 PROCEDURE — 80053 COMPREHEN METABOLIC PANEL: CPT | Performed by: NURSE PRACTITIONER

## 2024-01-15 PROCEDURE — 63710000001 INSULIN REGULAR HUMAN PER 5 UNITS: Performed by: INTERNAL MEDICINE

## 2024-01-15 PROCEDURE — 85027 COMPLETE CBC AUTOMATED: CPT | Performed by: INTERNAL MEDICINE

## 2024-01-15 PROCEDURE — 84100 ASSAY OF PHOSPHORUS: CPT | Performed by: INTERNAL MEDICINE

## 2024-01-15 PROCEDURE — 25010000002 ONDANSETRON PER 1 MG: Performed by: EMERGENCY MEDICINE

## 2024-01-15 PROCEDURE — 82330 ASSAY OF CALCIUM: CPT | Performed by: INTERNAL MEDICINE

## 2024-01-15 PROCEDURE — 82948 REAGENT STRIP/BLOOD GLUCOSE: CPT

## 2024-01-15 PROCEDURE — 99222 1ST HOSP IP/OBS MODERATE 55: CPT | Performed by: NURSE PRACTITIONER

## 2024-01-15 PROCEDURE — 99233 SBSQ HOSP IP/OBS HIGH 50: CPT | Performed by: INTERNAL MEDICINE

## 2024-01-15 PROCEDURE — 70551 MRI BRAIN STEM W/O DYE: CPT

## 2024-01-15 PROCEDURE — 63710000001 INSULIN LISPRO (HUMAN) PER 5 UNITS: Performed by: INTERNAL MEDICINE

## 2024-01-15 PROCEDURE — 70548 MR ANGIOGRAPHY NECK W/DYE: CPT

## 2024-01-15 PROCEDURE — 93306 TTE W/DOPPLER COMPLETE: CPT

## 2024-01-15 PROCEDURE — 93306 TTE W/DOPPLER COMPLETE: CPT | Performed by: INTERNAL MEDICINE

## 2024-01-15 PROCEDURE — 25010000002 CEFTRIAXONE PER 250 MG: Performed by: INTERNAL MEDICINE

## 2024-01-15 PROCEDURE — A9577 INJ MULTIHANCE: HCPCS | Performed by: INTERNAL MEDICINE

## 2024-01-15 PROCEDURE — 25010000002 HEPARIN (PORCINE) PER 1000 UNITS: Performed by: INTERNAL MEDICINE

## 2024-01-15 PROCEDURE — 83735 ASSAY OF MAGNESIUM: CPT | Performed by: INTERNAL MEDICINE

## 2024-01-15 PROCEDURE — 99254 IP/OBS CNSLTJ NEW/EST MOD 60: CPT | Performed by: INTERNAL MEDICINE

## 2024-01-15 PROCEDURE — 0 GADOBENATE DIMEGLUMINE 529 MG/ML SOLUTION: Performed by: INTERNAL MEDICINE

## 2024-01-15 PROCEDURE — 70544 MR ANGIOGRAPHY HEAD W/O DYE: CPT

## 2024-01-15 PROCEDURE — 87040 BLOOD CULTURE FOR BACTERIA: CPT | Performed by: INTERNAL MEDICINE

## 2024-01-15 PROCEDURE — 70450 CT HEAD/BRAIN W/O DYE: CPT

## 2024-01-15 PROCEDURE — 83605 ASSAY OF LACTIC ACID: CPT | Performed by: INTERNAL MEDICINE

## 2024-01-15 RX ORDER — CLOPIDOGREL BISULFATE 75 MG/1
75 TABLET ORAL DAILY
Status: DISCONTINUED | OUTPATIENT
Start: 2024-01-16 | End: 2024-01-19 | Stop reason: HOSPADM

## 2024-01-15 RX ORDER — INSULIN LISPRO 100 [IU]/ML
3-14 INJECTION, SOLUTION INTRAVENOUS; SUBCUTANEOUS
Status: DISCONTINUED | OUTPATIENT
Start: 2024-01-15 | End: 2024-01-19 | Stop reason: HOSPADM

## 2024-01-15 RX ORDER — DEXTROSE MONOHYDRATE 25 G/50ML
25 INJECTION, SOLUTION INTRAVENOUS
Status: DISCONTINUED | OUTPATIENT
Start: 2024-01-15 | End: 2024-01-19 | Stop reason: HOSPADM

## 2024-01-15 RX ORDER — CLOPIDOGREL BISULFATE 75 MG/1
300 TABLET ORAL ONCE
Qty: 4 TABLET | Refills: 0 | Status: COMPLETED | OUTPATIENT
Start: 2024-01-15 | End: 2024-01-15

## 2024-01-15 RX ORDER — SODIUM CHLORIDE 0.9 % (FLUSH) 0.9 %
10 SYRINGE (ML) INJECTION AS NEEDED
Status: DISCONTINUED | OUTPATIENT
Start: 2024-01-15 | End: 2024-01-19 | Stop reason: HOSPADM

## 2024-01-15 RX ORDER — SODIUM CHLORIDE 9 MG/ML
40 INJECTION, SOLUTION INTRAVENOUS AS NEEDED
Status: DISCONTINUED | OUTPATIENT
Start: 2024-01-15 | End: 2024-01-19 | Stop reason: HOSPADM

## 2024-01-15 RX ORDER — ATORVASTATIN CALCIUM 20 MG/1
20 TABLET, FILM COATED ORAL NIGHTLY
Status: DISCONTINUED | OUTPATIENT
Start: 2024-01-15 | End: 2024-01-19 | Stop reason: HOSPADM

## 2024-01-15 RX ORDER — ONDANSETRON 2 MG/ML
4 INJECTION INTRAMUSCULAR; INTRAVENOUS ONCE
Status: COMPLETED | OUTPATIENT
Start: 2024-01-15 | End: 2024-01-15

## 2024-01-15 RX ORDER — SODIUM CHLORIDE 0.9 % (FLUSH) 0.9 %
10 SYRINGE (ML) INJECTION EVERY 12 HOURS SCHEDULED
Status: DISCONTINUED | OUTPATIENT
Start: 2024-01-15 | End: 2024-01-19 | Stop reason: HOSPADM

## 2024-01-15 RX ORDER — NICOTINE POLACRILEX 4 MG
15 LOZENGE BUCCAL
Status: DISCONTINUED | OUTPATIENT
Start: 2024-01-15 | End: 2024-01-19 | Stop reason: HOSPADM

## 2024-01-15 RX ORDER — IBUPROFEN 600 MG/1
1 TABLET ORAL
Status: DISCONTINUED | OUTPATIENT
Start: 2024-01-15 | End: 2024-01-19 | Stop reason: HOSPADM

## 2024-01-15 RX ADMIN — HEPARIN SODIUM 5000 UNITS: 5000 INJECTION INTRAVENOUS; SUBCUTANEOUS at 16:43

## 2024-01-15 RX ADMIN — ASPIRIN 325 MG: 325 TABLET ORAL at 08:19

## 2024-01-15 RX ADMIN — Medication 10 ML: at 22:51

## 2024-01-15 RX ADMIN — ONDANSETRON 4 MG: 2 INJECTION INTRAMUSCULAR; INTRAVENOUS at 00:11

## 2024-01-15 RX ADMIN — INSULIN LISPRO 8 UNITS: 100 INJECTION, SOLUTION INTRAVENOUS; SUBCUTANEOUS at 22:50

## 2024-01-15 RX ADMIN — HEPARIN SODIUM 5000 UNITS: 5000 INJECTION INTRAVENOUS; SUBCUTANEOUS at 22:50

## 2024-01-15 RX ADMIN — INSULIN HUMAN 12 UNITS: 100 INJECTION, SOLUTION PARENTERAL at 12:11

## 2024-01-15 RX ADMIN — INSULIN HUMAN 1.5 UNITS/HR: 1 INJECTION, SOLUTION INTRAVENOUS at 00:33

## 2024-01-15 RX ADMIN — PANTOPRAZOLE SODIUM 40 MG: 40 INJECTION, POWDER, FOR SOLUTION INTRAVENOUS at 06:44

## 2024-01-15 RX ADMIN — CLOPIDOGREL BISULFATE 300 MG: 75 TABLET ORAL at 18:51

## 2024-01-15 RX ADMIN — HEPARIN SODIUM 5000 UNITS: 5000 INJECTION INTRAVENOUS; SUBCUTANEOUS at 06:44

## 2024-01-15 RX ADMIN — SODIUM CHLORIDE 1000 MG: 900 INJECTION INTRAVENOUS at 00:38

## 2024-01-15 RX ADMIN — GADOBENATE DIMEGLUMINE 10 ML: 529 INJECTION, SOLUTION INTRAVENOUS at 22:00

## 2024-01-15 RX ADMIN — DEXTROSE MONOHYDRATE 14 ML: 25 INJECTION, SOLUTION INTRAVENOUS at 10:17

## 2024-01-15 RX ADMIN — HEPARIN SODIUM 5000 UNITS: 5000 INJECTION INTRAVENOUS; SUBCUTANEOUS at 00:38

## 2024-01-15 RX ADMIN — SODIUM CHLORIDE 1000 MG: 900 INJECTION INTRAVENOUS at 23:08

## 2024-01-15 NOTE — PLAN OF CARE
"Goal Outcome Evaluation:  Dopamine gtt weaned off, insulin gtt stopped due to pts BG dropping to 66. Pt complained of \"not feeling well\", IV dextrose given. Pt sounded confused, Dr Steinberg at bedside, canceled plan for stress test due to pts insistence on eating. Pt given food, and continued to monitor blood sugar. Pt remained confuse, TYLER called to bedside to eval, discussed hypoglycemia and lack of sleep throughout night due to Glucommander,and no other deficits or stroke symptoms; decision to monitor pt made. Echo completed at bedside, pt napping at 1400 check. At 1600 exam pt still confused, repetitive speech, TYLER and MD notified, CT ordered. Stroke Navigator called to bedside due to scan results of L parietal lobe infarct. NIH 4. MRI screening sheet completed with . Continue pan of care.                                              "

## 2024-01-15 NOTE — H&P
CRITICAL CARE ADMISSION NOTE    Chief Complaint     Bradycardia    History of Present Illness     63-year-old female admitted to the intensive care unit on 1/14/2024 with hypotension and bradycardia.    Patient has a history of insulin-dependent diabetes mellitus for which she has an insulin pump.  History of diastolic dysfunction on prior transthoracic echocardiogram.    Came to the emergency room with several days of generalized fatigue, weakness and dizziness.  She also had nausea, and vomiting as well.  She had difficulty getting out of bed.  No diarrhea.    Had similar symptoms several weeks ago.    In the emergency room she was found to be hypotensive and bradycardic.  She was given 1 L of fluids and started on a dopamine drip per the emergency room physician.  EKG not consistent with ischemia however HST was elevated.    Patient did not bring her medications with her and does not know her current medications but was taking them on a regular basis prior to coming to the emergency room    Problem List, Surgical History, Family, Social History, and ROS     Bradycardia    Hypotension    R/O NSTEMI (non-ST elevated myocardial infarction)    LAUREN (acute kidney injury)    Type 1 diabetes mellitus with hyperglycemia (On insulin pump)    UTI (urinary tract infection)     Past Surgical History:   Procedure Laterality Date    BLADDER SURGERY      GALL BLADDER    CARPAL TUNNEL RELEASE      FINGER FUSION      FOOT SURGERY      TONSILLECTOMY         Allergies   Allergen Reactions    Sulfa Antibiotics Anaphylaxis     No current facility-administered medications on file prior to encounter.     Current Outpatient Medications on File Prior to Encounter   Medication Sig    aspirin (Dorys Aspirin) 325 MG tablet Take 1 tablet by mouth Daily.    atorvastatin (Lipitor) 20 MG tablet Take 1 tablet by mouth Daily.    brimonidine-timolol (COMBIGAN) 0.2-0.5 % ophthalmic solution Administer 1 drop to the right eye 2 (Two) Times a Day.     dilTIAZem (TIAZAC) 300 MG 24 hr capsule Take 1 capsule by mouth Daily.    esomeprazole (nexIUM) 40 MG capsule Take 1 capsule by mouth Daily.    fluticasone (FLONASE) 50 MCG/ACT nasal spray 2 sprays into the nostril(s) as directed by provider Daily As Needed for Rhinitis or Allergies. OTC    insulin lispro (humaLOG) 100 UNIT/ML injection Use with insulin pump. MDD 100u    latanoprost (XALATAN) 0.005 % ophthalmic solution INSTILL 1 DROP INTO RIGHT EYE AT BEDTIME    meclizine (ANTIVERT) 25 MG tablet Take 0.5 tablets by mouth 3 (Three) Times a Day As Needed for Dizziness.    montelukast (SINGULAIR) 10 MG tablet Take 1 tablet by mouth Every Night.    nitrofurantoin, macrocrystal-monohydrate, (MACROBID) 100 MG capsule Take 1 capsule by mouth 2 (Two) Times a Day.    Semaglutide,0.25 or 0.5MG/DOS, (OZEMPIC) 2 MG/3ML solution pen-injector Inject 0.5 mg under the skin into the appropriate area as directed.    Semaglutide,0.25 or 0.5MG/DOS, (Ozempic, 0.25 or 0.5 MG/DOSE,) 2 MG/1.5ML solution pen-injector Inject 1 mg under the skin into the appropriate area as directed 1 (One) Time Per Week. Friday    valsartan (DIOVAN) 80 MG tablet Take 1 tablet by mouth Daily.    Vitamin D, Cholecalciferol, 50 MCG (2000 UT) capsule Take 1 capsule by mouth Daily. OTC     MEDICATION LIST AND ALLERGIES REVIEWED.    Family History   Problem Relation Age of Onset    Diabetes Mother     Stroke Mother     COPD Father     Stroke Father     Hypertension Sister     Diabetes Brother     Heart attack Brother     Parkinsonism Brother     No Known Problems Maternal Grandmother     Diabetes Maternal Grandfather     Stroke Maternal Grandfather     Heart failure Paternal Grandmother     Diabetes Paternal Grandmother     No Known Problems Paternal Grandfather      Social History     Tobacco Use    Smoking status: Never     Passive exposure: Never    Smokeless tobacco: Never   Vaping Use    Vaping Use: Never used   Substance Use Topics    Alcohol use: No     "Drug use: No     Social History     Social History Narrative    CAFFEINE 0     FAMILY AND SOCIAL HISTORY REVIEWED.    Review of Systems  AS ABOVE OR ALL OTHER SYSTEMS REVIEWED AND ARE NEGATIVE.    Physical Exam and Clinical Information   BP (!) 86/49   Pulse (!) 45   Temp 97.9 °F (36.6 °C) (Oral)   Resp 16   Ht 139.7 cm (55\")   Wt 49.9 kg (110 lb)   SpO2 99%   BMI 25.57 kg/m²   Physical Exam:   GENERAL: Awake, no distress   HEENT: No adenopathy or thyromegaly   LUNGS: Clear without wheezes or rhonchi   HEART: Regular rate and rhythm, bradycardic   GI: Soft, nontender   EXTREMITIES: No clubbing, cyanosis, or edema   NEURO/PSYCH: Awake, alert, appropriate    Results from last 7 days   Lab Units 01/14/24  2118   WBC 10*3/mm3 9.62   HEMOGLOBIN g/dL 13.5   PLATELETS 10*3/mm3 265     Results from last 7 days   Lab Units 01/14/24  2118   SODIUM mmol/L 137   POTASSIUM mmol/L 4.9   CO2 mmol/L 27.0   BUN mg/dL 32*   CREATININE mg/dL 1.95*   MAGNESIUM mg/dL 2.0   GLUCOSE mg/dL 150*     Estimated Creatinine Clearance: 23.9 mL/min (A) (by C-G formula based on SCr of 1.95 mg/dL (H)).          No results found for: \"LACTATE\"     IMAGES: Chest x-ray clear without effusions, infiltrates, or consolidation    I reviewed the patient's results and images.     Assesment     Active Hospital Problems    Diagnosis     **Bradycardia     Hypotension     R/O NSTEMI (non-ST elevated myocardial infarction)     LAUREN (acute kidney injury)     UTI (urinary tract infection)     Type 1 diabetes mellitus with hyperglycemia (On insulin pump)      Plan/Recommendations     I suspect bradycardia is on the basis of her regular medications in the setting of volume depletion and acute kidney insufficiency.    Plan  Admit to the intensive care unit  IV fluids  Wean dopamine  Hold most home medications  Stop her insulin pump and transition to insulin drip  Antibiotics for possible UTI  Serial troponin and EKG  Transthoracic echocardiogram  Cardiology " consultation  Follow-up renal function.  Nephrology consultation if it does not improve    Critical Care time spent in direct patient care: 35 minutes (excluding procedure time, if applicable) including high complexity decision making to assess, manipulate, and support vital organ system failure in this individual who has impairment of one or more vital organ systems such that there is a high probability of imminent or life threatening deterioration in the patient’s condition.    RODOLFO Castillo MD  Pulmonary and Critical Care Medicine     CC: Marie Morales MD

## 2024-01-15 NOTE — CONSULTS
Stroke Consult Note    Patient Name: Emily Grove   MRN: [id number]  Age: 63 y.o.  Sex: female  : 1960    Primary Care Physician: Marie Morales MD  Referring Physician: Katarzyna SCOTT intensivist    TIME STROKE TEAM CALLED: 1647 EST     TIME PATIENT SEEN: 1650 EST    Handedness: Right  Race: -American    Chief Complaint/Reason for Consultation: Altered mental status mixed aphasia    HPI:   This is Ms. Emily Grove as 63-year-old -American female right-handed with significant health diagnosis for GERD, prior stroke left basal ganglia, diabetes, hyperlipidemia, hypertension, overactive bladder who presented to [facility] for evaluation of junctional bradycardia and uncontrolled hyperglycemia/hyperglycemia.  Today patient was noted to be altered mental status upon evaluation her sugars she was noted to have hypoglycemia which her symptoms was correlated to the hypoglycemia.  After correction patient continued to be with fluctuated neurological status, aphasia colluding the word salad making nonsense sentences, not answering question properly however no focal neurological weakness.  Patient was taken to CT head without contrast that showed hypodensity area at left parietal frontal lobe cortical, and subcortical that needs to be evaluated with MRI.  Upon exam patient resting in bed not in acute distress normal sinus rhythm on the monitor breathing comfortable on room air satting above 98% blood pressure normotensive.  Patient is notable for mixed aphasia, oriented only to her name unable to tell me her age or time or situation, follow simple one-step command with repetition of the question and illustration.  Patient blink bilaterally with threat, tongue protrudes midline, intact sensation to all extremities with light touch, move all extremity without any drift.  No facial droop is appreciated.    Spoke to her  who is present currently in the room reported that patient had prior stroke not  too long ago.  Patient is non-smoker, lives with her .  Independent with all ADL.  No reported illicit drug or marijuana use.    last Known Normal Date/Time: 11 AM EST     Review of Systems   Unable to perform ROS: Mental status change (Report from  and staff)   Constitutional:  Positive for activity change.   HENT: Negative.     Eyes:  Negative for visual disturbance.   Respiratory: Negative.     Cardiovascular: Negative.    Gastrointestinal: Negative.    Endocrine: Negative.    Genitourinary: Negative.    Musculoskeletal: Negative.    Skin: Negative.    Allergic/Immunologic: Negative.    Neurological:  Positive for speech difficulty.   Hematological: Negative.    Psychiatric/Behavioral:  Positive for confusion.       Past Medical History:   Diagnosis Date    Acid reflux     Diabetes mellitus     Hyperlipidemia     Hypertension     Overactive bladder     Stroke      Past Surgical History:   Procedure Laterality Date    BLADDER SURGERY      GALL BLADDER    CARPAL TUNNEL RELEASE      FINGER FUSION      FOOT SURGERY      TONSILLECTOMY       Family History   Problem Relation Age of Onset    Diabetes Mother     Stroke Mother     COPD Father     Stroke Father     Hypertension Sister     Diabetes Brother     Heart attack Brother     Parkinsonism Brother     No Known Problems Maternal Grandmother     Diabetes Maternal Grandfather     Stroke Maternal Grandfather     Heart failure Paternal Grandmother     Diabetes Paternal Grandmother     No Known Problems Paternal Grandfather      Social History     Socioeconomic History    Marital status:    Tobacco Use    Smoking status: Never     Passive exposure: Never    Smokeless tobacco: Never   Vaping Use    Vaping Use: Never used   Substance and Sexual Activity    Alcohol use: No    Drug use: No    Sexual activity: Defer     Allergies   Allergen Reactions    Sulfa Antibiotics Anaphylaxis     Prior to Admission medications    Medication Sig Start Date End Date  Taking? Authorizing Provider   aspirin (Dorys Aspirin) 325 MG tablet Take 1 tablet by mouth Daily. 11/27/23 11/26/24 Yes Dinorah Bailey APRN   atorvastatin (Lipitor) 20 MG tablet Take 1 tablet by mouth Daily. 11/27/23 11/26/24  Dinorah Bailey APRN   brimonidine-timolol (COMBIGAN) 0.2-0.5 % ophthalmic solution Administer 1 drop to the right eye 2 (Two) Times a Day.    Maynor Brady MD   dilTIAZem (TIAZAC) 300 MG 24 hr capsule Take 1 capsule by mouth Daily.    Maynor Brady MD   esomeprazole (nexIUM) 40 MG capsule Take 1 capsule by mouth Daily. 12/17/20   Maynor Brady MD   fluticasone (FLONASE) 50 MCG/ACT nasal spray 2 sprays into the nostril(s) as directed by provider Daily As Needed for Rhinitis or Allergies. OTC    Maynor Brady MD   insulin lispro (humaLOG) 100 UNIT/ML injection Use with insulin pump. MDD 100u 6/16/21   Emergency, Nurse Brisa, RN   latanoprost (XALATAN) 0.005 % ophthalmic solution INSTILL 1 DROP INTO RIGHT EYE AT BEDTIME 12/19/23   Maynor Brady MD   meclizine (ANTIVERT) 25 MG tablet Take 0.5 tablets by mouth 3 (Three) Times a Day As Needed for Dizziness. 12/30/23   Clarissa Mcfadden APRN   montelukast (SINGULAIR) 10 MG tablet Take 1 tablet by mouth Every Night.    Emergency, Nurse Brisa, RN   nitrofurantoin, macrocrystal-monohydrate, (MACROBID) 100 MG capsule Take 1 capsule by mouth 2 (Two) Times a Day. 12/27/23   Mira Delgadillo MD   Semaglutide,0.25 or 0.5MG/DOS, (OZEMPIC) 2 MG/3ML solution pen-injector Inject 0.5 mg under the skin into the appropriate area as directed. 12/12/23 12/11/24  Maynor Brady MD   Semaglutide,0.25 or 0.5MG/DOS, (Ozempic, 0.25 or 0.5 MG/DOSE,) 2 MG/1.5ML solution pen-injector Inject 1 mg under the skin into the appropriate area as directed 1 (One) Time Per Week. Friday    Provider, MD Maynor   valsartan (DIOVAN) 80 MG tablet Take 1 tablet by mouth Daily. 2/15/22   Provider, MD Maynor   Vitamin D,  Cholecalciferol, 50 MCG (2000 UT) capsule Take 1 capsule by mouth Daily. OTC    Emergency, Nurse Epic, RN         Temp:  [97.7 °F (36.5 °C)-98.3 °F (36.8 °C)] 97.7 °F (36.5 °C)  Heart Rate:  [43-93] 89  Resp:  [14-16] 14  BP: ()/() 107/64  Neurological Exam  Mental Status  Awake and alert. Oriented only to person. Speech is normal. Mixed aphasia present.    Cranial Nerves  CN II: Right visual acuity: Normal. Left visual acuity: Normal. Right normal visual field. Left normal visual field.  CN III, IV, VI: Extraocular movements intact bilaterally. Normal lids and orbits bilaterally. Pupils equal round and reactive to light bilaterally.  CN V: Facial sensation is normal.  CN VII: Full and symmetric facial movement.  CN VIII: Intact hearing bilateral.  CN IX, X: Palate elevates symmetrically  CN XI: Shoulder shrug strength is normal.  CN XII: Tongue midline without atrophy or fasciculations.    Motor  Normal muscle bulk throughout. No fasciculations present. Normal muscle tone. No abnormal involuntary movements. Strength is 5/5 throughout all four extremities.    Sensory  Light touch is normal in upper and lower extremities.     Reflexes  Right Plantar: downgoing  Left Plantar: downgoing    Coordination    Unable to assess unable to follow commands.    Gait    Unable to assess at this time.      Physical Exam  Constitutional:       General: She is awake.   HENT:      Head: Normocephalic and atraumatic.      Mouth/Throat:      Mouth: Mucous membranes are moist.   Eyes:      General: Lids are normal.      Extraocular Movements: Extraocular movements intact.      Pupils: Pupils are equal, round, and reactive to light.   Cardiovascular:      Rate and Rhythm: Normal rate and regular rhythm.      Pulses: Normal pulses.   Musculoskeletal:         General: Normal range of motion.      Cervical back: Normal range of motion and neck supple.   Skin:     General: Skin is warm and dry.      Capillary Refill: Capillary  refill takes less than 2 seconds.   Neurological:      Mental Status: She is alert. She is disoriented.      Cranial Nerves: Cranial nerve deficit present.      Sensory: Sensory deficit present.      Motor: Motor strength is normal.  Psychiatric:         Speech: Speech normal.      Comments: Unable to assess due to mental status         Acute Stroke Data    Thrombolytic Inclusion / Exclusion Criteria    Time: 17:23 EST  Person Administering Scale: TYLER Neri    Inclusion Criteria  [x]   18 years of age or greater   []   Onset of symptoms < 4.5 hours before beginning treatment (stroke onset = time patient was last seen well or without symptoms).   []   Diagnosis of acute ischemic stroke causing measurable disabling deficit (Complete Hemianopia, Any Aphasia, Visual or Sensory Extinction, Any weakness limiting sustained effort against gravity)   []   Any remaining deficit considered potentially disabling in view of patient and practitioner   Exclusion criteria (Do not proceed with Alteplase if any are checked under exclusion criteria)  [x]   Onset unknown or GREATER than 4.5 hours   []   ICH on CT/MRI   []   CT demonstrates hypodensity representing acute or subacute infarct   []   Significant head trauma or prior stroke in the previous 3 months   []   Symptoms suggestive of subarachnoid hemorrhage   []   History of un-ruptured intracranial aneurysm GREATER than 10 mm   []   Recent intracranial or intraspinal surgery within the last 3 months   []   Arterial puncture at a non-compressible site in the previous 7 days   []   Active internal bleeding   []   Acute bleeding tendency   []   Platelet count LESS than 100,000 for known hematological diseases such as leukemia, thrombocytopenia or chronic cirrhosis   []   Current use of anticoagulant with INR GREATER than 1.7 or PT GREATER than 15 seconds, aPTT GREATER than 40 seconds   []   Heparin received within 48 hours, resulting in abnormally elevated aPTT  GREATER than upper limit of normal   []   Current use of direct thrombin inhibitors or direct factor Xa inhibitors in the past 48 hours   []   Elevated blood pressure refractory to treatment (systolic GREATER than 185 mm/Hg or diastolic  GREATER than 110 mm/Hg   []   Suspected infective endocarditis and aortic arch dissection   []   Current use of therapeutic treatment dose of low-molecular-weight heparin (LMWH) within the previous 24 hours   []   Structural GI malignancy or bleed   Relative exclusion for all patients  []   Only minor non-disabling symptoms   []   Pregnancy   []   Seizure at onset with postictal residual neurological impairments   []   Major surgery or previous trauma within past 14 days   []   History of previous spontaneous ICH, intracranial neoplasm, or AV malformation   []   Postpartum (within previous 14 days)   []   Recent GI or urinary tract hemorrhage (within previous 21 days)   []   Recent acute MI (within previous 3 months)   []   History of un-ruptured intracranial aneurysm LESS than 10 mm   []   History of ruptured intracranial aneurysm   []   Blood glucose LESS than 50 mg/dL (2.7 mmol/L)   []   Dural puncture within the last 7 days   []   Known GREATER than 10 cerebral microbleeds   Additional exclusions for patients with symptoms onset between 3 and 4.5 hours.  []   Age > 80.   []   On any anticoagulants regardless of INR  >>> Warfarin (Coumadin), Heparin, Enoxaparin (Lovenox), fondaparinux (Arixtra), bivalirudin (Angiomax), Argatroban, dabigatran (Pradaxa), rivaroxaban (Xarelto), or apixaban (Eliquis)   []   Severe stroke (NIHSS > 25).   []   History of BOTH diabetes and previous ischemic stroke.   []   The risks and benefits have been discussed with the patient or family related to the administration of IV thrombolytic therapy for stroke symptoms.   []   I have discussed and reviewed the patient's case and imaging with the attending prior to IV thrombolytic therapy.   NA Time IV  thrombolytic administered       Hospital Meds:  Scheduled- aspirin, 325 mg, Oral, Daily  atorvastatin, 20 mg, Oral, Nightly  cefTRIAXone, 1,000 mg, Intravenous, Q24H  clopidogrel, 300 mg, Oral, Once   And  [START ON 1/16/2024] clopidogrel, 75 mg, Oral, Daily  heparin (porcine), 5,000 Units, Subcutaneous, Q8H  insulin lispro, 3-14 Units, Subcutaneous, 4x Daily AC & at Bedtime  pantoprazole, 40 mg, Intravenous, Q AM  sodium chloride, 10 mL, Intravenous, Q12H      Infusions- DOPamine, 2-20 mcg/kg/min, Last Rate: Stopped (01/15/24 0901)       PRNs-   dextrose    dextrose    glucagon (human recombinant)    Potassium Replacement - Follow Nurse / BPA Driven Protocol    Potassium Replacement - Follow Nurse / BPA Driven Protocol    sodium chloride    sodium chloride    sodium chloride    sodium chloride    Functional Status Prior to Current Stroke/Jaylan Score: 0    NIH Stroke Scale  Time: 17:23 EST  Person Administering Scale: TYLER Neri     Interval: baseline  1a. Level of Consciousness: 0-->Alert, keenly responsive  1b. LOC Questions: 1-->Answers one question correctly  1c. LOC Commands: 0-->Performs both tasks correctly  2. Best Gaze: 0-->Normal  3. Visual: 0-->No visual loss  4. Facial Palsy: 0-->Normal symmetrical movements  5a. Motor Arm, Left: 0-->No drift, limb holds 90 (or 45) degrees for full 10 secs  5b. Motor Arm, Right: 0-->No drift, limb holds 90 (or 45) degrees for full 10 secs  6a. Motor Leg, Left: 0-->No drift, leg holds 30 degree position for full 5 secs  6b. Motor Leg, Right: 0-->No drift, leg holds 30 degree position for full 5 secs  7. Limb Ataxia: 0-->Absent  8. Sensory: 0-->Normal, no sensory loss  9. Best Language: 1-->Mild-to-moderate aphasia, some obvious loss of fluency or facility of comprehension, without significant limitation on ideas expressed or form of expression. Reduction of speech and/or comprehension, however, makes conversation. . . (see row details)  10. Dysarthria:  0-->Normal  11. Extinction and Inattention (formerly Neglect): 0-->No abnormality    Total (NIH Stroke Scale): 2     Results Reviewed:  I have personally reviewed current lab, radiology, and data and agree with results.  Labs reviewed and noted for   elevated troponin 194 with delta,  Sodium 140  Potassium 4.7  BUN 33  Creatinine 1.6  GFR 36.5    AST 85  ALT 58  Previous HDL 50  LDL 27    Results for orders placed during the hospital encounter of 01/14/24    Adult Transthoracic Echo Complete W/ Cont if Necessary Per Protocol    Interpretation Summary    Left ventricular systolic function is normal. Calculated left ventricular EF = 64.6% Normal left ventricular cavity size and wall thickness noted. All left ventricular wall segments contract normally. Left ventricular diastolic function was normal.    Normal right ventricular cavity size, wall thickness, systolic function and septal motion noted.    The aortic valve is grossly normal in structure. No significant aortic valve regurgitation is present. No hemodynamically significant aortic valve stenosis is present.    The mitral valve is grossly normal in structure. Trace mitral valve regurgitation is present. No significant mitral valve stenosis is present.    The tricuspid valve is grossly normal in structure. Trace tricuspid valve regurgitation is present. Estimated right ventricular systolic pressure from tricuspid regurgitation is normal (<35 mmHg). No evidence of significant tricuspid valve stenosis is present.     CT Head Without Contrast    Result Date: 1/15/2024  Impression: 2 cm focus of hypodensity in the high left parietal lobe suspicious for nonhemorrhagic infarct. Recommend correlation with MRI. No intracranial hemorrhage visualized. Findings compatible with mild changes of chronic microvascular ischemia. Findings relayed to JASPER Terrazas on January 15, 2024 at 4:45 p.m. by telephone. Electronically Signed: Joao Lake MD  1/15/2024 4:45 PM EST   Workstation ID: [id number]    XR Chest 1 View    Result Date: 1/14/2024  Impression: No active disease Electronically Signed: Rodolfo Bauer MD  1/14/2024 10:02 PM EST  Workstation ID: [id number]       Assessment/Plan:    This is a 63-year-old -American female, with multiple vascular risk include arrhythmia, diabetes, presented to [facility] for hypertension, bradycardia, uncontrolled diabetes patient is admitted to the ICU, was found to be hypoglycemic and altered mental status hypoglycemia was corrected however continued to be altered mental status last known well 11 AM.  Patient is not a candidate for IV thrombolytic therapy outside the window.  Neurology stroke we will follow-up.    Antiplatelet PTA: Aspirin 325 mg  Anticoagulant PTA: None    Altered mental status  Mixed aphasia  Concern for acute AIS with CT finding  New hypodensity at left frontal, parietal, corona radiata area comparing to prior CTA back in August 25, 2023.  Etiology possible cardioembolic versus watershed will evaluate MRI.  -TIA\ischemic stroke without IV thrombolytic therapy order set placed  -MRI brain without ordered and pending, MRA head and neck to evaluate vessels.  -Will load with Plavix 300 mg p.o. followed by 75 mg, continue DAPT full dose aspirin 325 mg with Plavix 75 mg for 21 days.  -Echo resulted as above  -Continue statin Lipitor 20 mg for now we will evaluate lipid profile will increase dose if LDL and HDL not within goal.  -PT\OT\SLP evaluation  -Case management for final discharge planning  -NIH and neurocheck per protocol  -A1c, lipid profile ordered and pending  -Neurology stroke we will continue to follow-up        Diabetes mellitus type 2 complicated by uncontrolled hypohyperglycemia    -Patient initially came with hyperglycemia was placed on insulin then become hypoglycemic received multiple doses of dextrose and was able to eat with correction of her hypoglycemia.  -Blood glucose monitoring by intensivist           Transaminitis  Concern for acute liver failure  -Elevated LFT  -Avoid hepatotoxin, trending labs  -Management by intensivist  -We recommend holding off statin till normalization of liver function  -Check an ammonia level    LAUREN on CKD  -Avoid nephrotoxic  -Monitor and trending labs  -Monitor RICHARD and UOP            I discussed plan of care with patient, , RN, may see APRN intensivist.  Neurology stroke we will continue to follow-up.  Thank you for letting us participate in patient care    TYLER Neri  January 15, 2024  17:23 EST

## 2024-01-15 NOTE — PROGRESS NOTES
"Critical Care Note     LOS: 1 day   Patient Care Team:  Marie Morales MD as PCP - General (Internal Medicine)    Chief Complaint/Reason for visit:    Chief Complaint   Patient presents with    Weakness - Generalized   Bradycardia  Non-ST elevation MI  Acute kidney injury  Diabetes mellitus type 1  Urinary tract infection    Subjective     Interval History:     This morning dopamine drip was weaned off and she was maintaining a heart rate in the 80s.  Blood sugar dropped to 50 and insulin drip was stopped.  She received some D50.  She was able to eat lunch and blood sugar improved to 300.  However she became confused and had persistent confusion.  CT of the head was performed and revealed a 2 cm hypodensity in the left parietal lobe suspicious for an infarct.  She denies chest pain or shortness of air.  She denies headache.    Review of Systems:    All systems were reviewed and negative except as noted in subjective.    Medical history, surgical history, social history, family history reviewed    Objective     Intake/Output:    Intake/Output Summary (Last 24 hours) at 1/15/2024 1709  Last data filed at 1/15/2024 0800  Gross per 24 hour   Intake 2156.96 ml   Output 1050 ml   Net 1106.96 ml       Nutrition:  Diet: Cardiac Diets, Diabetic Diets; Healthy Heart (2-3 Na+); Consistent Carbohydrate; Texture: Regular Texture (IDDSI 7); Fluid Consistency: Thin (IDDSI 0)    Infusions:  DOPamine, 2-20 mcg/kg/min, Last Rate: Stopped (01/15/24 0901)        Mechanical Ventilator Settings:                                                Telemetry: Sinus rhythm             Vital Signs  Blood pressure 107/64, pulse 89, temperature 97.7 °F (36.5 °C), temperature source Oral, resp. rate 14, height 139.7 cm (55\"), weight 49.9 kg (110 lb), SpO2 100%.    Physical Exam:  General Appearance:  Middle-aged woman sitting upright in bed in no respiratory distress.   Head:  Atraumatic   Eyes:          Pupils equal and reactive   Ears:   " "  Throat: Oral mucosa moist   Neck: Trachea midline, no crepitus   Back:      Lungs:   Symmetric chest expansion without wheeze or rhonchi    Heart:  Regular rhythm, S1, S2 auscultated, no murmur   Abdomen:   Bowel sounds present, soft, nontender   Rectal:   Deferred   Extremities: No pretibial edema   Pulses: Palpable pedal pulses   Skin: Warm and dry without rash   Lymph nodes: No cervical adenopathy   Neurologic: I did examine her this morning and her speech was fluent, she had no focal deficits.  She was completely oriented.  This afternoon he can tell me her name and that she is in the hospital but she is confused to situation.  Her face is symmetric.  Tongue is midline.  Pupils are equal and reactive.  Extraocular movements intact.  No motor drift upper or lower extremities.  Speech is mildly dysarthric and speech content is an appropriate      Results Review:     I reviewed the patient's new clinical results.   Results from last 7 days   Lab Units 01/15/24  0842 01/14/24 2352 01/14/24  2118   SODIUM mmol/L 143 140 137   POTASSIUM mmol/L 4.2 4.7 4.9   CHLORIDE mmol/L 108* 107 100   CO2 mmol/L 22.0 23.0 27.0   BUN mg/dL 21 33* 32*   CREATININE mg/dL 0.84 1.60* 1.95*   CALCIUM mg/dL 8.6 8.1* 9.8   BILIRUBIN mg/dL 0.2 0.4 0.3   ALK PHOS U/L 215* 162* 198*   ALT (SGPT) U/L 89* 58* 50*   AST (SGOT) U/L 85* 85* 74*   GLUCOSE mg/dL 83 120* 150*     Results from last 7 days   Lab Units 01/15/24  0842 01/14/24  2118   WBC 10*3/mm3 6.76 9.62   HEMOGLOBIN g/dL 12.3 13.5   HEMATOCRIT % 36.4 41.2   PLATELETS 10*3/mm3 159 265         No results found for: \"BLOODCX\"  Lab Results   Component Value Date    URINECX No growth 12/30/2023       I reviewed the patient's new imaging including images and reports.    CT HEAD WO CONTRAST    Date of Exam: 1/15/2024 4:32 PM EST    Indication: Neuro deficit, acute, stroke suspected.    Comparison: Noncontrast CT of the brain performed on August 25, 2023    Technique: Axial CT images were " obtained of the head without contrast administration.  Automated exposure control and iterative construction methods were used.      Findings:  Small area of hypodensity is visualized in the high left parietal lobe at the level of the corona radiata measuring 2 cm. No intracranial hemorrhage visualized. No evidence of mass or mass effect. Mild bilateral periventricular white matter hypodensities   are visualized compatible with changes of chronic microvessel ischemia.    There is no extra-axial collections.    Ventricles are normal in size and configuration for patient's stated age.    Posterior fossa is within normal limits.    Calvarium and skull base appear intact. Visualized sinuses show no air fluid levels. The mastoid air cells are clear. Visualized orbits are unremarkable.     Impression:     Impression:    2 cm focus of hypodensity in the high left parietal lobe suspicious for nonhemorrhagic infarct. Recommend correlation with MRI.    No intracranial hemorrhage visualized.    Findings compatible with mild changes of chronic microvascular ischemia.    Findings relayed to JASPER Terrazas on January 15, 2024 at 4:45 p.m. by telephone.        Electronically Signed: Joao Lake MD   1/15/2024 4:45 PM EST       Interpretation Summary echocardiogram January 15, 2024         Left ventricular systolic function is normal. Calculated left ventricular EF = 64.6% Normal left ventricular cavity size and wall thickness noted. All left ventricular wall segments contract normally. Left ventricular diastolic function was normal.    Normal right ventricular cavity size, wall thickness, systolic function and septal motion noted.    The aortic valve is grossly normal in structure. No significant aortic valve regurgitation is present. No hemodynamically significant aortic valve stenosis is present.    The mitral valve is grossly normal in structure. Trace mitral valve regurgitation is present. No significant mitral valve stenosis  is present.    The tricuspid valve is grossly normal in structure. Trace tricuspid valve regurgitation is present. Estimated right ventricular systolic pressure from tricuspid regurgitation is normal (<35 mmHg). No evidence of significant tricuspid valve stenosis is present.      All medications reviewed.   aspirin, 325 mg, Oral, Daily  atorvastatin, 20 mg, Oral, Nightly  cefTRIAXone, 1,000 mg, Intravenous, Q24H  heparin (porcine), 5,000 Units, Subcutaneous, Q8H  insulin lispro, 3-14 Units, Subcutaneous, 4x Daily AC & at Bedtime  pantoprazole, 40 mg, Intravenous, Q AM          Assessment & Plan       Junctional bradycardia    Type 1 diabetes mellitus with hyperglycemia (On insulin pump)    Tachycardia    History of CVA (cerebrovascular accident)    Hypotension    Bradycardia    R/O NSTEMI (non-ST elevated myocardial infarction)    LAUREN (acute kidney injury)    UTI (urinary tract infection)    63-year-old woman, non-smoker, with type 1 diabetes, hypertension, GERD, presenting to the emergency room last night with hypotension, bradycardia, weakness and fatigue, dizziness.  She takes diltiazem for history of tachycardia.  She received some hydration and was placed on dopamine with improvement in her heart rate.  Troponins were elevated without EKG changes.  Bradycardia improved and she was able to wean off dopamine.    Today blood sugar dropped to 50 and insulin drip was stopped.  She was received D50 with improvement in her glucose.  She then was able to tolerate eating and blood sugar was in the 300s.  She continued to have some confusion and therefore a CT of the head was done revealing a small hypodensity in the left parietal lobe suspicious for stroke.    #1 junctional bradycardia, resolved, now off dopamine.  Possibly related to her calcium blocker.  Echo revealed EF 64% with no significant valvular disease.    -Continue to hold calcium blocker  -Cardiology consult for elevated troponins, arrhythmia      #2  confusion, initially felt to be from hypoglycemia but it persisted and she underwent a CT scan that reveals a small hypodensity in the left parietal lobe, possible stroke    -Initiate code stroke  -She is already on aspirin, Lipitor  -Plavix ordered    #3 diabetes mellitus type 1, her A1c is 7.8.  She uses an insulin pump at home.  Currently blood sugars are running 150.  She did have a blood glucose of 66 and received some D50 and ate lunch with a spike to 346.    -Insulin drip stopped  -Sliding scale insulin    #4 urinary tract infection she had 1+ bacteria on her admission urinalysis.    -Rocephin x 3 doses    #5 hypotension, resolved with fluids    #6 acute kidney injury serum creatinine today is 0.84, compared to 1.95 on admission.  She was most likely hypotensive with hypoperfusion and acute kidney injury.  She does have some protein in her urine but also has a urinary tract infection.  Urinalysis December 26 was negative for protein.    -Monitor urine output  -Avoid hypotension  -Avoid nephrotoxic drugs        VTE Prophylaxis: subcutaneous heparin    Stress Ulcer Prophylaxis: Protonix    Dea Montejo MD  01/15/24  17:09 EST      Time: Critical care 35 min  I personally provided care to this critically ill patient as documented above.  Critical care time does not include time spent on separately billed procedures.  None of my critical care time was concurrent with other critical care providers.

## 2024-01-15 NOTE — ED PROVIDER NOTES
Subjective   History of Present Illness  63-year-old female presents to the emergency department with concerns about generalized weakness and fatigue with associated dizziness for the past 2 days.  She states that 2 nights ago she had an episode of nausea and vomiting, with associated dizziness, and she could hardly get out of bed at that time.  Tonight her symptoms worsened with persistent nausea and dizziness.  She had a gradual onset headache which has since resolved.  She denies any recent abdominal pain or chest pain.  She denies any recent diarrhea.  She has had similar prior episodes over the past four years or so which did not last as long as this episode is lasting.  She was seen in our emergency department on 12/27/2023 for similar symptoms, and had an unremarkable emergency department evaluation and was discharged home with recommendations for follow-up.  The patient states that she does have several tests that are scheduled and upcoming such as a tilt table test and others in regards to these symptoms.  The patient denies any known history of atrial fibrillation or abnormal heart rhythm.       Review of Systems   Constitutional:  Negative for diaphoresis and fever.   Eyes:  Negative for photophobia and discharge.   Respiratory:  Negative for stridor.    Cardiovascular:  Negative for chest pain.   Neurological:  Positive for dizziness and weakness. Negative for speech difficulty.       Past Medical History:   Diagnosis Date    Acid reflux     Diabetes mellitus     Hyperlipidemia     Hypertension     Overactive bladder     Stroke        Allergies   Allergen Reactions    Sulfa Antibiotics Anaphylaxis       Past Surgical History:   Procedure Laterality Date    BLADDER SURGERY      GALL BLADDER    CARPAL TUNNEL RELEASE      FINGER FUSION      FOOT SURGERY      TONSILLECTOMY         Family History   Problem Relation Age of Onset    Diabetes Mother     Stroke Mother     COPD Father     Stroke Father      Hypertension Sister     Diabetes Brother     Heart attack Brother     Parkinsonism Brother     No Known Problems Maternal Grandmother     Diabetes Maternal Grandfather     Stroke Maternal Grandfather     Heart failure Paternal Grandmother     Diabetes Paternal Grandmother     No Known Problems Paternal Grandfather        Social History     Socioeconomic History    Marital status:    Tobacco Use    Smoking status: Never     Passive exposure: Never    Smokeless tobacco: Never   Vaping Use    Vaping Use: Never used   Substance and Sexual Activity    Alcohol use: No    Drug use: No    Sexual activity: Defer           Objective   Physical Exam  Vitals and nursing note reviewed.   Constitutional:       Appearance: She is ill-appearing. She is not diaphoretic.   Eyes:      General: No scleral icterus.     Comments: No photophobia   Cardiovascular:      Comments: S1, S2, regular bradycardia, no murmur appreciated.  Pulmonary:      Effort: Pulmonary effort is normal. No respiratory distress.      Breath sounds: Normal breath sounds. No stridor. No wheezing, rhonchi or rales.   Abdominal:      General: There is no distension.      Palpations: Abdomen is soft.      Tenderness: There is no abdominal tenderness.   Skin:     General: Skin is warm and dry.      Coloration: Skin is pale.   Neurological:      Mental Status: She is alert.      Comments: Normal speech, no dysarthria. No facial droop.         Procedures           ED Course  ED Course as of 01/15/24 0334   Sun Jan 14, 2024   2235 83/48, MAP 64, HR 44 [LD]   2235 Cardiology Dr. Navarro paged through medical society. [LD]      ED Course User Index  [LD] Mira Delgadillo MD                                             Medical Decision Making  Differential diagnosis includes paroxysmal atrial fibrillation with slow ventricular response, junctional rhythm, cardiogenic shock, dehydration, acute kidney injury, electrolyte abnormality, and others.  Elevated troponin  likely secondary to hypotension, as patient does not have any chest pain.    Problems Addressed:  Acute kidney injury (nontraumatic): complicated acute illness or injury  Hypotension, unspecified hypotension type: complicated acute illness or injury  Junctional bradycardia: complicated acute illness or injury  Non-ST elevation MI (NSTEMI): complicated acute illness or injury    Amount and/or Complexity of Data Reviewed  External Data Reviewed: labs.     Details: Creatinine elevated today compared to prior values.  Creatinine was 0.69 two weeks ago.  Labs: ordered. Decision-making details documented in ED Course.  Radiology: ordered. Decision-making details documented in ED Course.  ECG/medicine tests: ordered and independent interpretation performed. Decision-making details documented in ED Course.     Details: EKG at 2120 shows junctional rhythm, regular bradycardia at a rate of 48 bpm, right bundle branch block, no acute ischemic changes.  Repeat EKG at 2337 shows atrial fibrillation with slow ventricular response with competing junctional pace maker at a rate of 46 bpm, incomplete right bundle branch block, no acute ischemic changes.  Discussion of management or test interpretation with external provider(s): Case discussed with Dr. Gunderson of cardiology who recommends trending the troponin.  No heparin drip if the troponin is stable.  He suggests giving aspirin, patient may require a procedure in the morning, therefore keep the patient NPO.  Hold the patient's diltiazem.  Case discussed with intensivist Dr. Castillo.    Risk  OTC drugs.  Prescription drug management.  Decision regarding hospitalization.    Critical Care  Total time providing critical care: 33 minutes (Cardiovascular decompensation risk, intervention vasopressor administration and resuscitation with IV crystalloid.)      Recent Results (from the past 24 hour(s))   Comprehensive Metabolic Panel    Collection Time: 01/14/24  9:18 PM    Specimen:  Blood   Result Value Ref Range    Glucose 150 (H) 65 - 99 mg/dL    BUN 32 (H) 8 - 23 mg/dL    Creatinine 1.95 (H) 0.57 - 1.00 mg/dL    Sodium 137 136 - 145 mmol/L    Potassium 4.9 3.5 - 5.2 mmol/L    Chloride 100 98 - 107 mmol/L    CO2 27.0 22.0 - 29.0 mmol/L    Calcium 9.8 8.6 - 10.5 mg/dL    Total Protein 7.5 6.0 - 8.5 g/dL    Albumin 4.2 3.5 - 5.2 g/dL    ALT (SGPT) 50 (H) 1 - 33 U/L    AST (SGOT) 74 (H) 1 - 32 U/L    Alkaline Phosphatase 198 (H) 39 - 117 U/L    Total Bilirubin 0.3 0.0 - 1.2 mg/dL    Globulin 3.3 gm/dL    A/G Ratio 1.3 g/dL    BUN/Creatinine Ratio 16.4 7.0 - 25.0    Anion Gap 10.0 5.0 - 15.0 mmol/L    eGFR 28.8 (L) >60.0 mL/min/1.73   Single High Sensitivity Troponin T    Collection Time: 01/14/24  9:18 PM    Specimen: Blood   Result Value Ref Range    HS Troponin T 265 (C) <14 ng/L   Magnesium    Collection Time: 01/14/24  9:18 PM    Specimen: Blood   Result Value Ref Range    Magnesium 2.0 1.6 - 2.4 mg/dL   Green Top (Gel)    Collection Time: 01/14/24  9:18 PM   Result Value Ref Range    Extra Tube Hold for add-ons.    Lavender Top    Collection Time: 01/14/24  9:18 PM   Result Value Ref Range    Extra Tube hold for add-on    Gold Top - SST    Collection Time: 01/14/24  9:18 PM   Result Value Ref Range    Extra Tube Hold for add-ons.    Gray Top    Collection Time: 01/14/24  9:18 PM   Result Value Ref Range    Extra Tube Hold for add-ons.    Light Blue Top    Collection Time: 01/14/24  9:18 PM   Result Value Ref Range    Extra Tube Hold for add-ons.    CBC Auto Differential    Collection Time: 01/14/24  9:18 PM    Specimen: Blood   Result Value Ref Range    WBC 9.62 3.40 - 10.80 10*3/mm3    RBC 4.61 3.77 - 5.28 10*6/mm3    Hemoglobin 13.5 12.0 - 15.9 g/dL    Hematocrit 41.2 34.0 - 46.6 %    MCV 89.4 79.0 - 97.0 fL    MCH 29.3 26.6 - 33.0 pg    MCHC 32.8 31.5 - 35.7 g/dL    RDW 12.6 12.3 - 15.4 %    RDW-SD 41.5 37.0 - 54.0 fl    MPV 11.5 6.0 - 12.0 fL    Platelets 265 140 - 450 10*3/mm3     Neutrophil % 71.4 42.7 - 76.0 %    Lymphocyte % 18.0 (L) 19.6 - 45.3 %    Monocyte % 9.5 5.0 - 12.0 %    Eosinophil % 0.2 (L) 0.3 - 6.2 %    Basophil % 0.3 0.0 - 1.5 %    Immature Grans % 0.6 (H) 0.0 - 0.5 %    Neutrophils, Absolute 6.87 1.70 - 7.00 10*3/mm3    Lymphocytes, Absolute 1.73 0.70 - 3.10 10*3/mm3    Monocytes, Absolute 0.91 (H) 0.10 - 0.90 10*3/mm3    Eosinophils, Absolute 0.02 0.00 - 0.40 10*3/mm3    Basophils, Absolute 0.03 0.00 - 0.20 10*3/mm3    Immature Grans, Absolute 0.06 (H) 0.00 - 0.05 10*3/mm3    nRBC 0.0 0.0 - 0.2 /100 WBC   Lactic Acid, Plasma    Collection Time: 01/14/24  9:18 PM    Specimen: Blood   Result Value Ref Range    Lactate 1.6 0.5 - 2.0 mmol/L   ECG 12 Lead ED Triage Standing Order; Weak / Dizzy / AMS    Collection Time: 01/14/24  9:20 PM   Result Value Ref Range    QT Interval 444 ms    QTC Interval 396 ms   Urinalysis With Microscopic If Indicated (No Culture) - Urine, Clean Catch    Collection Time: 01/14/24  9:52 PM    Specimen: Urine, Clean Catch   Result Value Ref Range    Color, UA Yellow Yellow, Straw    Appearance, UA Clear Clear    pH, UA 6.0 5.0 - 8.0    Specific Gravity, UA >=1.030 1.005 - 1.030    Glucose,  mg/dL (Trace) (A) Negative    Ketones, UA Negative Negative    Bilirubin, UA Negative Negative    Blood, UA Trace (A) Negative    Protein, UA >=300 mg/dL (3+) (A) Negative    Leuk Esterase, UA Moderate (2+) (A) Negative    Nitrite, UA Negative Negative    Urobilinogen, UA 0.2 E.U./dL 0.2 - 1.0 E.U./dL   Urinalysis, Microscopic Only - Urine, Clean Catch    Collection Time: 01/14/24  9:52 PM    Specimen: Urine, Clean Catch   Result Value Ref Range    RBC, UA 0-2 None Seen, 0-2 /HPF    WBC, UA 3-5 (A) None Seen, 0-2 /HPF    Bacteria, UA 1+ (A) None Seen, Trace /HPF    Squamous Epithelial Cells, UA 3-6 (A) None Seen, 0-2 /HPF    Hyaline Casts, UA 0-6 0 - 6 /LPF    Methodology Automated Microscopy    Sodium, Urine, Random - Urine, Clean Catch    Collection Time:  01/14/24  9:52 PM    Specimen: Urine, Clean Catch   Result Value Ref Range    Sodium, Urine 60 mmol/L   ECG 12 Lead Bradycardia    Collection Time: 01/14/24 11:37 PM   Result Value Ref Range    QT Interval 486 ms    QTC Interval 425 ms   High Sensitivity Troponin T 2Hr    Collection Time: 01/14/24 11:52 PM    Specimen: Blood   Result Value Ref Range    HS Troponin T 194 (C) <14 ng/L    Troponin T Delta -71 (L) >=-4 - <+4 ng/L   Comprehensive Metabolic Panel    Collection Time: 01/14/24 11:52 PM    Specimen: Blood   Result Value Ref Range    Glucose 120 (H) 65 - 99 mg/dL    BUN 33 (H) 8 - 23 mg/dL    Creatinine 1.60 (H) 0.57 - 1.00 mg/dL    Sodium 140 136 - 145 mmol/L    Potassium 4.7 3.5 - 5.2 mmol/L    Chloride 107 98 - 107 mmol/L    CO2 23.0 22.0 - 29.0 mmol/L    Calcium 8.1 (L) 8.6 - 10.5 mg/dL    Total Protein 5.8 (L) 6.0 - 8.5 g/dL    Albumin 3.2 (L) 3.5 - 5.2 g/dL    ALT (SGPT) 58 (H) 1 - 33 U/L    AST (SGOT) 85 (H) 1 - 32 U/L    Alkaline Phosphatase 162 (H) 39 - 117 U/L    Total Bilirubin 0.4 0.0 - 1.2 mg/dL    Globulin 2.6 gm/dL    A/G Ratio 1.2 g/dL    BUN/Creatinine Ratio 20.6 7.0 - 25.0    Anion Gap 10.0 5.0 - 15.0 mmol/L    eGFR 36.5 (L) >60.0 mL/min/1.73   Lactic Acid, Plasma    Collection Time: 01/15/24 12:18 AM    Specimen: Blood   Result Value Ref Range    Lactate 1.4 0.5 - 2.0 mmol/L   POC Glucose Once    Collection Time: 01/15/24 12:32 AM    Specimen: Blood   Result Value Ref Range    Glucose 133 (H) 70 - 130 mg/dL   POC Glucose Once    Collection Time: 01/15/24  1:37 AM    Specimen: Blood   Result Value Ref Range    Glucose 167 (H) 70 - 130 mg/dL   POC Glucose Once    Collection Time: 01/15/24  2:39 AM    Specimen: Blood   Result Value Ref Range    Glucose 116 70 - 130 mg/dL     Note: In addition to lab results from this visit, the labs listed above may include labs taken at another facility or during a different encounter within the last 24 hours. Please correlate lab times with ED admission and  discharge times for further clarification of the services performed during this visit.    XR Chest 1 View   Final Result   Impression:   No active disease         Electronically Signed: Rodolfo Bauer MD     1/14/2024 10:02 PM EST     Workstation ID: [id number]        Vitals:    01/15/24 0200 01/15/24 0215 01/15/24 0230 01/15/24 0300   BP: 106/61 118/78 112/77 117/73   BP Location:    Left arm   Patient Position:    Lying   Pulse: 81 74 77 81   Resp:    16   Temp:    98.3 °F (36.8 °C)   TempSrc:    Oral   SpO2: 99% 98% 98% 97%   Weight:       Height:         Medications   sodium chloride 0.9 % flush 10 mL (has no administration in time range)   DOPamine 400 mg in 250 mL D5W infusion (4.003 mcg/kg/min × 49.9 kg Intravenous Currently Infusing 1/15/24 0232)   aspirin tablet 325 mg (has no administration in time range)   atorvastatin (LIPITOR) tablet 20 mg (has no administration in time range)   sodium chloride 0.9 % flush 10 mL (10 mL Intravenous Not Given 1/15/24 0116)   sodium chloride 0.9 % flush 10 mL (has no administration in time range)   sodium chloride 0.9 % infusion 40 mL (has no administration in time range)   dextrose (GLUTOSE) oral gel 15 g (has no administration in time range)   dextrose (D50W) (25 g/50 mL) IV injection 10-50 mL (has no administration in time range)   glucagon (GLUCAGEN) injection 1 mg (has no administration in time range)   insulin regular 1 unit/mL in 0.9% sodium chloride (Glucommander) (1.4 Units/hr Intravenous Rate Change (DUAL SIGN) 1/15/24 0241)   Potassium Replacement - Follow Nurse / BPA Driven Protocol (has no administration in time range)   cefTRIAXone (ROCEPHIN) 1,000 mg in sodium chloride 0.9 % 100 mL IVPB (0 mg Intravenous Stopped 1/15/24 0138)   heparin (porcine) 5000 UNIT/ML injection 5,000 Units (5,000 Units Subcutaneous Given 1/15/24 0038)   pantoprazole (PROTONIX) injection 40 mg (has no administration in time range)   sodium chloride 0.9 % bolus 500 mL (has no administration  in time range)   sodium chloride 0.9 % bolus 1,000 mL (0 mL Intravenous Stopped 1/14/24 2314)   aspirin chewable tablet 324 mg (324 mg Oral Given 1/14/24 2307)   sodium chloride 0.9 % bolus 1,000 mL (0 mL Intravenous Stopped 1/15/24 0012)   ondansetron (ZOFRAN) injection 4 mg (4 mg Intravenous Given 1/15/24 0011)     ECG/EMG Results (last 24 hours)       Procedure Component Value Units Date/Time    ECG 12 Lead ED Triage Standing Order; Weak / Dizzy / AMS [[id number]] Collected: 01/14/24 2120     Updated: 01/14/24 2120     QT Interval 444 ms      QTC Interval 396 ms     Narrative:      Test Reason : ED Triage Standing Order~  Blood Pressure :   */*   mmHG  Vent. Rate :  48 BPM     Atrial Rate :  41 BPM     P-R Int :   * ms          QRS Dur : 116 ms      QT Int : 444 ms       P-R-T Axes :   *  61  37 degrees     QTc Int : 396 ms    Junctional rhythm  Incomplete right bundle branch block  Abnormal ECG  When compared with ECG of 26-DEC-2023 20:48,  Junctional rhythm has replaced Sinus rhythm    Referred By: ED MD           Confirmed By:           ECG 12 Lead Bradycardia   Preliminary Result   Test Reason : Bradycardia   Blood Pressure :   */*   mmHG   Vent. Rate :  46 BPM     Atrial Rate : 267 BPM      P-R Int :   * ms          QRS Dur : 114 ms       QT Int : 486 ms       P-R-T Axes :   *  66  46 degrees      QTc Int : 425 ms      Atrial fibrillation with slow ventricular response with a competing    junctional pacemaker   Incomplete right bundle branch block   Abnormal ECG   When compared with ECG of 14-JAN-2024 21:20, (Unconfirmed)   Atrial fibrillation has replaced Junctional rhythm      Referred By: ED MD           Confirmed By:       ECG 12 Lead ED Triage Standing Order; Weak / Dizzy / AMS   Preliminary Result   Test Reason : ED Triage Standing Order~   Blood Pressure :   */*   mmHG   Vent. Rate :  48 BPM     Atrial Rate :  41 BPM      P-R Int :   * ms          QRS Dur : 116 ms       QT Int : 444 ms       P-R-T Axes  :   *  61  37 degrees      QTc Int : 396 ms      Junctional rhythm   Incomplete right bundle branch block   Abnormal ECG   When compared with ECG of 26-DEC-2023 20:48,   Junctional rhythm has replaced Sinus rhythm      Referred By: ED MD           Confirmed By:               Final diagnoses:   Hypotension, unspecified hypotension type   Junctional bradycardia   Acute kidney injury (nontraumatic)   Non-ST elevation MI (NSTEMI)       ED Disposition  ED Disposition       ED Disposition   Decision to Admit    Condition   --    Comment   Level of Care: Critical Care [6]   Diagnosis: Bradycardia [527534]   Admitting Physician: ENRIQUE HOLM [[id number]]   Attending Physician: ENRIQUE HOLM [[id number]]   Certification: I Certify That Inpatient Hospital Services Are Medically Necessary For Greater Than 2 Midnights                 No follow-up provider specified.       Medication List      No changes were made to your prescriptions during this visit.            Mira Delgadillo MD  01/15/24 0657

## 2024-01-15 NOTE — CASE MANAGEMENT/SOCIAL WORK
Discharge Planning Assessment  [facility]     Patient Name: Emily Grove  MRN: [id number]  Today's Date: 1/15/2024    Admit Date: 1/14/2024    Plan: IDP   Discharge Needs Assessment       Row Name 01/15/24 1243       Living Environment    People in Home spouse    Name(s) of People in Home Gasper, spouse    Current Living Arrangements home    Potentially Unsafe Housing Conditions unable to assess    In the past 12 months has the electric, gas, oil, or water company threatened to shut off services in your home? No    Primary Care Provided by self    Provides Primary Care For no one    Family Caregiver if Needed spouse    Family Caregiver Names Gasper, spouse    Quality of Family Relationships helpful;involved;supportive       Resource/Environmental Concerns    Resource/Environmental Concerns none    Transportation Concerns none       Transportation Needs    In the past 12 months, has lack of transportation kept you from medical appointments or from getting medications? no    In the past 12 months, has lack of transportation kept you from meetings, work, or from getting things needed for daily living? No       Food Insecurity    Within the past 12 months, you worried that your food would run out before you got the money to buy more. Never true    Within the past 12 months, the food you bought just didn't last and you didn't have money to get more. Never true       Transition Planning    Patient/Family Anticipates Transition to home with family    Patient/Family Anticipated Services at Transition     Transportation Anticipated family or friend will provide       Discharge Needs Assessment    Equipment Currently Used at Home none    Concerns to be Addressed discharge planning                   Discharge Plan       Row Name 01/15/24 1245       Plan    Plan IDP    Patient/Family in Agreement with Plan yes    Plan Comments Spoke with patient's spouse at bedside to initiate discharge planning, patient  sleeping.  Confirmed patient's address in [facility]; PCP is Marie Morales; insurance is [facility] Medicaid.  Patient is reported to be independent with ADLs and mobility; no DME or home health.  At this time, patient's discharge plan is home and family will transport.  CM will continue to follow and assist with discharge needs.    Final Discharge Disposition Code 30 - still a patient                  Continued Care and Services - Admitted Since 1/14/2024    Coordination has not been started for this encounter.       Expected Discharge Date and Time       Expected Discharge Date Expected Discharge Time    Jan 19, 2024            Demographic Summary       Row Name 01/15/24 1241       General Information    Arrived From home    Referral Source admission list    Reason for Consult discharge planning    Preferred Language English       Contact Information    Permission Granted to Share Info With                    Functional Status       Row Name 01/15/24 1241       Functional Status    Usual Activity Tolerance good       Physical Activity    On average, how many days per week do you engage in moderate to strenuous exercise (like a brisk walk)? 0 days    On average, how many minutes do you engage in exercise at this level? 0 min    Number of minutes of exercise per week 0       Functional Status, IADL    Medications independent    Meal Preparation independent    Housekeeping independent    Laundry independent    Shopping independent                   Psychosocial    No documentation.                  Abuse/Neglect    No documentation.                  Legal    No documentation.                  Substance Abuse    No documentation.                  Patient Forms    No documentation.                     Marla Aquino RN

## 2024-01-15 NOTE — CONSULTS
Inpatient Cardiology Consult  Consult performed by: Paulette Quijano APRN  Consult ordered by: Rik Castillo MD            [facility] at [facility]  Cardiovascular Consultation Note    Reason for consult: Elevated troponins, junctional bradycardia     Patient is a 63-year-old female with a history of type 1 diabetes mellitus on insulin pump, sinus tachycardia, and hypertension who we are being asked to consult for elevated troponins and bradycardia.  The patient has intermittently followed TYLER Reyes at the heart and valve clinic since 2019 for tachycardia associated with dizziness.  She had a treadmill stress test at that time that was negative.  She wore a 2-week monitor at that time that showed normal sinus rhythm and rare PACs but basically benign and nothing to account for symptoms.  She was started on diltiazem that was gradually increased to 240 mg daily.  This appeared to control her tachycardia for years.  However, she has continued to have intermittent dizziness and weakness.  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  She was placed on aspirin and Plavix at that time.  In November her Plavix was discontinued by neurology and 81 mg aspirin was increased to full-strength daily.  The patient has continued to have episodes of weakness and dizziness and had an upcoming appointment to be evaluated by cardiology in February.  Yesterday the patient developed hypotension with a systolic in the 80s and felt like she was going to pass out which prompted her presentation to [facility] ED.  On arrival she was found to be bradycardic with a junctional rhythm in the 40s and extremely hypotensive at 68/43.  She was treated with 1 L IV fluids and initially on IV dopamine drip but this has since been discontinued.  Her creatinine was elevated at 1.95 and troponins were elevated at 265 and 194.  Diltiazem  and valsartan have been on hold since admission.  She is currently sinus rhythm with heart rates in the 80s and blood pressure improved at 141/86.  Creatinine has now improved to within normal limits.  She denies any chest pain, dyspnea, orthopnea, PND or palpitations.    Cardiac risk factors: Hypertension    Past medical and surgical history, social and family history reviewed in EMR.    REVIEW OF SYSTEMS:   H&P ROS reviewed and pertinent CV ROS as noted in HPI.         Vital Sign Min/Max for last 24 hours  Temp  Min: 97.9 °F (36.6 °C)  Max: 98.3 °F (36.8 °C)   BP  Min: 68/43  Max: 140/93   Pulse  Min: 43  Max: 93   Resp  Min: 16  Max: 16   SpO2  Min: 96 %  Max: 100 %   No data recorded      Intake/Output Summary (Last 24 hours) at 1/15/2024 0857  Last data filed at 1/15/2024 0600  Gross per 24 hour   Intake 2156.96 ml   Output 300 ml   Net 1856.96 ml           Constitutional:       General: Awake.   Pulmonary:      Effort: Pulmonary effort is normal.      Breath sounds: Normal breath sounds.   Cardiovascular:      Normal rate. Regular rhythm.      Murmurs: There is no murmur.   Pulses:     Intact distal pulses.   Edema:     Peripheral edema absent.   Skin:     General: Skin is warm and dry.   Neurological:      Mental Status: Alert.   Psychiatric:         Behavior: Behavior is cooperative.          EK2024: Junctional bradycardia with PACs.  No atrial fibrillation    Lab Review:   Labs reviewed in the electronic medical record.  Pertinent findings include:  Lab Results   Component Value Date    GLUCOSE 120 (H) 2024    BUN 33 (H) 2024    CREATININE 1.60 (H) 2024    EGFR 36.5 (L) 2024    BCR 20.6 2024    K 4.7 2024    CO2 23.0 2024    CALCIUM 8.1 (L) 2024    ALBUMIN 3.2 (L) 2024    BILITOT 0.4 2024    AST 85 (H) 2024    ALT 58 (H) 2024     Lab Results   Component Value Date    WBC 6.76 01/15/2024    HGB 12.3 01/15/2024    HCT 36.4  01/15/2024    MCV 85.8 01/15/2024     01/15/2024     Lab Results   Component Value Date    CHOL 100 08/25/2023    TRIG 130 08/25/2023    HDL 50 08/25/2023    LDL 27 08/25/2023           Active Hospital Problems    Diagnosis     **Junctional bradycardia      Junctional bradycardia noted 1/2024      Bradycardia     R/O NSTEMI (non-ST elevated myocardial infarction)     LAUREN (acute kidney injury)     UTI (urinary tract infection)     Type 1 diabetes mellitus with hyperglycemia (On insulin pump)     Hypotension     History of CVA (cerebrovascular accident)      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  Emmet to be due to small vessel disease  Echo (8/24/2023): LVEF 61-65%.  Positive bubble study.  Carotid duplex (8/2024): No significant carotid artery disease bilaterally         Tachycardia      Evaluated at the heart valve clinic 2019 for tachycardia  Treadmill stress test (1/9/2019): No ischemia  2-week monitor (1/2019): Normal sinus rhythm.  Average heart rate 100 bpm.  Rare PACs.  3 episodes of brief SVT no longer than 4 beats.  Echo (2/8/2021): LVEF = 70%.  No valvular abnormality.  Grade 1 diastolic dysfunction  Echo (8/24/2023): LVEF 61-65%.  Positive bubble study.                Junctional bradycardia  Junctional bradycardia in the setting of daily diltiazem  Currently normal sinus rhythm with heart rate in the 80s.    Has resolved after discontinuation of diltiazem  EKG suggested A-fib but junctional bradycardia with PACs      Hypotension/hypertension  Hypotensive at 68/43 on admission  Resolved after resolution of IV fluids  Current /86      Elevated troponin/likely type II NSTEMI  Troponin elevated but trending down at 265 and 194  EKG does not show any acute ischemic changes.  Scenario does not seem to be consistent with type I NSTEMI  Ischemic eval via stress test is reasonable      Type 1 diabetes mellitus  Has insulin pump  Hemoglobin A1c 12/2023 was 7.8      History of acute CVA  History of  "acute left thymic CVA 8/2023  Recently on aspirin and Plavix but Plavix discontinued by neurology 11/2023  Aspirin 81 mg daily  No signs or symptoms TIA or CVA         May eat, stress tomorrow to eval trop elevation with RFs  Continue to hold diltiazem.  Likely to be permanently discontinued  If BP remains stable would reinitiate valsartan at lower dosing  If stress test within normal limits likely discharge home with 30-day monitor  Okay for transfer to telemetry      TYLER Durham      ___________________________________________________________  The patient was seen and examined by me.  Agree and verified/discussed key components of E/M as outlined by the Nurse practitioner/PA.    /95   Pulse 70   Temp 97.7 °F (36.5 °C) (Oral)   Resp 14   Ht 139.7 cm (55\")   Wt 49.9 kg (110 lb)   SpO2 100%   BMI 25.57 kg/m²     General Appearance:   well developed  well nourished  Neck:  thyroid not enlarged  supple  Respiratory:  no respiratory distress  normal breath sounds  no rales  Cardiovascular:  no jugular venous distention  regular rhythm  apical impulse normal  S1 normal, S2 normal  no S3, no S4   no murmur  no rub, no thrill  carotid pulses normal; no bruit  pedal pulses normal  lower extremity edema: none  .   Skin:   warm, dry        Plan:    Will plan for stress testing tomorrow so that patient may eat breakfast  Continue telemetry while holding diltiazem  Okay to transfer to floor    RODOLFO Steinberg MD  [facility]         "

## 2024-01-15 NOTE — ED NOTES
[facility]    Nursing Report ED to Floor:  Mental status: A&O x4  Ambulatory status: ad joleen  Oxygen Therapy:  room air  Cardiac Rhythm: NSR  Admitted from: ER  Safety Concerns:  dopamine drip, glucommander  Social Issues: unknown  ED Room #:  07    ED Nurse Phone Extension - [phone] or may call [phone].      HPI:   Chief Complaint   Patient presents with    Weakness - Generalized       Past Medical History:  Past Medical History:   Diagnosis Date    Acid reflux     Diabetes mellitus     Hyperlipidemia     Hypertension     Overactive bladder     Stroke         Past Surgical History:  Past Surgical History:   Procedure Laterality Date    BLADDER SURGERY      GALL BLADDER    CARPAL TUNNEL RELEASE      FINGER FUSION      FOOT SURGERY      TONSILLECTOMY          Admitting Doctor:   Rik Castillo MD    Consulting Provider(s):  Consults       Date and Time Order Name Status Description    1/14/2024 11:38 PM Inpatient Cardiology Consult               Admitting Diagnosis:   The primary encounter diagnosis was Hypotension, unspecified hypotension type. Diagnoses of Junctional bradycardia, Acute kidney injury (nontraumatic), and Non-ST elevation MI (NSTEMI) were also pertinent to this visit.    Most Recent Vitals:   Vitals:    01/15/24 0051 01/15/24 0054 01/15/24 0056 01/15/24 0059   BP:       BP Location:       Patient Position:       Pulse: 71 67 70 71   Resp:       Temp:       TempSrc:       SpO2: 99% 100% 100% 99%   Weight:       Height:           Active LDAs/IV Access:   Lines, Drains & Airways       Active LDAs       Name Placement date Placement time Site Days    Peripheral IV 01/14/24 2124 Posterior;Right Hand 01/14/24 2124  Hand  less than 1    Peripheral IV 01/14/24 2204 Left Antecubital 01/14/24 2204  Antecubital  less than 1    Peripheral IV 01/15/24 0015 Right Antecubital 01/15/24  0015  Antecubital  less than 1                    Labs (abnormal labs have a star):   Labs Reviewed   COMPREHENSIVE  METABOLIC PANEL - Abnormal; Notable for the following components:       Result Value    Glucose 150 (*)     BUN 32 (*)     Creatinine 1.95 (*)     ALT (SGPT) 50 (*)     AST (SGOT) 74 (*)     Alkaline Phosphatase 198 (*)     eGFR 28.8 (*)     All other components within normal limits    Narrative:     GFR Normal >60  Chronic Kidney Disease <60  Kidney Failure <15     SINGLE HSTROPONIN T - Abnormal; Notable for the following components:    HS Troponin T 265 (*)     All other components within normal limits    Narrative:     High Sensitive Troponin T Reference Range:  <14.0 ng/L- Negative Female for AMI  <22.0 ng/L- Negative Male for AMI  >=14 - Abnormal Female indicating possible myocardial injury.  >=22 - Abnormal Male indicating possible myocardial injury.   Clinicians would have to utilize clinical acumen, EKG, Troponin, and serial changes to determine if it is an Acute Myocardial Infarction or myocardial injury due to an underlying chronic condition.        URINALYSIS W/ MICROSCOPIC IF INDICATED (NO CULTURE) - Abnormal; Notable for the following components:    Glucose,  mg/dL (Trace) (*)     Blood, UA Trace (*)     Protein, UA >=300 mg/dL (3+) (*)     Leuk Esterase, UA Moderate (2+) (*)     All other components within normal limits   CBC WITH AUTO DIFFERENTIAL - Abnormal; Notable for the following components:    Lymphocyte % 18.0 (*)     Eosinophil % 0.2 (*)     Immature Grans % 0.6 (*)     Monocytes, Absolute 0.91 (*)     Immature Grans, Absolute 0.06 (*)     All other components within normal limits   HIGH SENSITIVITIY TROPONIN T 2HR - Abnormal; Notable for the following components:    HS Troponin T 194 (*)     Troponin T Delta -71 (*)     All other components within normal limits    Narrative:     High Sensitive Troponin T Reference Range:  <14.0 ng/L- Negative Female for AMI  <22.0 ng/L- Negative Male for AMI  >=14 - Abnormal Female indicating possible myocardial injury.  >=22 - Abnormal Male indicating  possible myocardial injury.   Clinicians would have to utilize clinical acumen, EKG, Troponin, and serial changes to determine if it is an Acute Myocardial Infarction or myocardial injury due to an underlying chronic condition.        URINALYSIS, MICROSCOPIC ONLY - Abnormal; Notable for the following components:    WBC, UA 3-5 (*)     Bacteria, UA 1+ (*)     Squamous Epithelial Cells, UA 3-6 (*)     All other components within normal limits    Narrative:     Microscopic performed on unspun specimen. RC'D <1mL     COMPREHENSIVE METABOLIC PANEL - Abnormal; Notable for the following components:    Glucose 120 (*)     BUN 33 (*)     Creatinine 1.60 (*)     Calcium 8.1 (*)     Total Protein 5.8 (*)     Albumin 3.2 (*)     ALT (SGPT) 58 (*)     AST (SGOT) 85 (*)     Alkaline Phosphatase 162 (*)     eGFR 36.5 (*)     All other components within normal limits    Narrative:     GFR Normal >60  Chronic Kidney Disease <60  Kidney Failure <15     POCT GLUCOSE FINGERSTICK - Abnormal; Notable for the following components:    Glucose 133 (*)     All other components within normal limits   POCT GLUCOSE FINGERSTICK - Abnormal; Notable for the following components:    Glucose 167 (*)     All other components within normal limits   MAGNESIUM - Normal   LACTIC ACID, PLASMA - Normal   LACTIC ACID, PLASMA - Normal   BLOOD CULTURE   BLOOD CULTURE   URINE CULTURE   RAINBOW DRAW    Narrative:     The following orders were created for panel order Peak Draw.  Procedure                               Abnormality         Status                     ---------                               -----------         ------                     Green Top (Gel)[[id number]]                                  Final result               Lavender Top[[id number]]                                     Final result               Gold Top - [facility][[id number]]                                   Final result               Dawson Top[[id number]]                                          Final result               Light Blue Top[[id number]]                                   Final result                 Please view results for these tests on the individual orders.   SODIUM, URINE, RANDOM    Narrative:     Reference intervals for random urine have not been established.  Clinical usage is dependent upon physician's interpretation in combination with other laboratory tests.      CBC (NO DIFF)   CALCIUM, IONIZED   MAGNESIUM   PHOSPHORUS   CBC AND DIFFERENTIAL    Narrative:     The following orders were created for panel order CBC & Differential.  Procedure                               Abnormality         Status                     ---------                               -----------         ------                     CBC Auto Differential[[id number]]        Abnormal            Final result                 Please view results for these tests on the individual orders.   GREEN TOP   LAVENDER TOP   GOLD TOP - SST   GRAY TOP   LIGHT BLUE TOP       Meds Given in ED:   Medications   sodium chloride 0.9 % flush 10 mL (has no administration in time range)   DOPamine 400 mg in 250 mL D5W infusion (4 mcg/kg/min × 49.9 kg Intravenous Rate/Dose Change 1/14/24 8308)   aspirin tablet 325 mg (has no administration in time range)   atorvastatin (LIPITOR) tablet 20 mg (has no administration in time range)   sodium chloride 0.9 % flush 10 mL (10 mL Intravenous Not Given 1/15/24 0116)   sodium chloride 0.9 % flush 10 mL (has no administration in time range)   sodium chloride 0.9 % infusion 40 mL (has no administration in time range)   dextrose (GLUTOSE) oral gel 15 g (has no administration in time range)   dextrose (D50W) (25 g/50 mL) IV injection 10-50 mL (has no administration in time range)   glucagon (GLUCAGEN) injection 1 mg (has no administration in time range)   insulin regular 1 unit/mL in 0.9% sodium chloride (Glucommander) (2.7 Units/hr Intravenous Rate Change (DUAL SIGN) 1/15/24 0140)   Potassium Replacement -  Follow Nurse / BPA Driven Protocol (has no administration in time range)   cefTRIAXone (ROCEPHIN) 1,000 mg in sodium chloride 0.9 % 100 mL IVPB (1,000 mg Intravenous New Bag 1/15/24 0038)   heparin (porcine) 5000 UNIT/ML injection 5,000 Units (5,000 Units Subcutaneous Given 1/15/24 0038)   pantoprazole (PROTONIX) injection 40 mg (has no administration in time range)   sodium chloride 0.9 % bolus 500 mL (has no administration in time range)   sodium chloride 0.9 % bolus 1,000 mL (0 mL Intravenous Stopped 1/14/24 2314)   aspirin chewable tablet 324 mg (324 mg Oral Given 1/14/24 2307)   sodium chloride 0.9 % bolus 1,000 mL (1,000 mL Intravenous New Bag 1/14/24 2312)   ondansetron (ZOFRAN) injection 4 mg (4 mg Intravenous Given 1/15/24 0011)     DOPamine, 2-20 mcg/kg/min, Last Rate: 4 mcg/kg/min (01/14/24 6708)  insulin, 0-100 Units/hr, Last Rate: 2.7 Units/hr (01/15/24 0140)

## 2024-01-15 NOTE — PAYOR COMM NOTE
"Ref# [id number]    Utilization Review  Phone [phone]  Fax [phone]    [place] [address]       Mahsa Ramires (63 y.o. Female)       Date of Birth   1960    Social Security Number       Address   [address] [place] [address]    Home Phone   [phone]    MRN   [id number]       [employer]   None    Marital Status                               Admission Date   1/14/24    Admission Type   Emergency    Admitting Provider   Rik Castillo MD    Attending Provider   Rik Castillo MD    Department, Room/Bed   [facility] 2B ICU, N238/1       Discharge Date       Discharge Disposition       Discharge Destination                                 Attending Provider: Rik Castillo MD    Allergies: Sulfa Antibiotics    Isolation: None   Infection: None   Code Status: CPR    Ht: 139.7 cm (55\")   Wt: 49.9 kg (110 lb)    Admission Cmt: None   Principal Problem: Junctional bradycardia [R00.1]                   Active Insurance as of 1/14/2024       Primary Coverage       Payor Plan Insurance Group Employer/Plan Group    [facility] PLAN [facility] PLAN [facility] [place]       Payor Plan Address Payor Plan Phone Number Payor Plan Fax Number Effective Dates    [address]   1/1/2022 - None Entered    [facility] [id number]         Subscriber Name Subscriber Birth Date Member ID       MAHSA RAMIRES 1960 [id number]                     Emergency Contacts        (Rel.) Home Phone Work Phone Mobile Phone    ADRIANE RAMIRES (Spouse) [phone] -- [phone]    HAO OLVERA (Sister) [phone] -- [phone]              [place]: NPI [id number] Tax ID [id number]  Insurance Information                  [facility] PLAN [facility] Phone: --    Subscriber: Mahsa Subscriber#: [id number]    Group#: [id number] Precert#: --             History " & Physical        Castillo, Rik Tena MD at 01/14/24 4307            CRITICAL CARE ADMISSION NOTE    Chief Complaint     Bradycardia    History of Present Illness     63-year-old female admitted to the intensive care unit on 1/14/2024 with hypotension and bradycardia.    Patient has a history of insulin-dependent diabetes mellitus for which she has an insulin pump.  History of diastolic dysfunction on prior transthoracic echocardiogram.    Came to the emergency room with several days of generalized fatigue, weakness and dizziness.  She also had nausea, and vomiting as well.  She had difficulty getting out of bed.  No diarrhea.    Had similar symptoms several weeks ago.    In the emergency room she was found to be hypotensive and bradycardic.  She was given 1 L of fluids and started on a dopamine drip per the emergency room physician.  EKG not consistent with ischemia however HST was elevated.    Patient did not bring her medications with her and does not know her current medications but was taking them on a regular basis prior to coming to the emergency room    Problem List, Surgical History, Family, Social History, and ROS     Bradycardia    Hypotension    R/O NSTEMI (non-ST elevated myocardial infarction)    LAUREN (acute kidney injury)    Type 1 diabetes mellitus with hyperglycemia (On insulin pump)    UTI (urinary tract infection)     Past Surgical History:   Procedure Laterality Date    BLADDER SURGERY      GALL BLADDER    CARPAL TUNNEL RELEASE      FINGER FUSION      FOOT SURGERY      TONSILLECTOMY         Allergies   Allergen Reactions    Sulfa Antibiotics Anaphylaxis     No current facility-administered medications on file prior to encounter.     Current Outpatient Medications on File Prior to Encounter   Medication Sig    aspirin (Dorys Aspirin) 325 MG tablet Take 1 tablet by mouth Daily.    atorvastatin (Lipitor) 20 MG tablet Take 1 tablet by mouth Daily.    brimonidine-timolol (COMBIGAN) 0.2-0.5 %  ophthalmic solution Administer 1 drop to the right eye 2 (Two) Times a Day.    dilTIAZem (TIAZAC) 300 MG 24 hr capsule Take 1 capsule by mouth Daily.    esomeprazole (nexIUM) 40 MG capsule Take 1 capsule by mouth Daily.    fluticasone (FLONASE) 50 MCG/ACT nasal spray 2 sprays into the nostril(s) as directed by provider Daily As Needed for Rhinitis or Allergies. OTC    insulin lispro (humaLOG) 100 UNIT/ML injection Use with insulin pump. MDD 100u    latanoprost (XALATAN) 0.005 % ophthalmic solution INSTILL 1 DROP INTO RIGHT EYE AT BEDTIME    meclizine (ANTIVERT) 25 MG tablet Take 0.5 tablets by mouth 3 (Three) Times a Day As Needed for Dizziness.    montelukast (SINGULAIR) 10 MG tablet Take 1 tablet by mouth Every Night.    nitrofurantoin, macrocrystal-monohydrate, (MACROBID) 100 MG capsule Take 1 capsule by mouth 2 (Two) Times a Day.    Semaglutide,0.25 or 0.5MG/DOS, (OZEMPIC) 2 MG/3ML solution pen-injector Inject 0.5 mg under the skin into the appropriate area as directed.    Semaglutide,0.25 or 0.5MG/DOS, (Ozempic, 0.25 or 0.5 MG/DOSE,) 2 MG/1.5ML solution pen-injector Inject 1 mg under the skin into the appropriate area as directed 1 (One) Time Per Week. Friday    valsartan (DIOVAN) 80 MG tablet Take 1 tablet by mouth Daily.    Vitamin D, Cholecalciferol, 50 MCG (2000 UT) capsule Take 1 capsule by mouth Daily. OTC     MEDICATION LIST AND ALLERGIES REVIEWED.    Family History   Problem Relation Age of Onset    Diabetes Mother     Stroke Mother     COPD Father     Stroke Father     Hypertension Sister     Diabetes Brother     Heart attack Brother     Parkinsonism Brother     No Known Problems Maternal Grandmother     Diabetes Maternal Grandfather     Stroke Maternal Grandfather     Heart failure Paternal Grandmother     Diabetes Paternal Grandmother     No Known Problems Paternal Grandfather      Social History     Tobacco Use    Smoking status: Never     Passive exposure: Never    Smokeless tobacco: Never   Vaping  "Use    Vaping Use: Never used   Substance Use Topics    Alcohol use: No    Drug use: No     Social History     Social History Narrative    CAFFEINE 0     FAMILY AND SOCIAL HISTORY REVIEWED.    Review of Systems  AS ABOVE OR ALL OTHER SYSTEMS REVIEWED AND ARE NEGATIVE.    Physical Exam and Clinical Information   BP (!) 86/49   Pulse (!) 45   Temp 97.9 °F (36.6 °C) (Oral)   Resp 16   Ht 139.7 cm (55\")   Wt 49.9 kg (110 lb)   SpO2 99%   BMI 25.57 kg/m²   Physical Exam:   GENERAL: Awake, no distress   HEENT: No adenopathy or thyromegaly   LUNGS: Clear without wheezes or rhonchi   HEART: Regular rate and rhythm, bradycardic   GI: Soft, nontender   EXTREMITIES: No clubbing, cyanosis, or edema   NEURO/PSYCH: Awake, alert, appropriate    Results from last 7 days   Lab Units 01/14/24  2118   WBC 10*3/mm3 9.62   HEMOGLOBIN g/dL 13.5   PLATELETS 10*3/mm3 265     Results from last 7 days   Lab Units 01/14/24  2118   SODIUM mmol/L 137   POTASSIUM mmol/L 4.9   CO2 mmol/L 27.0   BUN mg/dL 32*   CREATININE mg/dL 1.95*   MAGNESIUM mg/dL 2.0   GLUCOSE mg/dL 150*     Estimated Creatinine Clearance: 23.9 mL/min (A) (by C-G formula based on SCr of 1.95 mg/dL (H)).          No results found for: \"LACTATE\"     IMAGES: Chest x-ray clear without effusions, infiltrates, or consolidation    I reviewed the patient's results and images.     Assesment     Active Hospital Problems    Diagnosis     **Bradycardia     Hypotension     R/O NSTEMI (non-ST elevated myocardial infarction)     LAUREN (acute kidney injury)     UTI (urinary tract infection)     Type 1 diabetes mellitus with hyperglycemia (On insulin pump)      Plan/Recommendations     I suspect bradycardia is on the basis of her regular medications in the setting of volume depletion and acute kidney insufficiency.    Plan  Admit to the intensive care unit  IV fluids  Wean dopamine  Hold most home medications  Stop her insulin pump and transition to insulin drip  Antibiotics for possible " UTI  Serial troponin and EKG  Transthoracic echocardiogram  Cardiology consultation  Follow-up renal function.  Nephrology consultation if it does not improve    Critical Care time spent in direct patient care: 35 minutes (excluding procedure time, if applicable) including high complexity decision making to assess, manipulate, and support vital organ system failure in this individual who has impairment of one or more vital organ systems such that there is a high probability of imminent or life threatening deterioration in the patient’s condition.    RODOLFO Castillo MD  Pulmonary and Critical Care Medicine     CC: Marie Morales MD    Electronically signed by Rik Castillo MD at 01/14/24 1999          Emergency Department Notes        Jus Rinaldi, RN at 01/15/24 0213           Emily Dumont [place]    Nursing Report ED to Floor:  Mental status: A&O x4  Ambulatory status: ad joleen  Oxygen Therapy:  room air  Cardiac Rhythm: NSR  Admitted from: ER  Safety Concerns:  dopamine drip, glucommander  Social Issues: unknown  ED Room #:  07    ED Nurse Phone Extension - [phone] or may call [phone].      HPI:   Chief Complaint   Patient presents with    Weakness - Generalized       Past Medical History:  Past Medical History:   Diagnosis Date    Acid reflux     Diabetes mellitus     Hyperlipidemia     Hypertension     Overactive bladder     Stroke         Past Surgical History:  Past Surgical History:   Procedure Laterality Date    BLADDER SURGERY      GALL BLADDER    CARPAL TUNNEL RELEASE      FINGER FUSION      FOOT SURGERY      TONSILLECTOMY          Admitting Doctor:   Rik Castillo MD    Consulting Provider(s):  Consults       Date and Time Order Name Status Description    1/14/2024 11:38 PM Inpatient Cardiology Consult               Admitting Diagnosis:   The primary encounter diagnosis was Hypotension, unspecified hypotension type. Diagnoses of Junctional bradycardia, Acute kidney injury (nontraumatic),  and Non-ST elevation MI (NSTEMI) were also pertinent to this visit.    Most Recent Vitals:   Vitals:    01/15/24 0051 01/15/24 0054 01/15/24 0056 01/15/24 0059   BP:       BP Location:       Patient Position:       Pulse: 71 67 70 71   Resp:       Temp:       TempSrc:       SpO2: 99% 100% 100% 99%   Weight:       Height:           Active LDAs/IV Access:   Lines, Drains & Airways       Active LDAs       Name Placement date Placement time Site Days    Peripheral IV 01/14/24 2124 Posterior;Right Hand 01/14/24 2124  Hand  less than 1    Peripheral IV 01/14/24 2204 Left Antecubital 01/14/24 2204  Antecubital  less than 1    Peripheral IV 01/15/24 0015 Right Antecubital 01/15/24  0015  Antecubital  less than 1                    Labs (abnormal labs have a star):   Labs Reviewed   COMPREHENSIVE METABOLIC PANEL - Abnormal; Notable for the following components:       Result Value    Glucose 150 (*)     BUN 32 (*)     Creatinine 1.95 (*)     ALT (SGPT) 50 (*)     AST (SGOT) 74 (*)     Alkaline Phosphatase 198 (*)     eGFR 28.8 (*)     All other components within normal limits    Narrative:     GFR Normal >60  Chronic Kidney Disease <60  Kidney Failure <15     SINGLE HSTROPONIN T - Abnormal; Notable for the following components:    HS Troponin T 265 (*)     All other components within normal limits    Narrative:     High Sensitive Troponin T Reference Range:  <14.0 ng/L- Negative Female for AMI  <22.0 ng/L- Negative Male for AMI  >=14 - Abnormal Female indicating possible myocardial injury.  >=22 - Abnormal Male indicating possible myocardial injury.   Clinicians would have to utilize clinical acumen, EKG, Troponin, and serial changes to determine if it is an Acute Myocardial Infarction or myocardial injury due to an underlying chronic condition.        URINALYSIS W/ MICROSCOPIC IF INDICATED (NO CULTURE) - Abnormal; Notable for the following components:    Glucose,  mg/dL (Trace) (*)     Blood, UA Trace (*)      Protein, UA >=300 mg/dL (3+) (*)     Leuk Esterase, UA Moderate (2+) (*)     All other components within normal limits   CBC WITH AUTO DIFFERENTIAL - Abnormal; Notable for the following components:    Lymphocyte % 18.0 (*)     Eosinophil % 0.2 (*)     Immature Grans % 0.6 (*)     Monocytes, Absolute 0.91 (*)     Immature Grans, Absolute 0.06 (*)     All other components within normal limits   HIGH SENSITIVITIY TROPONIN T 2HR - Abnormal; Notable for the following components:    HS Troponin T 194 (*)     Troponin T Delta -71 (*)     All other components within normal limits    Narrative:     High Sensitive Troponin T Reference Range:  <14.0 ng/L- Negative Female for AMI  <22.0 ng/L- Negative Male for AMI  >=14 - Abnormal Female indicating possible myocardial injury.  >=22 - Abnormal Male indicating possible myocardial injury.   Clinicians would have to utilize clinical acumen, EKG, Troponin, and serial changes to determine if it is an Acute Myocardial Infarction or myocardial injury due to an underlying chronic condition.        URINALYSIS, MICROSCOPIC ONLY - Abnormal; Notable for the following components:    WBC, UA 3-5 (*)     Bacteria, UA 1+ (*)     Squamous Epithelial Cells, UA 3-6 (*)     All other components within normal limits    Narrative:     Microscopic performed on unspun specimen. RC'D <1mL     COMPREHENSIVE METABOLIC PANEL - Abnormal; Notable for the following components:    Glucose 120 (*)     BUN 33 (*)     Creatinine 1.60 (*)     Calcium 8.1 (*)     Total Protein 5.8 (*)     Albumin 3.2 (*)     ALT (SGPT) 58 (*)     AST (SGOT) 85 (*)     Alkaline Phosphatase 162 (*)     eGFR 36.5 (*)     All other components within normal limits    Narrative:     GFR Normal >60  Chronic Kidney Disease <60  Kidney Failure <15     POCT GLUCOSE FINGERSTICK - Abnormal; Notable for the following components:    Glucose 133 (*)     All other components within normal limits   POCT GLUCOSE FINGERSTICK - Abnormal; Notable for  the following components:    Glucose 167 (*)     All other components within normal limits   MAGNESIUM - Normal   LACTIC ACID, PLASMA - Normal   LACTIC ACID, PLASMA - Normal   BLOOD CULTURE   BLOOD CULTURE   URINE CULTURE   RAINBOW DRAW    Narrative:     The following orders were created for panel order [place] Draw.  Procedure                               Abnormality         Status                     ---------                               -----------         ------                     Green Top (Gel)[[id number]]                                  Final result               Lavender Top[[id number]]                                     Final result               Gold Top - SST[[id number]]                                   Final result               Dawson Top[[id number]]                                         Final result               Light Blue Top[[id number]]                                   Final result                 Please view results for these tests on the individual orders.   SODIUM, URINE, RANDOM    Narrative:     Reference intervals for random urine have not been established.  Clinical usage is dependent upon physician's interpretation in combination with other laboratory tests.      CBC (NO DIFF)   CALCIUM, IONIZED   MAGNESIUM   PHOSPHORUS   CBC AND DIFFERENTIAL    Narrative:     The following orders were created for panel order CBC & Differential.  Procedure                               Abnormality         Status                     ---------                               -----------         ------                     CBC Auto Differential[[id number]]        Abnormal            Final result                 Please view results for these tests on the individual orders.   GREEN TOP   LAVENDER TOP   GOLD TOP - SST   GRAY TOP   LIGHT BLUE TOP       Meds Given in ED:   Medications   sodium chloride 0.9 % flush 10 mL (has no administration in time range)   DOPamine 400 mg in 250 mL D5W infusion (4 mcg/kg/min × 49.9  kg Intravenous Rate/Dose Change 1/14/24 2338)   aspirin tablet 325 mg (has no administration in time range)   atorvastatin (LIPITOR) tablet 20 mg (has no administration in time range)   sodium chloride 0.9 % flush 10 mL (10 mL Intravenous Not Given 1/15/24 0116)   sodium chloride 0.9 % flush 10 mL (has no administration in time range)   sodium chloride 0.9 % infusion 40 mL (has no administration in time range)   dextrose (GLUTOSE) oral gel 15 g (has no administration in time range)   dextrose (D50W) (25 g/50 mL) IV injection 10-50 mL (has no administration in time range)   glucagon (GLUCAGEN) injection 1 mg (has no administration in time range)   insulin regular 1 unit/mL in 0.9% sodium chloride (Glucommander) (2.7 Units/hr Intravenous Rate Change (DUAL SIGN) 1/15/24 0140)   Potassium Replacement - Follow Nurse / BPA Driven Protocol (has no administration in time range)   cefTRIAXone (ROCEPHIN) 1,000 mg in sodium chloride 0.9 % 100 mL IVPB (1,000 mg Intravenous New Bag 1/15/24 0038)   heparin (porcine) 5000 UNIT/ML injection 5,000 Units (5,000 Units Subcutaneous Given 1/15/24 0038)   pantoprazole (PROTONIX) injection 40 mg (has no administration in time range)   sodium chloride 0.9 % bolus 500 mL (has no administration in time range)   sodium chloride 0.9 % bolus 1,000 mL (0 mL Intravenous Stopped 1/14/24 2314)   aspirin chewable tablet 324 mg (324 mg Oral Given 1/14/24 2307)   sodium chloride 0.9 % bolus 1,000 mL (1,000 mL Intravenous New Bag 1/14/24 2312)   ondansetron (ZOFRAN) injection 4 mg (4 mg Intravenous Given 1/15/24 0011)     DOPamine, 2-20 mcg/kg/min, Last Rate: 4 mcg/kg/min (01/14/24 2338)  insulin, 0-100 Units/hr, Last Rate: 2.7 Units/hr (01/15/24 0140)            Electronically signed by Jus Rinaldi RN at 01/15/24 0214       Mira Delgadillo MD at 01/14/24 2207          Subjective   History of Present Illness  63-year-old female presents to the emergency department with concerns about generalized  weakness and fatigue with associated dizziness for the past 2 days.  She states that 2 nights ago she had an episode of nausea and vomiting, with associated dizziness, and she could hardly get out of bed at that time.  Tonight her symptoms worsened with persistent nausea and dizziness.  She had a gradual onset headache which has since resolved.  She denies any recent abdominal pain or chest pain.  She denies any recent diarrhea.  She has had similar prior episodes over the past four years or so which did not last as long as this episode is lasting.  She was seen in our emergency department on 12/27/2023 for similar symptoms, and had an unremarkable emergency department evaluation and was discharged home with recommendations for follow-up.  The patient states that she does have several tests that are scheduled and upcoming such as a tilt table test and others in regards to these symptoms.  The patient denies any known history of atrial fibrillation or abnormal heart rhythm.       Review of Systems   Constitutional:  Negative for diaphoresis and fever.   Eyes:  Negative for photophobia and discharge.   Respiratory:  Negative for stridor.    Cardiovascular:  Negative for chest pain.   Neurological:  Positive for dizziness and weakness. Negative for speech difficulty.       Past Medical History:   Diagnosis Date    Acid reflux     Diabetes mellitus     Hyperlipidemia     Hypertension     Overactive bladder     Stroke        Allergies   Allergen Reactions    Sulfa Antibiotics Anaphylaxis       Past Surgical History:   Procedure Laterality Date    BLADDER SURGERY      GALL BLADDER    CARPAL TUNNEL RELEASE      FINGER FUSION      FOOT SURGERY      TONSILLECTOMY         Family History   Problem Relation Age of Onset    Diabetes Mother     Stroke Mother     COPD Father     Stroke Father     Hypertension Sister     Diabetes Brother     Heart attack Brother     Parkinsonism Brother     No Known Problems Maternal Grandmother      Diabetes Maternal Grandfather     Stroke Maternal Grandfather     Heart failure Paternal Grandmother     Diabetes Paternal Grandmother     No Known Problems Paternal Grandfather        Social History     Socioeconomic History    Marital status:    Tobacco Use    Smoking status: Never     Passive exposure: Never    Smokeless tobacco: Never   Vaping Use    Vaping Use: Never used   Substance and Sexual Activity    Alcohol use: No    Drug use: No    Sexual activity: Defer           Objective   Physical Exam  Vitals and nursing note reviewed.   Constitutional:       Appearance: She is ill-appearing. She is not diaphoretic.   Eyes:      General: No scleral icterus.     Comments: No photophobia   Cardiovascular:      Comments: S1, S2, regular bradycardia, no murmur appreciated.  Pulmonary:      Effort: Pulmonary effort is normal. No respiratory distress.      Breath sounds: Normal breath sounds. No stridor. No wheezing, rhonchi or rales.   Abdominal:      General: There is no distension.      Palpations: Abdomen is soft.      Tenderness: There is no abdominal tenderness.   Skin:     General: Skin is warm and dry.      Coloration: Skin is pale.   Neurological:      Mental Status: She is alert.      Comments: Normal speech, no dysarthria. No facial droop.         Procedures          ED Course  ED Course as of 01/15/24 0334   Sun Jan 14, 2024   2235 83/48, MAP 64, HR 44 [LD]   2235 Cardiology Dr. Navarro paged through medical society. [LD]      ED Course User Index  [LD] Mira Delgadillo MD                                             Medical Decision Making  Differential diagnosis includes paroxysmal atrial fibrillation with slow ventricular response, junctional rhythm, cardiogenic shock, dehydration, acute kidney injury, electrolyte abnormality, and others.  Elevated troponin likely secondary to hypotension, as patient does not have any chest pain.    Problems Addressed:  Acute kidney injury (nontraumatic):  complicated acute illness or injury  Hypotension, unspecified hypotension type: complicated acute illness or injury  Junctional bradycardia: complicated acute illness or injury  Non-ST elevation MI (NSTEMI): complicated acute illness or injury    Amount and/or Complexity of Data Reviewed  External Data Reviewed: labs.     Details: Creatinine elevated today compared to prior values.  Creatinine was 0.69 two weeks ago.  Labs: ordered. Decision-making details documented in ED Course.  Radiology: ordered. Decision-making details documented in ED Course.  ECG/medicine tests: ordered and independent interpretation performed. Decision-making details documented in ED Course.     Details: EKG at 2120 shows junctional rhythm, regular bradycardia at a rate of 48 bpm, right bundle branch block, no acute ischemic changes.  Repeat EKG at 2337 shows atrial fibrillation with slow ventricular response with competing junctional pace maker at a rate of 46 bpm, incomplete right bundle branch block, no acute ischemic changes.  Discussion of management or test interpretation with external provider(s): Case discussed with Dr. Gunderson of cardiology who recommends trending the troponin.  No heparin drip if the troponin is stable.  He suggests giving aspirin, patient may require a procedure in the morning, therefore keep the patient NPO.  Hold the patient's diltiazem.  Case discussed with intensivist Dr. Castillo.    Risk  OTC drugs.  Prescription drug management.  Decision regarding hospitalization.    Critical Care  Total time providing critical care: 33 minutes (Cardiovascular decompensation risk, intervention vasopressor administration and resuscitation with IV crystalloid.)      Recent Results (from the past 24 hour(s))   Comprehensive Metabolic Panel    Collection Time: 01/14/24  9:18 PM    Specimen: Blood   Result Value Ref Range    Glucose 150 (H) 65 - 99 mg/dL    BUN 32 (H) 8 - 23 mg/dL    Creatinine 1.95 (H) 0.57 - 1.00 mg/dL     Sodium 137 136 - 145 mmol/L    Potassium 4.9 3.5 - 5.2 mmol/L    Chloride 100 98 - 107 mmol/L    CO2 27.0 22.0 - 29.0 mmol/L    Calcium 9.8 8.6 - 10.5 mg/dL    Total Protein 7.5 6.0 - 8.5 g/dL    Albumin 4.2 3.5 - 5.2 g/dL    ALT (SGPT) 50 (H) 1 - 33 U/L    AST (SGOT) 74 (H) 1 - 32 U/L    Alkaline Phosphatase 198 (H) 39 - 117 U/L    Total Bilirubin 0.3 0.0 - 1.2 mg/dL    Globulin 3.3 gm/dL    A/G Ratio 1.3 g/dL    BUN/Creatinine Ratio 16.4 7.0 - 25.0    Anion Gap 10.0 5.0 - 15.0 mmol/L    eGFR 28.8 (L) >60.0 mL/min/1.73   Single High Sensitivity Troponin T    Collection Time: 01/14/24  9:18 PM    Specimen: Blood   Result Value Ref Range    HS Troponin T 265 (C) <14 ng/L   Magnesium    Collection Time: 01/14/24  9:18 PM    Specimen: Blood   Result Value Ref Range    Magnesium 2.0 1.6 - 2.4 mg/dL   Green Top (Gel)    Collection Time: 01/14/24  9:18 PM   Result Value Ref Range    Extra Tube Hold for add-ons.    Lavender Top    Collection Time: 01/14/24  9:18 PM   Result Value Ref Range    Extra Tube hold for add-on    Gold Top - SST    Collection Time: 01/14/24  9:18 PM   Result Value Ref Range    Extra Tube Hold for add-ons.    Gray Top    Collection Time: 01/14/24  9:18 PM   Result Value Ref Range    Extra Tube Hold for add-ons.    Light Blue Top    Collection Time: 01/14/24  9:18 PM   Result Value Ref Range    Extra Tube Hold for add-ons.    CBC Auto Differential    Collection Time: 01/14/24  9:18 PM    Specimen: Blood   Result Value Ref Range    WBC 9.62 3.40 - 10.80 10*3/mm3    RBC 4.61 3.77 - 5.28 10*6/mm3    Hemoglobin 13.5 12.0 - 15.9 g/dL    Hematocrit 41.2 34.0 - 46.6 %    MCV 89.4 79.0 - 97.0 fL    MCH 29.3 26.6 - 33.0 pg    MCHC 32.8 31.5 - 35.7 g/dL    RDW 12.6 12.3 - 15.4 %    RDW-SD 41.5 37.0 - 54.0 fl    MPV 11.5 6.0 - 12.0 fL    Platelets 265 140 - 450 10*3/mm3    Neutrophil % 71.4 42.7 - 76.0 %    Lymphocyte % 18.0 (L) 19.6 - 45.3 %    Monocyte % 9.5 5.0 - 12.0 %    Eosinophil % 0.2 (L) 0.3 - 6.2 %     Basophil % 0.3 0.0 - 1.5 %    Immature Grans % 0.6 (H) 0.0 - 0.5 %    Neutrophils, Absolute 6.87 1.70 - 7.00 10*3/mm3    Lymphocytes, Absolute 1.73 0.70 - 3.10 10*3/mm3    Monocytes, Absolute 0.91 (H) 0.10 - 0.90 10*3/mm3    Eosinophils, Absolute 0.02 0.00 - 0.40 10*3/mm3    Basophils, Absolute 0.03 0.00 - 0.20 10*3/mm3    Immature Grans, Absolute 0.06 (H) 0.00 - 0.05 10*3/mm3    nRBC 0.0 0.0 - 0.2 /100 WBC   Lactic Acid, Plasma    Collection Time: 01/14/24  9:18 PM    Specimen: Blood   Result Value Ref Range    Lactate 1.6 0.5 - 2.0 mmol/L   ECG 12 Lead ED Triage Standing Order; Weak / Dizzy / AMS    Collection Time: 01/14/24  9:20 PM   Result Value Ref Range    QT Interval 444 ms    QTC Interval 396 ms   Urinalysis With Microscopic If Indicated (No Culture) - Urine, Clean Catch    Collection Time: 01/14/24  9:52 PM    Specimen: Urine, Clean Catch   Result Value Ref Range    Color, UA Yellow Yellow, Straw    Appearance, UA Clear Clear    pH, UA 6.0 5.0 - 8.0    Specific Gravity, UA >=1.030 1.005 - 1.030    Glucose,  mg/dL (Trace) (A) Negative    Ketones, UA Negative Negative    Bilirubin, UA Negative Negative    Blood, UA Trace (A) Negative    Protein, UA >=300 mg/dL (3+) (A) Negative    Leuk Esterase, UA Moderate (2+) (A) Negative    Nitrite, UA Negative Negative    Urobilinogen, UA 0.2 E.U./dL 0.2 - 1.0 E.U./dL   Urinalysis, Microscopic Only - Urine, Clean Catch    Collection Time: 01/14/24  9:52 PM    Specimen: Urine, Clean Catch   Result Value Ref Range    RBC, UA 0-2 None Seen, 0-2 /HPF    WBC, UA 3-5 (A) None Seen, 0-2 /HPF    Bacteria, UA 1+ (A) None Seen, Trace /HPF    Squamous Epithelial Cells, UA 3-6 (A) None Seen, 0-2 /HPF    Hyaline Casts, UA 0-6 0 - 6 /LPF    Methodology Automated Microscopy    Sodium, Urine, Random - Urine, Clean Catch    Collection Time: 01/14/24  9:52 PM    Specimen: Urine, Clean Catch   Result Value Ref Range    Sodium, Urine 60 mmol/L   ECG 12 Lead Bradycardia    Collection  Time: 01/14/24 11:37 PM   Result Value Ref Range    QT Interval 486 ms    QTC Interval 425 ms   High Sensitivity Troponin T 2Hr    Collection Time: 01/14/24 11:52 PM    Specimen: Blood   Result Value Ref Range    HS Troponin T 194 (C) <14 ng/L    Troponin T Delta -71 (L) >=-4 - <+4 ng/L   Comprehensive Metabolic Panel    Collection Time: 01/14/24 11:52 PM    Specimen: Blood   Result Value Ref Range    Glucose 120 (H) 65 - 99 mg/dL    BUN 33 (H) 8 - 23 mg/dL    Creatinine 1.60 (H) 0.57 - 1.00 mg/dL    Sodium 140 136 - 145 mmol/L    Potassium 4.7 3.5 - 5.2 mmol/L    Chloride 107 98 - 107 mmol/L    CO2 23.0 22.0 - 29.0 mmol/L    Calcium 8.1 (L) 8.6 - 10.5 mg/dL    Total Protein 5.8 (L) 6.0 - 8.5 g/dL    Albumin 3.2 (L) 3.5 - 5.2 g/dL    ALT (SGPT) 58 (H) 1 - 33 U/L    AST (SGOT) 85 (H) 1 - 32 U/L    Alkaline Phosphatase 162 (H) 39 - 117 U/L    Total Bilirubin 0.4 0.0 - 1.2 mg/dL    Globulin 2.6 gm/dL    A/G Ratio 1.2 g/dL    BUN/Creatinine Ratio 20.6 7.0 - 25.0    Anion Gap 10.0 5.0 - 15.0 mmol/L    eGFR 36.5 (L) >60.0 mL/min/1.73   Lactic Acid, Plasma    Collection Time: 01/15/24 12:18 AM    Specimen: Blood   Result Value Ref Range    Lactate 1.4 0.5 - 2.0 mmol/L   POC Glucose Once    Collection Time: 01/15/24 12:32 AM    Specimen: Blood   Result Value Ref Range    Glucose 133 (H) 70 - 130 mg/dL   POC Glucose Once    Collection Time: 01/15/24  1:37 AM    Specimen: Blood   Result Value Ref Range    Glucose 167 (H) 70 - 130 mg/dL   POC Glucose Once    Collection Time: 01/15/24  2:39 AM    Specimen: Blood   Result Value Ref Range    Glucose 116 70 - 130 mg/dL     Note: In addition to lab results from this visit, the labs listed above may include labs taken at another facility or during a different encounter within the last 24 hours. Please correlate lab times with ED admission and discharge times for further clarification of the services performed during this visit.    XR Chest 1 View   Final Result   Impression:   No  active disease         Electronically Signed: Rodolfo Bauer MD     1/14/2024 10:02 PM EST     Workstation ID: [id number]        Vitals:    01/15/24 0200 01/15/24 0215 01/15/24 0230 01/15/24 0300   BP: 106/61 118/78 112/77 117/73   BP Location:    Left arm   Patient Position:    Lying   Pulse: 81 74 77 81   Resp:    16   Temp:    98.3 °F (36.8 °C)   TempSrc:    Oral   SpO2: 99% 98% 98% 97%   Weight:       Height:         Medications   sodium chloride 0.9 % flush 10 mL (has no administration in time range)   DOPamine 400 mg in 250 mL D5W infusion (4.003 mcg/kg/min × 49.9 kg Intravenous Currently Infusing 1/15/24 0232)   aspirin tablet 325 mg (has no administration in time range)   atorvastatin (LIPITOR) tablet 20 mg (has no administration in time range)   sodium chloride 0.9 % flush 10 mL (10 mL Intravenous Not Given 1/15/24 0116)   sodium chloride 0.9 % flush 10 mL (has no administration in time range)   sodium chloride 0.9 % infusion 40 mL (has no administration in time range)   dextrose (GLUTOSE) oral gel 15 g (has no administration in time range)   dextrose (D50W) (25 g/50 mL) IV injection 10-50 mL (has no administration in time range)   glucagon (GLUCAGEN) injection 1 mg (has no administration in time range)   insulin regular 1 unit/mL in 0.9% sodium chloride (Glucommander) (1.4 Units/hr Intravenous Rate Change (DUAL SIGN) 1/15/24 0241)   Potassium Replacement - Follow Nurse / BPA Driven Protocol (has no administration in time range)   cefTRIAXone (ROCEPHIN) 1,000 mg in sodium chloride 0.9 % 100 mL IVPB (0 mg Intravenous Stopped 1/15/24 0138)   heparin (porcine) 5000 UNIT/ML injection 5,000 Units (5,000 Units Subcutaneous Given 1/15/24 0038)   pantoprazole (PROTONIX) injection 40 mg (has no administration in time range)   sodium chloride 0.9 % bolus 500 mL (has no administration in time range)   sodium chloride 0.9 % bolus 1,000 mL (0 mL Intravenous Stopped 1/14/24 2314)   aspirin chewable tablet 324 mg (324 mg Oral  Given 1/14/24 2307)   sodium chloride 0.9 % bolus 1,000 mL (0 mL Intravenous Stopped 1/15/24 0012)   ondansetron (ZOFRAN) injection 4 mg (4 mg Intravenous Given 1/15/24 0011)     ECG/EMG Results (last 24 hours)       Procedure Component Value Units Date/Time    ECG 12 Lead ED Triage Standing Order; Weak / Dizzy / AMS [[id number]] Collected: 01/14/24 2120     Updated: 01/14/24 2120     QT Interval 444 ms      QTC Interval 396 ms     Narrative:      Test Reason : ED Triage Standing Order~  Blood Pressure :   */*   mmHG  Vent. Rate :  48 BPM     Atrial Rate :  41 BPM     P-R Int :   * ms          QRS Dur : 116 ms      QT Int : 444 ms       P-R-T Axes :   *  61  37 degrees     QTc Int : 396 ms    Junctional rhythm  Incomplete right bundle branch block  Abnormal ECG  When compared with ECG of 26-DEC-2023 20:48,  Junctional rhythm has replaced Sinus rhythm    Referred By: ED MD           Confirmed By:           ECG 12 Lead Bradycardia   Preliminary Result   Test Reason : Bradycardia   Blood Pressure :   */*   mmHG   Vent. Rate :  46 BPM     Atrial Rate : 267 BPM      P-R Int :   * ms          QRS Dur : 114 ms       QT Int : 486 ms       P-R-T Axes :   *  66  46 degrees      QTc Int : 425 ms      Atrial fibrillation with slow ventricular response with a competing    junctional pacemaker   Incomplete right bundle branch block   Abnormal ECG   When compared with ECG of 14-JAN-2024 21:20, (Unconfirmed)   Atrial fibrillation has replaced Junctional rhythm      Referred By: ED MD           Confirmed By:       ECG 12 Lead ED Triage Standing Order; Weak / Dizzy / AMS   Preliminary Result   Test Reason : ED Triage Standing Order~   Blood Pressure :   */*   mmHG   Vent. Rate :  48 BPM     Atrial Rate :  41 BPM      P-R Int :   * ms          QRS Dur : 116 ms       QT Int : 444 ms       P-R-T Axes :   *  61  37 degrees      QTc Int : 396 ms      Junctional rhythm   Incomplete right bundle branch block   Abnormal ECG   When compared  with ECG of 26-DEC-2023 20:48,   Junctional rhythm has replaced Sinus rhythm      Referred By: ED MD           Confirmed By:               Final diagnoses:   Hypotension, unspecified hypotension type   Junctional bradycardia   Acute kidney injury (nontraumatic)   Non-ST elevation MI (NSTEMI)       ED Disposition  ED Disposition       ED Disposition   Decision to Admit    Condition   --    Comment   Level of Care: Critical Care [6]   Diagnosis: Bradycardia [124960]   Admitting Physician: ENRIQUE HOLM [[id number]]   Attending Physician: ENRIQUE HOLM [[id number]]   Certification: I Certify That Inpatient Hospital Services Are Medically Necessary For Greater Than 2 Midnights                 No follow-up provider specified.       Medication List      No changes were made to your prescriptions during this visit.            Mira Delgadillo MD  01/15/24 0657      Electronically signed by Mira Delgadillo MD at 01/15/24 0657       Facility-Administered Medications as of 1/15/2024   Medication Dose Route Frequency Provider Last Rate Last Admin    [COMPLETED] aspirin chewable tablet 324 mg  324 mg Oral Once Mira Delgadillo MD   324 mg at 01/14/24 2307    aspirin tablet 325 mg  325 mg Oral Daily Enrique Holm MD   325 mg at 01/15/24 0819    atorvastatin (LIPITOR) tablet 20 mg  20 mg Oral Nightly Katarzyna Dunne, PharmD        cefTRIAXone (ROCEPHIN) 1,000 mg in sodium chloride 0.9 % 100 mL IVPB  1,000 mg Intravenous Q24H Enrique Holm MD   Stopped at 01/15/24 0138    dextrose (D50W) (25 g/50 mL) IV injection 25 g  25 g Intravenous Q15 Min PRN Dea Montejo MD        dextrose (GLUTOSE) oral gel 15 g  15 g Oral Q15 Min PRN Dea Montejo MD        DOPamine 400 mg in 250 mL D5W infusion  2-20 mcg/kg/min Intravenous Titrated Mira Delgadillo MD   Stopped at 01/15/24 0901    glucagon (GLUCAGEN) injection 1 mg  1 mg Intramuscular Q15 Min PRN Dea Montejo MD        heparin  (porcine) 5000 UNIT/ML injection 5,000 Units  5,000 Units Subcutaneous Q8H Rik Castillo MD   5,000 Units at 01/15/24 0644    insulin regular (humuLIN R,novoLIN R) injection 3-14 Units  3-14 Units Subcutaneous Q6H Dea Montejo MD   12 Units at 01/15/24 1211    [COMPLETED] ondansetron (ZOFRAN) injection 4 mg  4 mg Intravenous Once Mira Delgadillo MD   4 mg at 01/15/24 0011    pantoprazole (PROTONIX) injection 40 mg  40 mg Intravenous Q AM Rik Castillo MD   40 mg at 01/15/24 0644    Potassium Replacement - Follow Nurse / BPA Driven Protocol   Does not apply PRN Rik Castillo MD        Potassium Replacement - Follow Nurse / BPA Driven Protocol   Does not apply PRN Dea Montejo MD        [COMPLETED] sodium chloride 0.9 % bolus 1,000 mL  1,000 mL Intravenous Once Mira Delgadillo MD   Stopped at 01/14/24 2314    [COMPLETED] sodium chloride 0.9 % bolus 1,000 mL  1,000 mL Intravenous Once Mira Delgadillo MD   Stopped at 01/15/24 0012    sodium chloride 0.9 % bolus 500 mL  500 mL Intravenous Q1H PRN Rik Castillo MD        sodium chloride 0.9 % flush 10 mL  10 mL Intravenous PRN Mira Delgadillo MD         Physician Progress Notes (all)    No notes of this type exist for this encounter.          Consult Notes (all)        Rik Steinberg MD at 01/15/24 0857        Consult Orders    1. Inpatient Cardiology Consult [[id number]] ordered by Rik Castillo MD                 Inpatient Cardiology Consult  Consult performed by: Paulette Quijano APRN  Consult ordered by: Rik Castillo MD            [facility]  Cardiovascular Consultation Note    Reason for consult: Elevated troponins, junctional bradycardia  Subjective   Patient is a 63-year-old female with a history of type 1 diabetes mellitus on insulin pump, sinus tachycardia, and hypertension who we are being asked to consult for elevated troponins and  bradycardia.  The patient has intermittently followed TYLER Reyes at the heart and valve clinic since 2019 for tachycardia associated with dizziness.  She had a treadmill stress test at that time that was negative.  She wore a 2-week monitor at that time that showed normal sinus rhythm and rare PACs but basically benign and nothing to account for symptoms.  She was started on diltiazem that was gradually increased to 240 mg daily.  This appeared to control her tachycardia for years.  However, she has continued to have intermittent dizziness and weakness.  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  She was placed on aspirin and Plavix at that time.  In November her Plavix was discontinued by neurology and 81 mg aspirin was increased to full-strength daily.  The patient has continued to have episodes of weakness and dizziness and had an upcoming appointment to be evaluated by cardiology in February.  Yesterday the patient developed hypotension with a systolic in the 80s and felt like she was going to pass out which prompted her presentation to [facility] ED.  On arrival she was found to be bradycardic with a junctional rhythm in the 40s and extremely hypotensive at 68/43.  She was treated with 1 L IV fluids and initially on IV dopamine drip but this has since been discontinued.  Her creatinine was elevated at 1.95 and troponins were elevated at 265 and 194.  Diltiazem and valsartan have been on hold since admission.  She is currently sinus rhythm with heart rates in the 80s and blood pressure improved at 141/86.  Creatinine has now improved to within normal limits.  She denies any chest pain, dyspnea, orthopnea, PND or palpitations.    Cardiac risk factors: Hypertension    Past medical and surgical history, social and family history reviewed in EMR.    REVIEW OF SYSTEMS:   H&P ROS reviewed and pertinent CV ROS as noted in  HPI.    Objective     Vital Sign Min/Max for last 24 hours  Temp  Min: 97.9 °F (36.6 °C)  Max: 98.3 °F (36.8 °C)   BP  Min: 68/43  Max: 140/93   Pulse  Min: 43  Max: 93   Resp  Min: 16  Max: 16   SpO2  Min: 96 %  Max: 100 %   No data recorded      Intake/Output Summary (Last 24 hours) at 1/15/2024 0857  Last data filed at 1/15/2024 0600  Gross per 24 hour   Intake 2156.96 ml   Output 300 ml   Net 1856.96 ml           Constitutional:       General: Awake.   Pulmonary:      Effort: Pulmonary effort is normal.      Breath sounds: Normal breath sounds.   Cardiovascular:      Normal rate. Regular rhythm.      Murmurs: There is no murmur.   Pulses:     Intact distal pulses.   Edema:     Peripheral edema absent.   Skin:     General: Skin is warm and dry.   Neurological:      Mental Status: Alert.   Psychiatric:         Behavior: Behavior is cooperative.          EK2024: Junctional bradycardia with PACs.  No atrial fibrillation    Lab Review:   Labs reviewed in the electronic medical record.  Pertinent findings include:  Lab Results   Component Value Date    GLUCOSE 120 (H) 2024    BUN 33 (H) 2024    CREATININE 1.60 (H) 2024    EGFR 36.5 (L) 2024    BCR 20.6 2024    K 4.7 2024    CO2 23.0 2024    CALCIUM 8.1 (L) 2024    ALBUMIN 3.2 (L) 2024    BILITOT 0.4 2024    AST 85 (H) 2024    ALT 58 (H) 2024     Lab Results   Component Value Date    WBC 6.76 01/15/2024    HGB 12.3 01/15/2024    HCT 36.4 01/15/2024    MCV 85.8 01/15/2024     01/15/2024     Lab Results   Component Value Date    CHOL 100 2023    TRIG 130 2023    HDL 50 2023    LDL 27 2023           Active Hospital Problems    Diagnosis     **Junctional bradycardia      Junctional bradycardia noted 2024      Bradycardia     R/O NSTEMI (non-ST elevated myocardial infarction)     LAUREN (acute kidney injury)     UTI (urinary tract infection)     Type 1 diabetes  mellitus with hyperglycemia (On insulin pump)     Hypotension     History of CVA (cerebrovascular accident)      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  [place] to be due to small vessel disease  Echo (8/24/2023): LVEF 61-65%.  Positive bubble study.  Carotid duplex (8/2024): No significant carotid artery disease bilaterally         Tachycardia      Evaluated at the heart valve clinic 2019 for tachycardia  Treadmill stress test (1/9/2019): No ischemia  2-week monitor (1/2019): Normal sinus rhythm.  Average heart rate 100 bpm.  Rare PACs.  3 episodes of brief SVT no longer than 4 beats.  Echo (2/8/2021): LVEF = 70%.  No valvular abnormality.  Grade 1 diastolic dysfunction  Echo (8/24/2023): LVEF 61-65%.  Positive bubble study.                Junctional bradycardia  Junctional bradycardia in the setting of daily diltiazem  Currently normal sinus rhythm with heart rate in the 80s.    Has resolved after discontinuation of diltiazem  EKG suggested A-fib but junctional bradycardia with PACs      Hypotension/hypertension  Hypotensive at 68/43 on admission  Resolved after resolution of IV fluids  Current /86      Elevated troponin/likely type II NSTEMI  Troponin elevated but trending down at 265 and 194  EKG does not show any acute ischemic changes.  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  Likely to be permanently discontinued  If BP remains stable would reinitiate valsartan at lower dosing  If stress test within normal limits likely discharge home with 30-day monitor  Okay for transfer  "to telemetry      TYLER Durham      ___________________________________________________________  The patient was seen and examined by me.  Agree and verified/discussed key components of E/M as outlined by the Nurse practitioner/PA.    /95   Pulse 70   Temp 97.7 °F (36.5 °C) (Oral)   Resp 14   Ht 139.7 cm (55\")   Wt 49.9 kg (110 lb)   SpO2 100%   BMI 25.57 kg/m²     General Appearance:   well developed  well nourished  Neck:  thyroid not enlarged  supple  Respiratory:  no respiratory distress  normal breath sounds  no rales  Cardiovascular:  no jugular venous distention  regular rhythm  apical impulse normal  S1 normal, S2 normal  no S3, no S4   no murmur  no rub, no thrill  carotid pulses normal; no bruit  pedal pulses normal  lower extremity edema: none  .   Skin:   warm, dry        Plan:    Will plan for stress testing tomorrow so that patient may eat breakfast  Continue telemetry while holding diltiazem  Okay to transfer to floor    RODOLFO Steinberg MD  [facility]           Electronically signed by Rik Steinberg MD at 01/15/24 3780       "

## 2024-01-15 NOTE — PLAN OF CARE
Goal Outcome Evaluation:  Arrived to ICU from ED, continued dopamine and insulin infusion. Patient alert and oriented complaining of generalized weakness. Cardiology consult ordered. Remains in normal sinus rhythm, all VSS.

## 2024-01-16 ENCOUNTER — APPOINTMENT (OUTPATIENT)
Dept: CARDIOLOGY | Facility: HOSPITAL | Age: 64
End: 2024-01-16
Payer: MEDICAID

## 2024-01-16 PROBLEM — Q21.12 PFO (PATENT FORAMEN OVALE): Chronic | Status: ACTIVE | Noted: 2024-01-16

## 2024-01-16 LAB
BACTERIA SPEC AEROBE CULT: NO GROWTH
CHOLEST SERPL-MCNC: 106 MG/DL (ref 0–200)
GLUCOSE BLDC GLUCOMTR-MCNC: 161 MG/DL (ref 70–130)
GLUCOSE BLDC GLUCOMTR-MCNC: 202 MG/DL (ref 70–130)
GLUCOSE BLDC GLUCOMTR-MCNC: 338 MG/DL (ref 70–130)
GLUCOSE BLDC GLUCOMTR-MCNC: 97 MG/DL (ref 70–130)
HBA1C MFR BLD: 7.8 % (ref 4.8–5.6)
HDLC SERPL-MCNC: 50 MG/DL (ref 40–60)
LDLC SERPL CALC-MCNC: 36 MG/DL (ref 0–100)
LDLC/HDLC SERPL: 0.69 {RATIO}
QT INTERVAL: 444 MS
QT INTERVAL: 486 MS
QTC INTERVAL: 396 MS
QTC INTERVAL: 425 MS
TRIGL SERPL-MCNC: 107 MG/DL (ref 0–150)
VLDLC SERPL-MCNC: 20 MG/DL (ref 5–40)

## 2024-01-16 PROCEDURE — 92610 EVALUATE SWALLOWING FUNCTION: CPT | Performed by: SPEECH-LANGUAGE PATHOLOGIST

## 2024-01-16 PROCEDURE — 97166 OT EVAL MOD COMPLEX 45 MIN: CPT

## 2024-01-16 PROCEDURE — 93970 EXTREMITY STUDY: CPT | Performed by: INTERNAL MEDICINE

## 2024-01-16 PROCEDURE — 99232 SBSQ HOSP IP/OBS MODERATE 35: CPT | Performed by: STUDENT IN AN ORGANIZED HEALTH CARE EDUCATION/TRAINING PROGRAM

## 2024-01-16 PROCEDURE — 80061 LIPID PANEL: CPT | Performed by: NURSE PRACTITIONER

## 2024-01-16 PROCEDURE — 83036 HEMOGLOBIN GLYCOSYLATED A1C: CPT | Performed by: NURSE PRACTITIONER

## 2024-01-16 PROCEDURE — 25810000003 SODIUM CHLORIDE 0.9 % SOLUTION 250 ML FLEX CONT: Performed by: INTERNAL MEDICINE

## 2024-01-16 PROCEDURE — 99232 SBSQ HOSP IP/OBS MODERATE 35: CPT | Performed by: INTERNAL MEDICINE

## 2024-01-16 PROCEDURE — 25010000002 CEFTRIAXONE PER 250 MG: Performed by: INTERNAL MEDICINE

## 2024-01-16 PROCEDURE — 63710000001 INSULIN LISPRO (HUMAN) PER 5 UNITS: Performed by: INTERNAL MEDICINE

## 2024-01-16 PROCEDURE — 97162 PT EVAL MOD COMPLEX 30 MIN: CPT

## 2024-01-16 PROCEDURE — 25010000002 HEPARIN (PORCINE) PER 1000 UNITS: Performed by: INTERNAL MEDICINE

## 2024-01-16 PROCEDURE — 97116 GAIT TRAINING THERAPY: CPT

## 2024-01-16 PROCEDURE — 93970 EXTREMITY STUDY: CPT

## 2024-01-16 PROCEDURE — 99232 SBSQ HOSP IP/OBS MODERATE 35: CPT

## 2024-01-16 PROCEDURE — 92523 SPEECH SOUND LANG COMPREHEN: CPT | Performed by: SPEECH-LANGUAGE PATHOLOGIST

## 2024-01-16 PROCEDURE — 82948 REAGENT STRIP/BLOOD GLUCOSE: CPT

## 2024-01-16 RX ORDER — VALSARTAN 80 MG/1
40 TABLET ORAL
Status: DISCONTINUED | OUTPATIENT
Start: 2024-01-16 | End: 2024-01-19 | Stop reason: HOSPADM

## 2024-01-16 RX ORDER — PANTOPRAZOLE SODIUM 40 MG/1
40 TABLET, DELAYED RELEASE ORAL
Status: DISCONTINUED | OUTPATIENT
Start: 2024-01-17 | End: 2024-01-19 | Stop reason: HOSPADM

## 2024-01-16 RX ADMIN — Medication 10 ML: at 20:38

## 2024-01-16 RX ADMIN — INSULIN LISPRO 3 UNITS: 100 INJECTION, SOLUTION INTRAVENOUS; SUBCUTANEOUS at 17:41

## 2024-01-16 RX ADMIN — Medication 10 ML: at 08:16

## 2024-01-16 RX ADMIN — PANTOPRAZOLE SODIUM 40 MG: 40 INJECTION, POWDER, FOR SOLUTION INTRAVENOUS at 05:12

## 2024-01-16 RX ADMIN — VALSARTAN 40 MG: 80 TABLET, FILM COATED ORAL at 10:07

## 2024-01-16 RX ADMIN — ATORVASTATIN CALCIUM 20 MG: 20 TABLET, FILM COATED ORAL at 20:38

## 2024-01-16 RX ADMIN — INSULIN LISPRO 5 UNITS: 100 INJECTION, SOLUTION INTRAVENOUS; SUBCUTANEOUS at 12:32

## 2024-01-16 RX ADMIN — HEPARIN SODIUM 5000 UNITS: 5000 INJECTION INTRAVENOUS; SUBCUTANEOUS at 14:29

## 2024-01-16 RX ADMIN — HEPARIN SODIUM 5000 UNITS: 5000 INJECTION INTRAVENOUS; SUBCUTANEOUS at 05:12

## 2024-01-16 RX ADMIN — ASPIRIN 325 MG: 325 TABLET ORAL at 10:06

## 2024-01-16 RX ADMIN — SODIUM CHLORIDE 40 ML: 9 INJECTION, SOLUTION INTRAVENOUS at 22:32

## 2024-01-16 RX ADMIN — SODIUM CHLORIDE 1000 MG: 900 INJECTION INTRAVENOUS at 22:32

## 2024-01-16 RX ADMIN — HEPARIN SODIUM 5000 UNITS: 5000 INJECTION INTRAVENOUS; SUBCUTANEOUS at 22:35

## 2024-01-16 RX ADMIN — CLOPIDOGREL BISULFATE 75 MG: 75 TABLET ORAL at 10:06

## 2024-01-16 RX ADMIN — INSULIN LISPRO 10 UNITS: 100 INJECTION, SOLUTION INTRAVENOUS; SUBCUTANEOUS at 20:38

## 2024-01-16 NOTE — THERAPY EVALUATION
Acute Care - Speech Language Pathology Initial Evaluation  [facility]  Cognitive-Communication Evaluation  + Clinical Swallow Evaluation       Patient Name: Emily Grove  : 1960  MRN: [id number]  Today's Date: 2024               Admit Date: 2024     Visit Dx:    ICD-10-CM ICD-9-CM   1. Hypotension, unspecified hypotension type  I95.9 458.9   2. Junctional bradycardia  R00.1 427.89   3. Acute kidney injury (nontraumatic)  N17.9 584.9   4. Non-ST elevation MI (NSTEMI)  I21.4 410.70   5. Dysphagia, unspecified type  R13.10 787.20   6. Aphasia  R47.01 784.3     Patient Active Problem List   Diagnosis    Fatty liver    Family history of polyps in the colon    Gastroesophageal reflux disease    Precordial pain    Type 1 diabetes mellitus with hyperglycemia (On insulin pump)    Abnormal EKG    Other fatigue    Dizziness    Tachycardia    Palpitations    CVA (cerebral vascular accident)    History of CVA (cerebrovascular accident)    Hypotension    Bradycardia    R/O NSTEMI (non-ST elevated myocardial infarction)    LAUREN (acute kidney injury)    UTI (urinary tract infection)    Junctional bradycardia     Past Medical History:   Diagnosis Date    Acid reflux     Diabetes mellitus     Hyperlipidemia     Hypertension     Overactive bladder     Stroke      Past Surgical History:   Procedure Laterality Date    BLADDER SURGERY      GALL BLADDER    CARPAL TUNNEL RELEASE      FINGER FUSION      FOOT SURGERY      TONSILLECTOMY       SLP Recommendation and Plan  SLP Swallowing Diagnosis: R/O pharyngeal dysphagia, oral dysphagia (24)  SLP Diet Recommendation: regular textures, thin liquids (24)  Recommended Precautions and Strategies: upright posture during/after eating, small bites of food and sips of liquid, general aspiration precautions (24)  SLP Rec. for Method of Medication Administration: meds whole, with puree, as tolerated (24)     Monitor for Signs of  Aspiration: yes, notify SLP if any concerns (01/16/24 0830)  Recommended Diagnostics: other (see comments) (diet tolerance) (01/16/24 0830)  Swallow Criteria for Skilled Therapeutic Interventions Met: demonstrates skilled criteria (01/16/24 0830)  Anticipated Discharge Disposition (SLP): inpatient rehabilitation facility (01/16/24 0830)  Rehab Potential/Prognosis, Swallowing: good, to achieve stated therapy goals (01/16/24 0830)  Therapy Frequency (Swallow): PRN (01/16/24 0830)  Predicted Duration Therapy Intervention (Days): until discharge (01/16/24 0830)  Oral Care Recommendations: Oral Care BID/PRN, Toothbrush (01/16/24 0830)          SLP Recommendation and Plan  SLP Diagnosis: moderate, aphasia, suspected, apraxia (01/16/24 0830)  SLP Diagnosis Comments: Unable to assess RC and WE 2/2 pt becoming easily frustrated 2/2 difficulty w/ tasks. (01/16/24 0830)        Swallow Criteria for Skilled Therapeutic Interventions Met: demonstrates skilled criteria (01/16/24 0830)  SLC Criteria for Skilled Therapy Interventions Met: yes (01/16/24 0830)  Anticipated Discharge Disposition (SLP): inpatient rehabilitation facility (01/16/24 0830)     Therapy Frequency (Swallow): PRN (01/16/24 0830)  Therapy Frequency (SLP SLC): 5 days per week (01/16/24 0830)  Predicted Duration Therapy Intervention (Days): until discharge (01/16/24 0830)  Oral Care Recommendations: Oral Care BID/PRN, Toothbrush (01/16/24 0830)              Communication Strategy Suggestions: eliminate distractions when speaking with patient, avoid open-ended questions, avoid multi-step instructions (01/16/24 0830)           Plan of Care Reviewed With: patient, family (01/16/24 1302)  Progress:  (initial eval') (01/16/24 1302)      SLP EVALUATION (last 72 hours)       SLP SLC Evaluation       Row Name 01/16/24 0830                   Communication Assessment/Intervention    Document Type evaluation  -CJ        Subjective Information no complaints  -CJ         Patient Observations alert;cooperative  -CJ        Patient/Family/Caregiver Comments/Observations family present  -CJ        Patient Effort good  -CJ        Symptoms Noted During/After Treatment none  -CJ           General Information    Patient Profile Reviewed yes  -CJ        Pertinent History Of Current Problem Pt adm w/ [facility], Nstemi, bradycardia; sig h/o DM, hypotension, GERD, palpitations, previous CVA. Found to have new small patchy foci in L parietal lobe w/ new onset of aphasia  -CJ        Precautions/Limitations, Vision WFL;for purposes of eval  -CJ        Precautions/Limitations, Hearing WFL;for purposes of eval  -CJ        Prior Level of Function-Communication unknown  -CJ        Plans/Goals Discussed with patient;family  -CJ        Barriers to Rehab cognitive status  -CJ        Patient's Goals for Discharge patient could not state  -CJ           Pain    Additional Documentation Pain Scale: FACES Pre/Post-Treatment (Group)  -CJ           Pain Scale: FACES Pre/Post-Treatment    Pain: FACES Scale, Pretreatment 0-->no hurt  -CJ        Posttreatment Pain Rating 0-->no hurt  -CJ           Comprehension Assessment/Intervention    Comprehension Assessment/Intervention Auditory Comprehension;Reading Comprehension  -CJ           Auditory Comprehension Assessment/Intervention    Auditory Comprehension (Communication) moderate impairment  -CJ        Able to Identify Objects/Pictures (Communication) mild impairment;pictures of common objects  -CJ        Answers Questions (Communication) mild impairment;complex;yes/no  -CJ        Able to Follow Commands (Communication) mild impairment;3-step  -CJ        Narrative Discourse moderate impairment;conversational level  -CJ        Successful Auditory Strategies (Communication) decrease environmental distractions;phonemic cues  -CJ           Reading Comprehension Assessment/Intervention    Reading Comprehension (Communication) WFL  -CJ        Scanning (Reading) phrases  -CJ         Single Word Level [facility]  -        Reading Comprehension, Comment basic reading only assessed however pt became easily frustrated  -CJ           Expression Assessment/Intervention    Expression Assessment/Intervention verbal expression  -CJ           Verbal Expression Assessment/Intervention    Verbal Expression moderate impairment;severe impairment  -CJ        Automatic Speech (Communication) moderate impairment;counting 1-20;days of week;months of year  -CJ        Repetition moderate impairment;phrases  -CJ        Phrase Completion moderate impairment;automatic/predictable  -CJ        Responsive Naming moderate impairment;simple  -CJ        Confrontational Naming moderate impairment;low frequency  -CJ        Spontaneous/Functional Words moderate impairment;simple  -CJ        Sentence Formulation moderate impairment;severe impairment;simple  -CJ           Oral Motor Structure and Function    Oral Motor Structure and Function mild impairment  -CJ        Dentition Assessment natural, present and adequate  -CJ        Mucosal Quality moist, healthy  -           Oral Musculature and Cranial Nerve Assessment    Oral Motor General Assessment WFL  -           Motor Speech Assessment/Intervention    Motor Speech Function mild impairment;moderate impairment  -CJ        Speech intelligibility 90%;in connected speech;with unfamiliar listener  -           Cognitive Assessment Intervention- SLP    Cognitive Function (Cognition) unable/difficult to assess;other (see comments)  2/2 aphasia  -           SLP Evaluation Clinical Impressions    SLP Diagnosis moderate;aphasia;suspected;apraxia  -CJ        SLP Diagnosis Comments Unable to assess RC and WE 2/2 pt becoming easily frustrated 2/2 difficulty w/ tasks.  -CJ        Rehab Potential/Prognosis good  -CJ        SLC Criteria for Skilled Therapy Interventions Met yes  -CJ        Functional Impact functional impact in ADLs;difficulty communicating in an  emergency;difficulty in expressing complex messages;difficulty communicating wants, needs  -           Recommendations    Therapy Frequency (SLP SLC) 5 days per week  -        Predicted Duration Therapy Intervention (Days) until discharge  -        Anticipated Discharge Disposition (SLP) inpatient rehabilitation facility  -        Communication Strategy Suggestions eliminate distractions when speaking with patient;avoid open-ended questions;avoid multi-step instructions  -                  User Key  (r) = Recorded By, (t) = Taken By, (c) = Cosigned By      Initials Name Effective Dates    Karena Barkley MS CCC-SLP 23 -                        EDUCATION  The patient has been educated in the following areas:     Cognitive Impairment Communication Impairment Dysphagia (Swallowing Impairment) Oral Care/Hydration.                        Time Calculation:      Time Calculation- SLP       Row Name 24 1304             Time Calculation- SLP    SLP Start Time 0830  -      SLP Received On 24  -         Untimed Charges    05413-DL Eval Speech and Production w/ Language Minutes 24  -CJ      71029-PU Eval Oral Pharyng Swallow Minutes 25  -         Total Minutes    Untimed Charges Total Minutes 49  -CJ       Total Minutes 49  -CJ                User Key  (r) = Recorded By, (t) = Taken By, (c) = Cosigned By      Initials Name Provider Type    Karena Barkley MS CCC-SLP Speech and Language Pathologist                    Therapy Charges for Today       Code Description Service Date Service Provider Modifiers Qty    01043841892 HC ST EVAL ORAL PHARYNG SWALLOW 2 2024 Karena Nixon MS CCC-SLP GN 1    93212357313 HC ST EVAL SPEECH AND PROD W LANG  2 2024 Karena Nixon MS CCC-SLP GN 1                       Karena Nixon MS CCC-SLP  2024   and Acute Care - Speech Language Pathology   Swallow Initial Evaluation [facility]     Patient Name: Emily Dumont [place]  :  1960  MRN: [id number]  Today's Date: 1/16/2024               Admit Date: 1/14/2024    Visit Dx:     ICD-10-CM ICD-9-CM   1. Hypotension, unspecified hypotension type  I95.9 458.9   2. Junctional bradycardia  R00.1 427.89   3. Acute kidney injury (nontraumatic)  N17.9 584.9   4. Non-ST elevation MI (NSTEMI)  I21.4 410.70   5. Dysphagia, unspecified type  R13.10 787.20   6. Aphasia  R47.01 784.3     Patient Active Problem List   Diagnosis    Fatty liver    Family history of polyps in the colon    Gastroesophageal reflux disease    Precordial pain    Type 1 diabetes mellitus with hyperglycemia (On insulin pump)    Abnormal EKG    Other fatigue    Dizziness    Tachycardia    Palpitations    CVA (cerebral vascular accident)    History of CVA (cerebrovascular accident)    Hypotension    Bradycardia    R/O NSTEMI (non-ST elevated myocardial infarction)    LAUREN (acute kidney injury)    UTI (urinary tract infection)    Junctional bradycardia     Past Medical History:   Diagnosis Date    Acid reflux     Diabetes mellitus     Hyperlipidemia     Hypertension     Overactive bladder     Stroke      Past Surgical History:   Procedure Laterality Date    BLADDER SURGERY      GALL BLADDER    CARPAL TUNNEL RELEASE      FINGER FUSION      FOOT SURGERY      TONSILLECTOMY         SLP Recommendation and Plan  SLP Swallowing Diagnosis: R/O pharyngeal dysphagia, oral dysphagia (01/16/24 0830)  SLP Diet Recommendation: regular textures, thin liquids (01/16/24 0830)  Recommended Precautions and Strategies: upright posture during/after eating, small bites of food and sips of liquid, general aspiration precautions (01/16/24 0830)  SLP Rec. for Method of Medication Administration: meds whole, with puree, as tolerated (01/16/24 0830)     Monitor for Signs of Aspiration: yes, notify SLP if any concerns (01/16/24 0830)  Recommended Diagnostics: other (see comments) (diet tolerance) (01/16/24 0830)  Swallow Criteria for Skilled Therapeutic  Interventions Met: demonstrates skilled criteria (01/16/24 0830)  Anticipated Discharge Disposition (SLP): inpatient rehabilitation facility (01/16/24 0830)  Rehab Potential/Prognosis, Swallowing: good, to achieve stated therapy goals (01/16/24 0830)  Therapy Frequency (Swallow): PRN (01/16/24 0830)  Predicted Duration Therapy Intervention (Days): until discharge (01/16/24 0830)  Oral Care Recommendations: Oral Care BID/PRN, Toothbrush (01/16/24 0830)                     Communication Strategy Suggestions: eliminate distractions when speaking with patient, avoid open-ended questions, avoid multi-step instructions (01/16/24 0830)                Oral Care Recommendations: Oral Care BID/PRN, Toothbrush (01/16/24 0830)    Plan of Care Reviewed With: patient, family  Progress:  (initial eval')      SWALLOW EVALUATION (last 72 hours)       SLP Adult Swallow Evaluation       Row Name 01/16/24 0830       General Information    Current Method of Nutrition NPO  -    Prior Level of Function-Communication unknown  -    Prior Level of Function-Swallowing no diet consistency restrictions  -    Patient's Goals for Discharge return to PO diet  -       Oral Motor Structure and Function    Secretion Management WNL/WFL  -       General Eating/Swallowing Observations    Respiratory Support Currently in Use room air  -    Eating/Swallowing Skills fed by SLP  -    Positioning During Eating upright in bed  -    Utensils Used spoon;cup;straw  -    Consistencies Trialed regular textures;pureed;ice chips;thin liquids  -       Clinical Swallow Eval    Oral Prep Phase impaired  -    Pharyngeal Phase no overt signs/symptoms of pharyngeal impairment  -    Clinical Swallow Evaluation Summary Prolonged but adequate mastication w/ solids. No oral residue. No other overt s/s of aspiration w/ any trials of regular solids, thins or puree presentations. Will initaite regular diet w/ thin liquids and f/u for diet tolerance  -        Oral Prep Concerns    Oral Prep Concerns prolonged mastication  -       SLP Evaluation Clinical Impression    SLP Swallowing Diagnosis R/O pharyngeal dysphagia;oral dysphagia  -    Functional Impact risk of aspiration/pneumonia  -    Rehab Potential/Prognosis, Swallowing good, to achieve stated therapy goals  -    Swallow Criteria for Skilled Therapeutic Interventions Met demonstrates skilled criteria  -       Recommendations    Therapy Frequency (Swallow) PRN  -    SLP Diet Recommendation regular textures;thin liquids  -    Recommended Diagnostics other (see comments)  diet tolerance  -    Recommended Precautions and Strategies upright posture during/after eating;small bites of food and sips of liquid;general aspiration precautions  -    Oral Care Recommendations Oral Care BID/PRN;Toothbrush  -    SLP Rec. for Method of Medication Administration meds whole;with puree;as tolerated  -    Monitor for Signs of Aspiration yes;notify SLP if any concerns  -              User Key  (r) = Recorded By, (t) = Taken By, (c) = Cosigned By      Initials Name Effective Dates    Karena Barkley, MS CCC-SLP 07/11/23 -                     EDUCATION  The patient has been educated in the following areas:   Dysphagia (Swallowing Impairment) Oral Care/Hydration.        SLP GOALS       Row Name 01/16/24 0830             (LTG) Patient will demonstrate functional swallow for    Diet Texture (Demonstrate functional swallow) regular textures  -CJ      Liquid viscosity (Demonstrate functional swallow) thin liquids  -CJ      [place] (Demonstrate functional swallow) with minimal cues (75-90% accuracy)  -CJ      Time Frame (Demonstrate functional swallow) by discharge  -CJ      Progress/Outcomes (Demonstrate functional swallow) new goal  -CJ         (STG) Patient will tolerate trials of    Consistencies Trialed (Tolerate trials) regular textures;thin liquids  -CJ      Desired Outcome (Tolerate trials)  without signs/symptoms of aspiration;without signs of distress  -CJ      [place] (Tolerate trials) with minimal cues (75-90% accuracy)  -CJ      Time Frame (Tolerate trials) 1 week  -CJ      Progress/Outcomes (Tolerate trials) new goal  -CJ         Patient will demonstrate functional language skills for return to discharge environment     [place] with minimal cues  -CJ      Time frame by discharge  -CJ      Progress/Outcomes new goal  -CJ         SLP Diagnostic Treatment     Patient will participate in further assessment in the following areas reading comprehension;graphic expression  -CJ      Time Frame (Diagnostic) short term goal (STG)  -CJ      Progress/Outcomes (Additional Goal 1, SLP) new goal  -CJ         Comprehend Questions Goal 1 (SLP)    Improve Ability to Comprehend Questions Goal 1 (SLP) complex yes/no questions;simple wh questions;80%;with moderate cues (50-74%)  -CJ      Time Frame (Comprehend Questions Goal 1, SLP) short term goal (STG)  -CJ      Progress/Outcomes (Comprehend Questions Goal 1, SLP) new goal  -CJ         Follow Directions Goal 2 (SLP)    Improve Ability to Follow Directions Goal 1 (SLP) 2 step commands;80%;with moderate cues (50-74%)  -CJ      Time Frame (Follow Directions Goal 1, SLP) short term goal (STG)  -CJ      Progress/Outcomes (Follow Directions Goal 1, SLP) new goal  -CJ         Word Retrieval Skills Goal 1 (SLP)    Improve Word Retrieval Skills By Goal 1 (SLP) confrontational naming task;low frequency;repeating phrases;completing open ended structured sentence;simple;80%;with moderate cues (50-74%)  -CJ      Time Frame (Word Retrieval Goal 1, SLP) short term goal (STG)  -CJ      Progress/Outcomes (Word Retrieval Goal 1, SLP) new goal  -CJ                User Key  (r) = Recorded By, (t) = Taken By, (c) = Cosigned By      Initials Name Provider Type    Karena Barkley MS CCC-SLP Speech and Language Pathologist                       Time Calculation:    Time  Calculation- SLP       Row Name 01/16/24 1304             Time Calculation- SLP    SLP Start Time 0830  -CJ      SLP Received On 01/16/24  -         Untimed Charges    00688-LF Eval Speech and Production w/ Language Minutes 24  -CJ      64518-IV Eval Oral Pharyng Swallow Minutes 25  -CJ         Total Minutes    Untimed Charges Total Minutes 49  -CJ       Total Minutes 49  -CJ                User Key  (r) = Recorded By, (t) = Taken By, (c) = Cosigned By      Initials Name Provider Type    Karena Barkley, MS CCC-SLP Speech and Language Pathologist                    Therapy Charges for Today       Code Description Service Date Service Provider Modifiers Qty    68017122721 HC ST EVAL ORAL PHARYNG SWALLOW 2 1/16/2024 Karena Nixon, MS CCC-SLP GN 1    98947673975 HC ST EVAL SPEECH AND PROD W LANG  2 1/16/2024 Karena Nixon, MS CCC-SLP GN 1                 Karena Nixon MS CCC-SLP  1/16/2024

## 2024-01-16 NOTE — PLAN OF CARE
Goal Outcome Evaluation:  Plan of Care Reviewed With: patient, family        Progress: no change (PT IE)  Outcome Evaluation: PT eval complete. Pt presents with generalized weakness (R>L), mild balance deficits, and aphasia warranting skilled IPPT services. Pt demonstrated good effort with therapy, requiring Tito for mobility. PT rec IRF at d/c for best functional outcome.      Anticipated Discharge Disposition (PT): inpatient rehabilitation facility

## 2024-01-16 NOTE — PLAN OF CARE
Goal Outcome Evaluation:  Plan of Care Reviewed With: patient, family        Progress: improving  Outcome Evaluation: OT eval complete. P tpresents w/ deficits in balance, cognition, and functional endurance warranting cont skilled IPOT POC to promote return to PLOF. Recommend pt DC to IP rehab.      Anticipated Discharge Disposition (OT): inpatient rehabilitation facility

## 2024-01-16 NOTE — THERAPY EVALUATION
Patient Name: Emily Grove  : 1960    MRN: [id number]                              Today's Date: 2024       Admit Date: 2024    Visit Dx:     ICD-10-CM ICD-9-CM   1. Hypotension, unspecified hypotension type  I95.9 458.9   2. Junctional bradycardia  R00.1 427.89   3. Acute kidney injury (nontraumatic)  N17.9 584.9   4. Non-ST elevation MI (NSTEMI)  I21.4 410.70     Patient Active Problem List   Diagnosis    Fatty liver    Family history of polyps in the colon    Gastroesophageal reflux disease    Precordial pain    Type 1 diabetes mellitus with hyperglycemia (On insulin pump)    Abnormal EKG    Other fatigue    Dizziness    Tachycardia    Palpitations    CVA (cerebral vascular accident)    History of CVA (cerebrovascular accident)    Hypotension    Bradycardia    R/O NSTEMI (non-ST elevated myocardial infarction)    LAUREN (acute kidney injury)    UTI (urinary tract infection)    Junctional bradycardia     Past Medical History:   Diagnosis Date    Acid reflux     Diabetes mellitus     Hyperlipidemia     Hypertension     Overactive bladder     Stroke      Past Surgical History:   Procedure Laterality Date    BLADDER SURGERY      GALL BLADDER    CARPAL TUNNEL RELEASE      FINGER FUSION      FOOT SURGERY      TONSILLECTOMY        General Information       Row Name 24 1048          Physical Therapy Time and Intention    Document Type evaluation  -ES     Mode of Treatment physical therapy  -ES       Row Name 24 1048          General Information    Patient Profile Reviewed yes  -ES     Prior Level of Function independent:;all household mobility;community mobility;transfer;bed mobility;ADL's;home management;driving  -ES     Existing Precautions/Restrictions fall;other (see comments)  aphasia, R weakness  -ES     Barriers to Rehab medically complex  -ES       Row Name 24 1045          Living Environment    People in Home spouse;child(linh), adult  -ES       Row Name 24 1045           Home Main Entrance    Number of Stairs, Main Entrance three  -ES       Row Name 01/16/24 1048          Stairs Within Home, Primary    Number of Stairs, Within Home, Primary other (see comments)  all needs met primary level  -ES       Row Name 01/16/24 1048          Cognition    Orientation Status (Cognition) oriented x 3;verbal cues/prompts needed for orientation  -ES       Row Name 01/16/24 1048          Safety Issues, Functional Mobility    Safety Issues Affecting Function (Mobility) awareness of need for assistance;insight into deficits/self-awareness;safety precaution awareness;safety precautions follow-through/compliance;sequencing abilities  -ES     Impairments Affecting Function (Mobility) balance;coordination;endurance/activity tolerance;strength  -ES               User Key  (r) = Recorded By, (t) = Taken By, (c) = Cosigned By      Initials Name Provider Type    Lexi Young, PT Physical Therapist                   Mobility       Row Name 01/16/24 1049          Bed Mobility    Bed Mobility sit-supine  -ES     Sit-Supine [place] (Bed Mobility) minimum assist (75% patient effort);verbal cues  -ES     Assistive Device (Bed Mobility) bed rails;head of bed elevated  -ES     Comment, (Bed Mobility) v/c for sequencing  -ES       Row Name 01/16/24 1049          Bed-Chair Transfer    Comment, (Bed-Chair Transfer) Pt declined sitting [facility]  -ES       Row Name 01/16/24 1049          Sit-Stand Transfer    Sit-Stand [place] (Transfers) minimum assist (75% patient effort);verbal cues  -ES     Assistive Device (Sit-Stand Transfers) other (see comments)  UE support  -ES     Comment, (Sit-Stand Transfer) Pt c/o mild dizziness with transition to standing, resolved quickly.  -ES       Row Name 01/16/24 1049          Gait/Stairs (Locomotion)    [place] Level (Gait) minimum assist (75% patient effort);verbal cues  -ES     Assistive Device (Gait) other (see comments)  UE support  -ES     Distance in Feet  (Gait) 40+40  -ES     Deviations/Abnormal Patterns (Gait) bilateral deviations;gait speed decreased;stride length decreased;base of support, narrow  -ES     Bilateral Gait Deviations forward flexed posture  -ES     Comment, (Gait/Stairs) Pt ambulated with Tito and UE support. Demo'd narrow IAN with step-through gait pattern and required cues to promote upright posture and for environmental awareness. Pt had 1 instance of veering L while ambulating, requiring Tito to correct. Further mobility limited by fatigue.  -ES               User Key  (r) = Recorded By, (t) = Taken By, (c) = Cosigned By      Initials Name Provider Type    ES Lexi Black, PT Physical Therapist                   Obj/Interventions       Row Name 01/16/24 1052          Range of Motion Comprehensive    General Range of Motion bilateral lower extremity ROM WFL  -ES       Row Name 01/16/24 1052          Strength Comprehensive (MMT)    General Manual Muscle Testing (MMT) Assessment lower extremity strength deficits identified  -ES     Comment, General Manual Muscle Testing (MMT) Assessment RLE 4-/5, LLE 4/5  -ES       Row Name 01/16/24 1052          Balance    Balance Assessment sitting static balance;sitting dynamic balance;sit to stand dynamic balance;standing static balance;standing dynamic balance  -ES     Static Sitting Balance contact guard  -ES     Dynamic Sitting Balance contact guard;verbal cues  -ES     Position, Sitting Balance supported;sitting edge of bed  -ES     Sit to Stand Dynamic Balance minimal assist;verbal cues  -ES     Static Standing Balance minimal assist  -ES     Dynamic Standing Balance minimal assist;verbal cues  -ES     Position/Device Used, Standing Balance supported;other (see comments)  UE support  -ES     Balance Interventions sitting;standing;sit to stand;supported;static;dynamic;occupation based/functional task  -ES       Row Name 01/16/24 1052          Sensory Assessment (Somatosensory)    Sensory Assessment  (Somatosensory) LE sensation intact  -ES               User Key  (r) = Recorded By, (t) = Taken By, (c) = Cosigned By      Initials Name Provider Type    Lexi Young, PT Physical Therapist                   Goals/Plan       Row Name 01/16/24 1058          Bed Mobility Goal 1 (PT)    Activity/Assistive Device (Bed Mobility Goal 1, PT) sit to supine/supine to sit  -ES     [place] Level/Cues Needed (Bed Mobility Goal 1, PT) standby assist  -ES     Time Frame (Bed Mobility Goal 1, PT) long term goal (LTG);10 days  -ES       Row Name 01/16/24 1058          Transfer Goal 1 (PT)    Activity/Assistive Device (Transfer Goal 1, PT) sit-to-stand/stand-to-sit;bed-to-chair/chair-to-bed  -ES     [place] Level/Cues Needed (Transfer Goal 1, PT) standby assist  -ES     Time Frame (Transfer Goal 1, PT) long term goal (LTG);10 days  -ES       Row Name 01/16/24 1058          Gait Training Goal 1 (PT)    Activity/Assistive Device (Gait Training Goal 1, PT) gait (walking locomotion);decrease fall risk;improve balance and speed;increase endurance/gait distance  -ES     [place] Level (Gait Training Goal 1, PT) standby assist  -ES     Distance (Gait Training Goal 1, PT) 150  -ES     Time Frame (Gait Training Goal 1, PT) long term goal (LTG);10 days  -ES       Row Name 01/16/24 1058          Stairs Goal 1 (PT)    Activity/Assistive Device (Stairs Goal 1, PT) ascending stairs;descending stairs  -ES     [place] Level/Cues Needed (Stairs Goal 1, PT) standby assist  -ES     Number of Stairs (Stairs Goal 1, PT) 3  -ES     Time Frame (Stairs Goal 1, PT) long term goal (LTG);10 days  -ES       Row Name 01/16/24 1058          Therapy Assessment/Plan (PT)    Planned Therapy Interventions (PT) balance training;bed mobility training;gait training;patient/family education;postural re-education;home exercise program;ROM (range of motion);stair training;strengthening;transfer training  -ES               User Key  (r) = Recorded  By, (t) = Taken By, (c) = Cosigned By      Initials Name Provider Type    ES Lexi Black, PT Physical Therapist                   Clinical Impression       Row Name 01/16/24 1056          Pain    Pretreatment Pain Rating 0/10 - no pain  -ES     Posttreatment Pain Rating 0/10 - no pain  -ES     Pre/Posttreatment Pain Comment tolerated  -ES     Pain Intervention(s) Repositioned;Ambulation/increased activity  -ES       Row Name 01/16/24 1056          Plan of Care Review    Plan of Care Reviewed With patient;family  -ES     Progress no change  PT IE  -ES     Outcome Evaluation PT eval complete. Pt presents with generalized weakness (R>L), mild balance deficits, and aphasia warranting skilled IPPT services. Pt demonstrated good effort with therapy, requiring Tito for mobility. PT rec IRF at d/c for best functional outcome.  -ES       Row Name 01/16/24 1056          Therapy Assessment/Plan (PT)    Rehab Potential (PT) good, to achieve stated therapy goals  -ES     Criteria for Skilled Interventions Met (PT) yes;meets criteria;skilled treatment is necessary  -ES     Therapy Frequency (PT) daily  -ES       Row Name 01/16/24 1056          Vital Signs    Pre Systolic BP Rehab 155  -ES     Pre Treatment Diastolic BP 88  -ES     Post Systolic BP Rehab 135  -ES     Post Treatment Diastolic BP 88  -ES     Pretreatment Heart Rate (beats/min) 73  -ES     Posttreatment Heart Rate (beats/min) 91  -ES     Pre SpO2 (%) 100  -ES     O2 Delivery Pre Treatment room air  -ES     O2 Delivery Intra Treatment room air  -ES     Post SpO2 (%) 93  -ES     O2 Delivery Post Treatment room air  -ES     Pre Patient Position Sitting  -ES     Intra Patient Position Standing  -ES     Post Patient Position Supine  -ES       Row Name 01/16/24 1056          Positioning and Restraints    Pre-Treatment Position in bed  -ES     Post Treatment Position bed  -ES     In Bed notified nsg;fowlers;call light within reach;encouraged to call for assist;exit  alarm on;with family/caregiver;side rails up x3;SCD pump applied;heels elevated  -ES               User Key  (r) = Recorded By, (t) = Taken By, (c) = Cosigned By      Initials Name Provider Type    Lexi Young PT Physical Therapist                   Outcome Measures       Row Name 01/16/24 1059          How much help from another person do you currently need...    Turning from your back to your side while in flat bed without using bedrails? 4  -ES     Moving from lying on back to sitting on the side of a flat bed without bedrails? 3  -ES     Moving to and from a bed to a chair (including a wheelchair)? 3  -ES     Standing up from a chair using your arms (e.g., wheelchair, bedside chair)? 3  -ES     Climbing 3-5 steps with a railing? 3  -ES     To walk in hospital room? 3  -ES     AM-PAC 6 Clicks Score (PT) 19  -ES     Highest Level of Mobility Goal 6 --> Walk 10 steps or more  -ES       Row Name 01/16/24 1059          Modified Jaylan Scale    Pre-Stroke Modified Jaylan Scale 6 - Unable to determine (UTD) from the medical record documentation  -ES     Modified [place] Scale 3 - Moderate disability.  Requiring some help, but able to walk without assistance.  -ES       Row Name 01/16/24 1059          Functional Assessment    Outcome Measure Options AM-PAC 6 Clicks Basic Mobility (PT);Modified Jaylan  -ES               User Key  (r) = Recorded By, (t) = Taken By, (c) = Cosigned By      Initials Name Provider Type    Lexi Young PT Physical Therapist                                 Physical Therapy Education       Title: PT OT SLP Therapies (In Progress)       Topic: Physical Therapy (In Progress)       Point: Mobility training (In Progress)       Learning Progress Summary             Patient Acceptance, E,TB, NR by RIK at 1/16/2024 1059   Family Acceptance, E,TB, NR by RIK at 1/16/2024 1059                         Point: Home exercise program (Not Started)       Learner Progress:  Not documented in this  visit.              Point: Body mechanics (In Progress)       Learning Progress Summary             Patient Acceptance, E,TB, NR by ES at 1/16/2024 1059   Family Acceptance, E,TB, NR by ES at 1/16/2024 1059                         Point: Precautions (In Progress)       Learning Progress Summary             Patient Acceptance, E,TB, NR by ES at 1/16/2024 1059   Family Acceptance, E,TB, NR by ES at 1/16/2024 1059                                         User Key       Initials Effective Dates Name Provider Type Discipline     08/11/22 -  Lexi Black, PT Physical Therapist PT                  PT Recommendation and Plan  Planned Therapy Interventions (PT): balance training, bed mobility training, gait training, patient/family education, postural re-education, home exercise program, ROM (range of motion), stair training, strengthening, transfer training  Plan of Care Reviewed With: patient, family  Progress: no change (PT IE)  Outcome Evaluation: PT eval complete. Pt presents with generalized weakness (R>L), mild balance deficits, and aphasia warranting skilled IPPT services. Pt demonstrated good effort with therapy, requiring Tito for mobility. PT rec IRF at d/c for best functional outcome.     Time Calculation:   PT Evaluation Complexity  History, PT Evaluation Complexity: 1-2 personal factors and/or comorbidities  Examination of Body Systems (PT Eval Complexity): total of 3 or more elements  Clinical Presentation (PT Evaluation Complexity): evolving  Clinical Decision Making (PT Evaluation Complexity): moderate complexity  Overall Complexity (PT Evaluation Complexity): moderate complexity     PT Charges       Row Name 01/16/24 1100             Time Calculation    Start Time 0910  -ES      PT Received On 01/16/24  -ES      PT Goal Re-Cert Due Date 01/26/24  -ES         Time Calculation- PT    Total Timed Code Minutes- PT 13 minute(s)  -ES         Timed Charges    69860 - Gait Training Minutes  13  -ES          Total Minutes    Timed Charges Total Minutes 13  -ES       Total Minutes 13  -ES                User Key  (r) = Recorded By, (t) = Taken By, (c) = Cosigned By      Initials Name Provider Type    Lexi Young PT Physical Therapist                  Therapy Charges for Today       Code Description Service Date Service Provider Modifiers Qty    [id number]  GAIT TRAINING EA 15 MIN 1/16/2024 Lexi Black, PT GP 1    [id number]  PT EVAL MOD COMPLEXITY 3 1/16/2024 Lexi Black, PT GP 1            PT G-Codes  Outcome Measure Options: AM-PAC 6 Clicks Basic Mobility (PT), Modified Eleanor  AM-PAC 6 Clicks Score (PT): 19  Modified Eleanor Scale: 3 - Moderate disability.  Requiring some help, but able to walk without assistance.  PT Discharge Summary  Anticipated Discharge Disposition (PT): inpatient rehabilitation facility    Lexi Black PT  1/16/2024

## 2024-01-16 NOTE — PROGRESS NOTES
"Critical Care Note     LOS: 2 days   Patient Care Team:  Marie Morales MD as PCP - General (Internal Medicine)    Chief Complaint/Reason for visit:    Chief Complaint   Patient presents with    Weakness - Generalized   Bradycardia  Non-ST elevation MI  Acute kidney injury  Diabetes mellitus type 1  Urinary tract infection    Subjective     Interval History:     Patient slept poorly.  Persistent expressive aphasia.  PT noted generalized weakness and balance deficits and are recommending inpatient physical therapy.  Speech therapy passed her for regular textures, thin liquids.  She remains in sinus rhythm.  On room air her saturation is 95%.  3.4 L diuresis yesterday.    Review of Systems:    All systems were reviewed and negative except as noted in subjective.    Medical history, surgical history, social history, family history reviewed    Objective     Intake/Output:    Intake/Output Summary (Last 24 hours) at 1/16/2024 1453  Last data filed at 1/16/2024 0600  Gross per 24 hour   Intake 254 ml   Output 2650 ml   Net -2396 ml       Nutrition:  Diet: Cardiac Diets, Diabetic Diets; Healthy Heart (2-3 Na+); Consistent Carbohydrate; Texture: Regular Texture (IDDSI 7); Fluid Consistency: Thin (IDDSI 0)    Infusions:  DOPamine, 2-20 mcg/kg/min, Last Rate: Stopped (01/15/24 0901)        Mechanical Ventilator Settings:                                                Telemetry: Sinus rhythm             Vital Signs  Blood pressure 116/79, pulse 87, temperature 97.7 °F (36.5 °C), temperature source Axillary, resp. rate 16, height 139.7 cm (55\"), weight 49.9 kg (110 lb), SpO2 95%.    Physical Exam:  General Appearance:  Middle-aged woman sitting upright in bed in no respiratory distress.   Head:  Atraumatic   Eyes:          Pupils equal and reactive   Ears:     Throat: Oral mucosa moist   Neck: Trachea midline, no crepitus   Back:      Lungs:   Symmetric chest expansion without wheeze or rhonchi    Heart:  Regular rhythm, S1, " S2 auscultated, no murmur   Abdomen:   Bowel sounds present, soft, nontender   Rectal:   Deferred   Extremities: No pretibial edema   Pulses: Palpable pedal pulses   Skin: Warm and dry without rash   Lymph nodes: No cervical adenopathy   Neurologic: Alert, expressive aphasia  Interval:  (return from MRI)  1a. Level of Consciousness: 0-->Alert, keenly responsive  1b. LOC Questions: 2-->Answers neither question correctly  1c. LOC Commands: 0-->Performs both tasks correctly  2. Best Gaze: 0-->Normal  3. Visual: 0-->No visual loss  4. Facial Palsy: 0-->Normal symmetrical movements  5a. Motor Arm, Left: 0-->No drift, limb holds 90 (or 45) degrees for full 10 secs  5b. Motor Arm, Right: 0-->No drift, limb holds 90 (or 45) degrees for full 10 secs  6a. Motor Leg, Left: 0-->No drift, leg holds 30 degree position for full 5 secs  6b. Motor Leg, Right: 0-->No drift, leg holds 30 degree position for full 5 secs  7. Limb Ataxia: 0-->Absent  8. Sensory: 0-->Normal, no sensory loss  9. Best Language: 1-->Mild-to-moderate aphasia, some obvious loss of fluency or facility of comprehension, without significant limitation on ideas expressed or form of expression. Reduction of speech and/or comprehension, however, makes conversation. . . (see row details)  10. Dysarthria: 1-->Mild-to-moderate dysarthria, patient slurs at least some words and, at worst, can be understood with some difficulty  11. Extinction and Inattention (formerly Neglect): 0-->No abnormality    Total (NIH Stroke Scale): 4       Results Review:     I reviewed the patient's new clinical results.   Results from last 7 days   Lab Units 01/15/24  0842 01/14/24  2352 01/14/24  2118   SODIUM mmol/L 143 140 137   POTASSIUM mmol/L 4.2 4.7 4.9   CHLORIDE mmol/L 108* 107 100   CO2 mmol/L 22.0 23.0 27.0   BUN mg/dL 21 33* 32*   CREATININE mg/dL 0.84 1.60* 1.95*   CALCIUM mg/dL 8.6 8.1* 9.8   BILIRUBIN mg/dL 0.2 0.4 0.3   ALK PHOS U/L 215* 162* 198*   ALT (SGPT) U/L 89* 58* 50*    AST (SGOT) U/L 85* 85* 74*   GLUCOSE mg/dL 83 120* 150*     Results from last 7 days   Lab Units 01/15/24  0842 01/14/24  2118   WBC 10*3/mm3 6.76 9.62   HEMOGLOBIN g/dL 12.3 13.5   HEMATOCRIT % 36.4 41.2   PLATELETS 10*3/mm3 159 265         Lab Results   Component Value Date    BLOODCX No growth at 24 hours 01/15/2024     Lab Results   Component Value Date    URINECX No growth 01/14/2024       I reviewed the patient's new imaging including images and reports.      MRI BRAIN WO CONTRAST    Date of Exam: 1/15/2024 9:27 PM EST    Indication: Stroke, follow up.     Comparison: Same day noncontrast CT of the brain.    Technique:  Routine multiplanar/multisequence sequence images of the brain were obtained without contrast administration.      Findings:  Small patchy foci of restricted diffusion are visualized in the left parietal lobe measuring up to 1.6 cm.  There is no evidence of acute or chronic intracranial hemorrhage.  No mass effect or midline shift.] [No abnormal extra-axial collections.  The major vascular flow voids appear intact.  Bilateral periventricular matter T2/FLAIR hyperintensities are visualized compatible with changes of chronic microvessel ischemia.  The posterior fossa is unremarkable.  Calvarial and superficial soft tissue signal is within normal limits.  Orbits appear unremarkable.  Paranasal sinuses demonstrate no evidence of air-fluid levels. Small left mastoid effusion is visualized. Right mastoid air cells are clear.  Midline structures are intact.     Impression:     Impression:    Small patchy foci of nonhemorrhagic infarction in the left parietal lobe measuring up to 1.6 cm.    Mild changes of chronic microvascular ischemia.    Findings discussed with Nitesh from the stroke team on January 15, 2024 at 10:25 p.m.        Electronically Signed: Joao Lake MD   1/15/2024 10:24 PM EST       MRI ANGIOGRAM HEAD WO CONTRAST, MRI ANGIOGRAM NECK W CONTRAST    Date of Exam: 1/15/2024 9:27 PM  EST    Indication: Stroke, follow up.     Comparison: None available.    Technique:  Routine 3-D time-of-flight gradient echo imaging was obtained of the head without contrast administration. MRA of the neck was obtained pre and post administration of MultiHance 10 mL IV.      Findings:    Vascular Findings:    The right common carotid, internal carotid, middle cerebral, anterior cerebral, vertebral, and posterior cerebral arteries are patent without abrupt cut off or aneurysmal dilation.    The left common carotid, internal carotid, middle cerebral, anterior cerebral, vertebral, and posterior cerebral arteries are patent without abrupt cut off or aneurysmal dilation.    Basilar artery appears patent and appears unremarkable.      Non-vascular Findings:      Limited visualization of the brain parenchyma is grossly unremarkable.      No acute abnormality is identified within the visualized soft tissue or bony structures of the neck.     Impression:     Impression:    No vascular abnormality visualized.        Electronically Signed: Joao Lake MD   1/15/2024 10:30 PM EST       CT HEAD WO CONTRAST    Date of Exam: 1/15/2024 4:32 PM EST    Indication: Neuro deficit, acute, stroke suspected.    Comparison: Noncontrast CT of the brain performed on August 25, 2023    Technique: Axial CT images were obtained of the head without contrast administration.  Automated exposure control and iterative construction methods were used.      Findings:  Small area of hypodensity is visualized in the high left parietal lobe at the level of the corona radiata measuring 2 cm. No intracranial hemorrhage visualized. No evidence of mass or mass effect. Mild bilateral periventricular white matter hypodensities   are visualized compatible with changes of chronic microvessel ischemia.    There is no extra-axial collections.    Ventricles are normal in size and configuration for patient's stated age.    Posterior fossa is within normal  limits.    Calvarium and skull base appear intact. Visualized sinuses show no air fluid levels. The mastoid air cells are clear. Visualized orbits are unremarkable.     Impression:     Impression:    2 cm focus of hypodensity in the high left parietal lobe suspicious for nonhemorrhagic infarct. Recommend correlation with MRI.    No intracranial hemorrhage visualized.    Findings compatible with mild changes of chronic microvascular ischemia.    Findings relayed to JASPER Terrazas on January 15, 2024 at 4:45 p.m. by telephone.        Electronically Signed: Joao Lake MD   1/15/2024 4:45 PM EST       Interpretation Summary echocardiogram January 15, 2024         Left ventricular systolic function is normal. Calculated left ventricular EF = 64.6% Normal left ventricular cavity size and wall thickness noted. All left ventricular wall segments contract normally. Left ventricular diastolic function was normal.    Normal right ventricular cavity size, wall thickness, systolic function and septal motion noted.    The aortic valve is grossly normal in structure. No significant aortic valve regurgitation is present. No hemodynamically significant aortic valve stenosis is present.    The mitral valve is grossly normal in structure. Trace mitral valve regurgitation is present. No significant mitral valve stenosis is present.    The tricuspid valve is grossly normal in structure. Trace tricuspid valve regurgitation is present. Estimated right ventricular systolic pressure from tricuspid regurgitation is normal (<35 mmHg). No evidence of significant tricuspid valve stenosis is present.      All medications reviewed.   aspirin, 325 mg, Oral, Daily  atorvastatin, 20 mg, Oral, Nightly  cefTRIAXone, 1,000 mg, Intravenous, Q24H  clopidogrel, 75 mg, Oral, Daily  heparin (porcine), 5,000 Units, Subcutaneous, Q8H  insulin lispro, 3-14 Units, Subcutaneous, 4x Daily AC & at Bedtime  [START ON 1/17/2024] pantoprazole, 40 mg, Oral, Q  AM  sodium chloride, 10 mL, Intravenous, Q12H  valsartan, 40 mg, Oral, Q24H          Assessment & Plan       Junctional bradycardia    Type 1 diabetes mellitus with hyperglycemia (On insulin pump)    History of CVA (cerebrovascular accident)    Hypotension    Bradycardia    R/O NSTEMI (non-ST elevated myocardial infarction)    LAUREN (acute kidney injury)    UTI (urinary tract infection)    63-year-old woman, non-smoker, with type 1 diabetes, hypertension, GERD, presenting to the emergency room last night with hypotension, bradycardia, weakness and fatigue, dizziness.  She takes diltiazem for history of tachycardia.  She received some hydration and was placed on dopamine with improvement in her heart rate.  Troponins were elevated without EKG changes.  Bradycardia improved and she was able to wean off dopamine.      She has not had persistent confusion and underwent an emergent CT scan on January 15 that revealed a small left parietal stroke.  Stroke team was consulted and she was outside the window for TNK.    She has a history of diabetes mellitus type 1 on insulin pump at home.  She was on an insulin drip on admission for poorly controlled blood sugars.  Blood sugar dropped to 50 yesterday and she received some D50 and insulin was stopped.        #1 junctional bradycardia, resolved, dopamine stopped..  Possibly related to her calcium blocker.  Echo revealed EF 64% with no significant valvular disease.  Troponins were elevated on admission.    -Continue to hold calcium blocker  -Cardiology consult for elevated troponins, arrhythmia, holding off on myocardial perfusion scan secondary to her stroke  -MCOT at discharge      #2  Acute left parietal stroke with expressive aphasia.  NIH stroke scale is a 4 today    - aspirin, Lipitor  -Plavix   -PT, OT, speech therapy    #3 diabetes mellitus type 1, her A1c is 7.8.  She uses an insulin pump at home.  Currently blood sugars are running 100-270.      -Sliding scale  insulin  -Restart insulin pump at discharge    #4 urinary tract infection she had 1+ bacteria on her admission urinalysis.    -Rocephin x 3 doses    #5 hypotension, resolved with fluids, blood pressure as high as 155    -Restart Diovan at 40 mg daily    #6 acute kidney injury serum creatinine  is 0.84, compared to 1.95 on admission.  She was most likely hypotensive with hypoperfusion and acute kidney injury.  She does have some protein in her urine but also has a urinary tract infection.  Urinalysis December 26 was negative for protein.    -Monitor urine output  -Avoid hypotension  -Avoid nephrotoxic drugs    Anticipate discharge to [facility] tomorrow    VTE Prophylaxis: subcutaneous heparin    Stress Ulcer Prophylaxis: Protonix    Dea Montejo MD  01/16/24  14:53 EST      Time: Critical care 35 min  I personally provided care to this critically ill patient as documented above.  Critical care time does not include time spent on separately billed procedures.  None of my critical care time was concurrent with other critical care providers.

## 2024-01-16 NOTE — PLAN OF CARE
Goal Outcome Evaluation:  Plan of Care Reviewed With: patient, family        Progress:  (initial eval')     SLP evaluation completed. Will continue to address dysphagia, aphasia. Recommend regular diet w/ thin liquids. Please see note for further details and recommendations.      Anticipated Discharge Disposition (SLP): inpatient rehabilitation facility    SLP Diagnosis: moderate, aphasia, suspected, apraxia (01/16/24 0830)  SLP Diagnosis Comments: Unable to assess RC and WE 2/2 pt becoming easily frustrated 2/2 difficulty w/ tasks. (01/16/24 0830)  SLP Swallowing Diagnosis: R/O pharyngeal dysphagia, oral dysphagia (01/16/24 0830)

## 2024-01-16 NOTE — PROGRESS NOTES
Stroke Progress Note       Chief Complaint: Acute left parietal infarct    Subjective     Subjective:  Emily Grove was seen and examined at bedside.  The patient's , brother, and sister were present for the encounter.  No known history of atrial fibrillation.  Previously wore a Holter monitor which was negative for atrial fibrillation.  Results of MRI were discussed.  All questions and concerns were answered.       Objective      Temp:  [97.5 °F (36.4 °C)-98.2 °F (36.8 °C)] 97.7 °F (36.5 °C)  Heart Rate:  [69-97] 87  Resp:  [16-18] 16  BP: (109-155)/() 116/79            Objective    Physical Exam:  General Appearance: Alert  Eyes: Anicteric sclera  HEENT: no scleral injection  Lungs: respirations appear comfortable, no obvious increased work of breathing  Extremities: No cyanosis or fingernail clubbing   Skin: No rashes in exposed skin areas     Neurological Examination:   Mental status: Alert.  Nonfluent.  Oriented to person and place with options.  Not oriented to time. Speech with mild dysarthria.  Able to repeat.  Unable to read.  Follows intermittent commands.  Cranial Nerves: Visual fields intact. Extraocular movements are intact and spontaneous. Facial sensation intact. Face symmetrical. Hearing grossly intact. Full shoulder shrug bilaterally. Tongue protrudes midline.   Sensory: Sensory exam to light touch in all four extremities distally is normal.   Motor: Normal tone throughout. Normal bulk. Pronator drift is absent.  Strength exam: Limited participation, at least 3/5 in all extremities  Gait: Not assessed.       Results Review:    MRI Angiogram Head Without Contrast    Result Date: 1/15/2024  Impression: No vascular abnormality visualized. Electronically Signed: Joao Lake MD  1/15/2024 10:30 PM EST  Workstation ID: [id number]    MRI Angiogram Neck With Contrast    Result Date: 1/15/2024  Impression: No vascular abnormality visualized. Electronically Signed: Joao Lake MD   1/15/2024 10:30 PM EST  Workstation ID: [id number]    MRI Brain Without Contrast    Result Date: 1/15/2024  Impression: Small patchy foci of nonhemorrhagic infarction in the left parietal lobe measuring up to 1.6 cm. Mild changes of chronic microvascular ischemia. Findings discussed with Nitesh from the stroke team on January 15, 2024 at 10:25 p.m. Electronically Signed: Joao Lake MD  1/15/2024 10:24 PM EST  Workstation ID: [id number]    CT Head Without Contrast    Result Date: 1/15/2024  Impression: 2 cm focus of hypodensity in the high left parietal lobe suspicious for nonhemorrhagic infarct. Recommend correlation with MRI. No intracranial hemorrhage visualized. Findings compatible with mild changes of chronic microvascular ischemia. Findings relayed to JASPER Terrazas on January 15, 2024 at 4:45 p.m. by telephone. Electronically Signed: Joao Lake MD  1/15/2024 4:45 PM EST  Workstation ID: [id number]    XR Chest 1 View    Result Date: 1/14/2024  Impression: No active disease Electronically Signed: Rodolfo Bauer MD  1/14/2024 10:02 PM EST  Workstation ID: [id number]   Results for orders placed during the hospital encounter of 01/14/24    Adult Transthoracic Echo Complete W/ Cont if Necessary Per Protocol    Interpretation Summary    Left ventricular systolic function is normal. Calculated left ventricular EF = 64.6% Normal left ventricular cavity size and wall thickness noted. All left ventricular wall segments contract normally. Left ventricular diastolic function was normal.    Normal right ventricular cavity size, wall thickness, systolic function and septal motion noted.    The aortic valve is grossly normal in structure. No significant aortic valve regurgitation is present. No hemodynamically significant aortic valve stenosis is present.    The mitral valve is grossly normal in structure. Trace mitral valve regurgitation is present. No significant mitral valve stenosis is present.    The tricuspid valve is grossly  normal in structure. Trace tricuspid valve regurgitation is present. Estimated right ventricular systolic pressure from tricuspid regurgitation is normal (<35 mmHg). No evidence of significant tricuspid valve stenosis is present.      Labs:  Lab Results   Component Value Date    HGBA1C 7.80 (H) 01/16/2024      Lab Results   Component Value Date    CHOL 106 01/16/2024    TRIG 107 01/16/2024    HDL 50 01/16/2024    LDL 36 01/16/2024     LIVER FUNCTION TESTS:      Lab 01/15/24  0842 01/14/24  2352 01/14/24  2118   TOTAL PROTEIN 6.3 5.8* 7.5   ALBUMIN 3.9 3.2* 4.2   GLOBULIN 2.4 2.6 3.3   ALT (SGPT) 89* 58* 50*   AST (SGOT) 85* 85* 74*   BILIRUBIN 0.2 0.4 0.3   ALK PHOS 215* 162* 198*                 Assessment/Plan     Assessment:  Emily Grove is a 63-year-old right-handed female with a past medical history of chronic left MCA territory infarct, diabetes mellitus type 2 (hemoglobin A1c 7.8%) with insulin pump, hyperlipidemia, and hypertension who presented to [facility] Emergency Department on 1/14/2024 with hypotension and bradycardia.  On 1/15/2024, the patient was noted to have aphasia and stroke neurology was consulted for further evaluation.  CT head 1/15/2024 shows high left parietal lesion.  MRI brain 1/15/2024 shows cortical left parietal lobe infarct.  MR angiogram head and neck 1/15/2024 without significant stenoses.  Transthoracic echocardiogram 1/15/2024 left ventricular ejection fraction 64.6%, normal left atrial cavity, patent foramen ovale.  Patient previously wore a Holter monitor from 11/6-11/20/19 which was negative for atrial fibrillation.    # Acute left parietal lesion  MRI suggestive of cardioembolic etiology given cortical infarct, no history of atrial fibrillation.  -Monitor telemetry for atrial fibrillation  -If no atrial fibrillation is found on telemetry, would recommend Holter monitor prior to discharge  -Speech therapy recommends meds whole, with purée, as  tolerated  -Continue aspirin 325 mg daily  -Continue clopidogrel 75 mg daily  -Continue atorvastatin 20 mg daily (home medication, LDL 36)  -Physical Occupational Therapy recommend acute inpatient rehabilitation facility  -DVT prophylaxis with heparin    # Patent foramen ovale  -Bilateral lower extremity ultrasound ordered to rule out deep vein thrombosis  -Cardiology following, will reach out to team to discuss closure if large (unclear from TTE read 8/25/23)        Discharge Planning: stroke neurology will follow results of bilateral lower extremity ultrasound    Jennifer Lebron MD  [facility] [place]  01/16/24  17:02 EST

## 2024-01-16 NOTE — THERAPY EVALUATION
Patient Name: Emily Grove  : 1960    MRN: [id number]                              Today's Date: 2024       Admit Date: 2024    Visit Dx:     ICD-10-CM ICD-9-CM   1. Hypotension, unspecified hypotension type  I95.9 458.9   2. Junctional bradycardia  R00.1 427.89   3. Acute kidney injury (nontraumatic)  N17.9 584.9   4. Non-ST elevation MI (NSTEMI)  I21.4 410.70   5. Dysphagia, unspecified type  R13.10 787.20   6. Aphasia  R47.01 784.3     Patient Active Problem List   Diagnosis    Fatty liver    Family history of polyps in the colon    Gastroesophageal reflux disease    Precordial pain    Type 1 diabetes mellitus with hyperglycemia (On insulin pump)    Abnormal EKG    Other fatigue    Dizziness    Tachycardia    Palpitations    CVA (cerebral vascular accident)    History of CVA (cerebrovascular accident)    Hypotension    Bradycardia    R/O NSTEMI (non-ST elevated myocardial infarction)    LAUREN (acute kidney injury)    UTI (urinary tract infection)    Junctional bradycardia     Past Medical History:   Diagnosis Date    Acid reflux     Diabetes mellitus     Hyperlipidemia     Hypertension     Overactive bladder     Stroke      Past Surgical History:   Procedure Laterality Date    BLADDER SURGERY      GALL BLADDER    CARPAL TUNNEL RELEASE      FINGER FUSION      FOOT SURGERY      TONSILLECTOMY        General Information       Row Name 24 1258          OT Time and Intention    Document Type evaluation  -CS     Mode of Treatment occupational therapy  -CS       Row Name 24 1258          General Information    Patient Profile Reviewed yes  -CS     Prior Level of Function independent:;all household mobility;ADL's  -CS     Existing Precautions/Restrictions fall;other (see comments)  aphasia, R sided weakness  -CS     Barriers to Rehab medically complex  -CS       Row Name 24 1256          Living Environment    People in Home spouse;child(linh), adult  -CS       Row Name 24 1257           Home Main Entrance    Number of Stairs, Main Entrance three  -CS       Row Name 01/16/24 1258          Cognition    Orientation Status (Cognition) oriented x 3;verbal cues/prompts needed for orientation  -CS       Row Name 01/16/24 1258          Safety Issues, Functional Mobility    Impairments Affecting Function (Mobility) balance;coordination;endurance/activity tolerance;strength;cognition  -CS     Cognitive Impairments, Mobility Safety/Performance attention;insight into deficits/self-awareness;sequencing abilities  -CS               User Key  (r) = Recorded By, (t) = Taken By, (c) = Cosigned By      Initials Name Provider Type    CS Shannon Preston OT Occupational Therapist                     Mobility/ADL's       Row Name 01/16/24 1259          Bed Mobility    Bed Mobility supine-sit  -     Supine-Sit [place] (Bed Mobility) minimum assist (75% patient effort);verbal cues  -     Assistive Device (Bed Mobility) bed rails;draw sheet;head of bed elevated  -       Row Name 01/16/24 1259          Transfers    Transfers sit-stand transfer;stand-sit transfer  -       Row Name 01/16/24 1259          Sit-Stand Transfer    Sit-Stand [place] (Transfers) minimum assist (75% patient effort);verbal cues  -       Row Name 01/16/24 1259          Stand-Sit Transfer    Stand-Sit [place] (Transfers) minimum assist (75% patient effort);verbal cues  -CS       Row Name 01/16/24 1259          Activities of Daily Living    BADL Assessment/Intervention lower body dressing;grooming  -       Row Name 01/16/24 1259          Lower Body Dressing Assessment/Training    [place] Level (Lower Body Dressing) don;socks;dependent (less than 25% patient effort)  -CS     Position (Lower Body Dressing) supine  -CS       Row Name 01/16/24 1259          Grooming Assessment/Training    [place] Level (Grooming) moderate assist (50% patient effort)  -CS     Position (Grooming) sitting up in bed  -CS     Comment,  (Grooming) hearing aids  -CS               User Key  (r) = Recorded By, (t) = Taken By, (c) = Cosigned By      Initials Name Provider Type    CS Shannon Preston OT Occupational Therapist                   Obj/Interventions       [facility] Name 01/16/24 1300          Sensory Assessment (Somatosensory)    Sensory Assessment (Somatosensory) UE sensation intact  -[facility] Name 01/16/24 1300          Range of Motion Comprehensive    General Range of Motion bilateral upper extremity ROM WFL  -[facility] Name 01/16/24 1300          Strength Comprehensive (MMT)    Comment, General Manual Muscle Testing (MMT) Assessment BUE grossly 4-/5  -CS       [facility] Name 01/16/24 1300          Balance    Balance Assessment sitting static balance;sitting dynamic balance;standing static balance;standing dynamic balance  -CS     Static Sitting Balance contact guard  -CS     Dynamic Sitting Balance contact guard  -CS     Position, Sitting Balance sitting edge of bed  -CS     Static Standing Balance minimal assist  -CS     Dynamic Standing Balance minimal assist  -CS     Position/Device Used, Standing Balance supported  -CS     Balance Interventions sitting;standing;dynamic;static;dynamic reaching;weight shifting activity;occupation based/functional task  -CS               User Key  (r) = Recorded By, (t) = Taken By, (c) = Cosigned By      Initials Name Provider Type    CS Shannon Preston OT Occupational Therapist                   Goals/Plan       Row Name 01/16/24 1302          Transfer Goal 1 (OT)    Activity/Assistive Device (Transfer Goal 1, OT) sit-to-stand/stand-to-sit;toilet  -CS     [place] Level/Cues Needed (Transfer Goal 1, OT) standby assist  -CS     Time Frame (Transfer Goal 1, OT) long term goal (LTG);10 days  -CS     Progress/Outcome (Transfer Goal 1, OT) goal ongoing  -[facility] Name 01/16/24 1302          Dressing Goal 1 (OT)    Activity/Device (Dressing Goal 1, OT) lower body dressing  -CS     [place]/Cues Needed  (Dressing Goal 1, OT) minimum assist (75% or more patient effort)  -CS     Time Frame (Dressing Goal 1, OT) long term goal (LTG);10 days  -CS     Strategies/Barriers (Dressing Goal 1, OT) don/doff socks w/ AAD  -CS     Progress/Outcome (Dressing Goal 1, OT) goal ongoing  -CS       Row Name 01/16/24 1302          Grooming Goal 1 (OT)    Activity/Device (Grooming Goal 1, OT) hair care;wash face, hands;oral care  -CS     [place] (Grooming Goal 1, OT) standby assist  -CS     Time Frame (Grooming Goal 1, OT) long term goal (LTG);10 days  -CS     Progress/Outcome (Grooming Goal 1, OT) goal ongoing  -CS       Row Name 01/16/24 1302          Therapy Assessment/Plan (OT)    Planned Therapy Interventions (OT) activity tolerance training;adaptive equipment training;BADL retraining;functional balance retraining;occupation/activity based interventions;ROM/therapeutic exercise;strengthening exercise;transfer/mobility retraining  -CS               User Key  (r) = Recorded By, (t) = Taken By, (c) = Cosigned By      Initials Name Provider Type    CS Shannon Preston, OT Occupational Therapist                   Clinical Impression       Row Name 01/16/24 1300          Pain Assessment    Pretreatment Pain Rating 0/10 - no pain  -CS     Posttreatment Pain Rating 0/10 - no pain  -CS       Row Name 01/16/24 1300          Plan of Care Review    Plan of Care Reviewed With patient;family  -CS     Progress improving  -CS     Outcome Evaluation OT eval complete. P tpresents w/ deficits in balance, cognition, and functional endurance warranting cont skilled IPOT POC to promote return to PLOF. Recommend pt DC to IP rehab.  -CS       Row Name 01/16/24 1300          Therapy Assessment/Plan (OT)    Patient/Family Therapy Goal Statement (OT) Return to PLOF  -CS     Rehab Potential (OT) good, to achieve stated therapy goals  -CS     Criteria for Skilled Therapeutic Interventions Met (OT) yes;skilled treatment is necessary  -CS     Therapy  Frequency (OT) daily  -CS       Row Name 01/16/24 1300          Therapy Plan Review/Discharge Plan (OT)    Anticipated Discharge Disposition (OT) inpatient rehabilitation facility  -CS       Row Name 01/16/24 1300          Vital Signs    Pre Systolic BP Rehab 155  -CS     Pre Treatment Diastolic BP 88  -CS     Post Systolic BP Rehab 138  -CS     Post Treatment Diastolic BP 88  -CS     Pretreatment Heart Rate (beats/min) 73  -CS     Posttreatment Heart Rate (beats/min) 78  -CS     Pre SpO2 (%) 100  -CS     O2 Delivery Pre Treatment room air  -CS     Post SpO2 (%) 94  -CS     O2 Delivery Post Treatment room air  -CS     Pre Patient Position Supine  -CS     Intra Patient Position Standing  -CS     Post Patient Position Sitting  -CS       Row Name 01/16/24 1300          Positioning and Restraints    Pre-Treatment Position in bed  -CS     Post Treatment Position bed  -CS               User Key  (r) = Recorded By, (t) = Taken By, (c) = Cosigned By      Initials Name Provider Type    CS Shannon Preston, OT Occupational Therapist                   Outcome Measures       Row Name 01/16/24 1303          How much help from another is currently needed...    Putting on and taking off regular lower body clothing? 2  -CS     Bathing (including washing, rinsing, and drying) 2  -CS     Toileting (which includes using toilet bed pan or urinal) 2  -CS     Putting on and taking off regular upper body clothing 2  -CS     Taking care of personal grooming (such as brushing teeth) 3  -CS     Eating meals 3  -CS     AM-PAC 6 Clicks Score (OT) 14  -CS       Row Name 01/16/24 1059          How much help from another person do you currently need...    Turning from your back to your side while in flat bed without using bedrails? 4  -ES     Moving from lying on back to sitting on the side of a flat bed without bedrails? 3  -ES     Moving to and from a bed to a chair (including a wheelchair)? 3  -ES     Standing up from a chair using your arms  (e.g., wheelchair, bedside chair)? 3  -ES     Climbing 3-5 steps with a railing? 3  -ES     To walk in hospital room? 3  -ES     AM-PAC 6 Clicks Score (PT) 19  -ES     Highest Level of Mobility Goal 6 --> Walk 10 steps or more  -ES       Row Name 01/16/24 1303 01/16/24 1059       Modified Jaylan Scale    Pre-Stroke Modified Jaylan Scale 6 - Unable to determine (UTD) from the medical record documentation  -CS 6 - Unable to determine (UTD) from the medical record documentation  -ES    Modified Aurora Scale 3 - Moderate disability.  Requiring some help, but able to walk without assistance.  -CS 3 - Moderate disability.  Requiring some help, but able to walk without assistance.  -ES      Row Name 01/16/24 1303 01/16/24 1059       Functional Assessment    Outcome Measure Options AM-PAC 6 Clicks Daily Activity (OT);Modified Jaylan  -CS AM-PAC 6 Clicks Basic Mobility (PT);Modified Aurora  -ES              User Key  (r) = Recorded By, (t) = Taken By, (c) = Cosigned By      Initials Name Provider Type    Shannon Stanton, OT Occupational Therapist    ES Lexi Black, PT Physical Therapist                    Occupational Therapy Education       Title: PT OT SLP Therapies (In Progress)       Topic: Occupational Therapy (Not Started)       Point: ADL training (Not Started)       Description:   Instruct learner(s) on proper safety adaptation and remediation techniques during self care or transfers.   Instruct in proper use of assistive devices.                  Learner Progress:  Not documented in this visit.              Point: Home exercise program (Not Started)       Description:   Instruct learner(s) on appropriate technique for monitoring, assisting and/or progressing therapeutic exercises/activities.                  Learner Progress:  Not documented in this visit.              Point: Body mechanics (Not Started)       Description:   Instruct learner(s) on proper positioning and spine alignment during self-care,  functional mobility activities and/or exercises.                  Learner Progress:  Not documented in this visit.                                  OT Recommendation and Plan  Planned Therapy Interventions (OT): activity tolerance training, adaptive equipment training, BADL retraining, functional balance retraining, occupation/activity based interventions, ROM/therapeutic exercise, strengthening exercise, transfer/mobility retraining  Therapy Frequency (OT): daily  Plan of Care Review  Plan of Care Reviewed With: patient, family  Progress: improving  Outcome Evaluation: OT eval complete. P tpresents w/ deficits in balance, cognition, and functional endurance warranting cont skilled IPOT POC to promote return to PLOF. Recommend pt DC to IP rehab.     Time Calculation:   Evaluation Complexity (OT)  Review Occupational Profile/Medical/Therapy History Complexity: expanded/moderate complexity  Assessment, Occupational Performance/Identification of Deficit Complexity: 5 or more performance deficits  Clinical Decision Making Complexity (OT): detailed assessment/moderate complexity  Overall Complexity of Evaluation (OT): moderate complexity     Time Calculation- OT       Row Name 01/16/24 1305 01/16/24 1100          Time Calculation- OT    OT Start Time 0920  -CS --     OT Received On 01/16/24  -CS --     OT Goal Re-Cert Due Date 01/26/24  -CS --        Timed Charges    82127 - Gait Training Minutes  -- 13  -ES        Untimed Charges    OT Eval/Re-eval Minutes 46  -CS --        Total Minutes    Timed Charges Total Minutes -- 13  -ES     Untimed Charges Total Minutes 46  -CS --      Total Minutes 46  -CS 13  -ES               User Key  (r) = Recorded By, (t) = Taken By, (c) = Cosigned By      Initials Name Provider Type    Shannon Stanton, OT Occupational Therapist    ES Lexi Black, NOEMY Physical Therapist                  Therapy Charges for Today       Code Description Service Date Service Provider Modifiers Qty     [id number]  OT EVAL MOD COMPLEXITY 4 1/16/2024 Shannon Preston, OT GO 1                 Shannon Preston, RANDAL  1/16/2024

## 2024-01-16 NOTE — PROGRESS NOTES
"  [facility]  PROGRESS NOTE    Date of Admission: 1/14/2024  Date of Service: 01/16/24    Primary Care Physician: Marie Morales MD    Chief Complaint: follow up elevated troponin, junctional bradycardia    Problem List:   Junctional bradycardia    Type 1 diabetes mellitus with hyperglycemia (On insulin pump)    History of CVA (cerebrovascular accident)    Hypotension    Bradycardia    R/O NSTEMI (non-ST elevated myocardial infarction)    LAUREN (acute kidney injury)    UTI (urinary tract infection)      Subjective      Found with new CVA on MRI. No evidence of atrial fibrillation on telemetry. Multiple family members at bedside. Did not have stress this morning and did just finish eating chocolate ice cream at the time of my visit. No chest pain or shortness of breath    Objective   Vitals: BP (!) 124/105   Pulse 69   Temp 97.8 °F (36.6 °C) (Oral)   Resp 16   Ht 139.7 cm (55\")   Wt 49.9 kg (110 lb)   SpO2 96%   BMI 25.57 kg/m²     Physical Exam:  GENERAL: no acute distress.   HEENT:  no jugular venous distention  HEART: Regular rhythm, normal rate, and no murmurs, gallops, or rubs.   LUNGS: Clear to auscultation bilaterally. No wheezing, rales or rhonchi.  EXTREMITIES: No clubbing, cyanosis, or edema noted.     Results:  Results from last 7 days   Lab Units 01/15/24  0842 01/14/24  2118   WBC 10*3/mm3 6.76 9.62   HEMOGLOBIN g/dL 12.3 13.5   HEMATOCRIT % 36.4 41.2   PLATELETS 10*3/mm3 159 265     Results from last 7 days   Lab Units 01/15/24  0842 01/14/24  2352 01/14/24  2118   SODIUM mmol/L 143 140 137   POTASSIUM mmol/L 4.2 4.7 4.9   CHLORIDE mmol/L 108* 107 100   CO2 mmol/L 22.0 23.0 27.0   BUN mg/dL 21 33* 32*   CREATININE mg/dL 0.84 1.60* 1.95*   GLUCOSE mg/dL 83 120* 150*      Lab Results   Component Value Date    CHOL 106 01/16/2024    TRIG 107 01/16/2024    HDL 50 01/16/2024    LDL 36 01/16/2024    AST 85 (H) 01/15/2024    ALT 89 (H) 01/15/2024     Results from " last 7 days   Lab Units 01/16/24  0541   HEMOGLOBIN A1C % 7.80*     Results from last 7 days   Lab Units 01/16/24  0541   CHOLESTEROL mg/dL 106   TRIGLYCERIDES mg/dL 107   HDL CHOL mg/dL 50   LDL CHOL mg/dL 36                 Results from last 7 days   Lab Units 01/14/24  2352 01/14/24  2118   HSTROP T ng/L 194* 265*             Intake/Output Summary (Last 24 hours) at 1/16/2024 0926  Last data filed at 1/16/2024 0600  Gross per 24 hour   Intake 494 ml   Output 2650 ml   Net -2156 ml       I personally reviewed the patient's EKG/Telemetry data    Radiology Data:   Results for orders placed during the hospital encounter of 01/14/24    Adult Transthoracic Echo Complete W/ Cont if Necessary Per Protocol    Interpretation Summary    Left ventricular systolic function is normal. Calculated left ventricular EF = 64.6% Normal left ventricular cavity size and wall thickness noted. All left ventricular wall segments contract normally. Left ventricular diastolic function was normal.    Normal right ventricular cavity size, wall thickness, systolic function and septal motion noted.    The aortic valve is grossly normal in structure. No significant aortic valve regurgitation is present. No hemodynamically significant aortic valve stenosis is present.    The mitral valve is grossly normal in structure. Trace mitral valve regurgitation is present. No significant mitral valve stenosis is present.    The tricuspid valve is grossly normal in structure. Trace tricuspid valve regurgitation is present. Estimated right ventricular systolic pressure from tricuspid regurgitation is normal (<35 mmHg). No evidence of significant tricuspid valve stenosis is present.        Current Medications:  aspirin, 325 mg, Oral, Daily  atorvastatin, 20 mg, Oral, Nightly  cefTRIAXone, 1,000 mg, Intravenous, Q24H  clopidogrel, 75 mg, Oral, Daily  heparin (porcine), 5,000 Units, Subcutaneous, Q8H  insulin lispro, 3-14 Units, Subcutaneous, 4x Daily AC & at  Bedtime  pantoprazole, 40 mg, Intravenous, Q AM  sodium chloride, 10 mL, Intravenous, Q12H      DOPamine, 2-20 mcg/kg/min, Last Rate: Stopped (01/15/24 0901)      MRI 1/15/2023:  IMPRESSION:  Impression:  Small patchy foci of nonhemorrhagic infarction in the left parietal lobe measuring up to 1.6 cm.  Mild changes of chronic microvascular ischemia.      Assessment:  Elevated troponin, likely type II mechanism   Normal EF on echocardiogram.   History of hypertension, hypotensive this admission  Junctional bradycardia, was on diltiazem at home, currently on hold  AMS this admssion  History of CVA, 8/2023  Concern for acute AIS with CT finding New hypodensity at left frontal, parietal, corona radiata area comparing to prior CTA back in August 25, 2023.   MRI 1/15/2024: Small patchy foci of nonhemorrhagic infarction in the left parietal lobe measuring up to 1.6 cm.  Type I diabetes  Transaminitis  LAUREN on arrival, now resolved    Plan:  MPS cancelled today. Will consider MPS tomorrow. She will need to be first available as she is a type I diabetic.   Monitor telemetry for arrhythmia.   30 day MCOT at discharge to assess for bradycardiac/pause/block as well as for atrial fibrillation. If unremarkable, will likely need loop implant.   Will resume low dose valsartan today. Monitor BP.  Statin on hold due to transaminitis. Continue aspirin and plavix.       Anais Hutchinson PA-C

## 2024-01-16 NOTE — CASE MANAGEMENT/SOCIAL WORK
Continued Stay Note   [place]     Patient Name: Emily Grove  MRN: [id number]  Today's Date: 1/16/2024    Admit Date: 1/14/2024    Plan: [facility] acute   Discharge Plan       Row Name 01/16/24 1519       Plan    Plan [facility] acute    Plan Comments Spoke with patient's spouse regarding disposition and therapy recommendations for IRF and he is agreeable to [facility].  Referral sent to April with [facility].   will continue to follow.    Final Discharge Disposition Code 62 - inpatient rehab facility                   Discharge Codes    No documentation.                       Marla Aquino RN

## 2024-01-17 LAB
BH CV LOWER VASCULAR LEFT ANTERIOR SAPH COMPRESS: NORMAL
BH CV LOWER VASCULAR LEFT COMMON FEMORAL AUGMENT: NORMAL
BH CV LOWER VASCULAR LEFT COMMON FEMORAL COMPRESS: NORMAL
BH CV LOWER VASCULAR LEFT COMMON FEMORAL PHASIC: NORMAL
BH CV LOWER VASCULAR LEFT COMMON FEMORAL SPONT: NORMAL
BH CV LOWER VASCULAR LEFT DISTAL FEMORAL COMPRESS: NORMAL
BH CV LOWER VASCULAR LEFT GASTRONEMIUS COMPRESS: NORMAL
BH CV LOWER VASCULAR LEFT GREATER SAPH AK COMPRESS: NORMAL
BH CV LOWER VASCULAR LEFT GREATER SAPH BK COMPRESS: NORMAL
BH CV LOWER VASCULAR LEFT LESSER SAPH COMPRESS: NORMAL
BH CV LOWER VASCULAR LEFT MID FEMORAL AUGMENT: NORMAL
BH CV LOWER VASCULAR LEFT MID FEMORAL COMPRESS: NORMAL
BH CV LOWER VASCULAR LEFT MID FEMORAL PHASIC: NORMAL
BH CV LOWER VASCULAR LEFT MID FEMORAL SPONT: NORMAL
BH CV LOWER VASCULAR LEFT PERONEAL COMPRESS: NORMAL
BH CV LOWER VASCULAR LEFT POPLITEAL AUGMENT: NORMAL
BH CV LOWER VASCULAR LEFT POPLITEAL COMPRESS: NORMAL
BH CV LOWER VASCULAR LEFT POPLITEAL PHASIC: NORMAL
BH CV LOWER VASCULAR LEFT POPLITEAL SPONT: NORMAL
BH CV LOWER VASCULAR LEFT POSTERIOR TIBIAL COMPRESS: NORMAL
BH CV LOWER VASCULAR LEFT PROXIMAL FEMORAL COMPRESS: NORMAL
BH CV LOWER VASCULAR LEFT SAPHENOFEMORAL JUNCTION COMPRESS: NORMAL
BH CV LOWER VASCULAR RIGHT COMMON FEMORAL AUGMENT: NORMAL
BH CV LOWER VASCULAR RIGHT COMMON FEMORAL COMPRESS: NORMAL
BH CV LOWER VASCULAR RIGHT COMMON FEMORAL PHASIC: NORMAL
BH CV LOWER VASCULAR RIGHT COMMON FEMORAL SPONT: NORMAL
BH CV LOWER VASCULAR RIGHT DISTAL FEMORAL COMPRESS: NORMAL
BH CV LOWER VASCULAR RIGHT GASTRONEMIUS COMPRESS: NORMAL
BH CV LOWER VASCULAR RIGHT GREATER SAPH AK COMPRESS: NORMAL
BH CV LOWER VASCULAR RIGHT GREATER SAPH BK COMPRESS: NORMAL
BH CV LOWER VASCULAR RIGHT LESSER SAPH COMPRESS: NORMAL
BH CV LOWER VASCULAR RIGHT MID FEMORAL AUGMENT: NORMAL
BH CV LOWER VASCULAR RIGHT MID FEMORAL COMPRESS: NORMAL
BH CV LOWER VASCULAR RIGHT MID FEMORAL PHASIC: NORMAL
BH CV LOWER VASCULAR RIGHT MID FEMORAL SPONT: NORMAL
BH CV LOWER VASCULAR RIGHT PERONEAL COMPRESS: NORMAL
BH CV LOWER VASCULAR RIGHT POPLITEAL AUGMENT: NORMAL
BH CV LOWER VASCULAR RIGHT POPLITEAL COMPRESS: NORMAL
BH CV LOWER VASCULAR RIGHT POPLITEAL PHASIC: NORMAL
BH CV LOWER VASCULAR RIGHT POPLITEAL SPONT: NORMAL
BH CV LOWER VASCULAR RIGHT POSTERIOR TIBIAL COMPRESS: NORMAL
BH CV LOWER VASCULAR RIGHT PROXIMAL FEMORAL COMPRESS: NORMAL
BH CV LOWER VASCULAR RIGHT SAPHENOFEMORAL JUNCTION COMPRESS: NORMAL
GLUCOSE BLDC GLUCOMTR-MCNC: 217 MG/DL (ref 70–130)
GLUCOSE BLDC GLUCOMTR-MCNC: 230 MG/DL (ref 70–130)
GLUCOSE BLDC GLUCOMTR-MCNC: 278 MG/DL (ref 70–130)
GLUCOSE BLDC GLUCOMTR-MCNC: 316 MG/DL (ref 70–130)

## 2024-01-17 PROCEDURE — 25010000002 PROCHLORPERAZINE 10 MG/2ML SOLUTION: Performed by: HOSPITALIST

## 2024-01-17 PROCEDURE — 25010000002 HEPARIN (PORCINE) PER 1000 UNITS: Performed by: INTERNAL MEDICINE

## 2024-01-17 PROCEDURE — 99232 SBSQ HOSP IP/OBS MODERATE 35: CPT | Performed by: NURSE PRACTITIONER

## 2024-01-17 PROCEDURE — 63710000001 INSULIN DETEMIR PER 5 UNITS: Performed by: HOSPITALIST

## 2024-01-17 PROCEDURE — 25010000002 ONDANSETRON PER 1 MG: Performed by: NURSE PRACTITIONER

## 2024-01-17 PROCEDURE — 99232 SBSQ HOSP IP/OBS MODERATE 35: CPT | Performed by: STUDENT IN AN ORGANIZED HEALTH CARE EDUCATION/TRAINING PROGRAM

## 2024-01-17 PROCEDURE — 63710000001 INSULIN LISPRO (HUMAN) PER 5 UNITS: Performed by: INTERNAL MEDICINE

## 2024-01-17 PROCEDURE — 99232 SBSQ HOSP IP/OBS MODERATE 35: CPT | Performed by: HOSPITALIST

## 2024-01-17 PROCEDURE — 99232 SBSQ HOSP IP/OBS MODERATE 35: CPT | Performed by: INTERNAL MEDICINE

## 2024-01-17 PROCEDURE — 82948 REAGENT STRIP/BLOOD GLUCOSE: CPT

## 2024-01-17 RX ORDER — BRIMONIDINE TARTRATE 2 MG/ML
1 SOLUTION/ DROPS OPHTHALMIC 3 TIMES DAILY
Status: DISCONTINUED | OUTPATIENT
Start: 2024-01-17 | End: 2024-01-17

## 2024-01-17 RX ORDER — TIMOLOL MALEATE 5 MG/ML
1 SOLUTION/ DROPS OPHTHALMIC 3 TIMES DAILY PRN
Status: DISCONTINUED | OUTPATIENT
Start: 2024-01-17 | End: 2024-01-17

## 2024-01-17 RX ORDER — PROCHLORPERAZINE MALEATE 5 MG/1
5 TABLET ORAL EVERY 6 HOURS PRN
Status: DISCONTINUED | OUTPATIENT
Start: 2024-01-17 | End: 2024-01-19 | Stop reason: HOSPADM

## 2024-01-17 RX ORDER — ONDANSETRON 2 MG/ML
4 INJECTION INTRAMUSCULAR; INTRAVENOUS EVERY 6 HOURS PRN
Status: DISCONTINUED | OUTPATIENT
Start: 2024-01-17 | End: 2024-01-19 | Stop reason: HOSPADM

## 2024-01-17 RX ORDER — BISACODYL 10 MG
10 SUPPOSITORY, RECTAL RECTAL DAILY PRN
Status: DISCONTINUED | OUTPATIENT
Start: 2024-01-17 | End: 2024-01-19 | Stop reason: HOSPADM

## 2024-01-17 RX ORDER — BISACODYL 5 MG/1
5 TABLET, DELAYED RELEASE ORAL DAILY PRN
Status: DISCONTINUED | OUTPATIENT
Start: 2024-01-17 | End: 2024-01-19 | Stop reason: HOSPADM

## 2024-01-17 RX ORDER — PROCHLORPERAZINE 25 MG
25 SUPPOSITORY, RECTAL RECTAL EVERY 12 HOURS PRN
Status: DISCONTINUED | OUTPATIENT
Start: 2024-01-17 | End: 2024-01-19 | Stop reason: HOSPADM

## 2024-01-17 RX ORDER — LATANOPROST 50 UG/ML
1 SOLUTION/ DROPS OPHTHALMIC DAILY
Status: DISCONTINUED | OUTPATIENT
Start: 2024-01-17 | End: 2024-01-19 | Stop reason: HOSPADM

## 2024-01-17 RX ORDER — TIMOLOL MALEATE 5 MG/ML
1 SOLUTION/ DROPS OPHTHALMIC 2 TIMES DAILY
Status: DISCONTINUED | OUTPATIENT
Start: 2024-01-17 | End: 2024-01-17

## 2024-01-17 RX ORDER — BRIMONIDINE TARTRATE 2 MG/ML
1 SOLUTION/ DROPS OPHTHALMIC 2 TIMES DAILY
Status: DISCONTINUED | OUTPATIENT
Start: 2024-01-17 | End: 2024-01-17

## 2024-01-17 RX ORDER — POLYETHYLENE GLYCOL 3350 17 G/17G
17 POWDER, FOR SOLUTION ORAL DAILY PRN
Status: DISCONTINUED | OUTPATIENT
Start: 2024-01-17 | End: 2024-01-19 | Stop reason: HOSPADM

## 2024-01-17 RX ORDER — PROCHLORPERAZINE EDISYLATE 5 MG/ML
5 INJECTION INTRAMUSCULAR; INTRAVENOUS EVERY 6 HOURS PRN
Status: DISCONTINUED | OUTPATIENT
Start: 2024-01-17 | End: 2024-01-19 | Stop reason: HOSPADM

## 2024-01-17 RX ORDER — AMOXICILLIN 250 MG
2 CAPSULE ORAL 2 TIMES DAILY
Status: DISCONTINUED | OUTPATIENT
Start: 2024-01-17 | End: 2024-01-19 | Stop reason: HOSPADM

## 2024-01-17 RX ORDER — LATANOPROST 50 UG/ML
1 SOLUTION/ DROPS OPHTHALMIC DAILY
Status: DISCONTINUED | OUTPATIENT
Start: 2024-01-17 | End: 2024-01-17

## 2024-01-17 RX ORDER — TIMOLOL MALEATE 5 MG/ML
1 SOLUTION/ DROPS OPHTHALMIC 3 TIMES DAILY PRN
Status: DISCONTINUED | OUTPATIENT
Start: 2024-01-17 | End: 2024-01-19 | Stop reason: HOSPADM

## 2024-01-17 RX ORDER — BRIMONIDINE TARTRATE 2 MG/ML
1 SOLUTION/ DROPS OPHTHALMIC 3 TIMES DAILY
Status: DISCONTINUED | OUTPATIENT
Start: 2024-01-17 | End: 2024-01-19 | Stop reason: HOSPADM

## 2024-01-17 RX ADMIN — ATORVASTATIN CALCIUM 20 MG: 20 TABLET, FILM COATED ORAL at 20:20

## 2024-01-17 RX ADMIN — Medication 10 ML: at 07:51

## 2024-01-17 RX ADMIN — HEPARIN SODIUM 5000 UNITS: 5000 INJECTION INTRAVENOUS; SUBCUTANEOUS at 14:21

## 2024-01-17 RX ADMIN — ASPIRIN 325 MG: 325 TABLET ORAL at 11:36

## 2024-01-17 RX ADMIN — CLOPIDOGREL BISULFATE 75 MG: 75 TABLET ORAL at 11:36

## 2024-01-17 RX ADMIN — SENNOSIDES AND DOCUSATE SODIUM 2 TABLET: 8.6; 5 TABLET ORAL at 20:20

## 2024-01-17 RX ADMIN — VALSARTAN 40 MG: 80 TABLET, FILM COATED ORAL at 11:36

## 2024-01-17 RX ADMIN — INSULIN DETEMIR 10 UNITS: 100 INJECTION, SOLUTION SUBCUTANEOUS at 16:16

## 2024-01-17 RX ADMIN — PROCHLORPERAZINE EDISYLATE 5 MG: 5 INJECTION INTRAMUSCULAR; INTRAVENOUS at 10:23

## 2024-01-17 RX ADMIN — HEPARIN SODIUM 5000 UNITS: 5000 INJECTION INTRAVENOUS; SUBCUTANEOUS at 05:45

## 2024-01-17 RX ADMIN — INSULIN LISPRO 8 UNITS: 100 INJECTION, SOLUTION INTRAVENOUS; SUBCUTANEOUS at 20:20

## 2024-01-17 RX ADMIN — Medication 10 ML: at 11:40

## 2024-01-17 RX ADMIN — ONDANSETRON 4 MG: 2 INJECTION INTRAMUSCULAR; INTRAVENOUS at 01:24

## 2024-01-17 RX ADMIN — HEPARIN SODIUM 5000 UNITS: 5000 INJECTION INTRAVENOUS; SUBCUTANEOUS at 22:03

## 2024-01-17 RX ADMIN — Medication 10 ML: at 20:20

## 2024-01-17 RX ADMIN — INSULIN LISPRO 5 UNITS: 100 INJECTION, SOLUTION INTRAVENOUS; SUBCUTANEOUS at 12:33

## 2024-01-17 RX ADMIN — ONDANSETRON 4 MG: 2 INJECTION INTRAMUSCULAR; INTRAVENOUS at 07:50

## 2024-01-17 RX ADMIN — INSULIN LISPRO 10 UNITS: 100 INJECTION, SOLUTION INTRAVENOUS; SUBCUTANEOUS at 17:11

## 2024-01-17 RX ADMIN — SENNOSIDES AND DOCUSATE SODIUM 2 TABLET: 8.6; 5 TABLET ORAL at 11:36

## 2024-01-17 NOTE — PROGRESS NOTES
[facility] Medicine Services  PROGRESS NOTE    Patient Name: Emily Grove  : 1960  MRN: [id number]    Date of Admission: 2024  Primary Care Physician: Marie Morales MD    Subjective   Subjective     CC: Dysarthria    HPI: Nauseated this AM. Constipation. No f/c.       Objective   Objective     Vital Signs:   Temp:  [97.7 °F (36.5 °C)-98.9 °F (37.2 °C)] 97.9 °F (36.6 °C)  Heart Rate:  [] 99  Resp:  [16] 16  BP: (110-155)/() 110/74     Physical Exam:  NAD, alert  OP clear, dry MM  Neck supple  No LAD  RRR  CTAB  +BS, soft  GARCIA  Dysarthria, speech delay    Results Reviewed:  LAB RESULTS:      Lab 01/15/24  0842 01/15/24  0018 248   WBC 6.76  --  9.62   HEMOGLOBIN 12.3  --  13.5   HEMATOCRIT 36.4  --  41.2   PLATELETS 159  --  265   NEUTROS ABS  --   --  6.87   IMMATURE GRANS (ABS)  --   --  0.06*   LYMPHS ABS  --   --  1.73   MONOS ABS  --   --  0.91*   EOS ABS  --   --  0.02   MCV 85.8  --  89.4   LACTATE  --  1.4 1.6         Lab 24  0541 01/15/24  0842 242 248   SODIUM  --  143 140 137   POTASSIUM  --  4.2 4.7 4.9   CHLORIDE  --  108* 107 100   CO2  --  22.0 23.0 27.0   ANION GAP  --  13.0 10.0 10.0   BUN  --  21 33* 32*   CREATININE  --  0.84 1.60* 1.95*   EGFR  --  79.2 36.5* 28.8*   GLUCOSE  --  83 120* 150*   CALCIUM  --  8.6 8.1* 9.8   IONIZED CALCIUM  --  1.23  --   --    MAGNESIUM  --  1.8  --  2.0   PHOSPHORUS  --  3.2  --   --    HEMOGLOBIN A1C 7.80*  --   --   --          Lab 01/15/24  0842 24   TOTAL PROTEIN 6.3 5.8* 7.5   ALBUMIN 3.9 3.2* 4.2   GLOBULIN 2.4 2.6 3.3   ALT (SGPT) 89* 58* 50*   AST (SGOT) 85* 85* 74*   BILIRUBIN 0.2 0.4 0.3   ALK PHOS 215* 162* 198*         Lab 24   HSTROP T 194* 265*         Lab 24  0541   CHOLESTEROL 106   LDL CHOL 36   HDL CHOL 50   TRIGLYCERIDES 107             Brief Urine Lab Results  (Last result in the past  365 days)        Color   Clarity   Blood   Leuk Est   Nitrite   Protein   CREAT   Urine HCG        01/14/24 2152 Yellow   Clear   Trace   Moderate (2+)   Negative   >=300 mg/dL (3+)                   Microbiology Results Abnormal       Procedure Component Value - Date/Time    Blood Culture - Blood, Arm, Right [[id number]]  (Normal) Collected: 01/14/24 2352    Lab Status: Preliminary result Specimen: Blood from Arm, Right Updated: 01/17/24 0245     Blood Culture No growth at 2 days    Blood Culture - Blood, Arm, Right [[id number]]  (Normal) Collected: 01/15/24 0018    Lab Status: Preliminary result Specimen: Blood from Arm, Right Updated: 01/17/24 0245     Blood Culture No growth at 2 days    Urine Culture - Urine, Urine, Clean Catch [[id number]]  (Normal) Collected: 01/14/24 2152    Lab Status: Final result Specimen: Urine, Clean Catch Updated: 01/16/24 0944     Urine Culture No growth            Duplex Venous Lower Extremity - Bilateral CAR    Result Date: 1/17/2024    Normal bilateral lower extremity venous duplex scan.     MRI Angiogram Head Without Contrast    Result Date: 1/15/2024  MRI ANGIOGRAM HEAD WO CONTRAST, MRI ANGIOGRAM NECK W CONTRAST Date of Exam: 1/15/2024 9:27 PM EST Indication: Stroke, follow up.  Comparison: None available. Technique:  Routine 3-D time-of-flight gradient echo imaging was obtained of the head without contrast administration. MRA of the neck was obtained pre and post administration of MultiHance 10 mL IV. Findings: Vascular Findings: The right common carotid, internal carotid, middle cerebral, anterior cerebral, vertebral, and posterior cerebral arteries are patent without abrupt cut off or aneurysmal dilation. The left common carotid, internal carotid, middle cerebral, anterior cerebral, vertebral, and posterior cerebral arteries are patent without abrupt cut off or aneurysmal dilation. Basilar artery appears patent and appears unremarkable. Non-vascular Findings: Limited  visualization of the brain parenchyma is grossly unremarkable. No acute abnormality is identified within the visualized soft tissue or bony structures of the neck.     Impression: Impression: No vascular abnormality visualized. Electronically Signed: Joao Lake MD  1/15/2024 10:30 PM EST  Workstation ID: [id number]    MRI Angiogram Neck With Contrast    Result Date: 1/15/2024  MRI ANGIOGRAM HEAD WO CONTRAST, MRI ANGIOGRAM NECK W CONTRAST Date of Exam: 1/15/2024 9:27 PM EST Indication: Stroke, follow up.  Comparison: None available. Technique:  Routine 3-D time-of-flight gradient echo imaging was obtained of the head without contrast administration. MRA of the neck was obtained pre and post administration of MultiHance 10 mL IV. Findings: Vascular Findings: The right common carotid, internal carotid, middle cerebral, anterior cerebral, vertebral, and posterior cerebral arteries are patent without abrupt cut off or aneurysmal dilation. The left common carotid, internal carotid, middle cerebral, anterior cerebral, vertebral, and posterior cerebral arteries are patent without abrupt cut off or aneurysmal dilation. Basilar artery appears patent and appears unremarkable. Non-vascular Findings: Limited visualization of the brain parenchyma is grossly unremarkable. No acute abnormality is identified within the visualized soft tissue or bony structures of the neck.     Impression: Impression: No vascular abnormality visualized. Electronically Signed: Joao Lake MD  1/15/2024 10:30 PM EST  Workstation ID: [id number]    MRI Brain Without Contrast    Result Date: 1/15/2024  MRI BRAIN WO CONTRAST Date of Exam: 1/15/2024 9:27 PM EST Indication: Stroke, follow up.  Comparison: Same day noncontrast CT of the brain. Technique:  Routine multiplanar/multisequence sequence images of the brain were obtained without contrast administration. Findings: Small patchy foci of restricted diffusion are visualized in the left parietal lobe  measuring up to 1.6 cm. There is no evidence of acute or chronic intracranial hemorrhage. No mass effect or midline shift.] [No abnormal extra-axial collections. The major vascular flow voids appear intact. Bilateral periventricular matter T2/FLAIR hyperintensities are visualized compatible with changes of chronic microvessel ischemia. The posterior fossa is unremarkable. Calvarial and superficial soft tissue signal is within normal limits. Orbits appear unremarkable. Paranasal sinuses demonstrate no evidence of air-fluid levels. Small left mastoid effusion is visualized. Right mastoid air cells are clear. Midline structures are intact.     Impression: Impression: Small patchy foci of nonhemorrhagic infarction in the left parietal lobe measuring up to 1.6 cm. Mild changes of chronic microvascular ischemia. Findings discussed with Nitesh from the stroke team on January 15, 2024 at 10:25 p.m. Electronically Signed: Joao Lake MD  1/15/2024 10:24 PM EST  Workstation ID: [id number]    CT Head Without Contrast    Result Date: 1/15/2024  CT HEAD WO CONTRAST Date of Exam: 1/15/2024 4:32 PM EST Indication: Neuro deficit, acute, stroke suspected. Comparison: Noncontrast CT of the brain performed on August 25, 2023 Technique: Axial CT images were obtained of the head without contrast administration.  Automated exposure control and iterative construction methods were used. Findings: Small area of hypodensity is visualized in the high left parietal lobe at the level of the corona radiata measuring 2 cm. No intracranial hemorrhage visualized. No evidence of mass or mass effect. Mild bilateral periventricular white matter hypodensities  are visualized compatible with changes of chronic microvessel ischemia. There is no extra-axial collections. Ventricles are normal in size and configuration for patient's stated age. Posterior fossa is within normal limits. Calvarium and skull base appear intact. Visualized sinuses show no air  fluid levels. The mastoid air cells are clear. Visualized orbits are unremarkable.     Impression: Impression: 2 cm focus of hypodensity in the high left parietal lobe suspicious for nonhemorrhagic infarct. Recommend correlation with MRI. No intracranial hemorrhage visualized. Findings compatible with mild changes of chronic microvascular ischemia. Findings relayed to JASPER Terrazas on January 15, 2024 at 4:45 p.m. by telephone. Electronically Signed: Joao Lake MD  1/15/2024 4:45 PM EST  Workstation ID: [id number]    Adult Transthoracic Echo Complete W/ Cont if Necessary Per Protocol    Result Date: 1/15/2024    Left ventricular systolic function is normal. Calculated left ventricular EF = 64.6% Normal left ventricular cavity size and wall thickness noted. All left ventricular wall segments contract normally. Left ventricular diastolic function was normal.   Normal right ventricular cavity size, wall thickness, systolic function and septal motion noted.   The aortic valve is grossly normal in structure. No significant aortic valve regurgitation is present. No hemodynamically significant aortic valve stenosis is present.   The mitral valve is grossly normal in structure. Trace mitral valve regurgitation is present. No significant mitral valve stenosis is present.   The tricuspid valve is grossly normal in structure. Trace tricuspid valve regurgitation is present. Estimated right ventricular systolic pressure from tricuspid regurgitation is normal (<35 mmHg). No evidence of significant tricuspid valve stenosis is present.      Results for orders placed during the hospital encounter of 01/14/24    Adult Transthoracic Echo Complete W/ Cont if Necessary Per Protocol    Interpretation Summary    Left ventricular systolic function is normal. Calculated left ventricular EF = 64.6% Normal left ventricular cavity size and wall thickness noted. All left ventricular wall segments contract normally. Left ventricular diastolic  function was normal.    Normal right ventricular cavity size, wall thickness, systolic function and septal motion noted.    The aortic valve is grossly normal in structure. No significant aortic valve regurgitation is present. No hemodynamically significant aortic valve stenosis is present.    The mitral valve is grossly normal in structure. Trace mitral valve regurgitation is present. No significant mitral valve stenosis is present.    The tricuspid valve is grossly normal in structure. Trace tricuspid valve regurgitation is present. Estimated right ventricular systolic pressure from tricuspid regurgitation is normal (<35 mmHg). No evidence of significant tricuspid valve stenosis is present.      Current medications:  Scheduled Meds:aspirin, 325 mg, Oral, Daily  atorvastatin, 20 mg, Oral, Nightly  clopidogrel, 75 mg, Oral, Daily  heparin (porcine), 5,000 Units, Subcutaneous, Q8H  insulin lispro, 3-14 Units, Subcutaneous, 4x Daily AC & at Bedtime  pantoprazole, 40 mg, Oral, Q AM  sodium chloride, 10 mL, Intravenous, Q12H  valsartan, 40 mg, Oral, Q24H      Continuous Infusions:DOPamine, 2-20 mcg/kg/min, Last Rate: Stopped (01/15/24 0901)      PRN Meds:.  dextrose    dextrose    glucagon (human recombinant)    ondansetron    Potassium Replacement - Follow Nurse / BPA Driven Protocol    Potassium Replacement - Follow Nurse / BPA Driven Protocol    sodium chloride    sodium chloride    sodium chloride    Assessment & Plan   Assessment & Plan     Active Hospital Problems    Diagnosis  POA    **Junctional bradycardia [R00.1]  Yes    PFO (patent foramen ovale) [Q21.12]  Not Applicable    Hypotension [I95.9]  Yes    Bradycardia [R00.1]  Yes    R/O NSTEMI (non-ST elevated myocardial infarction) [I21.4]  Yes    LAUREN (acute kidney injury) [N17.9]  Yes    UTI (urinary tract infection) [N39.0]  Yes    History of CVA (cerebrovascular accident) [Z86.73]  Not Applicable    Type 1 diabetes mellitus with hyperglycemia (On insulin  pump) [E10.65]  Yes      Resolved Hospital Problems   No resolved problems to display.        Brief Hospital Course to date:    Junctional bradycardia with hypoperfusion  -s/p ICU, dopamine, off CCB, better rate/BP  -cardiology following, MCOT at DC    Acute L parietal stroke  -DAPT/statin  -PFO noted, further work up per stroke/cardiology    Elevated troponin  -MPS per cardiology    DM  -SSI    UTI  -s/p rocephin    HTN  -ARB    LAUREN  -resolved    Constipation  -bowel regimen      Expected Discharge Location and Transportation: Rehab  Expected Discharge   Expected Discharge Date: 1/19/2024; Expected Discharge Time:      DVT prophylaxis:  Medical and mechanical DVT prophylaxis orders are present.     AM-PAC 6 Clicks Score (PT): 19 (01/16/24 1059)    CODE STATUS:   Code Status and Medical Interventions:   Ordered at: 01/14/24 6067     Code Status (Patient has no pulse and is not breathing):    CPR (Attempt to Resuscitate)     Medical Interventions (Patient has pulse or is breathing):    Full Support       Erick Smith MD  01/17/24

## 2024-01-17 NOTE — PROGRESS NOTES
"Stroke Progress Note       Chief Complaint: Acute cortical left parietal infarct    Subjective     Subjective:  Emily Grove was seen and examined at bedside she is accompanied by her sister.  Patient was initially tearful, stating she \"felt bad all over.\"  She was unable to articulate more specific symptoms.  Patient had underwent a stress test this morning, and evidently had a poor reaction.  SBP in the 150s, HR 70s, no new neurologic symptoms.  After a few minutes she began to feel better and was smiling and laughing.  Her sister states that her speech is unchanged from a couple days ago.        Objective      Temp:  [97.7 °F (36.5 °C)-98.9 °F (37.2 °C)] 97.9 °F (36.6 °C)  Heart Rate:  [] 99  Resp:  [16] 16  BP: (110-142)/() 110/74      Objective    Physical Exam:  General Appearance: Alert  Eyes: Anicteric sclera  HEENT: no scleral injection  Lungs: respirations appear comfortable, no obvious increased work of breathing  Extremities: No cyanosis or fingernail clubbing   Skin: No rashes in exposed skin areas     Neurological Examination:   Mental status: Alert.  Nonfluent.  Oriented to person and place with options.  Not oriented to time. Speech with mild dysarthria.  Able to repeat.   Follows commands.  Cranial Nerves: Visual fields intact. Extraocular movements are intact and spontaneous. Facial sensation intact. Face symmetrical. Hearing grossly intact. Full shoulder shrug bilaterally. Tongue protrudes midline.   Sensory: Sensory exam to light touch in all four extremities distally is normal.   Motor: Normal tone throughout. Normal bulk. Pronator drift is absent.  Strength exam: Limited participation, at least 3/5 in all extremities  Gait: Not assessed.       Results Review:      MRI Angiogram Head  and Neck 1/15/2024  Impression: No vascular abnormality visualized.    MRI Brain 1/15/2024  Impression: Small patchy foci of nonhemorrhagic infarction in the left parietal lobe measuring up to " 1.6 cm. Mild changes of chronic microvascular ischemia.    CT Head 1/15/2024  Impression: 2 cm focus of hypodensity in the high left parietal lobe suspicious for nonhemorrhagic infarct. Recommend correlation with MRI. No intracranial hemorrhage visualized. Findings compatible with mild changes of chronic microvascular ischemia.     Transthoracic echocardiogram 1/14/2024 left ventricular ejection fraction 64.6%.  Normal left atrial size.  Positive bubble study.    Bilateral lower extremity ultrasound 1/16/2024 negative for deep vein thromboses      Labs:  Lab Results   Component Value Date    HGBA1C 7.80 (H) 01/16/2024      Lab Results   Component Value Date    CHOL 106 01/16/2024    TRIG 107 01/16/2024    HDL 50 01/16/2024    LDL 36 01/16/2024     LIVER FUNCTION TESTS:      Lab 01/15/24  0842 01/14/24  2352 01/14/24  2118   TOTAL PROTEIN 6.3 5.8* 7.5   ALBUMIN 3.9 3.2* 4.2   GLOBULIN 2.4 2.6 3.3   ALT (SGPT) 89* 58* 50*   AST (SGOT) 85* 85* 74*   BILIRUBIN 0.2 0.4 0.3   ALK PHOS 215* 162* 198*                 Assessment/Plan     Assessment:  Emily Grove is a 63-year-old right-handed female with a past medical history of chronic left MCA territory infarct, diabetes mellitus type 2 (hemoglobin A1c 7.8%) with insulin pump, hyperlipidemia, and hypertension who presented to [facility] Emergency Department on 1/14/2024 with hypotension and bradycardia.  On 1/15/2024, the patient was noted to have aphasia and stroke neurology was consulted for further evaluation.  CT head 1/15/2024 shows high left parietal lesion.  MRI brain 1/15/2024 shows cortical left parietal lobe infarct.  MR angiogram head and neck 1/15/2024 without significant stenoses.  Transthoracic echocardiogram 1/15/2024 left ventricular ejection fraction 64.6%, normal left atrial cavity, patent foramen ovale.  Patient previously wore a Holter monitor from 11/6-11/20/19 which was negative for atrial fibrillation.    # Acute left parietal  stroke  -MRI suggestive of cardioembolic etiology given cortical infarct, no history of atrial fibrillation.  -No atrial fibrillation noted on telemetry thus far, would recommend Holter monitor prior to discharge  -Speech therapy recommends regular diet w/ thins  -Continue aspirin 325 mg daily  -Continue clopidogrel 75 mg daily  -Continue atorvastatin 20 mg daily (home medication, LDL 36), recheck LFTs in the outpatient setting  -Physical Occupational Therapy recommend acute inpatient rehabilitation facility; CM following - planning for [facility]  -DVT prophylaxis with heparin  -Follow up in the stroke clinic in 4-6 weeks    # Patent foramen ovale  -Rope score 3, evaluated by cardiology - planning for PET myocardial perfusion today  -Bilateral lower extremity ultrasound negative for deep vein thromboses.  -MCOT 30 day at discharge, if unremarkable will need a loop recorder  -outpatient cardiology follow up per their recs    Discussed the importance of medication compliance Plavix 75mg daily, Aspirin 325mg daily, and Atorvastatin 20mg nightly and lifestyle modifications adequate control of blood pressure, adequate control of cholesterol (goal LDL <70), adequate control of glucose (<140, A1c goal <7), increased physical activity, and implementation of healthy diet to help reduce the risk of future cerebrovascular events.  Also discussed the signs symptoms that would warrant the patient return back to the emergency department including unilateral weakness, unilateral numbness, visual disturbances, loss of balance, speech difficulties, and/or a sudden severe headache.  Patient and her sister verbalized understanding.        Discharge Planning: No further stroke workup needed, patient ready for discharge to rehab from our standpoint.      Jennifer Lebron MD  [facility] STROKE NEURO  01/17/24  09:52 EST       maria victoria

## 2024-01-17 NOTE — PROGRESS NOTES
"  [facility]  PROGRESS NOTE    Date of Admission: 1/14/2024  Date of Service: 01/17/24    Primary Care Physician: Marie Morales MD    Chief Complaint: follow up elevated troponin, junctional bradycardia    Problem List:   Junctional bradycardia    Type 1 diabetes mellitus with hyperglycemia (On insulin pump)    History of CVA (cerebrovascular accident)    Hypotension    Bradycardia    R/O NSTEMI (non-ST elevated myocardial infarction)    LAUREN (acute kidney injury)    UTI (urinary tract infection)    PFO (patent foramen ovale)      Subjective      Found with new CVA on MRI. No evidence of atrial fibrillation on telemetry. No chest pain or shortness of breath    Objective   Vitals: /98 (BP Location: Right arm, Patient Position: Lying)   Pulse 74   Temp 98.2 °F (36.8 °C) (Oral)   Resp 16   Ht 139.7 cm (55\")   Wt 49.9 kg (110 lb)   SpO2 97%   BMI 25.57 kg/m²     Physical Exam:  GENERAL: no acute distress.   HEENT:  no jugular venous distention  HEART: Regular rhythm, normal rate, and no murmurs, gallops, or rubs.   LUNGS: Clear to auscultation bilaterally. No wheezing, rales or rhonchi.  EXTREMITIES: No clubbing, cyanosis, or edema noted.     Results:  Results from last 7 days   Lab Units 01/15/24  0842 01/14/24  2118   WBC 10*3/mm3 6.76 9.62   HEMOGLOBIN g/dL 12.3 13.5   HEMATOCRIT % 36.4 41.2   PLATELETS 10*3/mm3 159 265     Results from last 7 days   Lab Units 01/15/24  0842 01/14/24  2352 01/14/24  2118   SODIUM mmol/L 143 140 137   POTASSIUM mmol/L 4.2 4.7 4.9   CHLORIDE mmol/L 108* 107 100   CO2 mmol/L 22.0 23.0 27.0   BUN mg/dL 21 33* 32*   CREATININE mg/dL 0.84 1.60* 1.95*   GLUCOSE mg/dL 83 120* 150*      Lab Results   Component Value Date    CHOL 106 01/16/2024    TRIG 107 01/16/2024    HDL 50 01/16/2024    LDL 36 01/16/2024    AST 85 (H) 01/15/2024    ALT 89 (H) 01/15/2024     Results from last 7 days   Lab Units 01/16/24  0541   HEMOGLOBIN A1C % 7.80* "     Results from last 7 days   Lab Units 01/16/24  0541   CHOLESTEROL mg/dL 106   TRIGLYCERIDES mg/dL 107   HDL CHOL mg/dL 50   LDL CHOL mg/dL 36                 Results from last 7 days   Lab Units 01/14/24  2352 01/14/24  2118   HSTROP T ng/L 194* 265*             Intake/Output Summary (Last 24 hours) at 1/17/2024 1638  Last data filed at 1/17/2024 0400  Gross per 24 hour   Intake 500 ml   Output 400 ml   Net 100 ml       I personally reviewed the patient's EKG/Telemetry data    Radiology Data:   Results for orders placed during the hospital encounter of 01/14/24    Adult Transthoracic Echo Complete W/ Cont if Necessary Per Protocol    Interpretation Summary    Left ventricular systolic function is normal. Calculated left ventricular EF = 64.6% Normal left ventricular cavity size and wall thickness noted. All left ventricular wall segments contract normally. Left ventricular diastolic function was normal.    Normal right ventricular cavity size, wall thickness, systolic function and septal motion noted.    The aortic valve is grossly normal in structure. No significant aortic valve regurgitation is present. No hemodynamically significant aortic valve stenosis is present.    The mitral valve is grossly normal in structure. Trace mitral valve regurgitation is present. No significant mitral valve stenosis is present.    The tricuspid valve is grossly normal in structure. Trace tricuspid valve regurgitation is present. Estimated right ventricular systolic pressure from tricuspid regurgitation is normal (<35 mmHg). No evidence of significant tricuspid valve stenosis is present.        Current Medications:  aspirin, 325 mg, Oral, Daily  atorvastatin, 20 mg, Oral, Nightly  clopidogrel, 75 mg, Oral, Daily  heparin (porcine), 5,000 Units, Subcutaneous, Q8H  insulin detemir, 10 Units, Subcutaneous, Daily  insulin lispro, 3-14 Units, Subcutaneous, 4x Daily AC & at Bedtime  pantoprazole, 40 mg, Oral, Q AM  senna-docusate  sodium, 2 tablet, Oral, BID  sodium chloride, 10 mL, Intravenous, Q12H  valsartan, 40 mg, Oral, Q24H      DOPamine, 2-20 mcg/kg/min, Last Rate: Stopped (01/15/24 0901)      MRI 1/15/2023:  IMPRESSION:  Impression:  Small patchy foci of nonhemorrhagic infarction in the left parietal lobe measuring up to 1.6 cm.  Mild changes of chronic microvascular ischemia.      Assessment:  Elevated troponin, likely type II mechanism   Normal EF on echocardiogram.  Positive bubble study with Valsalva only from August 2023 consistent with small PFO.  History of hypertension, hypotensive this admission  Junctional bradycardia, was on diltiazem at home, currently on hold  AMS this admssion  History of CVA, 8/2023  Concern for acute AIS with CT finding New hypodensity at left frontal, parietal, corona radiata area comparing to prior CTA back in August 25, 2023.   MRI 1/15/2024: Small patchy foci of nonhemorrhagic infarction in the left parietal lobe measuring up to 1.6 cm.  Type I diabetes  Transaminitis  LAUREN on arrival, now resolved    Plan:  PET myocardial perfusion imaging tomorrow  Monitor telemetry for arrhythmia.   30 day MCOT at discharge to assess for bradycardiac/pause/block as well as for atrial fibrillation. If unremarkable, will likely need loop implant.   Continue current medical therapy.  Known presence of small PFO.  Rope score equal to 3, low risk for underlying etiology for CVA.  Reasonable to have patient evaluated by structural heart disease team as an outpatient.

## 2024-01-18 ENCOUNTER — APPOINTMENT (OUTPATIENT)
Dept: CARDIOLOGY | Facility: HOSPITAL | Age: 64
End: 2024-01-18
Payer: MEDICAID

## 2024-01-18 LAB
ANION GAP SERPL CALCULATED.3IONS-SCNC: 12 MMOL/L (ref 5–15)
BH CV REST NUCLEAR ISOTOPE DOSE: 30 MCI
BH CV STRESS BP STAGE 1: NORMAL
BH CV STRESS BP STAGE 3: NORMAL
BH CV STRESS COMMENTS STAGE 1: NORMAL
BH CV STRESS DOSE REGADENOSON STAGE 1: 0.4
BH CV STRESS DURATION MIN STAGE 1: 1
BH CV STRESS DURATION MIN STAGE 2: 1
BH CV STRESS DURATION MIN STAGE 3: 1
BH CV STRESS DURATION MIN STAGE 4: 1
BH CV STRESS DURATION SEC STAGE 1: 10
BH CV STRESS DURATION SEC STAGE 2: 0
BH CV STRESS DURATION SEC STAGE 3: 0
BH CV STRESS DURATION SEC STAGE 4: 0
BH CV STRESS HR STAGE 1: 116
BH CV STRESS HR STAGE 2: 129
BH CV STRESS HR STAGE 3: 117
BH CV STRESS HR STAGE 4: 110
BH CV STRESS NUCLEAR ISOTOPE DOSE: 29.9 MCI
BH CV STRESS O2 STAGE 1: 97
BH CV STRESS O2 STAGE 2: 98
BH CV STRESS O2 STAGE 3: 99
BH CV STRESS O2 STAGE 4: 98
BH CV STRESS PROTOCOL 1: NORMAL
BH CV STRESS RECOVERY BP: NORMAL MMHG
BH CV STRESS RECOVERY HR: 102 BPM
BH CV STRESS RECOVERY O2: 99 %
BH CV STRESS STAGE 1: 1
BH CV STRESS STAGE 2: 2
BH CV STRESS STAGE 3: 3
BH CV STRESS STAGE 4: 4
BUN SERPL-MCNC: 14 MG/DL (ref 8–23)
BUN/CREAT SERPL: 25.9 (ref 7–25)
CALCIUM SPEC-SCNC: 9.6 MG/DL (ref 8.6–10.5)
CHLORIDE SERPL-SCNC: 104 MMOL/L (ref 98–107)
CO2 SERPL-SCNC: 26 MMOL/L (ref 22–29)
CREAT SERPL-MCNC: 0.54 MG/DL (ref 0.57–1)
DEPRECATED RDW RBC AUTO: 37.8 FL (ref 37–54)
EGFRCR SERPLBLD CKD-EPI 2021: 104.9 ML/MIN/1.73
ERYTHROCYTE [DISTWIDTH] IN BLOOD BY AUTOMATED COUNT: 12.1 % (ref 12.3–15.4)
GLUCOSE BLDC GLUCOMTR-MCNC: 152 MG/DL (ref 70–130)
GLUCOSE BLDC GLUCOMTR-MCNC: 193 MG/DL (ref 70–130)
GLUCOSE BLDC GLUCOMTR-MCNC: 345 MG/DL (ref 70–130)
GLUCOSE BLDC GLUCOMTR-MCNC: 358 MG/DL (ref 70–130)
GLUCOSE SERPL-MCNC: 136 MG/DL (ref 65–99)
HCT VFR BLD AUTO: 39.1 % (ref 34–46.6)
HGB BLD-MCNC: 13.2 G/DL (ref 12–15.9)
LV EF NUC BP: 79 %
MAXIMAL PREDICTED HEART RATE: 159 BPM
MCH RBC QN AUTO: 29 PG (ref 26.6–33)
MCHC RBC AUTO-ENTMCNC: 33.8 G/DL (ref 31.5–35.7)
MCV RBC AUTO: 85.9 FL (ref 79–97)
PERCENT MAX PREDICTED HR: 81.13 %
PLATELET # BLD AUTO: 158 10*3/MM3 (ref 140–450)
PMV BLD AUTO: 11.1 FL (ref 6–12)
POTASSIUM SERPL-SCNC: 4.1 MMOL/L (ref 3.5–5.2)
RBC # BLD AUTO: 4.55 10*6/MM3 (ref 3.77–5.28)
SODIUM SERPL-SCNC: 142 MMOL/L (ref 136–145)
STRESS BASELINE BP: NORMAL MMHG
STRESS BASELINE HR: 78 BPM
STRESS O2 SAT REST: 98 %
STRESS PERCENT HR: 95 %
STRESS POST ESTIMATED WORKLOAD: 1 METS
STRESS POST EXERCISE DUR MIN: 4 MIN
STRESS POST EXERCISE DUR SEC: 0 SEC
STRESS POST O2 SAT PEAK: 97 %
STRESS POST PEAK BP: NORMAL MMHG
STRESS POST PEAK HR: 129 BPM
STRESS TARGET HR: 135 BPM
WBC NRBC COR # BLD AUTO: 6.64 10*3/MM3 (ref 3.4–10.8)

## 2024-01-18 PROCEDURE — 63710000001 INSULIN DETEMIR PER 5 UNITS: Performed by: HOSPITALIST

## 2024-01-18 PROCEDURE — 99232 SBSQ HOSP IP/OBS MODERATE 35: CPT | Performed by: INTERNAL MEDICINE

## 2024-01-18 PROCEDURE — 82948 REAGENT STRIP/BLOOD GLUCOSE: CPT

## 2024-01-18 PROCEDURE — 78431 MYOCRD IMG PET RST&STRS CT: CPT | Performed by: INTERNAL MEDICINE

## 2024-01-18 PROCEDURE — 25010000002 HEPARIN (PORCINE) PER 1000 UNITS: Performed by: INTERNAL MEDICINE

## 2024-01-18 PROCEDURE — 63710000001 INSULIN LISPRO (HUMAN) PER 5 UNITS: Performed by: INTERNAL MEDICINE

## 2024-01-18 PROCEDURE — 93018 CV STRESS TEST I&R ONLY: CPT | Performed by: INTERNAL MEDICINE

## 2024-01-18 PROCEDURE — 78431 MYOCRD IMG PET RST&STRS CT: CPT

## 2024-01-18 PROCEDURE — 25010000002 ONDANSETRON PER 1 MG: Performed by: NURSE PRACTITIONER

## 2024-01-18 PROCEDURE — 25010000002 REGADENOSON 0.4 MG/5ML SOLUTION: Performed by: INTERNAL MEDICINE

## 2024-01-18 PROCEDURE — 80048 BASIC METABOLIC PNL TOTAL CA: CPT | Performed by: HOSPITALIST

## 2024-01-18 PROCEDURE — 93017 CV STRESS TEST TRACING ONLY: CPT

## 2024-01-18 PROCEDURE — 92526 ORAL FUNCTION THERAPY: CPT

## 2024-01-18 PROCEDURE — A9555 RB82 RUBIDIUM: HCPCS | Performed by: INTERNAL MEDICINE

## 2024-01-18 PROCEDURE — 92507 TX SP LANG VOICE COMM INDIV: CPT

## 2024-01-18 PROCEDURE — 97530 THERAPEUTIC ACTIVITIES: CPT

## 2024-01-18 PROCEDURE — 85027 COMPLETE CBC AUTOMATED: CPT | Performed by: HOSPITALIST

## 2024-01-18 PROCEDURE — 0 RUBIDIUM CHLORIDE: Performed by: INTERNAL MEDICINE

## 2024-01-18 PROCEDURE — 97116 GAIT TRAINING THERAPY: CPT

## 2024-01-18 PROCEDURE — 99232 SBSQ HOSP IP/OBS MODERATE 35: CPT | Performed by: NURSE PRACTITIONER

## 2024-01-18 RX ORDER — CLOPIDOGREL BISULFATE 75 MG/1
75 TABLET ORAL DAILY
Qty: 30 TABLET | Refills: 3 | Status: CANCELLED | OUTPATIENT
Start: 2024-01-19

## 2024-01-18 RX ORDER — CAFFEINE CITRATE 20 MG/ML
60 SOLUTION INTRAVENOUS ONCE
Status: COMPLETED | OUTPATIENT
Start: 2024-01-18 | End: 2024-01-18

## 2024-01-18 RX ORDER — ASPIRIN 325 MG
325 TABLET ORAL DAILY
Qty: 30 TABLET | Refills: 10 | Status: CANCELLED | OUTPATIENT
Start: 2024-01-19 | End: 2024-11-27

## 2024-01-18 RX ORDER — REGADENOSON 0.08 MG/ML
0.4 INJECTION, SOLUTION INTRAVENOUS ONCE
Status: COMPLETED | OUTPATIENT
Start: 2024-01-18 | End: 2024-01-18

## 2024-01-18 RX ORDER — ATORVASTATIN CALCIUM 20 MG/1
20 TABLET, FILM COATED ORAL NIGHTLY
Qty: 30 TABLET | Refills: 10 | Status: CANCELLED | OUTPATIENT
Start: 2024-01-18 | End: 2024-11-28

## 2024-01-18 RX ORDER — ACETAMINOPHEN 325 MG/1
650 TABLET ORAL EVERY 6 HOURS PRN
Status: DISCONTINUED | OUTPATIENT
Start: 2024-01-18 | End: 2024-01-19 | Stop reason: HOSPADM

## 2024-01-18 RX ADMIN — BRIMONIDINE TARTRATE 1 DROP: 2 SOLUTION/ DROPS OPHTHALMIC at 20:13

## 2024-01-18 RX ADMIN — HEPARIN SODIUM 5000 UNITS: 5000 INJECTION INTRAVENOUS; SUBCUTANEOUS at 12:29

## 2024-01-18 RX ADMIN — RUBIDIUM CHLORIDE RB-82 1 DOSE: 150 INJECTION, SOLUTION INTRAVENOUS at 09:05

## 2024-01-18 RX ADMIN — VALSARTAN 40 MG: 80 TABLET, FILM COATED ORAL at 12:14

## 2024-01-18 RX ADMIN — BRIMONIDINE TARTRATE 1 DROP: 2 SOLUTION/ DROPS OPHTHALMIC at 12:24

## 2024-01-18 RX ADMIN — INSULIN LISPRO 3 UNITS: 100 INJECTION, SOLUTION INTRAVENOUS; SUBCUTANEOUS at 12:16

## 2024-01-18 RX ADMIN — INSULIN LISPRO 12 UNITS: 100 INJECTION, SOLUTION INTRAVENOUS; SUBCUTANEOUS at 17:45

## 2024-01-18 RX ADMIN — RUBIDIUM CHLORIDE RB-82 1 DOSE: 150 INJECTION, SOLUTION INTRAVENOUS at 08:53

## 2024-01-18 RX ADMIN — CAFFEINE CITRATE 60 MG: 20 INJECTION, SOLUTION INTRAVENOUS at 09:17

## 2024-01-18 RX ADMIN — INSULIN DETEMIR 10 UNITS: 100 INJECTION, SOLUTION SUBCUTANEOUS at 12:17

## 2024-01-18 RX ADMIN — HEPARIN SODIUM 5000 UNITS: 5000 INJECTION INTRAVENOUS; SUBCUTANEOUS at 20:09

## 2024-01-18 RX ADMIN — ATORVASTATIN CALCIUM 20 MG: 20 TABLET, FILM COATED ORAL at 20:07

## 2024-01-18 RX ADMIN — ACETAMINOPHEN 650 MG: 325 TABLET ORAL at 17:46

## 2024-01-18 RX ADMIN — HEPARIN SODIUM 5000 UNITS: 5000 INJECTION INTRAVENOUS; SUBCUTANEOUS at 06:28

## 2024-01-18 RX ADMIN — ONDANSETRON 4 MG: 2 INJECTION INTRAMUSCULAR; INTRAVENOUS at 23:28

## 2024-01-18 RX ADMIN — BRIMONIDINE TARTRATE 1 DROP: 2 SOLUTION/ DROPS OPHTHALMIC at 17:49

## 2024-01-18 RX ADMIN — INSULIN LISPRO 10 UNITS: 100 INJECTION, SOLUTION INTRAVENOUS; SUBCUTANEOUS at 20:21

## 2024-01-18 RX ADMIN — Medication 10 ML: at 12:17

## 2024-01-18 RX ADMIN — ASPIRIN 325 MG: 325 TABLET ORAL at 12:15

## 2024-01-18 RX ADMIN — REGADENOSON 0.4 MG: 0.08 INJECTION, SOLUTION INTRAVENOUS at 09:02

## 2024-01-18 RX ADMIN — CLOPIDOGREL BISULFATE 75 MG: 75 TABLET ORAL at 12:16

## 2024-01-18 RX ADMIN — Medication 10 ML: at 20:11

## 2024-01-18 RX ADMIN — BISACODYL 10 MG: 10 SUPPOSITORY RECTAL at 00:25

## 2024-01-18 NOTE — PLAN OF CARE
Goal Outcome Evaluation:  Plan of Care Reviewed With: patient, sibling        Progress: improving  Outcome Evaluation: Pt required less assist for STS transfer and ambulation this date. Pt continues to demo strength, balance, and endurance below baseline and will continue to benefit from PT to address these ongoing deficits.

## 2024-01-18 NOTE — PROGRESS NOTES
"Stroke Progress Note       Chief Complaint: Acute cortical left parietal infarct    Subjective     Subjective:  Emily Grove was seen and examined at bedside she is accompanied by her sister.  Patient was initially tearful, stating she \"felt bad all over.\"  She was unable to articulate more specific symptoms.  Patient had underwent a stress test this morning, and evidently had a poor reaction.  SBP in the 150s, HR 70s, no new neurologic symptoms.  After a few minutes she began to feel better and was smiling and laughing.  Her sister states that her speech is unchanged from a couple days ago.        Objective      Temp:  [98 °F (36.7 °C)-98.4 °F (36.9 °C)] 98.3 °F (36.8 °C)  Heart Rate:  [74-98] 78  Resp:  [16-18] 18  BP: (113-138)/(76-98) 126/93      Objective    Physical Exam:  General Appearance: Alert  Eyes: Anicteric sclera  HEENT: no scleral injection  Lungs: respirations appear comfortable, no obvious increased work of breathing  Extremities: No cyanosis or fingernail clubbing   Skin: No rashes in exposed skin areas     Neurological Examination:   Mental status: Alert.  Nonfluent.  Oriented to person and place with options.  Not oriented to time. Speech with mild dysarthria.  Able to repeat.   Follows commands.  Cranial Nerves: Visual fields intact. Extraocular movements are intact and spontaneous. Facial sensation intact. Face symmetrical. Hearing grossly intact. Full shoulder shrug bilaterally. Tongue protrudes midline.   Sensory: Sensory exam to light touch in all four extremities distally is normal.   Motor: Normal tone throughout. Normal bulk. Pronator drift is absent.  Strength exam: Limited participation, at least 3/5 in all extremities  Gait: Not assessed.       Results Review:      MRI Angiogram Head  and Neck 1/15/2024  Impression: No vascular abnormality visualized.    MRI Brain 1/15/2024  Impression: Small patchy foci of nonhemorrhagic infarction in the left parietal lobe measuring up to " 1.6 cm. Mild changes of chronic microvascular ischemia.    CT Head 1/15/2024  Impression: 2 cm focus of hypodensity in the high left parietal lobe suspicious for nonhemorrhagic infarct. Recommend correlation with MRI. No intracranial hemorrhage visualized. Findings compatible with mild changes of chronic microvascular ischemia.     Transthoracic echocardiogram 1/14/2024 left ventricular ejection fraction 64.6%.  Normal left atrial size.  Positive bubble study.    Bilateral lower extremity ultrasound 1/16/2024 negative for deep vein thromboses      Labs:  Lab Results   Component Value Date    HGBA1C 7.80 (H) 01/16/2024      Lab Results   Component Value Date    CHOL 106 01/16/2024    TRIG 107 01/16/2024    HDL 50 01/16/2024    LDL 36 01/16/2024     LIVER FUNCTION TESTS:      Lab 01/15/24  0842 01/14/24  2352 01/14/24  2118   TOTAL PROTEIN 6.3 5.8* 7.5   ALBUMIN 3.9 3.2* 4.2   GLOBULIN 2.4 2.6 3.3   ALT (SGPT) 89* 58* 50*   AST (SGOT) 85* 85* 74*   BILIRUBIN 0.2 0.4 0.3   ALK PHOS 215* 162* 198*                 Assessment/Plan     Assessment:  Emily Grove is a 63-year-old right-handed female with a past medical history of chronic left MCA territory infarct, diabetes mellitus type 2 (hemoglobin A1c 7.8%) with insulin pump, hyperlipidemia, and hypertension who presented to [facility] Emergency Department on 1/14/2024 with hypotension and bradycardia.  On 1/15/2024, the patient was noted to have aphasia and stroke neurology was consulted for further evaluation.  CT head 1/15/2024 shows high left parietal lesion.  MRI brain 1/15/2024 shows cortical left parietal lobe infarct.  MR angiogram head and neck 1/15/2024 without significant stenoses.  Transthoracic echocardiogram 1/15/2024 left ventricular ejection fraction 64.6%, normal left atrial cavity, patent foramen ovale.  Patient previously wore a Holter monitor from 11/6-11/20/19 which was negative for atrial fibrillation.    # Acute left parietal  stroke  -MRI suggestive of cardioembolic etiology given cortical infarct, no history of atrial fibrillation.  -No atrial fibrillation noted on telemetry thus far, would recommend Holter monitor prior to discharge  -Speech therapy recommends regular diet w/ thins  -Continue aspirin 325 mg daily  -Continue clopidogrel 75 mg daily  -Continue atorvastatin 20 mg daily (home medication, LDL 36), recheck LFTs in the outpatient setting  -Physical Occupational Therapy recommend acute inpatient rehabilitation facility; CM following - planning for [facility]  -DVT prophylaxis with heparin  -Follow up in the stroke clinic in 4-6 weeks    # Patent foramen ovale  -Rope score 3, evaluated by cardiology - planning for PET myocardial perfusion today  -Bilateral lower extremity ultrasound negative for deep vein thromboses.  -MCOT 30 day at discharge, if unremarkable will need a loop recorder  -outpatient cardiology follow up per their recs    Discussed the importance of medication compliance Plavix 75mg daily, Aspirin 325mg daily, and Atorvastatin 20mg nightly and lifestyle modifications adequate control of blood pressure, adequate control of cholesterol (goal LDL <70), adequate control of glucose (<140, A1c goal <7), increased physical activity, and implementation of healthy diet to help reduce the risk of future cerebrovascular events.  Also discussed the signs symptoms that would warrant the patient return back to the emergency department including unilateral weakness, unilateral numbness, visual disturbances, loss of balance, speech difficulties, and/or a sudden severe headache.  Patient and her sister verbalized understanding.        Discharge Planning: No further stroke workup needed, patient ready for discharge to rehab from our standpoint.      TYLER Massey  [facility] [facility]  01/18/24  10:40 EST

## 2024-01-18 NOTE — PROGRESS NOTES
"  [facility]  PROGRESS NOTE    Date of Admission: 1/14/2024  Date of Service: 01/18/24    Primary Care Physician: Marie Morales MD    Chief Complaint: follow up elevated troponin, junctional bradycardia    Problem List:   Junctional bradycardia    Type 1 diabetes mellitus with hyperglycemia (On insulin pump)    History of CVA (cerebrovascular accident)    Hypotension    Bradycardia    R/O NSTEMI (non-ST elevated myocardial infarction)    LAUREN (acute kidney injury)    UTI (urinary tract infection)    PFO (patent foramen ovale)      Subjective      No evidence of atrial fibrillation on telemetry. No chest pain or shortness of breath.  Transient generalized feeling of unwellness and fatigue and intermittent vertigo following Cindy scan PET myocardial perfusion imaging this morning, now back to baseline.    Objective   Vitals: /93   Pulse 78   Temp 98.3 °F (36.8 °C)   Resp 18   Ht 139.7 cm (55\")   Wt 49.9 kg (110 lb 0.2 oz)   SpO2 98%   BMI 25.57 kg/m²     Physical Exam:  GENERAL: no acute distress.   HEENT:  no jugular venous distention  HEART: Regular rhythm, normal rate, and no murmurs, gallops, or rubs.   LUNGS: Clear to auscultation bilaterally. No wheezing, rales or rhonchi.  EXTREMITIES: No clubbing, cyanosis, or edema noted.     Results:  Results from last 7 days   Lab Units 01/18/24  0638 01/15/24  0842 01/14/24  2118   WBC 10*3/mm3 6.64 6.76 9.62   HEMOGLOBIN g/dL 13.2 12.3 13.5   HEMATOCRIT % 39.1 36.4 41.2   PLATELETS 10*3/mm3 158 159 265     Results from last 7 days   Lab Units 01/18/24  0638 01/15/24  0842 01/14/24  2352   SODIUM mmol/L 142 143 140   POTASSIUM mmol/L 4.1 4.2 4.7   CHLORIDE mmol/L 104 108* 107   CO2 mmol/L 26.0 22.0 23.0   BUN mg/dL 14 21 33*   CREATININE mg/dL 0.54* 0.84 1.60*   GLUCOSE mg/dL 136* 83 120*      Lab Results   Component Value Date    CHOL 106 01/16/2024    TRIG 107 01/16/2024    HDL 50 01/16/2024    LDL 36 01/16/2024    AST " 85 (H) 01/15/2024    ALT 89 (H) 01/15/2024     Results from last 7 days   Lab Units 01/16/24  0541   HEMOGLOBIN A1C % 7.80*     Results from last 7 days   Lab Units 01/16/24  0541   CHOLESTEROL mg/dL 106   TRIGLYCERIDES mg/dL 107   HDL CHOL mg/dL 50   LDL CHOL mg/dL 36                 Results from last 7 days   Lab Units 01/14/24  2352 01/14/24  2118   HSTROP T ng/L 194* 265*             Intake/Output Summary (Last 24 hours) at 1/18/2024 1203  Last data filed at 1/18/2024 0535  Gross per 24 hour   Intake --   Output 850 ml   Net -850 ml       I personally reviewed the patient's EKG/Telemetry data    Radiology Data:   Results for orders placed during the hospital encounter of 01/14/24    Adult Transthoracic Echo Complete W/ Cont if Necessary Per Protocol    Interpretation Summary    Left ventricular systolic function is normal. Calculated left ventricular EF = 64.6% Normal left ventricular cavity size and wall thickness noted. All left ventricular wall segments contract normally. Left ventricular diastolic function was normal.    Normal right ventricular cavity size, wall thickness, systolic function and septal motion noted.    The aortic valve is grossly normal in structure. No significant aortic valve regurgitation is present. No hemodynamically significant aortic valve stenosis is present.    The mitral valve is grossly normal in structure. Trace mitral valve regurgitation is present. No significant mitral valve stenosis is present.    The tricuspid valve is grossly normal in structure. Trace tricuspid valve regurgitation is present. Estimated right ventricular systolic pressure from tricuspid regurgitation is normal (<35 mmHg). No evidence of significant tricuspid valve stenosis is present.        Current Medications:  aspirin, 325 mg, Oral, Daily  atorvastatin, 20 mg, Oral, Nightly  brimonidine, 1 drop, Right Eye, TID  clopidogrel, 75 mg, Oral, Daily  heparin (porcine), 5,000 Units, Subcutaneous, Q8H  insulin  detemir, 10 Units, Subcutaneous, Daily  insulin lispro, 3-14 Units, Subcutaneous, 4x Daily AC & at Bedtime  latanoprost, 1 drop, Both Eyes, Daily  pantoprazole, 40 mg, Oral, Q AM  senna-docusate sodium, 2 tablet, Oral, BID  sodium chloride, 10 mL, Intravenous, Q12H  valsartan, 40 mg, Oral, Q24H           MRI 1/15/2023:  IMPRESSION:  Impression:  Small patchy foci of nonhemorrhagic infarction in the left parietal lobe measuring up to 1.6 cm.  Mild changes of chronic microvascular ischemia.      Assessment:  Elevated troponin, likely type II mechanism   Normal EF on echocardiogram.  Positive bubble study with Valsalva only from August 2023 consistent with small PFO.  History of hypertension, hypotensive this admission  Junctional bradycardia, was on diltiazem at home, currently on hold  AMS this admssion  History of CVA, 8/2023  Concern for acute AIS with CT finding New hypodensity at left frontal, parietal, corona radiata area comparing to prior CTA back in August 25, 2023.   MRI 1/15/2024: Small patchy foci of nonhemorrhagic infarction in the left parietal lobe measuring up to 1.6 cm.  Type I diabetes  Transaminitis  LAUREN on arrival, now resolved  Coronary artery disease: Distal single-vessel disease based on perfusion imaging.  Continue medical therapy.    Plan:  Agree with current medical therapy.  Monitor telemetry for arrhythmia.   30 day MCOT at discharge to assess for bradycardiac/pause/block as well as for atrial fibrillation. If unremarkable, will consider loop implant.   Known presence of small PFO.  Rope score equal to 3, low risk for underlying etiology for CVA.  Reasonable to have patient evaluated by structural heart disease team as an outpatient.

## 2024-01-18 NOTE — DISCHARGE INSTRUCTIONS
Please continue taking aspirin 325 mg daily and plavix 75 mg daily until you are seen in the stroke clinic    Please continue taking atorvastatin 20 mg daily, you will need to have repeat labs drawn at you appointment in the clinic    You will have a cardiac monitor placed prior to going to rehab, we will review these results and next steps at your clinic appointment

## 2024-01-18 NOTE — CASE MANAGEMENT/SOCIAL WORK
Continued Stay Note  [facility]     Patient Name: Emily Grove  MRN: [id number]  Today's Date: 1/18/2024    Admit Date: 1/14/2024    Plan: [facility]   Discharge Plan       Row Name 01/18/24 0931       Plan    Plan [facility]    Plan Comments When nearing medical readiness for [facility], April wil complete work up and start insurance approval.    Final Discharge Disposition Code 62 - inpatient rehab facility                   Discharge Codes    No documentation.                 Expected Discharge Date and Time       Expected Discharge Date Expected Discharge Time    Jan 19, 2024               Lary Obrien RN

## 2024-01-18 NOTE — THERAPY TREATMENT NOTE
Patient Name: Emily Grove  : 1960    MRN: [id number]                              Today's Date: 2024       Admit Date: 2024    Visit Dx:     ICD-10-CM ICD-9-CM   1. Hypotension, unspecified hypotension type  I95.9 458.9   2. Junctional bradycardia  R00.1 427.89   3. Acute kidney injury (nontraumatic)  N17.9 584.9   4. Non-ST elevation MI (NSTEMI)  I21.4 410.70   5. Dysphagia, unspecified type  R13.10 787.20   6. Aphasia  R47.01 784.3     Patient Active Problem List   Diagnosis    Fatty liver    Family history of polyps in the colon    Gastroesophageal reflux disease    Precordial pain    Type 1 diabetes mellitus with hyperglycemia (On insulin pump)    Abnormal EKG    Other fatigue    Dizziness    Tachycardia    Palpitations    CVA (cerebral vascular accident)    History of CVA (cerebrovascular accident)    Hypotension    Bradycardia    R/O NSTEMI (non-ST elevated myocardial infarction)    LAUREN (acute kidney injury)    UTI (urinary tract infection)    Junctional bradycardia    PFO (patent foramen ovale)     Past Medical History:   Diagnosis Date    Acid reflux     Diabetes mellitus     Hyperlipidemia     Hypertension     Overactive bladder     Stroke      Past Surgical History:   Procedure Laterality Date    BLADDER SURGERY      GALL BLADDER    CARPAL TUNNEL RELEASE      FINGER FUSION      FOOT SURGERY      TONSILLECTOMY        General Information       Row Name 24 1421          Physical Therapy Time and Intention    Document Type therapy note (daily note)  -KR     Mode of Treatment physical therapy;individual therapy  -KR       Row Name 24 1421          General Information    Patient Profile Reviewed yes  -KR     Existing Precautions/Restrictions fall;other (see comments)  aphasia, R sided weakness  -KR     Barriers to Rehab medically complex  -KR       Row Name 24 1421          Cognition    Orientation Status (Cognition) oriented x 3;verbal cues/prompts needed for  orientation  -KR       Row Name 01/18/24 1421          Safety Issues, Functional Mobility    Safety Issues Affecting Function (Mobility) awareness of need for assistance;insight into deficits/self-awareness;safety precaution awareness;safety precautions follow-through/compliance;sequencing abilities  -KR     Impairments Affecting Function (Mobility) balance;coordination;endurance/activity tolerance;strength;cognition  -KR               User Key  (r) = Recorded By, (t) = Taken By, (c) = Cosigned By      Initials Name Provider Type    KR Emma Walters, PT Physical Therapist                   Mobility       Row Name 01/18/24 1421          Bed Mobility    Bed Mobility supine-sit  -KR     Supine-Sit [place] (Bed Mobility) minimum assist (75% patient effort);verbal cues  -KR     Assistive Device (Bed Mobility) bed rails;draw sheet;head of bed elevated  -KR     Comment, (Bed Mobility) cues for sequencing. Assist req'd at trunk.  -KR       Row Name 01/18/24 1421          Sit-Stand Transfer    Sit-Stand [place] (Transfers) contact guard  -KR     Assistive Device (Sit-Stand Transfers) walker, front-wheeled  -KR     Comment, (Sit-Stand Transfer) 1x from bed, multiple stands from chair + addt'l 5x STS. Cues for UE push up and reaching back for controlled lowering to sit.  -KR       Row Name 01/18/24 1421          Gait/Stairs (Locomotion)    [place] Level (Gait) contact guard  -KR     Assistive Device (Gait) walker, front-wheeled  -KR     Distance in Feet (Gait) 48 + 48  -KR     Deviations/Abnormal Patterns (Gait) bilateral deviations;gait speed decreased;stride length decreased;base of support, narrow  -KR     Bilateral Gait Deviations forward flexed posture  -KR     Comment, (Gait/Stairs) cues for proximity to walker. Farther distance limited by LLE weakness and fatigue.  -KR               User Key  (r) = Recorded By, (t) = Taken By, (c) = Cosigned By      Initials Name Provider Type    Emma Lacey, PT  Physical Therapist                   Obj/Interventions       Row Name 01/18/24 1424          Motor Skills    Therapeutic Exercise hip;knee;ankle  -KR       Row Name 01/18/24 1424          Hip (Therapeutic Exercise)    Hip (Therapeutic Exercise) strengthening exercise  -KR     Hip Strengthening (Therapeutic Exercise) 10 repetitions;bilateral;marching while standing  -KR       Row Name 01/18/24 1424          Knee (Therapeutic Exercise)    Knee (Therapeutic Exercise) strengthening exercise  -KR     Knee Strengthening (Therapeutic Exercise) 10 repetitions;left;LAQ (long arc quad)  -KR       Row Name 01/18/24 1424          Ankle (Therapeutic Exercise)    Ankle (Therapeutic Exercise) strengthening exercise  -KR     Ankle Strengthening (Therapeutic Exercise) 10 repetitions;standing;bilateral;plantarflexion  -KR       Row Name 01/18/24 1424          Balance    Balance Assessment sitting static balance;sitting dynamic balance;standing static balance;standing dynamic balance  -KR     Static Sitting Balance standby assist  -KR     Dynamic Sitting Balance contact guard  -KR     Position, Sitting Balance unsupported;sitting in chair;sitting edge of bed  -KR     Static Standing Balance contact guard  -KR     Dynamic Standing Balance contact guard  -KR     Position/Device Used, Standing Balance supported;walker, front-wheeled  -KR     Balance Interventions sitting;standing;sit to stand;supported;static;dynamic  -KR               User Key  (r) = Recorded By, (t) = Taken By, (c) = Cosigned By      Initials Name Provider Type    Emma Lacey PT Physical Therapist                   Goals/Plan    No documentation.                  Clinical Impression       Row Name 01/18/24 1426          Pain    Pretreatment Pain Rating 0/10 - no pain  -KR     Posttreatment Pain Rating 0/10 - no pain  -KR       Row Name 01/18/24 1426          Plan of Care Review    Plan of Care Reviewed With patient;sibling  -KR     Progress improving  -KR      Outcome Evaluation Pt required less assist for STS transfer and ambulation this date. Pt continues to demo strength, balance, and endurance below baseline and will continue to benefit from PT to address these ongoing deficits.  -KR       Row Name 01/18/24 1426          Vital Signs    Pre Patient Position Supine  -KR     Intra Patient Position Standing  -KR     Post Patient Position Sitting  -KR       Row Name 01/18/24 1426          Positioning and Restraints    Pre-Treatment Position in bed  -KR     Post Treatment Position chair  -KR     In Chair notified nsg;reclined;call light within reach;encouraged to call for assist;exit alarm on;with family/caregiver;waffle cushion;legs elevated  -KR               User Key  (r) = Recorded By, (t) = Taken By, (c) = Cosigned By      Initials Name Provider Type    Emma Lacey PT Physical Therapist                   Outcome Measures       Row Name 01/18/24 1427          How much help from another person do you currently need...    Turning from your back to your side while in flat bed without using bedrails? 4  -KR     Moving from lying on back to sitting on the side of a flat bed without bedrails? 3  -KR     Moving to and from a bed to a chair (including a wheelchair)? 3  -KR     Standing up from a chair using your arms (e.g., wheelchair, bedside chair)? 3  -KR     Climbing 3-5 steps with a railing? 3  -KR     To walk in hospital room? 3  -KR     AM-PAC 6 Clicks Score (PT) 19  -KR     Highest Level of Mobility Goal 6 --> Walk 10 steps or more  -KR               User Key  (r) = Recorded By, (t) = Taken By, (c) = Cosigned By      Initials Name Provider Type    Emma Lacey PT Physical Therapist                                 Physical Therapy Education       Title: PT OT SLP Therapies (In Progress)       Topic: Physical Therapy (In Progress)       Point: Mobility training (Done)       Learning Progress Summary             Patient Acceptance, E, VU by GANESH at 1/18/2024  1427    Acceptance, E,TB, NR by ES at 1/16/2024 1059   Family Acceptance, E, VU by KR at 1/18/2024 1427    Acceptance, E,TB, NR by ES at 1/16/2024 1059                         Point: Home exercise program (Not Started)       Learner Progress:  Not documented in this visit.              Point: Body mechanics (Done)       Learning Progress Summary             Patient Acceptance, E, VU by KR at 1/18/2024 1427    Acceptance, E,TB, NR by ES at 1/16/2024 1059   Family Acceptance, E, VU by KR at 1/18/2024 1427    Acceptance, E,TB, NR by ES at 1/16/2024 1059                         Point: Precautions (Done)       Learning Progress Summary             Patient Acceptance, E, VU by KR at 1/18/2024 1427    Acceptance, E,TB, NR by ES at 1/16/2024 1059   Family Acceptance, E, VU by KR at 1/18/2024 1427    Acceptance, E,TB, NR by ES at 1/16/2024 1059                                         User Key       Initials Effective Dates Name Provider Type Discipline    ES 08/11/22 -  Lexi Black, PT Physical Therapist PT    KR 12/30/22 -  Emma Walters, PT Physical Therapist PT                  PT Recommendation and Plan     Plan of Care Reviewed With: patient, sibling  Progress: improving  Outcome Evaluation: Pt required less assist for STS transfer and ambulation this date. Pt continues to demo strength, balance, and endurance below baseline and will continue to benefit from PT to address these ongoing deficits.     Time Calculation:         PT Charges       Row Name 01/18/24 1428             Time Calculation    Start Time 1323  -KR      PT Received On 01/18/24  -KR         Timed Charges    83168 - PT Therapeutic Exercise Minutes 8  -KR      44372 - Gait Training Minutes  10  -KR      50990 - PT Therapeutic Activity Minutes 13  -KR         Total Minutes    Timed Charges Total Minutes 31  -KR       Total Minutes 31  -KR                User Key  (r) = Recorded By, (t) = Taken By, (c) = Cosigned By      Initials Name Provider Type     Emma Lacey, PT Physical Therapist                  Therapy Charges for Today       Code Description Service Date Service Provider Modifiers Qty    93109900401 HC GAIT TRAINING EA 15 MIN 1/18/2024 Emma Walters, PT GP 1    08739846532 HC PT THERAPEUTIC ACT EA 15 MIN 1/18/2024 Emma Walters, PT GP 1            PT G-Codes  Outcome Measure Options: AM-PAC 6 Clicks Daily Activity (OT), Modified [place]  AM-PAC 6 Clicks Score (PT): 19  AM-PAC 6 Clicks Score (OT): 14  Modified [place] Scale: 3 - Moderate disability.  Requiring some help, but able to walk without assistance.       Emma Walters, PT  1/18/2024

## 2024-01-18 NOTE — THERAPY TREATMENT NOTE
Acute Care - Speech Language Pathology Treatment Note  [facility]     Patient Name: Emily Grove  : 1960  MRN: [id number]  Today's Date: 2024               Admit Date: 2024     Visit Dx:    ICD-10-CM ICD-9-CM   1. Hypotension, unspecified hypotension type  I95.9 458.9   2. Junctional bradycardia  R00.1 427.89   3. Acute kidney injury (nontraumatic)  N17.9 584.9   4. Non-ST elevation MI (NSTEMI)  I21.4 410.70   5. Dysphagia, unspecified type  R13.10 787.20   6. Aphasia  R47.01 784.3     Patient Active Problem List   Diagnosis    Fatty liver    Family history of polyps in the colon    Gastroesophageal reflux disease    Precordial pain    Type 1 diabetes mellitus with hyperglycemia (On insulin pump)    Abnormal EKG    Other fatigue    Dizziness    Tachycardia    Palpitations    CVA (cerebral vascular accident)    History of CVA (cerebrovascular accident)    Hypotension    Bradycardia    R/O NSTEMI (non-ST elevated myocardial infarction)    LAUREN (acute kidney injury)    UTI (urinary tract infection)    Junctional bradycardia    PFO (patent foramen ovale)     Past Medical History:   Diagnosis Date    Acid reflux     Diabetes mellitus     Hyperlipidemia     Hypertension     Overactive bladder     Stroke      Past Surgical History:   Procedure Laterality Date    BLADDER SURGERY      GALL BLADDER    CARPAL TUNNEL RELEASE      FINGER FUSION      FOOT SURGERY      TONSILLECTOMY         SLP Recommendation and Plan  SLP Diagnosis: moderate, aphasia, suspected, apraxia (24 1500)           Swallow Criteria for Skilled Therapeutic Interventions Met: demonstrates skilled criteria (24 1500)  SLC Criteria for Skilled Therapy Interventions Met: yes (24 1500)  Anticipated Discharge Disposition (SLP): inpatient rehabilitation facility (24 1500)        Therapy Frequency (SLP SLC): 5 days per week (24 1500)  Predicted Duration Therapy Intervention (Days): until discharge (24  1500)  Oral Care Recommendations: Oral Care BID/PRN, Toothbrush (01/18/24 1500)     Daily Summary of Progress (SLP): progress toward functional goals as expected (01/18/24 1500)        Communication Strategy Suggestions: eliminate distractions when speaking with patient, avoid open-ended questions, avoid multi-step instructions (01/18/24 1500)  Treatment Assessment (SLP): continued, mild-moderate, aphasia, dysarthria (01/18/24 1500)     Plan for Continued Treatment (SLP): continue treatment per plan of care (01/18/24 1500)         SLP EVALUATION (last 72 hours)       SLP SLC Evaluation       Row Name 01/18/24 1500 01/16/24 0830                Communication Assessment/Intervention    Document Type therapy note (daily note)  - evaluation  -CJ       Subjective Information no complaints  - no complaints  -CJ       Patient Observations alert;cooperative;agree to therapy  - alert;cooperative  -       Patient/Family/Caregiver Comments/Observations sister present  - family present  -CJ       Patient Effort good  -CH good  -CJ       Symptoms Noted During/After Treatment none  -CH none  -CJ          General Information    Patient Profile Reviewed yes  - yes  -CJ       Pertinent History Of Current Problem -- Pt adm w/ [facility], Nstemi, bradycardia; sig h/o DM, hypotension, GERD, palpitations, previous CVA. Found to have new small patchy foci in L parietal lobe w/ new onset of aphasia  -CJ       Precautions/Limitations, Vision -- WFL;for purposes of eval  -CJ       Precautions/Limitations, Hearing -- WFL;for purposes of eval  -CJ       Prior Level of Function-Communication -- unknown  -       Plans/Goals Discussed with -- patient;family  -       Barriers to Rehab -- cognitive status  -       Patient's Goals for Discharge -- patient could not state  -          Pain    Additional Documentation Pain Scale: FACES Pre/Post-Treatment (Group)  - Pain Scale: FACES Pre/Post-Treatment (Group)  -          Pain Scale:  FACES Pre/Post-Treatment    Pain: FACES Scale, Pretreatment 0-->no hurt  -CH 0-->no hurt  -CJ       Posttreatment Pain Rating 0-->no hurt  -CH 0-->no hurt  -CJ          Comprehension Assessment/Intervention    Comprehension Assessment/Intervention -- Auditory Comprehension;Reading Comprehension  -          Auditory Comprehension Assessment/Intervention    Auditory Comprehension (Communication) -- moderate impairment  -CJ       Able to Identify Objects/Pictures (Communication) -- mild impairment;pictures of common objects  -       Answers Questions (Communication) -- mild impairment;complex;yes/no  -CJ       Able to Follow Commands (Communication) -- mild impairment;3-step  -CJ       Narrative Discourse -- moderate impairment;conversational level  -       Successful Auditory Strategies (Communication) -- decrease environmental distractions;phonemic cues  -          Reading Comprehension Assessment/Intervention    Reading Comprehension (Communication) -- WFL  -       Scanning (Reading) -- phrases  -       Single Word Level -- [facility]  -       Reading Comprehension, Comment -- basic reading only assessed however pt became easily frustrated  -          Expression Assessment/Intervention    Expression Assessment/Intervention -- verbal expression  -          Verbal Expression Assessment/Intervention    Verbal Expression -- moderate impairment;severe impairment  -CJ       Automatic Speech (Communication) -- moderate impairment;counting 1-20;days of week;months of year  -CJ       Repetition -- moderate impairment;phrases  -CJ       Phrase Completion -- moderate impairment;automatic/predictable  -CJ       Responsive Naming -- moderate impairment;simple  -CJ       Confrontational Naming -- moderate impairment;low frequency  -CJ       Spontaneous/Functional Words -- moderate impairment;simple  -CJ       Sentence Formulation -- moderate impairment;severe impairment;simple  -CJ          Oral Motor Structure and  Function    Oral Motor Structure and Function -- mild impairment  -       Dentition Assessment -- natural, present and adequate  -       Mucosal Quality -- moist, healthy  -          Oral Musculature and Cranial Nerve Assessment    Oral Motor General Assessment -- WFL  -          Motor Speech Assessment/Intervention    Motor Speech Function -- mild impairment;moderate impairment  -       Speech intelligibility -- 90%;in connected speech;with unfamiliar listener  -          Cognitive Assessment Intervention- SLP    Cognitive Function (Cognition) -- unable/difficult to assess;other (see comments)  2/2 aphasia  -          SLP Evaluation Clinical Impressions    SLP Diagnosis moderate;aphasia;suspected;apraxia  - moderate;aphasia;suspected;apraxia  -       SLP Diagnosis Comments -- Unable to assess RC and WE 2/2 pt becoming easily frustrated 2/2 difficulty w/ tasks.  -       Rehab Potential/Prognosis good  - good  -[facility] Criteria for Skilled Therapy Interventions Met yes  - yes  -       Functional Impact functional impact in ADLs;difficulty communicating in an emergency;difficulty in expressing complex messages;difficulty communicating wants, needs  - functional impact in ADLs;difficulty communicating in an emergency;difficulty in expressing complex messages;difficulty communicating wants, needs  -          SLP Treatment Clinical Impressions    Treatment Assessment (SLP) continued;mild-moderate;aphasia;dysarthria  - --       Daily Summary of Progress (SLP) progress toward functional goals as expected  - --       Plan for Continued Treatment (SLP) continue treatment per plan of care  - --       Care Plan Review evaluation/treatment results reviewed;care plan/treatment goals reviewed;risks/benefits reviewed  - --       Care Plan Review, Other Participant(s) sibling  - --          Recommendations    Therapy Frequency (SLP SLC) 5 days per week  - 5 days per week  -        Predicted Duration Therapy Intervention (Days) until discharge  - until discharge  -CJ       Anticipated Discharge Disposition (SLP) inpatient rehabilitation facility  - inpatient rehabilitation facility  -       Communication Strategy Suggestions eliminate distractions when speaking with patient;avoid open-ended questions;avoid multi-step instructions  - eliminate distractions when speaking with patient;avoid open-ended questions;avoid multi-step instructions  -                 User Key  (r) = Recorded By, (t) = Taken By, (c) = Cosigned By      Initials Name Effective Dates    CJ Karena Nixon, MS CCC-SLP 07/11/23 -      Dora Stoddard MS CCC-SLP 06/16/21 -                        EDUCATION  The patient has been educated in the following areas:     Cognitive Impairment Communication Impairment.           SLP GOALS       Row Name 01/18/24 1500 01/16/24 0830          (LTG) Patient will demonstrate functional swallow for    Diet Texture (Demonstrate functional swallow) regular textures  - regular textures  -     Liquid viscosity (Demonstrate functional swallow) thin liquids  - thin liquids  -     [place] (Demonstrate functional swallow) with minimal cues (75-90% accuracy)  - with minimal cues (75-90% accuracy)  -CJ     Time Frame (Demonstrate functional swallow) by discharge  - by discharge  -CJ     Progress/Outcomes (Demonstrate functional swallow) goal met  -CH new goal  -CJ        (STG) Patient will tolerate trials of    Consistencies Trialed (Tolerate trials) regular textures;thin liquids  - regular textures;thin liquids  -     Desired Outcome (Tolerate trials) without signs/symptoms of aspiration;without signs of distress  - without signs/symptoms of aspiration;without signs of distress  -CJ     [place] (Tolerate trials) with minimal cues (75-90% accuracy)  - with minimal cues (75-90% accuracy)  -CJ     Time Frame (Tolerate trials) 1 week  - 1 week  -      Progress/Outcomes (Tolerate trials) goal met  -CH new goal  -CJ        Patient will demonstrate functional language skills for return to discharge environment     [place] with minimal cues  - with minimal cues  -CJ     Time frame by discharge  - by discharge  -CJ     Progress/Outcomes continuing progress toward goal  - new goal  -[facility] Diagnostic Treatment     Patient will participate in further assessment in the following areas reading comprehension;graphic expression  - reading comprehension;graphic expression  -CJ     Time Frame (Diagnostic) short term goal (STG)  -CH short term goal (STG)  -CJ     Progress/Outcomes (Additional Goal 1, SLP) goal ongoing  -CH new goal  -CJ        Comprehend Questions Goal 1 (SLP)    Improve Ability to Comprehend Questions Goal 1 (SLP) complex yes/no questions;simple wh questions;80%;with moderate cues (50-74%)  - complex yes/no questions;simple wh questions;80%;with moderate cues (50-74%)  -CJ     Time Frame (Comprehend Questions Goal 1, SLP) short term goal (STG)  -CH short term goal (STG)  -CJ     Progress (Ability to Comprehend Questions Goal 1, SLP) 60%;with moderate cues (50-74%)  - --     Progress/Outcomes (Comprehend Questions Goal 1, SLP) continuing progress toward goal  - new goal  -        Follow Directions Goal 2 (SLP)    Improve Ability to Follow Directions Goal 1 (SLP) 2 step commands;80%;with moderate cues (50-74%)  - 2 step commands;80%;with moderate cues (50-74%)  -     Time Frame (Follow Directions Goal 1, SLP) short term goal (STG)  -CH short term goal (STG)  -CJ     Progress (Ability to Follow Directions Goal 1, SLP) 60%;with moderate cues (50-74%)  - --     Progress/Outcomes (Follow Directions Goal 1, SLP) good progress toward goal  - new goal  -        Word Retrieval Skills Goal 1 (SLP)    Improve Word Retrieval Skills By Goal 1 (SLP) confrontational naming task;low frequency;repeating phrases;completing open ended  structured sentence;simple;80%;with moderate cues (50-74%)  - confrontational naming task;low frequency;repeating phrases;completing open ended structured sentence;simple;80%;with moderate cues (50-74%)  -CJ     Time Frame (Word Retrieval Goal 1, SLP) short term goal (STG)  -CH short term goal (STG)  -CJ     Progress (Word Retrieval Skills Goal 1, SLP) 50%;with moderate cues (50-74%)  - --     Progress/Outcomes (Word Retrieval Goal 1, SLP) continuing progress toward goal  -CH new goal  -CJ     Comment (Word Retrieval Goal 1, SLP) Naming and open ended phrases  - --               User Key  (r) = Recorded By, (t) = Taken By, (c) = Cosigned By      Initials Name Provider Type    Karena Barkley MS CCC-SLP Speech and Language Pathologist    Dora Sanchez MS CCC-SLP Speech and Language Pathologist                            Time Calculation:      Time Calculation- SLP       Row Name 24 1557             Time Calculation- SLP    SLP Start Time 1500  -CH      SLP Received On 24  -         Untimed Charges    41355-PW Treatment/ST Modification Prosth Aug Alter  45  -CH      51791-ZY Treatment Swallow Minutes 25  -CH         Total Minutes    Untimed Charges Total Minutes 70  -CH       Total Minutes 70  -CH                User Key  (r) = Recorded By, (t) = Taken By, (c) = Cosigned By      Initials Name Provider Type    Dora Sanchez MS CCC-SLP Speech and Language Pathologist                    Therapy Charges for Today       Code Description Service Date Service Provider Modifiers Qty    09300412068  ST TREATMENT SWALLOW 2 2024 Dora Stoddard MS CCC-SLP GN 1    84576703793 HC ST TREATMENT SPEECH 3 2024 Dora Stoddard MS CCC-SLP GN 1                       Dora Stoddard MS CCC-SLP  2024   and Acute Care - Speech Language Pathology   Swallow Treatment Note  [facility]     Patient Name: Emily Grove  : 1960  MRN: [id number]  Today's Date:  1/18/2024               Admit Date: 1/14/2024    Visit Dx:     ICD-10-CM ICD-9-CM   1. Hypotension, unspecified hypotension type  I95.9 458.9   2. Junctional bradycardia  R00.1 427.89   3. Acute kidney injury (nontraumatic)  N17.9 584.9   4. Non-ST elevation MI (NSTEMI)  I21.4 410.70   5. Dysphagia, unspecified type  R13.10 787.20   6. Aphasia  R47.01 784.3     Patient Active Problem List   Diagnosis    Fatty liver    Family history of polyps in the colon    Gastroesophageal reflux disease    Precordial pain    Type 1 diabetes mellitus with hyperglycemia (On insulin pump)    Abnormal EKG    Other fatigue    Dizziness    Tachycardia    Palpitations    CVA (cerebral vascular accident)    History of CVA (cerebrovascular accident)    Hypotension    Bradycardia    R/O NSTEMI (non-ST elevated myocardial infarction)    LAUREN (acute kidney injury)    UTI (urinary tract infection)    Junctional bradycardia    PFO (patent foramen ovale)     Past Medical History:   Diagnosis Date    Acid reflux     Diabetes mellitus     Hyperlipidemia     Hypertension     Overactive bladder     Stroke      Past Surgical History:   Procedure Laterality Date    BLADDER SURGERY      GALL BLADDER    CARPAL TUNNEL RELEASE      FINGER FUSION      FOOT SURGERY      TONSILLECTOMY         SLP Recommendation and Plan  SLP Swallowing Diagnosis: R/O pharyngeal dysphagia, oral dysphagia (01/18/24 1500)  SLP Diet Recommendation: regular textures, thin liquids (01/18/24 1500)  Recommended Precautions and Strategies: upright posture during/after eating, small bites of food and sips of liquid, general aspiration precautions (01/18/24 1500)  SLP Rec. for Method of Medication Administration: meds whole, with puree, as tolerated (01/18/24 1500)     Monitor for Signs of Aspiration: notify SLP if any concerns (01/18/24 1500)     Swallow Criteria for Skilled Therapeutic Interventions Met: demonstrates skilled criteria (01/18/24 1500)  Anticipated Discharge Disposition  (SLP): inpatient rehabilitation facility (01/18/24 1500)  Rehab Potential/Prognosis, Swallowing: good, to achieve stated therapy goals (01/18/24 1500)     Predicted Duration Therapy Intervention (Days): until discharge (01/18/24 1500)  Oral Care Recommendations: Oral Care BID/PRN, Toothbrush (01/18/24 1500)        Daily Summary of Progress (SLP): progress toward functional goals as expected (01/18/24 1500)            Communication Strategy Suggestions: eliminate distractions when speaking with patient, avoid open-ended questions, avoid multi-step instructions (01/18/24 1500)  Treatment Assessment (SLP): continued, mild-moderate, aphasia, dysarthria (01/18/24 1500)     Plan for Continued Treatment (SLP): continue treatment per plan of care (01/18/24 1500)       Oral Care Recommendations: Oral Care BID/PRN, Toothbrush (01/18/24 1500)           SWALLOW EVALUATION (last 72 hours)       SLP Adult Swallow Evaluation       Row Name 01/18/24 1500 01/16/24 0830                General Information    Current Method of Nutrition -- NPO  -       Prior Level of Function-Communication -- unknown  -       Prior Level of Function-Swallowing -- no diet consistency restrictions  -       Patient's Goals for Discharge -- return to PO diet  -          Oral Motor Structure and Function    Secretion Management -- WNL/WFL  -          General Eating/Swallowing Observations    Respiratory Support Currently in Use -- room air  -       Eating/Swallowing Skills -- fed by SLP  -       Positioning During Eating -- upright in bed  -       Utensils Used -- spoon;cup;straw  -       Consistencies Trialed -- regular textures;pureed;ice chips;thin liquids  -          Clinical Swallow Eval    Oral Prep Phase -- impaired  -       Pharyngeal Phase -- no overt signs/symptoms of pharyngeal impairment  -       Clinical Swallow Evaluation Summary -- Prolonged but adequate mastication w/ solids. No oral residue. No other overt s/s of  aspiration w/ any trials of regular solids, thins or puree presentations. Will initaite regular diet w/ thin liquids and f/u for diet tolerance  -          Oral Prep Concerns    Oral Prep Concerns -- prolonged mastication  -          SLP Evaluation Clinical Impression    SLP Swallowing Diagnosis R/O pharyngeal dysphagia;oral dysphagia  - R/O pharyngeal dysphagia;oral dysphagia  -       Functional Impact risk of aspiration/pneumonia  - risk of aspiration/pneumonia  -       Rehab Potential/Prognosis, Swallowing good, to achieve stated therapy goals  - good, to achieve stated therapy goals  -       Swallow Criteria for Skilled Therapeutic Interventions Met demonstrates skilled criteria  - demonstrates skilled criteria  -          Recommendations    Therapy Frequency (Swallow) -- PRN  -       SLP Diet Recommendation regular textures;thin liquids  - regular textures;thin liquids  -       Recommended Diagnostics -- other (see comments)  diet tolerance  -       Recommended Precautions and Strategies upright posture during/after eating;small bites of food and sips of liquid;general aspiration precautions  - upright posture during/after eating;small bites of food and sips of liquid;general aspiration precautions  -       Oral Care Recommendations Oral Care BID/PRN;Toothbrush  - Oral Care BID/PRN;Toothbrush  -       SLP Rec. for Method of Medication Administration meds whole;with puree;as tolerated  - meds whole;with puree;as tolerated  -       Monitor for Signs of Aspiration notify SLP if any concerns  - yes;notify SLP if any concerns  -                 User Key  (r) = Recorded By, (t) = Taken By, (c) = Cosigned By      Initials Name Effective Dates     Karena Nixon MS CCC-SLP 07/11/23 -      Dora Stoddard MS CCC-SLP 06/16/21 -                     EDUCATION  The patient has been educated in the following areas:   Dysphagia (Swallowing Impairment) Oral Care/Hydration.         SLP GOALS       Row Name 01/18/24 1500 01/16/24 0830          (LTG) Patient will demonstrate functional swallow for    Diet Texture (Demonstrate functional swallow) regular textures  -CH regular textures  -CJ     Liquid viscosity (Demonstrate functional swallow) thin liquids  -CH thin liquids  -CJ     [place] (Demonstrate functional swallow) with minimal cues (75-90% accuracy)  -CH with minimal cues (75-90% accuracy)  -CJ     Time Frame (Demonstrate functional swallow) by discharge  -CH by discharge  -CJ     Progress/Outcomes (Demonstrate functional swallow) goal met  -CH new goal  -CJ        (STG) Patient will tolerate trials of    Consistencies Trialed (Tolerate trials) regular textures;thin liquids  -CH regular textures;thin liquids  -CJ     Desired Outcome (Tolerate trials) without signs/symptoms of aspiration;without signs of distress  -CH without signs/symptoms of aspiration;without signs of distress  -CJ     [place] (Tolerate trials) with minimal cues (75-90% accuracy)  -CH with minimal cues (75-90% accuracy)  -CJ     Time Frame (Tolerate trials) 1 week  -CH 1 week  -CJ     Progress/Outcomes (Tolerate trials) goal met  -CH new goal  -CJ        Patient will demonstrate functional language skills for return to discharge environment     [place] with minimal cues  - with minimal cues  -CJ     Time frame by discharge  - by discharge  -CJ     Progress/Outcomes continuing progress toward goal  -CH new goal  -CJ        SLP Diagnostic Treatment     Patient will participate in further assessment in the following areas reading comprehension;graphic expression  - reading comprehension;graphic expression  -CJ     Time Frame (Diagnostic) short term goal (STG)  -CH short term goal (STG)  -CJ     Progress/Outcomes (Additional Goal 1, SLP) goal ongoing  -CH new goal  -CJ        Comprehend Questions Goal 1 (SLP)    Improve Ability to Comprehend Questions Goal 1 (SLP) complex yes/no questions;simple  wh questions;80%;with moderate cues (50-74%)  -CH complex yes/no questions;simple wh questions;80%;with moderate cues (50-74%)  -     Time Frame (Comprehend Questions Goal 1, SLP) short term goal (STG)  - short term goal (STG)  -CJ     Progress (Ability to Comprehend Questions Goal 1, SLP) 60%;with moderate cues (50-74%)  - --     Progress/Outcomes (Comprehend Questions Goal 1, SLP) continuing progress toward goal  - new goal  -CJ        Follow Directions Goal 2 (SLP)    Improve Ability to Follow Directions Goal 1 (SLP) 2 step commands;80%;with moderate cues (50-74%)  - 2 step commands;80%;with moderate cues (50-74%)  -     Time Frame (Follow Directions Goal 1, SLP) short term goal (STG)  - short term goal (STG)  -CJ     Progress (Ability to Follow Directions Goal 1, SLP) 60%;with moderate cues (50-74%)  - --     Progress/Outcomes (Follow Directions Goal 1, SLP) good progress toward goal  - new goal  -        Word Retrieval Skills Goal 1 (SLP)    Improve Word Retrieval Skills By Goal 1 (SLP) confrontational naming task;low frequency;repeating phrases;completing open ended structured sentence;simple;80%;with moderate cues (50-74%)  - confrontational naming task;low frequency;repeating phrases;completing open ended structured sentence;simple;80%;with moderate cues (50-74%)  -     Time Frame (Word Retrieval Goal 1, SLP) short term goal (STG)  - short term goal (STG)  -CJ     Progress (Word Retrieval Skills Goal 1, SLP) 50%;with moderate cues (50-74%)  - --     Progress/Outcomes (Word Retrieval Goal 1, SLP) continuing progress toward goal  - new goal  -     Comment (Word Retrieval Goal 1, SLP) Naming and open ended phrases  - --               User Key  (r) = Recorded By, (t) = Taken By, (c) = Cosigned By      Initials Name Provider Type    Karena Barkley MS CCC-SLP Speech and Language Pathologist     Dora Stoddard MS CCC-SLP Speech and Language Pathologist                        Time Calculation:    Time Calculation- SLP       Row Name 01/18/24 1557             Time Calculation- SLP    SLP Start Time 1500  -CH      SLP Received On 01/18/24  -         Untimed Charges    46366-OP Treatment/ST Modification Prosth Aug Alter  45  -CH      89123-TG Treatment Swallow Minutes 25  -CH         Total Minutes    Untimed Charges Total Minutes 70  -CH       Total Minutes 70  -CH                User Key  (r) = Recorded By, (t) = Taken By, (c) = Cosigned By      Initials Name Provider Type     Dora Stoddard MS CCC-SLP Speech and Language Pathologist                    Therapy Charges for Today       Code Description Service Date Service Provider Modifiers Qty    73529130002 HC ST TREATMENT SWALLOW 2 1/18/2024 Dora Stoddard MS CCC-SLP GN 1    [id number] HC ST TREATMENT SPEECH 3 1/18/2024 Dora Stoddard MS CCC-SLP GN 1                 Dora Stoddard MS CCC-SLP  1/18/2024

## 2024-01-18 NOTE — CONSULTS
Diabetes Education    Patient Name:  Emily Grove  YOB: 1960  MRN: [id number]  Admit Date:  1/14/2024      Diabetes ed consult rec'd, chart reviewed. Noted most recent GCS 13. Plan for [facility] at discharge per case mgmt notes. As a result, pt will not qualify for follow-up stroke class. We will cont to follow to assess for appropriate time for education. Call [phone] w/ interval needs.     Electronically signed by:  Samantha Villatoro RN  01/18/24 09:58 EST

## 2024-01-18 NOTE — PLAN OF CARE
Goal Outcome Evaluation:                     Anticipated Discharge Disposition (SLP): inpatient rehabilitation facility    SLP Diagnosis: moderate, aphasia, suspected, apraxia (01/18/24 1500)     SLP Swallowing Diagnosis: R/O pharyngeal dysphagia, oral dysphagia (01/18/24 1500)  Treatment Assessment (SLP): continued, mild-moderate, aphasia, dysarthria (01/18/24 1500)     Plan for Continued Treatment (SLP): continue treatment per plan of care (01/18/24 1500)

## 2024-01-18 NOTE — PROGRESS NOTES
[facility] Medicine Services  PROGRESS NOTE    Patient Name: Emily Grove  : 1960  MRN: [id number]    Date of Admission: 2024  Primary Care Physician: Marie Morales MD    Subjective   Subjective     CC:  Bradycardia, hypotension    HPI:  No acute events. Still with aphasia. Sister at bedside and states she is about the same. Patient stated she did not like stress test today.       Objective   Objective     Vital Signs:   Temp:  [98 °F (36.7 °C)-98.4 °F (36.9 °C)] 98.3 °F (36.8 °C)  Heart Rate:  [78-98] 78  Resp:  [16-18] 18  BP: (113-129)/(76-93) 126/93     Physical Exam:  Constitutional: No acute distress, awake, alert  HENT: NCAT, mucous membranes moist  Respiratory: Clear to auscultation bilaterally, respiratory effort normal   Cardiovascular: RRR, no murmurs, rubs, or gallops  Gastrointestinal: Positive bowel sounds, soft, nontender, nondistended  Musculoskeletal: No bilateral ankle edema  Psychiatric: Appropriate affect, cooperative  Neurologic: Oriented x 3, strength symmetric in all extremities, Cranial Nerves grossly intact to confrontation, expressive aphasia   Skin: No rashes      Results Reviewed:  LAB RESULTS:      Lab 24  0638 01/15/24  0842 01/15/24  0018 248   WBC 6.64 6.76  --  9.62   HEMOGLOBIN 13.2 12.3  --  13.5   HEMATOCRIT 39.1 36.4  --  41.2   PLATELETS 158 159  --  265   NEUTROS ABS  --   --   --  6.87   IMMATURE GRANS (ABS)  --   --   --  0.06*   LYMPHS ABS  --   --   --  1.73   MONOS ABS  --   --   --  0.91*   EOS ABS  --   --   --  0.02   MCV 85.9 85.8  --  89.4   LACTATE  --   --  1.4 1.6         Lab 24  0638 24  0541 01/15/24  0842 24  2352 24  2118   SODIUM 142  --  143 140 137   POTASSIUM 4.1  --  4.2 4.7 4.9   CHLORIDE 104  --  108* 107 100   CO2 26.0  --  22.0 23.0 27.0   ANION GAP 12.0  --  13.0 10.0 10.0   BUN 14  --  21 33* 32*   CREATININE 0.54*  --  0.84 1.60* 1.95*   EGFR 104.9  --  79.2  36.5* 28.8*   GLUCOSE 136*  --  83 120* 150*   CALCIUM 9.6  --  8.6 8.1* 9.8   IONIZED CALCIUM  --   --  1.23  --   --    MAGNESIUM  --   --  1.8  --  2.0   PHOSPHORUS  --   --  3.2  --   --    HEMOGLOBIN A1C  --  7.80*  --   --   --          Lab 01/15/24  0842 01/14/24 2352 01/14/24 2118   TOTAL PROTEIN 6.3 5.8* 7.5   ALBUMIN 3.9 3.2* 4.2   GLOBULIN 2.4 2.6 3.3   ALT (SGPT) 89* 58* 50*   AST (SGOT) 85* 85* 74*   BILIRUBIN 0.2 0.4 0.3   ALK PHOS 215* 162* 198*         Lab 01/14/24 2352 01/14/24 2118   HSTROP T 194* 265*         Lab 01/16/24  0541   CHOLESTEROL 106   LDL CHOL 36   HDL CHOL 50   TRIGLYCERIDES 107             Brief Urine Lab Results  (Last result in the past 365 days)        Color   Clarity   Blood   Leuk Est   Nitrite   Protein   CREAT   Urine HCG        01/14/24 2152 Yellow   Clear   Trace   Moderate (2+)   Negative   >=300 mg/dL (3+)                   Microbiology Results Abnormal       Procedure Component Value - Date/Time    Blood Culture - Blood, Arm, Right [[id number]]  (Normal) Collected: 01/14/24 2352    Lab Status: Preliminary result Specimen: Blood from Arm, Right Updated: 01/18/24 0245     Blood Culture No growth at 3 days    Blood Culture - Blood, Arm, Right [[id number]]  (Normal) Collected: 01/15/24 0018    Lab Status: Preliminary result Specimen: Blood from Arm, Right Updated: 01/18/24 0245     Blood Culture No growth at 3 days    Urine Culture - Urine, Urine, Clean Catch [[id number]]  (Normal) Collected: 01/14/24 2152    Lab Status: Final result Specimen: Urine, Clean Catch Updated: 01/16/24 0944     Urine Culture No growth            Stress Test With Pet Myocardial Perfusion    Result Date: 1/18/2024    Left ventricular ejection fraction is hyperdynamic (Calculated EF > 70%).   Rest EF = 74% Stress EF = 79%.   Myocardial perfusion imaging indicates a small-sized, mild-to-moderately severe area of ischemia located in the basal inferior lateral wall.   Impressions are consistent with  abnormal but overall low risk study, consistent with distal single-vessel disease, either distal circumflex or distal RCA territory.     Duplex Venous Lower Extremity - Bilateral CAR    Result Date: 1/17/2024    Normal bilateral lower extremity venous duplex scan.      Results for orders placed during the hospital encounter of 01/14/24    Adult Transthoracic Echo Complete W/ Cont if Necessary Per Protocol    Interpretation Summary    Left ventricular systolic function is normal. Calculated left ventricular EF = 64.6% Normal left ventricular cavity size and wall thickness noted. All left ventricular wall segments contract normally. Left ventricular diastolic function was normal.    Normal right ventricular cavity size, wall thickness, systolic function and septal motion noted.    The aortic valve is grossly normal in structure. No significant aortic valve regurgitation is present. No hemodynamically significant aortic valve stenosis is present.    The mitral valve is grossly normal in structure. Trace mitral valve regurgitation is present. No significant mitral valve stenosis is present.    The tricuspid valve is grossly normal in structure. Trace tricuspid valve regurgitation is present. Estimated right ventricular systolic pressure from tricuspid regurgitation is normal (<35 mmHg). No evidence of significant tricuspid valve stenosis is present.      Current medications:  Scheduled Meds:aspirin, 325 mg, Oral, Daily  atorvastatin, 20 mg, Oral, Nightly  brimonidine, 1 drop, Right Eye, TID  clopidogrel, 75 mg, Oral, Daily  heparin (porcine), 5,000 Units, Subcutaneous, Q8H  insulin detemir, 10 Units, Subcutaneous, Daily  insulin lispro, 3-14 Units, Subcutaneous, 4x Daily AC & at Bedtime  latanoprost, 1 drop, Both Eyes, Daily  pantoprazole, 40 mg, Oral, Q AM  senna-docusate sodium, 2 tablet, Oral, BID  sodium chloride, 10 mL, Intravenous, Q12H  valsartan, 40 mg, Oral, Q24H      Continuous Infusions:   PRN Meds:.   senna-docusate sodium **AND** polyethylene glycol **AND** bisacodyl **AND** bisacodyl    dextrose    dextrose    glucagon (human recombinant)    ondansetron    Potassium Replacement - Follow Nurse / BPA Driven Protocol    Potassium Replacement - Follow Nurse / BPA Driven Protocol    prochlorperazine **OR** prochlorperazine **OR** prochlorperazine    sodium chloride    sodium chloride    sodium chloride    brimonidine **AND** timolol    Assessment & Plan   Assessment & Plan     Active Hospital Problems    Diagnosis  POA    **Junctional bradycardia [R00.1]  Yes    PFO (patent foramen ovale) [Q21.12]  Not Applicable    Hypotension [I95.9]  Yes    Bradycardia [R00.1]  Yes    R/O NSTEMI (non-ST elevated myocardial infarction) [I21.4]  Yes    LAUREN (acute kidney injury) [N17.9]  Yes    UTI (urinary tract infection) [N39.0]  Yes    History of CVA (cerebrovascular accident) [Z86.73]  Not Applicable    Type 1 diabetes mellitus with hyperglycemia (On insulin pump) [E10.65]  Yes      Resolved Hospital Problems   No resolved problems to display.        Brief Hospital Course to date:  Emily Grove is a 63 y.o. female with history of type 1 diabetes, hypertension, GERD who presented to the emergency room 1/14 for hypotension with bradycardia.  Patient was admitted to the ICU and started on fluids and dopamine with improvement in heart rate.  She was able to be weaned off dopamine.  Patient had an episode of confusion underwent emergent CT scan 1/15 that showed small left parietal CVA.  Stroke team was consulted when she is outside of the window for TNK.  Patient was transferred to medicine service on 1/17.    Junctional bradycardia  -Required dopamine on admission which has been weaned off   -Bradycardia felt to be related to diltiazem  -Stress test 1/18 with small sized, mild to moderately severe area of ischemia located in the basal inferior lateral wall.  Impression consistent with an abnormal but overall low risk study,  consistent with distal single-vessel disease either distal circumflex or distal RCA territory.  -Off AV odette blocking agents  -Cardiac monitor at discharge    Acute left parietal stroke with expressive aphasia  -MRI brain with infarct in the left parietal lobe and mild changes of chronic microvascular ischemia  -Stroke team consulted --stroke suggestive of cardioembolic etiology concordant with  -No atrial fibrillation noted so far with no history of A-fib  -Aspirin 325 mg daily, Plavix 75 mg daily, atorvastatin 20 mg daily  -Cardiac monitor on discharge    PFO  -Rope score 3  -Lower extremity duplex negative for DVT  -Cardiology evaluated recommended evaluation by structural heart disease team as an outpatient    Hypertension  -Continue valsartan 40 mg daily    Type 1 diabetes  -A1c 7.8  -Controlled on Levemir 10 units daily and sliding scale insulin.  -Resume home insulin pump on discharge.    UTI POA  -Urine culture no growth  -Status post 3 doses of Rocephin      Expected Discharge Location and Transportation: Rehab  Expected Discharge   Expected Discharge Date: 1/19/2024; Expected Discharge Time:      DVT prophylaxis:  Medical and mechanical DVT prophylaxis orders are present.     AM-PAC 6 Clicks Score (PT): 19 (01/16/24 1059)    CODE STATUS:   Code Status and Medical Interventions:   Ordered at: 01/14/24 5662     Code Status (Patient has no pulse and is not breathing):    CPR (Attempt to Resuscitate)     Medical Interventions (Patient has pulse or is breathing):    Full Support       Betty Lorenz DO  01/18/24

## 2024-01-18 NOTE — CASE MANAGEMENT/SOCIAL WORK
Case Management Discharge Note      Final Note: For Friday:    To [facility], insurance approval for CVA-2 unit. Nursing to call report to [phone].   I will arrange [facility] transport as [facility] is full.     [facility] will pick her up in room at noon tomorrow.     Selected Continued Care - Admitted Since 1/14/2024       Destination Coordination complete.      Service Provider Selected Services Address Phone Fax Patient Preferred    [facility] [address] [address] [phone] --              Durable Medical Equipment    No services have been selected for the patient.                Dialysis/Infusion    No services have been selected for the patient.                Home Medical Care    No services have been selected for the patient.                Therapy    No services have been selected for the patient.                Community Resources    No services have been selected for the patient.                Community & DME    No services have been selected for the patient.                         Final Discharge Disposition Code: 62 - inpatient rehab facility

## 2024-01-19 VITALS
WEIGHT: 110.01 LBS | HEART RATE: 87 BPM | RESPIRATION RATE: 18 BRPM | SYSTOLIC BLOOD PRESSURE: 121 MMHG | OXYGEN SATURATION: 99 % | BODY MASS INDEX: 25.46 KG/M2 | DIASTOLIC BLOOD PRESSURE: 79 MMHG | TEMPERATURE: 97.9 F | HEIGHT: 55 IN

## 2024-01-19 DIAGNOSIS — I63.9 CEREBROVASCULAR ACCIDENT (CVA), UNSPECIFIED MECHANISM: ICD-10-CM

## 2024-01-19 DIAGNOSIS — Q21.12 PFO (PATENT FORAMEN OVALE): Primary | Chronic | ICD-10-CM

## 2024-01-19 PROBLEM — I21.4 NSTEMI (NON-ST ELEVATED MYOCARDIAL INFARCTION): Status: RESOLVED | Noted: 2024-01-14 | Resolved: 2024-01-19

## 2024-01-19 PROBLEM — N17.9 AKI (ACUTE KIDNEY INJURY): Status: RESOLVED | Noted: 2024-01-14 | Resolved: 2024-01-19

## 2024-01-19 PROBLEM — R00.1 JUNCTIONAL BRADYCARDIA: Status: RESOLVED | Noted: 2024-01-15 | Resolved: 2024-01-19

## 2024-01-19 PROBLEM — N39.0 UTI (URINARY TRACT INFECTION): Status: RESOLVED | Noted: 2024-01-14 | Resolved: 2024-01-19

## 2024-01-19 PROBLEM — R00.1 BRADYCARDIA: Status: RESOLVED | Noted: 2024-01-14 | Resolved: 2024-01-19

## 2024-01-19 PROBLEM — I95.9 HYPOTENSION: Status: RESOLVED | Noted: 2024-01-14 | Resolved: 2024-01-19

## 2024-01-19 LAB — GLUCOSE BLDC GLUCOMTR-MCNC: 117 MG/DL (ref 70–130)

## 2024-01-19 PROCEDURE — 25010000002 HEPARIN (PORCINE) PER 1000 UNITS: Performed by: INTERNAL MEDICINE

## 2024-01-19 PROCEDURE — 63710000001 INSULIN DETEMIR PER 5 UNITS: Performed by: HOSPITALIST

## 2024-01-19 PROCEDURE — 92507 TX SP LANG VOICE COMM INDIV: CPT

## 2024-01-19 PROCEDURE — 82948 REAGENT STRIP/BLOOD GLUCOSE: CPT

## 2024-01-19 PROCEDURE — 99239 HOSP IP/OBS DSCHRG MGMT >30: CPT | Performed by: INTERNAL MEDICINE

## 2024-01-19 RX ORDER — CLOPIDOGREL BISULFATE 75 MG/1
75 TABLET ORAL DAILY
Qty: 30 TABLET | Refills: 3 | Status: SHIPPED | OUTPATIENT
Start: 2024-01-19

## 2024-01-19 RX ORDER — INSULIN LISPRO 100 [IU]/ML
3-14 INJECTION, SOLUTION INTRAVENOUS; SUBCUTANEOUS
Start: 2024-01-19

## 2024-01-19 RX ORDER — VALSARTAN 40 MG/1
40 TABLET ORAL
Start: 2024-01-19

## 2024-01-19 RX ORDER — ATORVASTATIN CALCIUM 20 MG/1
20 TABLET, FILM COATED ORAL NIGHTLY
Qty: 30 TABLET | Refills: 10 | Status: SHIPPED | OUTPATIENT
Start: 2024-01-19 | End: 2024-11-29

## 2024-01-19 RX ORDER — ASPIRIN 325 MG
325 TABLET ORAL DAILY
Qty: 30 TABLET | Refills: 10 | Status: SHIPPED | OUTPATIENT
Start: 2024-01-19 | End: 2024-11-27

## 2024-01-19 RX ADMIN — INSULIN DETEMIR 10 UNITS: 100 INJECTION, SOLUTION SUBCUTANEOUS at 07:29

## 2024-01-19 RX ADMIN — ACETAMINOPHEN 650 MG: 325 TABLET ORAL at 07:27

## 2024-01-19 RX ADMIN — PANTOPRAZOLE SODIUM 40 MG: 40 TABLET, DELAYED RELEASE ORAL at 06:33

## 2024-01-19 RX ADMIN — BRIMONIDINE TARTRATE 1 DROP: 2 SOLUTION/ DROPS OPHTHALMIC at 07:31

## 2024-01-19 RX ADMIN — ASPIRIN 325 MG: 325 TABLET ORAL at 07:28

## 2024-01-19 RX ADMIN — HEPARIN SODIUM 5000 UNITS: 5000 INJECTION INTRAVENOUS; SUBCUTANEOUS at 06:33

## 2024-01-19 RX ADMIN — LATANOPROST 1 DROP: 50 SOLUTION OPHTHALMIC at 07:30

## 2024-01-19 RX ADMIN — CLOPIDOGREL BISULFATE 75 MG: 75 TABLET ORAL at 07:28

## 2024-01-19 RX ADMIN — VALSARTAN 40 MG: 80 TABLET, FILM COATED ORAL at 07:28

## 2024-01-19 NOTE — THERAPY TREATMENT NOTE
Acute Care - Speech Language Pathology Treatment Note  [facility]     Patient Name: Emily Grove  : 1960  MRN: [id number]  Today's Date: 2024               Admit Date: 2024     Visit Dx:    ICD-10-CM ICD-9-CM   1. Hypotension, unspecified hypotension type  I95.9 458.9   2. Junctional bradycardia  R00.1 427.89   3. Acute kidney injury (nontraumatic)  N17.9 584.9   4. Non-ST elevation MI (NSTEMI)  I21.4 410.70   5. Dysphagia, unspecified type  R13.10 787.20   6. Aphasia  R47.01 784.3     Patient Active Problem List   Diagnosis    Fatty liver    Family history of polyps in the colon    Gastroesophageal reflux disease    Precordial pain    Type 1 diabetes mellitus with hyperglycemia (On insulin pump)    Abnormal EKG    Other fatigue    Dizziness    Tachycardia    Palpitations    CVA (cerebral vascular accident)    History of CVA (cerebrovascular accident)    PFO (patent foramen ovale)     Past Medical History:   Diagnosis Date    Acid reflux     Diabetes mellitus     Hyperlipidemia     Hypertension     Overactive bladder     Stroke      Past Surgical History:   Procedure Laterality Date    BLADDER SURGERY      GALL BLADDER    CARPAL TUNNEL RELEASE      FINGER FUSION      FOOT SURGERY      TONSILLECTOMY         SLP Recommendation and Plan                 Anticipated Discharge Disposition (SLP): inpatient rehabilitation facility (24)        Therapy Frequency (SLP SLC): 3 days per week (24)  Predicted Duration Therapy Intervention (Days): until discharge (24)        Daily Summary of Progress (SLP): progress toward functional goals as expected (24)        Communication Strategy Suggestions: eliminate distractions when speaking with patient, avoid open-ended questions, avoid multi-step instructions (24)  Treatment Assessment (SLP): continued, mild-moderate, aphasia (24)     Plan for Continued Treatment (SLP): continue treatment per  plan of care (01/19/24 0925)         SLP EVALUATION (last 72 hours)       SLP SLC Evaluation       Row Name 01/19/24 0925 01/18/24 1500                Communication Assessment/Intervention    Document Type -- therapy note (daily note)  -       Subjective Information -- no complaints  -       Patient Observations -- alert;cooperative;agree to therapy  -       Patient/Family/Caregiver Comments/Observations -- sister present  -       Patient Effort -- good  -       Symptoms Noted During/After Treatment -- none  -          General Information    Patient Profile Reviewed -- yes  -          Pain    Additional Documentation -- Pain Scale: FACES Pre/Post-Treatment (Group)  -          Pain Scale: FACES Pre/Post-Treatment    Pain: FACES Scale, Pretreatment -- 0-->no hurt  -       Posttreatment Pain Rating -- 0-->no hurt  -          SLP Evaluation Clinical Impressions    SLP Diagnosis -- moderate;aphasia;suspected;apraxia  -       Rehab Potential/Prognosis -- good  -[facility] Criteria for Skilled Therapy Interventions Met -- yes  -       Functional Impact -- functional impact in ADLs;difficulty communicating in an emergency;difficulty in expressing complex messages;difficulty communicating wants, needs  -          SLP Treatment Clinical Impressions    Treatment Assessment (SLP) continued;mild-moderate;aphasia  - continued;mild-moderate;aphasia;dysarthria  -       Treatment Assessment Comments (SLP) --  -MH --       Daily Summary of Progress (SLP) progress toward functional goals as expected  - progress toward functional goals as expected  -       Plan for Continued Treatment (SLP) continue treatment per plan of care  - continue treatment per plan of care  -       Care Plan Review care plan/treatment goals reviewed  - evaluation/treatment results reviewed;care plan/treatment goals reviewed;risks/benefits reviewed  -       Care Plan Review, Other Participant(s) -- sibling  -           Recommendations    Therapy Frequency (SLP SLC) 3 days per week  - 5 days per week  -       Predicted Duration Therapy Intervention (Days) until discharge  - until discharge  -       Anticipated Discharge Disposition (SLP) inpatient rehabilitation facility  - inpatient rehabilitation facility  -       Communication Strategy Suggestions eliminate distractions when speaking with patient;avoid open-ended questions;avoid multi-step instructions  - eliminate distractions when speaking with patient;avoid open-ended questions;avoid multi-step instructions  -                 User Key  (r) = Recorded By, (t) = Taken By, (c) = Cosigned By      Initials Name Effective Dates     Dora Stoddard, MS CCC-SLP 06/16/21 -      Padmaja Erickson, MS CCC-SLP 05/12/23 -                        EDUCATION  The patient has been educated in the following areas:     Communication Impairment.           SLP GOALS       Row Name 01/19/24 0925 01/18/24 1500          (LTG) Patient will demonstrate functional swallow for    Diet Texture (Demonstrate functional swallow) -- regular textures  -     Liquid viscosity (Demonstrate functional swallow) -- thin liquids  -     Donald (Demonstrate functional swallow) -- with minimal cues (75-90% accuracy)  -     Time Frame (Demonstrate functional swallow) -- by discharge  -     Progress/Outcomes (Demonstrate functional swallow) -- goal met  -        ([facility]) Patient will tolerate trials of    Consistencies Trialed (Tolerate trials) -- regular textures;thin liquids  -     Desired Outcome (Tolerate trials) -- without signs/symptoms of aspiration;without signs of distress  -     Donald (Tolerate trials) -- with minimal cues (75-90% accuracy)  -     Time Frame (Tolerate trials) -- 1 week  -     Progress/Outcomes (Tolerate trials) -- goal met  -        Patient will demonstrate functional language skills for return to discharge environment     Donald with  "minimal cues  -MH with minimal cues  -CH     Time frame by discharge  -MH by discharge  -     Progress/Outcomes continuing progress toward goal  -MH continuing progress toward goal  -CH        SLP Diagnostic Treatment     Patient will participate in further assessment in the following areas reading comprehension;graphic expression  -MH reading comprehension;graphic expression  -CH     Time Frame (Diagnostic) short term goal (STG)  -MH short term goal (STG)  -CH     Progress/Outcomes (Additional Goal 1, SLP) goal ongoing  -MH goal ongoing  -CH     Comment (Diagnostic) Pt frusterated when SLP attempted dx tx, will re-address as appropriate  - --        Comprehend Questions Goal 1 (SLP)    Improve Ability to Comprehend Questions Goal 1 (SLP) complex yes/no questions;simple wh questions;80%;with moderate cues (50-74%)  -MH complex yes/no questions;simple wh questions;80%;with moderate cues (50-74%)  -CH     Time Frame (Comprehend Questions Goal 1, SLP) short term goal (STG)  -MH short term goal (STG)  -CH     Progress (Ability to Comprehend Questions Goal 1, SLP) 50%;with moderate cues (50-74%)  -MH 60%;with moderate cues (50-74%)  -CH     Progress/Outcomes (Comprehend Questions Goal 1, SLP) continuing progress toward goal  -MH continuing progress toward goal  -CH     Comment (Comprehend Questions Goal 1, SLP) 60 % simple \"wh\" questions, 40% complex y/n questions  - --        Follow Directions Goal 2 (SLP)    Improve Ability to Follow Directions Goal 1 (SLP) 2 step commands;80%;with moderate cues (50-74%)  - 2 step commands;80%;with moderate cues (50-74%)  -CH     Time Frame (Follow Directions Goal 1, SLP) short term goal (STG)  - short term goal (STG)  -CH     Progress (Ability to Follow Directions Goal 1, SLP) 60%;with moderate cues (50-74%)  -MH 60%;with moderate cues (50-74%)  -CH     Progress/Outcomes (Follow Directions Goal 1, SLP) continuing progress toward goal  -MH good progress toward goal  -CH     "    Word Retrieval Skills Goal 1 (SLP)    Improve Word Retrieval Skills By Goal 1 (SLP) confrontational naming task;low frequency;repeating phrases;completing open ended structured sentence;simple;80%;with moderate cues (50-74%)  - confrontational naming task;low frequency;repeating phrases;completing open ended structured sentence;simple;80%;with moderate cues (50-74%)  -CH     Time Frame (Word Retrieval Goal 1, SLP) short term goal (STG)  -MH short term goal (STG)  -CH     Progress (Word Retrieval Skills Goal 1, SLP) 60%;with moderate cues (50-74%)  -MH 50%;with moderate cues (50-74%)  -CH     Progress/Outcomes (Word Retrieval Goal 1, SLP) continuing progress toward goal  -MH continuing progress toward goal  -CH     Comment (Word Retrieval Goal 1, SLP) Confrontal namaing and completing open ended sentences  -MH Naming and open ended phrases  -CH               User Key  (r) = Recorded By, (t) = Taken By, (c) = Cosigned By      Initials Name Provider Type    Dora Sanchez MS CCC-SLP Speech and Language Pathologist    Padmaja Martinez MS CCC-SLP Speech and Language Pathologist                            Time Calculation:      Time Calculation- SLP       Row Name 01/19/24 1039             Time Calculation- SLP    SLP Start Time 0925  -      SLP Received On 01/19/24  -         Untimed Charges    06961-FL Treatment/ST Modification Prosth Aug Alter  40  -MH         Total Minutes    Untimed Charges Total Minutes 40  -MH       Total Minutes 40  -MH                User Key  (r) = Recorded By, (t) = Taken By, (c) = Cosigned By      Initials Name Provider Type     Padmaja Erickson, MS CCC-SLP Speech and Language Pathologist                    Therapy Charges for Today       Code Description Service Date Service Provider Modifiers Qty    [id number] HC ST TREATMENT SPEECH 3 1/19/2024 Padmaja Erickson MS CCC-SLP GN 1                       Padmaja Erickson MS CCC-BRIANA  1/19/2024

## 2024-01-19 NOTE — DISCHARGE SUMMARY
[facility] Medicine Services  DISCHARGE SUMMARY    Patient Name: Emily Grove  : 1960  MRN: [id number]    Date of Admission: 2024  9:02 PM  Date of Discharge:  24  Primary Care Physician: Marie Morales MD    Consults       Date and Time Order Name Status Description    2024 11:38 PM Inpatient Cardiology Consult Completed             Hospital Course     Presenting Problem: bradycardia, hypotension     Active Hospital Problems    Diagnosis  POA   • PFO (patent foramen ovale) [Q21.12]  Not Applicable   • History of CVA (cerebrovascular accident) [Z86.73]  Not Applicable   • Type 1 diabetes mellitus with hyperglycemia (On insulin pump) [E10.65]  Yes      Resolved Hospital Problems    Diagnosis Date Resolved POA   • **Junctional bradycardia [R00.1] 2024 Yes   • Hypotension [I95.9] 2024 Yes   • Bradycardia [R00.1] 2024 Yes   • R/O NSTEMI (non-ST elevated myocardial infarction) [I21.4] 2024 Yes   • LAUREN (acute kidney injury) [N17.9] 2024 Yes   • UTI (urinary tract infection) [N39.0] 2024 Yes          Hospital Course:  Emily Grove is a 63 y.o. female with history of type 1 diabetes, hypertension, GERD who presented to the emergency room  for hypotension with bradycardia.  Patient was admitted to the ICU and started on fluids and dopamine with improvement in heart rate.  She was able to be weaned off dopamine.  Patient had an episode of confusion underwent emergent CT scan 1/15 that showed small left parietal CVA.  Stroke team was consulted when she is outside of the window for TNK.  Patient was transferred to medicine service on .     Junctional bradycardia - resolved   -Required dopamine on admission which has been weaned off   -Bradycardia felt to be related to diltiazem  -Stress test  with small sized, mild to moderately severe area of ischemia located in the basal inferior lateral wall.  Impression  consistent with an abnormal but overall low risk study, consistent with distal single-vessel disease either distal circumflex or distal RCA territory.  -Off AV odette blocking agents. Recommend continuing to hold  -Cardiac monitor placed at discharge.   - Cardiology follow up in 4 weeks      Acute left parietal stroke with expressive aphasia  -MRI brain with infarct in the left parietal lobe and mild changes of chronic microvascular ischemia  -Stroke team consulted --stroke suggestive of cardioembolic etiology concordant with  -No atrial fibrillation noted so far during admission with no history of A-fib  -Aspirin 325 mg daily, Plavix 75 mg daily, atorvastatin 20 mg daily  -Cardiac monitor on discharge.   - Follow up stroke in 4 weeks      PFO  -Rope score 3  -Lower extremity duplex negative for DVT  -Cardiology evaluated recommended evaluation by structural heart disease team as an outpatient     Hypertension  -Continue valsartan 40 mg daily  - BP has been stable inpatient. She was on higher dose of valsartan at home. Monitor for need for titration      Type 1 diabetes  -A1c 7.8  -Controlled on Levemir 10 units daily and sliding scale insulin.  - patient has been stable on her current regimen. She uses a pump at home but unsure if she will be able to work it at this time. Recommend continuing regimen and continuing to work with patient regarding her pump as she progresses.      UTI POA  -Urine culture no growth  -Status post 3 doses of Rocephin     Discharge Follow Up Recommendations for outpatient labs/diagnostics:  PCP Dr. Morales 1 week  Cardiology Dr. Ruiz 4 weeks  Stroke Neurology 4 weeks     Day of Discharge     HPI:   No acute events. States she feels tired today. Denies pain. Reviewed plans for rehab and she is agreeable. Called and updated . Answered all questions to the best of my ability. He is concerned that she won't be able to work her pump, and reviewed that [facility] can continuing to work with her  and monitor her progress.     Review of Systems  Gen- No fevers, chills  CV- No chest pain, palpitations  Resp- No cough, dyspnea  GI- No N/V/D, abd pain    Vital Signs:   Temp:  [97.2 °F (36.2 °C)-98.3 °F (36.8 °C)] 97.9 °F (36.6 °C)  Heart Rate:  [77-99] 87  Resp:  [16-19] 18  BP: ()/(66-79) 121/79      Physical Exam:  Constitutional: No acute distress, awake, alert  HENT: NCAT, mucous membranes moist  Respiratory: Clear to auscultation bilaterally, respiratory effort normal   Cardiovascular: RRR, no murmurs, rubs, or gallops  Gastrointestinal: Positive bowel sounds, soft, nontender, nondistended  Musculoskeletal: No bilateral ankle edema  Psychiatric: Appropriate affect, cooperative  Neurologic: Oriented x 3, strength symmetric in all extremities, Cranial Nerves grossly intact to confrontation, speech slow but clear; some expressive aphasia but somewhat improved from yesterday  Skin: No rashes      Pertinent  and/or Most Recent Results     LAB RESULTS:      Lab 01/18/24  0638 01/15/24  0842 01/15/24  0018 01/14/24  2118   WBC 6.64 6.76  --  9.62   HEMOGLOBIN 13.2 12.3  --  13.5   HEMATOCRIT 39.1 36.4  --  41.2   PLATELETS 158 159  --  265   NEUTROS ABS  --   --   --  6.87   IMMATURE GRANS (ABS)  --   --   --  0.06*   LYMPHS ABS  --   --   --  1.73   MONOS ABS  --   --   --  0.91*   EOS ABS  --   --   --  0.02   MCV 85.9 85.8  --  89.4   LACTATE  --   --  1.4 1.6         Lab 01/18/24  0638 01/16/24  0541 01/15/24  0842 01/14/24  2352 01/14/24  2118   SODIUM 142  --  143 140 137   POTASSIUM 4.1  --  4.2 4.7 4.9   CHLORIDE 104  --  108* 107 100   CO2 26.0  --  22.0 23.0 27.0   ANION GAP 12.0  --  13.0 10.0 10.0   BUN 14  --  21 33* 32*   CREATININE 0.54*  --  0.84 1.60* 1.95*   EGFR 104.9  --  79.2 36.5* 28.8*   GLUCOSE 136*  --  83 120* 150*   CALCIUM 9.6  --  8.6 8.1* 9.8   IONIZED CALCIUM  --   --  1.23  --   --    MAGNESIUM  --   --  1.8  --  2.0   PHOSPHORUS  --   --  3.2  --   --    HEMOGLOBIN A1C  --   7.80*  --   --   --          Lab 01/15/24  0842 01/14/24 2352 01/14/24 2118   TOTAL PROTEIN 6.3 5.8* 7.5   ALBUMIN 3.9 3.2* 4.2   GLOBULIN 2.4 2.6 3.3   ALT (SGPT) 89* 58* 50*   AST (SGOT) 85* 85* 74*   BILIRUBIN 0.2 0.4 0.3   ALK PHOS 215* 162* 198*         Lab 01/14/24 2352 01/14/24 2118   HSTROP T 194* 265*         Lab 01/16/24  0541   CHOLESTEROL 106   LDL CHOL 36   HDL CHOL 50   TRIGLYCERIDES 107             Brief Urine Lab Results  (Last result in the past 365 days)        Color   Clarity   Blood   Leuk Est   Nitrite   Protein   CREAT   Urine HCG        01/14/24 2152 Yellow   Clear   Trace   Moderate (2+)   Negative   >=300 mg/dL (3+)                 Microbiology Results (last 10 days)       Procedure Component Value - Date/Time    Blood Culture - Blood, Arm, Right [[id number]]  (Normal) Collected: 01/15/24 0018    Lab Status: Preliminary result Specimen: Blood from Arm, Right Updated: 01/19/24 0245     Blood Culture No growth at 4 days    Blood Culture - Blood, Arm, Right [[id number]]  (Normal) Collected: 01/14/24 2352    Lab Status: Preliminary result Specimen: Blood from Arm, Right Updated: 01/19/24 0245     Blood Culture No growth at 4 days    Urine Culture - Urine, Urine, Clean Catch [[id number]]  (Normal) Collected: 01/14/24 2152    Lab Status: Final result Specimen: Urine, Clean Catch Updated: 01/16/24 0944     Urine Culture No growth            Stress Test With Pet Myocardial Perfusion    Result Date: 1/18/2024  •  Left ventricular ejection fraction is hyperdynamic (Calculated EF > 70%). •  Rest EF = 74% Stress EF = 79%. •  Myocardial perfusion imaging indicates a small-sized, mild-to-moderately severe area of ischemia located in the basal inferior lateral wall. •  Impressions are consistent with abnormal but overall low risk study, consistent with distal single-vessel disease, either distal circumflex or distal RCA territory.     Duplex Venous Lower Extremity - Bilateral CAR    Result Date:  1/17/2024  •  Normal bilateral lower extremity venous duplex scan.     MRI Angiogram Head Without Contrast    Result Date: 1/15/2024  MRI ANGIOGRAM HEAD WO CONTRAST, MRI ANGIOGRAM NECK W CONTRAST Date of Exam: 1/15/2024 9:27 PM EST Indication: Stroke, follow up.  Comparison: None available. Technique:  Routine 3-D time-of-flight gradient echo imaging was obtained of the head without contrast administration. MRA of the neck was obtained pre and post administration of MultiHance 10 mL IV. Findings: Vascular Findings: The right common carotid, internal carotid, middle cerebral, anterior cerebral, vertebral, and posterior cerebral arteries are patent without abrupt cut off or aneurysmal dilation. The left common carotid, internal carotid, middle cerebral, anterior cerebral, vertebral, and posterior cerebral arteries are patent without abrupt cut off or aneurysmal dilation. Basilar artery appears patent and appears unremarkable. Non-vascular Findings: Limited visualization of the brain parenchyma is grossly unremarkable. No acute abnormality is identified within the visualized soft tissue or bony structures of the neck.     Impression: No vascular abnormality visualized. Electronically Signed: Joao Lake MD  1/15/2024 10:30 PM EST  Workstation ID: [id number]    MRI Angiogram Neck With Contrast    Result Date: 1/15/2024  MRI ANGIOGRAM HEAD WO CONTRAST, MRI ANGIOGRAM NECK W CONTRAST Date of Exam: 1/15/2024 9:27 PM EST Indication: Stroke, follow up.  Comparison: None available. Technique:  Routine 3-D time-of-flight gradient echo imaging was obtained of the head without contrast administration. MRA of the neck was obtained pre and post administration of MultiHance 10 mL IV. Findings: Vascular Findings: The right common carotid, internal carotid, middle cerebral, anterior cerebral, vertebral, and posterior cerebral arteries are patent without abrupt cut off or aneurysmal dilation. The left common carotid, internal carotid,  middle cerebral, anterior cerebral, vertebral, and posterior cerebral arteries are patent without abrupt cut off or aneurysmal dilation. Basilar artery appears patent and appears unremarkable. Non-vascular Findings: Limited visualization of the brain parenchyma is grossly unremarkable. No acute abnormality is identified within the visualized soft tissue or bony structures of the neck.     Impression: No vascular abnormality visualized. Electronically Signed: Joao Lake MD  1/15/2024 10:30 PM EST  Workstation ID: [id number]    MRI Brain Without Contrast    Result Date: 1/15/2024  MRI BRAIN WO CONTRAST Date of Exam: 1/15/2024 9:27 PM EST Indication: Stroke, follow up.  Comparison: Same day noncontrast CT of the brain. Technique:  Routine multiplanar/multisequence sequence images of the brain were obtained without contrast administration. Findings: Small patchy foci of restricted diffusion are visualized in the left parietal lobe measuring up to 1.6 cm. There is no evidence of acute or chronic intracranial hemorrhage. No mass effect or midline shift.] [No abnormal extra-axial collections. The major vascular flow voids appear intact. Bilateral periventricular matter T2/FLAIR hyperintensities are visualized compatible with changes of chronic microvessel ischemia. The posterior fossa is unremarkable. Calvarial and superficial soft tissue signal is within normal limits. Orbits appear unremarkable. Paranasal sinuses demonstrate no evidence of air-fluid levels. Small left mastoid effusion is visualized. Right mastoid air cells are clear. Midline structures are intact.     Impression: Small patchy foci of nonhemorrhagic infarction in the left parietal lobe measuring up to 1.6 cm. Mild changes of chronic microvascular ischemia. Findings discussed with Nitesh from the stroke team on January 15, 2024 at 10:25 p.m. Electronically Signed: Joao Lake MD  1/15/2024 10:24 PM EST  Workstation ID: [id number]    CT Head Without  Contrast    Result Date: 1/15/2024  CT HEAD WO CONTRAST Date of Exam: 1/15/2024 4:32 PM EST Indication: Neuro deficit, acute, stroke suspected. Comparison: Noncontrast CT of the brain performed on August 25, 2023 Technique: Axial CT images were obtained of the head without contrast administration.  Automated exposure control and iterative construction methods were used. Findings: Small area of hypodensity is visualized in the high left parietal lobe at the level of the corona radiata measuring 2 cm. No intracranial hemorrhage visualized. No evidence of mass or mass effect. Mild bilateral periventricular white matter hypodensities  are visualized compatible with changes of chronic microvessel ischemia. There is no extra-axial collections. Ventricles are normal in size and configuration for patient's stated age. Posterior fossa is within normal limits. Calvarium and skull base appear intact. Visualized sinuses show no air fluid levels. The mastoid air cells are clear. Visualized orbits are unremarkable.     Impression: 2 cm focus of hypodensity in the high left parietal lobe suspicious for nonhemorrhagic infarct. Recommend correlation with MRI. No intracranial hemorrhage visualized. Findings compatible with mild changes of chronic microvascular ischemia. Findings relayed to JASPER Terrazas on January 15, 2024 at 4:45 p.m. by telephone. Electronically Signed: Joao Lake MD  1/15/2024 4:45 PM EST  Workstation ID: [id number]    Adult Transthoracic Echo Complete W/ Cont if Necessary Per Protocol    Result Date: 1/15/2024  •  Left ventricular systolic function is normal. Calculated left ventricular EF = 64.6% Normal left ventricular cavity size and wall thickness noted. All left ventricular wall segments contract normally. Left ventricular diastolic function was normal. •  Normal right ventricular cavity size, wall thickness, systolic function and septal motion noted. •  The aortic valve is grossly normal in structure. No  significant aortic valve regurgitation is present. No hemodynamically significant aortic valve stenosis is present. •  The mitral valve is grossly normal in structure. Trace mitral valve regurgitation is present. No significant mitral valve stenosis is present. •  The tricuspid valve is grossly normal in structure. Trace tricuspid valve regurgitation is present. Estimated right ventricular systolic pressure from tricuspid regurgitation is normal (<35 mmHg). No evidence of significant tricuspid valve stenosis is present.     XR Chest 1 View    Result Date: 1/14/2024  XR CHEST 1 VW Date of Exam: 1/14/2024 9:43 PM EST Indication: Weak/Dizzy/AMS triage protocol Comparison: Chest radiograph dated December 26, 2023 Findings: There are no airspace consolidations. No pleural fluid. No pneumothorax. The pulmonary vasculature appears within normal limits. The cardiac and mediastinal silhouette appear unremarkable. No acute osseous abnormality identified.     Impression: No active disease Electronically Signed: Rodolfo Bauer MD  1/14/2024 10:02 PM EST  Workstation ID: [id number]     Results for orders placed during the hospital encounter of 01/14/24    Duplex Venous Lower Extremity - Bilateral CAR    Interpretation Summary  •  Normal bilateral lower extremity venous duplex scan.      Results for orders placed during the hospital encounter of 01/14/24    Duplex Venous Lower Extremity - Bilateral CAR    Interpretation Summary  •  Normal bilateral lower extremity venous duplex scan.      Results for orders placed during the hospital encounter of 01/14/24    Adult Transthoracic Echo Complete W/ Cont if Necessary Per Protocol    Interpretation Summary  •  Left ventricular systolic function is normal. Calculated left ventricular EF = 64.6% Normal left ventricular cavity size and wall thickness noted. All left ventricular wall segments contract normally. Left ventricular diastolic function was normal.  •  Normal right ventricular cavity  size, wall thickness, systolic function and septal motion noted.  •  The aortic valve is grossly normal in structure. No significant aortic valve regurgitation is present. No hemodynamically significant aortic valve stenosis is present.  •  The mitral valve is grossly normal in structure. Trace mitral valve regurgitation is present. No significant mitral valve stenosis is present.  •  The tricuspid valve is grossly normal in structure. Trace tricuspid valve regurgitation is present. Estimated right ventricular systolic pressure from tricuspid regurgitation is normal (<35 mmHg). No evidence of significant tricuspid valve stenosis is present.      Plan for Follow-up of Pending Labs/Results:   Pending Labs       Order Current Status    Blood Culture - Blood, Arm, Right Preliminary result    Blood Culture - Blood, Arm, Right Preliminary result          Discharge Details        Discharge Medications        New Medications        Instructions Start Date   clopidogrel 75 MG tablet  Commonly known as: PLAVIX   75 mg, Oral, Daily      insulin detemir 100 UNIT/ML injection  Commonly known as: LEVEMIR   10 Units, Subcutaneous, Daily      Insulin Lispro 100 UNIT/ML injection  Commonly known as: humaLOG  Replaces: insulin lispro 100 UNIT/ML injection   3-14 Units, Subcutaneous, 4 Times Daily Before Meals & Nightly             Changes to Medications        Instructions Start Date   atorvastatin 20 MG tablet  Commonly known as: Lipitor  What changed: when to take this   20 mg, Oral, Nightly      Ozempic (0.25 or 0.5 MG/DOSE) 2 MG/1.5ML solution pen-injector  Generic drug: Semaglutide(0.25 or 0.5MG/DOS)  What changed: Another medication with the same name was removed. Continue taking this medication, and follow the directions you see here.   1 mg, Subcutaneous, Weekly, Friday      valsartan 40 MG tablet  Commonly known as: DIOVAN  What changed:   medication strength  how much to take  when to take this   40 mg, Oral, Every 24 Hours  Scheduled             Continue These Medications        Instructions Start Date   aspirin 325 MG tablet  Commonly known as: Dorys Aspirin   325 mg, Oral, Daily      brimonidine-timolol 0.2-0.5 % ophthalmic solution  Commonly known as: COMBIGAN   1 drop, Right Eye, 2 Times Daily      esomeprazole 40 MG capsule  Commonly known as: nexIUM   40 mg, Oral, Daily      fluticasone 50 MCG/ACT nasal spray  Commonly known as: FLONASE   2 sprays into the nostril(s) as directed by provider Daily As Needed for Rhinitis or Allergies. OTC      latanoprost 0.005 % ophthalmic solution  Commonly known as: XALATAN   INSTILL 1 DROP INTO RIGHT EYE AT BEDTIME      meclizine 25 MG tablet  Commonly known as: ANTIVERT   12.5 mg, Oral, 3 Times Daily PRN      montelukast 10 MG tablet  Commonly known as: SINGULAIR   10 mg, Oral, Nightly      Vitamin D (Cholecalciferol) 50 MCG (2000 UT) capsule   2,000 Units, Oral, Daily, OTC             Stop These Medications      dilTIAZem 300 MG 24 hr capsule  Commonly known as: TIAZAC     insulin lispro 100 UNIT/ML injection  Commonly known as: humaLOG  Replaced by: Insulin Lispro 100 UNIT/ML injection     nitrofurantoin (macrocrystal-monohydrate) 100 MG capsule  Commonly known as: MACROBID              Allergies   Allergen Reactions   • Sulfa Antibiotics Anaphylaxis         Discharge Disposition:  Rehab Facility or Unit (DC - External)    Diet:  Hospital:  Diet Order   Procedures   • Diet: Cardiac Diets; Healthy Heart (2-3 Na+); Texture: Regular Texture (IDDSI 7); Fluid Consistency: Thin (IDDSI 0)       Diet Instructions       Diet: Cardiac Diets, Diabetic Diets; Healthy Heart (2-3 Na+); Thin (IDDSI 0); Consistent Carbohydrate      Discharge Diet:  Cardiac Diets  Diabetic Diets       Cardiac Diet: Healthy Heart (2-3 Na+)    Fluid Consistency: Thin (IDDSI 0)    Diabetic Diet: Consistent Carbohydrate             Activity:  Activity Instructions       Activity as Tolerated              Restrictions or Other  Recommendations:         CODE STATUS:    Code Status and Medical Interventions:   Ordered at: 01/14/24 2335     Code Status (Patient has no pulse and is not breathing):    CPR (Attempt to Resuscitate)     Medical Interventions (Patient has pulse or is breathing):    Full Support       Future Appointments   Date Time Provider Department Center   2/15/2024  2:30 PM [facility]   2/21/2024 11:00 AM Rik Ruiz III, MD [facility]   2/27/2024 11:30 AM Teri Johnson APRN [facility]       Additional Instructions for the Follow-ups that You Need to Schedule       Discharge Follow-up with PCP   As directed       Currently Documented PCP:    Marie Morales MD    PCP Phone Number:    [phone]     Follow Up Details: PCP Dr. Morales 1 week        Discharge Follow-up with Specified Provider: Cardiology Dr. Ruiz 4 weeks   As directed      To: Cardiology Dr. Ruiz 4 weeks        Discharge Follow-up with Specified Provider: Stroke Neurology 4-6 weeks   As directed      To: Stroke Neurology 4-6 weeks                      Betty Lorenz DO  01/19/24      Time Spent on Discharge:  I spent  35  minutes on this discharge activity which included: face-to-face encounter with the patient, reviewing the data in the system, coordination of the care with the nursing staff as well as consultants, documentation, and entering orders.           No

## 2024-01-20 LAB
BACTERIA SPEC AEROBE CULT: NORMAL
BACTERIA SPEC AEROBE CULT: NORMAL

## 2024-02-15 ENCOUNTER — HOSPITAL ENCOUNTER (OUTPATIENT)
Dept: CARDIOLOGY | Facility: HOSPITAL | Age: 64
Discharge: HOME | End: 2024-02-15
Payer: MEDICAID

## 2024-02-16 ENCOUNTER — TELEPHONE (OUTPATIENT)
Dept: CARDIOLOGY | Facility: CLINIC | Age: 64
End: 2024-02-16
Payer: MEDICAID

## 2024-02-21 ENCOUNTER — HOSPITAL ENCOUNTER (OUTPATIENT)
Dept: CARDIOLOGY | Facility: HOSPITAL | Age: 64
Discharge: HOME | End: 2024-02-21
Payer: MEDICAID

## 2024-02-26 ENCOUNTER — OFFICE VISIT (OUTPATIENT)
Dept: CARDIOLOGY | Facility: CLINIC | Age: 64
End: 2024-02-26
Payer: MEDICAID

## 2024-02-26 VITALS
DIASTOLIC BLOOD PRESSURE: 76 MMHG | WEIGHT: 117 LBS | HEIGHT: 56 IN | OXYGEN SATURATION: 94 % | SYSTOLIC BLOOD PRESSURE: 110 MMHG | HEART RATE: 94 BPM | BODY MASS INDEX: 26.32 KG/M2

## 2024-02-26 DIAGNOSIS — R00.2 PALPITATIONS: ICD-10-CM

## 2024-02-26 DIAGNOSIS — Q21.12 PFO (PATENT FORAMEN OVALE): Primary | Chronic | ICD-10-CM

## 2024-02-26 DIAGNOSIS — E78.5 HYPERLIPIDEMIA LDL GOAL <70: ICD-10-CM

## 2024-02-26 PROCEDURE — 99213 OFFICE O/P EST LOW 20 MIN: CPT | Performed by: INTERNAL MEDICINE

## 2024-02-26 RX ORDER — PROCHLORPERAZINE 25 MG/1
SUPPOSITORY RECTAL
COMMUNITY
Start: 2024-01-28

## 2024-02-26 RX ORDER — MIRABEGRON 50 MG/1
1 TABLET, FILM COATED, EXTENDED RELEASE ORAL DAILY
COMMUNITY
Start: 2024-02-17

## 2024-02-26 RX ORDER — MELOXICAM 15 MG/1
TABLET ORAL
COMMUNITY
Start: 2024-02-22

## 2024-02-26 RX ORDER — LOSARTAN POTASSIUM 25 MG/1
25 TABLET ORAL DAILY
COMMUNITY

## 2024-02-26 RX ORDER — INSULIN PMP CART,AUT,G6/7,CNTR
EACH SUBCUTANEOUS
COMMUNITY
Start: 2024-01-31

## 2024-02-26 RX ORDER — KETOCONAZOLE 20 MG/ML
SHAMPOO TOPICAL
COMMUNITY
Start: 2024-02-21

## 2024-02-26 RX ORDER — PROCHLORPERAZINE 25 MG/1
SUPPOSITORY RECTAL
COMMUNITY
Start: 2024-02-09

## 2024-02-26 RX ORDER — PREGABALIN 100 MG/1
300 CAPSULE ORAL 3 TIMES DAILY
COMMUNITY

## 2024-02-26 RX ORDER — DULOXETIN HYDROCHLORIDE 30 MG/1
30 CAPSULE, DELAYED RELEASE ORAL DAILY
COMMUNITY

## 2024-02-26 RX ORDER — BRIMONIDINE TARTRATE 2 MG/ML
1 SOLUTION/ DROPS OPHTHALMIC 3 TIMES DAILY
COMMUNITY

## 2024-02-26 NOTE — PROGRESS NOTES
[facility] Cardiology  Office visit  Emily Grove  1960  [phone]  There is no work phone number on file.    VISIT DATE:  2/26/2024    PCP: Marie Morales MD  [id number]  [facility] [address]    CC:  Chief Complaint   Patient presents with    Cerebrovascular Accident     FU       Previous cardiac studies and procedures:  January 2024  MRI brain  Small patchy foci of nonhemorrhagic infarction in the left parietal lobe measuring up to 1.6 cm.     TTE    Left ventricular systolic function is normal. Calculated left ventricular EF = 64.6% Normal left ventricular cavity size and wall thickness noted. All left ventricular wall segments contract normally. Left ventricular diastolic function was normal.    Normal right ventricular cavity size, wall thickness, systolic function and septal motion noted.    The aortic valve is grossly normal in structure. No significant aortic valve regurgitation is present. No hemodynamically significant aortic valve stenosis is present.    The mitral valve is grossly normal in structure. Trace mitral valve regurgitation is present. No significant mitral valve stenosis is present.    The tricuspid valve is grossly normal in structure. Trace tricuspid valve regurgitation is present. Estimated right ventricular systolic pressure from tricuspid regurgitation is normal (<35 mmHg). No evidence of significant tricuspid valve stenosis is present.  PET myocardial perfusion imaging    Left ventricular ejection fraction is hyperdynamic (Calculated EF > 70%).    Rest EF = 74% Stress EF = 79%.    Myocardial perfusion imaging indicates a small-sized, mild-to-moderately severe area of ischemia located in the basal inferior lateral wall.    Impressions are consistent with abnormal but overall low risk study, consistent with distal single-vessel disease, either distal circumflex or distal RCA territory.  MCOT     A relatively benign monitor  "study.    No atrial fibrillation.    August 2024  TTE    Left ventricular systolic function is normal. Left ventricular ejection fraction appears to be 61 - 65%.    Saline test results are positive with valsalva manuever.  MRI brain: Negative for CVA    ASSESSMENT:   Diagnosis Plan   1. PFO (patent foramen ovale)        2. Palpitations            PLAN:  PFO: History of TIA in August 2024, had been weaned down to single platelet therapy with aspirin when she presented with recurrent CVA.  Currently on dual antiplatelet therapy afterload reduction and statin.  Scheduled undergo evaluation for potential PFO closure.    Hypertension: Goal is 130/80 mmHg currently well-controlled.  Continue current medical therapy.    Hyperlipidemia: Goal LDL less than 70.  Continue atorvastatin 20 mg p.o. daily.    History of junctional bradycardia and small type II NSTEMI during admission for CVA in January 2004.    Subjective  No change in baseline function capacity.  Undergoing outpatient physical therapy [facility].  No recurrent TIA or strokelike symptoms.  Compliant with medical therapy.  Blood pressures running less than 130/80 mmHg.    PHYSICAL EXAMINATION:  Vitals:    02/26/24 1121   BP: 110/76   BP Location: Left arm   Patient Position: Sitting   Pulse: 94   SpO2: 94%   Weight: 53.1 kg (117 lb)   Height: 142.2 cm (56\")     General Appearance:    Alert, cooperative, no distress, appears stated age   Head:    Normocephalic, without obvious abnormality, atraumatic   Eyes:    conjunctiva/corneas clear   Nose:   Nares normal, septum midline, mucosa normal, no drainage   Throat:   Lips, teeth and gums normal   Neck:   Supple, symmetrical, trachea midline, no carotid    bruit or JVD   Lungs:     Clear to auscultation bilaterally, respirations unlabored   Chest Wall:    No tenderness or deformity    Heart:    Regular rate and rhythm, S1 and S2 normal, no murmur, rub   or gallop, normal carotid impulse bilaterally without bruit. "   Abdomen:     Soft, non-tender   Extremities:   Extremities normal, atraumatic, no cyanosis or edema   Pulses:   2+ and symmetric all extremities   Skin:   Skin color, texture, turgor normal, no rashes or lesions       Diagnostic Data:  Procedures  Lab Results   Component Value Date    TRIG 107 01/16/2024    HDL 50 01/16/2024     Lab Results   Component Value Date    GLUCOSE 136 (H) 01/18/2024    BUN 14 01/18/2024    CREATININE 0.54 (L) 01/18/2024     01/18/2024    K 4.1 01/18/2024     01/18/2024    CO2 26.0 01/18/2024     Lab Results   Component Value Date    HGBA1C 7.80 (H) 01/16/2024     Lab Results   Component Value Date    WBC 6.64 01/18/2024    HGB 13.2 01/18/2024    HCT 39.1 01/18/2024     01/18/2024       Allergies  Allergies   Allergen Reactions    Sulfa Antibiotics Anaphylaxis       Current Medications    Current Outpatient Medications:     aspirin (Dorys Aspirin) 325 MG tablet, Take 1 tablet by mouth Daily for 313 doses., Disp: 30 tablet, Rfl: 10    atorvastatin (Lipitor) 20 MG tablet, Take 1 tablet by mouth Every Night for 315 doses., Disp: 30 tablet, Rfl: 10    brimonidine (ALPHAGAN) 0.2 % ophthalmic solution, 1 drop 3 (Three) Times a Day., Disp: , Rfl:     brimonidine-timolol (COMBIGAN) 0.2-0.5 % ophthalmic solution, Administer 1 drop to the right eye 2 (Two) Times a Day., Disp: , Rfl:     clopidogrel (PLAVIX) 75 MG tablet, Take 1 tablet by mouth Daily., Disp: 30 tablet, Rfl: 3    Continuous Blood Gluc Sensor (Dexcom G6 Sensor), USE AS INSTRUCTED CHANGE EVERY 10 DAYS DX E11.9, Disp: , Rfl:     Continuous Blood Gluc Transmit (Dexcom G6 Transmitter) misc, USE AS DIRECTED CHANGE TRANSMITTER EVERY 90 DAYS DX E11.9, Disp: , Rfl:     DULoxetine (CYMBALTA) 30 MG capsule, Take 1 capsule by mouth Daily., Disp: , Rfl:     esomeprazole (nexIUM) 40 MG capsule, Take 1 capsule by mouth Daily., Disp: , Rfl:     Insulin Disposable Pump (Omnipod 5 G6 Pod, Gen 5,) misc, USE AS INSTRUCTED CHANGE POD  EVERY 2 TO 3 DAYS, Disp: , Rfl:     ketoconazole (NIZORAL) 2 % shampoo, Please see attached for detailed directions, Disp: , Rfl:     latanoprost (XALATAN) 0.005 % ophthalmic solution, INSTILL 1 DROP INTO RIGHT EYE AT BEDTIME, Disp: , Rfl:     losartan (COZAAR) 25 MG tablet, Take 1 tablet by mouth Daily., Disp: , Rfl:     meloxicam (MOBIC) 15 MG tablet, TAKE 1 TABLET BY MOUTH EVERY DAY FOR LEG PAIN, Disp: , Rfl:     Myrbetriq 50 MG tablet sustained-release 24 hour 24 hr tablet, Take 50 mg by mouth Daily., Disp: , Rfl:     pregabalin (LYRICA) 100 MG capsule, Take 3 capsules by mouth 3 (Three) Times a Day., Disp: , Rfl:     Semaglutide,0.25 or 0.5MG/DOS, (Ozempic, 0.25 or 0.5 MG/DOSE,) 2 MG/1.5ML solution pen-injector, Inject 1 mg under the skin into the appropriate area as directed 1 (One) Time Per Week. Friday, Disp: , Rfl:     Vitamin D, Cholecalciferol, 50 MCG (2000 UT) capsule, Take 1 capsule by mouth Daily. OTC, Disp: , Rfl:     fluticasone (FLONASE) 50 MCG/ACT nasal spray, 2 sprays into the nostril(s) as directed by provider Daily As Needed for Rhinitis or Allergies. OTC (Patient not taking: Reported on 2/26/2024), Disp: , Rfl:     insulin detemir (LEVEMIR) 100 UNIT/ML injection, Inject 10 Units under the skin into the appropriate area as directed Daily. (Patient not taking: Reported on 2/26/2024), Disp: , Rfl:     Insulin Lispro (humaLOG) 100 UNIT/ML injection, Inject 3-14 Units under the skin into the appropriate area as directed 4 (Four) Times a Day Before Meals & at Bedtime. (Patient not taking: Reported on 2/26/2024), Disp: , Rfl:     meclizine (ANTIVERT) 25 MG tablet, Take 0.5 tablets by mouth 3 (Three) Times a Day As Needed for Dizziness. (Patient not taking: Reported on 2/26/2024), Disp: 15 tablet, Rfl: 0    montelukast (SINGULAIR) 10 MG tablet, Take 1 tablet by mouth Every Night. (Patient not taking: Reported on 2/26/2024), Disp: , Rfl:           ROS  ROS      SOCIAL HX  Social History      Socioeconomic History    Marital status:    Tobacco Use    Smoking status: Never     Passive exposure: Never    Smokeless tobacco: Never   Vaping Use    Vaping Use: Never used   Substance and Sexual Activity    Alcohol use: No    Drug use: No    Sexual activity: Defer       FAMILY HX  Family History   Problem Relation Age of Onset    Diabetes Mother     Stroke Mother     COPD Father     Stroke Father     Hypertension Sister     Diabetes Brother     Heart attack Brother     Parkinsonism Brother     No Known Problems Maternal Grandmother     Diabetes Maternal Grandfather     Stroke Maternal Grandfather     Heart failure Paternal Grandmother     Diabetes Paternal Grandmother     No Known Problems Paternal Grandfather              Rik Ruiz III, MD, FACC

## 2024-02-27 ENCOUNTER — OFFICE VISIT (OUTPATIENT)
Dept: NEUROLOGY | Facility: CLINIC | Age: 64
End: 2024-02-27
Payer: MEDICAID

## 2024-02-27 ENCOUNTER — LAB (OUTPATIENT)
Dept: LAB | Facility: HOSPITAL | Age: 64
End: 2024-02-27
Payer: MEDICAID

## 2024-02-27 VITALS
WEIGHT: 116 LBS | DIASTOLIC BLOOD PRESSURE: 82 MMHG | HEART RATE: 94 BPM | BODY MASS INDEX: 26.1 KG/M2 | OXYGEN SATURATION: 97 % | HEIGHT: 56 IN | TEMPERATURE: 98.4 F | SYSTOLIC BLOOD PRESSURE: 126 MMHG

## 2024-02-27 DIAGNOSIS — Z86.73 HISTORY OF STROKE: ICD-10-CM

## 2024-02-27 DIAGNOSIS — R74.8 ELEVATED LIVER ENZYMES: ICD-10-CM

## 2024-02-27 DIAGNOSIS — R74.8 ELEVATED LIVER ENZYMES: Primary | ICD-10-CM

## 2024-02-27 LAB
ALBUMIN SERPL-MCNC: 4.3 G/DL (ref 3.5–5.2)
ALBUMIN/GLOB SERPL: 1.7 G/DL
ALP SERPL-CCNC: 141 U/L (ref 39–117)
ALT SERPL W P-5'-P-CCNC: 18 U/L (ref 1–33)
ANION GAP SERPL CALCULATED.3IONS-SCNC: 10.5 MMOL/L (ref 5–15)
AST SERPL-CCNC: 21 U/L (ref 1–32)
BILIRUB SERPL-MCNC: 0.3 MG/DL (ref 0–1.2)
BUN SERPL-MCNC: 15 MG/DL (ref 8–23)
BUN/CREAT SERPL: 28.3 (ref 7–25)
CALCIUM SPEC-SCNC: 9.4 MG/DL (ref 8.6–10.5)
CHLORIDE SERPL-SCNC: 102 MMOL/L (ref 98–107)
CO2 SERPL-SCNC: 28.5 MMOL/L (ref 22–29)
CREAT SERPL-MCNC: 0.53 MG/DL (ref 0.57–1)
EGFRCR SERPLBLD CKD-EPI 2021: 105.4 ML/MIN/1.73
GLOBULIN UR ELPH-MCNC: 2.6 GM/DL
GLUCOSE SERPL-MCNC: 164 MG/DL (ref 65–99)
POTASSIUM SERPL-SCNC: 4.2 MMOL/L (ref 3.5–5.2)
PROT SERPL-MCNC: 6.9 G/DL (ref 6–8.5)
SODIUM SERPL-SCNC: 141 MMOL/L (ref 136–145)

## 2024-02-27 PROCEDURE — 99214 OFFICE O/P EST MOD 30 MIN: CPT | Performed by: NURSE PRACTITIONER

## 2024-02-27 PROCEDURE — 36415 COLL VENOUS BLD VENIPUNCTURE: CPT

## 2024-02-27 PROCEDURE — 80053 COMPREHEN METABOLIC PANEL: CPT

## 2024-02-27 RX ORDER — GABAPENTIN 100 MG/1
100 CAPSULE ORAL 3 TIMES DAILY
COMMUNITY

## 2024-02-27 RX ORDER — PANTOPRAZOLE SODIUM 40 MG/1
40 TABLET, DELAYED RELEASE ORAL DAILY
COMMUNITY

## 2024-02-27 NOTE — PROGRESS NOTES
Follow Up Office Visit      Encounter Date: 2024   Patient Name: Emily Grove  : 1960   MRN: [id number]   PCP: Marie Morales MD    Chief Complaint:    Chief Complaint   Patient presents with    Follow-up    Stroke       History of Present Illness:Emily Grove is a 63 y.o. female who is here today to establish care.  Pertinent medical history includes T2DM, HTN, HLD.  Patient was admitted to [facility] 2023 after presenting with expressive aphasia, dysarthria, right facial droop and left-sided weakness.  Initial NIHSS 5, /88.  CT head negative for acute abnormality.  CTA head and neck negative for significant flow-limiting stenosis, no LVO or aneurysm.  CT perfusion negative.  MRI showed left thalamic stroke; suspected etiology likely small vessel disease.  TTE showed EF 61-65%, normal left atrium, positive bubble study.  Bilateral lower extremity duplex negative for DVT.  Patient was discharged home on DAPT with plan to continue for 21 days followed by aspirin 325 mg as well as statin.     Clinic visit 2023: Patient presents today for clinic follow-up.  She notes intermittent paresthesias around her right upper lip.  Her facial droop and speech abnormalities have resolved.  She also notes mild paresthesias involving her right first and second digits that she attributes to her recent carpal tunnel surgery in September.  She tells me she has episodes of lightheadedness after eating; this occurs occasionally and she is unable to specify a frequency.  Typically she has to lay down for an hour or 2 when these events happen.  This is not accompanied by nausea vomiting, no visual disturbances, no dizziness or altered gait.  She denies any new weakness, no visual changes, no speech disturbance.  She has remained active and goes to the gym 3 days a week.  She has continued taking both aspirin and Plavix as well as her atorvastatin.  She does state that she has been having leg  pain since beginning taking Lipitor.    Clinic visit 2/27/2024: Since patient's last appointment she presented to [facility] ED on 1/15/2024 with altered mental status and mixed aphasia. She was also found to be hypoglycemic. CT head without contrast that showed hypodensity area at left parietal frontal lobe cortical, and subcortical. MRI brain revealed  acute left parietal lesion that is suggestive of cardioembolic etiology. Cardiology was consulted while admitted for opinion on PFO closure. Rope score 3. Cardiology is scheduled to undergo evaluation for possible PFO closure on outpatient basis. MCOT is negative for Afib. Patient has been maintained on  mg and Plavix 75 mg    Subjective        I have reviewed and the following portions of the patient's history were updated as appropriate: past family history, past medical history, past social history, past surgical history and problem list.    Medications:     Current Outpatient Medications:     aspirin (Dorys Aspirin) 325 MG tablet, Take 1 tablet by mouth Daily for 313 doses., Disp: 30 tablet, Rfl: 10    atorvastatin (Lipitor) 20 MG tablet, Take 1 tablet by mouth Every Night for 315 doses., Disp: 30 tablet, Rfl: 10    brimonidine (ALPHAGAN) 0.2 % ophthalmic solution, 1 drop 3 (Three) Times a Day., Disp: , Rfl:     brimonidine-timolol (COMBIGAN) 0.2-0.5 % ophthalmic solution, Administer 1 drop to the right eye 2 (Two) Times a Day., Disp: , Rfl:     clopidogrel (PLAVIX) 75 MG tablet, Take 1 tablet by mouth Daily., Disp: 30 tablet, Rfl: 3    Continuous Blood Gluc Sensor (Dexcom G6 Sensor), USE AS INSTRUCTED CHANGE EVERY 10 DAYS DX E11.9, Disp: , Rfl:     Continuous Blood Gluc Transmit (Dexcom G6 Transmitter) misc, USE AS DIRECTED CHANGE TRANSMITTER EVERY 90 DAYS DX E11.9, Disp: , Rfl:     DULoxetine (CYMBALTA) 30 MG capsule, Take 1 capsule by mouth Daily., Disp: , Rfl:     gabapentin (NEURONTIN) 100 MG capsule, Take 1 capsule by mouth 3 (Three) Times a Day., Disp: ,  Rfl:     insulin detemir (LEVEMIR) 100 UNIT/ML injection, Inject 10 Units under the skin into the appropriate area as directed Daily., Disp: , Rfl:     Insulin Disposable Pump (Omnipod 5 G6 Pod, Gen 5,) misc, USE AS INSTRUCTED CHANGE POD EVERY 2 TO 3 DAYS, Disp: , Rfl:     Insulin Lispro (humaLOG) 100 UNIT/ML injection, Inject 3-14 Units under the skin into the appropriate area as directed 4 (Four) Times a Day Before Meals & at Bedtime., Disp: , Rfl:     ketoconazole (NIZORAL) 2 % shampoo, Please see attached for detailed directions, Disp: , Rfl:     latanoprost (XALATAN) 0.005 % ophthalmic solution, INSTILL 1 DROP INTO RIGHT EYE AT BEDTIME, Disp: , Rfl:     losartan (COZAAR) 25 MG tablet, Take 1 tablet by mouth Daily., Disp: , Rfl:     meloxicam (MOBIC) 15 MG tablet, TAKE 1 TABLET BY MOUTH EVERY DAY FOR LEG PAIN, Disp: , Rfl:     Myrbetriq 50 MG tablet sustained-release 24 hour 24 hr tablet, Take 50 mg by mouth Daily., Disp: , Rfl:     pantoprazole (PROTONIX) 40 MG EC tablet, Take 1 tablet by mouth Daily., Disp: , Rfl:     Vitamin D, Cholecalciferol, 50 MCG (2000 UT) capsule, Take 1 capsule by mouth Daily. OTC, Disp: , Rfl:     esomeprazole (nexIUM) 40 MG capsule, Take 1 capsule by mouth Daily. (Patient not taking: Reported on 2/27/2024), Disp: , Rfl:     fluticasone (FLONASE) 50 MCG/ACT nasal spray, 2 sprays into the nostril(s) as directed by provider Daily As Needed for Rhinitis or Allergies. OTC (Patient not taking: Reported on 2/26/2024), Disp: , Rfl:     meclizine (ANTIVERT) 25 MG tablet, Take 0.5 tablets by mouth 3 (Three) Times a Day As Needed for Dizziness. (Patient not taking: Reported on 2/26/2024), Disp: 15 tablet, Rfl: 0    montelukast (SINGULAIR) 10 MG tablet, Take 1 tablet by mouth Every Night. (Patient not taking: Reported on 2/26/2024), Disp: , Rfl:     pregabalin (LYRICA) 100 MG capsule, Take 3 capsules by mouth 3 (Three) Times a Day. (Patient not taking: Reported on 2/27/2024), Disp: , Rfl:      "Semaglutide,0.25 or 0.5MG/DOS, (Ozempic, 0.25 or 0.5 MG/DOSE,) 2 MG/1.5ML solution pen-injector, Inject 1 mg under the skin into the appropriate area as directed 1 (One) Time Per Week. Friday, Disp: , Rfl:     Allergies:   Allergies   Allergen Reactions    Sulfa Antibiotics Anaphylaxis       Objective     Physical Exam:   Vital Signs:   Vitals:    02/27/24 1125   BP: 126/82   Pulse: 94   Temp: 98.4 °F (36.9 °C)   SpO2: 97%   Weight: 52.6 kg (116 lb)   Height: 142.2 cm (55.98\")     Body mass index is 26.02 kg/m².    Physical Exam  Vitals and nursing note reviewed.   Constitutional:       General: She is awake. She is not in acute distress.     Appearance: Normal appearance. She is normal weight. She is not ill-appearing.      Comments:  female    HENT:      Head: Normocephalic and atraumatic.      Nose: Nose normal.      Mouth/Throat:      Mouth: Mucous membranes are moist.   Eyes:      General: Lids are normal.      Extraocular Movements: Extraocular movements intact.      Pupils: Pupils are equal, round, and reactive to light.   Cardiovascular:      Rate and Rhythm: Normal rate and regular rhythm.      Pulses: Normal pulses.   Pulmonary:      Effort: Pulmonary effort is normal. No respiratory distress.   Skin:     General: Skin is warm and dry.   Neurological:      Mental Status: She is alert and oriented to person, place, and time.      Cranial Nerves: Cranial nerve deficit and dysarthria present.      Sensory: Sensory deficit present.      Motor: Motor strength is normal.     Coordination: Coordination is intact.   Psychiatric:         Mood and Affect: Mood normal.         Behavior: Behavior normal.       Neurological Exam  Mental Status  Awake and alert. Oriented to person, place, time and situation. Oriented to person, place, and time. Mild dysarthria present. Mixed aphasia present.    Cranial Nerves  CN II: Right visual acuity: Counts fingers. Left visual acuity: Counts fingers. Visual fields " full to confrontation.  CN III, IV, VI: Extraocular movements intact bilaterally. Normal lids and orbits bilaterally. Pupils equal round and reactive to light bilaterally.  CN V: Facial sensation is normal.  CN VII:  Right: There is central facial weakness.  CN VIII: Hearing is normal to speech .  CN XI: Shoulder shrug strength is normal.  CN XII: Tongue midline without atrophy or fasciculations.    Motor  Normal muscle bulk throughout. No fasciculations present. Normal muscle tone. Strength is 5/5 throughout all four extremities.    Sensory  Light touch abnormality: Right hand numbness .     Coordination    Finger-to-nose, rapid alternating movements and heel-to-shin normal bilaterally without dysmetria.  No obvious dysmetria .    Gait  Casual gait is normal including stance, stride, and arm swing.       Modified Jaylan Score: 2        0  No Symptoms    1 No significant disability. Able to carry out all usual activities, despite some symptoms.    2 Slight disability. Able to look after own affairs without assistance, but unable to carry out all previous activities.    3 Moderate disability. Requires some help, but able to walk unassisted.    4 Moderately severe disability. Unable to attend to own bodily needs without assistance, and unable to walk unassisted.    5 Severe disability. Requires constant nursing care and attention, bedridden, incontinent.    6 Dead      PHQ-9 Depression Screening  Little interest or pleasure in doing things? 0-->not at all   Feeling down, depressed, or hopeless? 0-->not at all   Trouble falling or staying asleep, or sleeping too much?     Feeling tired or having little energy?     Poor appetite or overeating?     Feeling bad about yourself - or that you are a failure or have let yourself or your family down?     Trouble concentrating on things, such as reading the newspaper or watching television?     Moving or speaking so slowly that other people could have noticed? Or the opposite -  being so fidgety or restless that you have been moving around a lot more than usual?     Thoughts that you would be better off dead, or of hurting yourself in some way?     PHQ-9 Total Score 0   If you checked off any problems, how difficult have these problems made it for you to do your work, take care of things at home, or get along with other people?       GAEL Fall Risk Clinician Key Questions   Have you fallen in the past year?: No  Do you feel unsteady with walking?: Yes  Are you worried about falling?: No      Labs  Lab Results   Component Value Date    GLUCOSE 136 (H) 01/18/2024    BUN 14 01/18/2024    CREATININE 0.54 (L) 01/18/2024    EGFR 104.9 01/18/2024    BCR 25.9 (H) 01/18/2024    K 4.1 01/18/2024    CO2 26.0 01/18/2024    CALCIUM 9.6 01/18/2024    ALBUMIN 3.9 01/15/2024    BILITOT 0.2 01/15/2024    AST 85 (H) 01/15/2024    ALT 89 (H) 01/15/2024     Lipid Panel          8/25/2023    03:53 1/16/2024    05:41   Lipid Panel   Total Cholesterol 100  106    Triglycerides 130  107    HDL Cholesterol 50  50    VLDL Cholesterol 23  20    LDL Cholesterol  27  36    LDL/HDL Ratio 0.48  0.69       Most Recent A1C          1/16/2024    05:41   HGBA1C Most Recent   Hemoglobin A1C 7.80       Imaging     CT head negative for acute abnormality.     CTA head and neck negative for significant flow-limiting stenosis, no LVO or aneurysm.       CT perfusion negative.       MRI showed acute left thalamic stroke     TTE showed EF 61-65%, normal left atrium, positive bubble study.       Bilateral lower extremity duplex negative for DVT.    MRI Angiogram Head Without Contrast    Result Date: 1/15/2024  Impression: No vascular abnormality visualized. Electronically Signed: Joao Lake MD  1/15/2024 10:30 PM EST  Workstation ID: [id number]    MRI Angiogram Neck With Contrast    Result Date: 1/15/2024  Impression: No vascular abnormality visualized. Electronically Signed: Joao Lake MD  1/15/2024 10:30 PM EST  Workstation  ID: [id number]    MRI Brain Without Contrast    Result Date: 1/15/2024  Impression: Small patchy foci of nonhemorrhagic infarction in the left parietal lobe measuring up to 1.6 cm. Mild changes of chronic microvascular ischemia. Findings discussed with Nitesh from the stroke team on January 15, 2024 at 10:25 p.m. Electronically Signed: Joao aLke MD  1/15/2024 10:24 PM EST  Workstation ID: [id number]    CT Head Without Contrast    Result Date: 1/15/2024  Impression: 2 cm focus of hypodensity in the high left parietal lobe suspicious for nonhemorrhagic infarct. Recommend correlation with MRI. No intracranial hemorrhage visualized. Findings compatible with mild changes of chronic microvascular ischemia. Findings relayed to JASPER Terrazas on January 15, 2024 at 4:45 p.m. by telephone. Electronically Signed: Joao Lake MD  1/15/2024 4:45 PM EST  Workstation ID: [id number]    XR Chest 1 View    Result Date: 1/14/2024  Impression: No active disease Electronically Signed: Rodolfo Bauer MD  1/14/2024 10:02 PM EST  Workstation ID: [id number]    XR Chest 1 View    Result Date: 12/26/2023  Impression: No acute process. Electronically Signed: Vinny Abarca MD  12/26/2023 10:30 PM EST  Workstation ID: [id number]    Assessment / Plan      Assessment/Plan:   There are no diagnoses linked to this encounter.      History of stroke  -Left thalamic stroke (2023), Left parietal stroke (2024)  -Continue DAPT ( mg + Plavix 75 mg)  -Patient was discharged on atorvastatin 20 mg, LDL was 27, goal<70. Patient is well below goal. AST/ALT was noted to be elevated at most recent admission. Will recheck today. If remains elevated will discontinue statin.   -follow up tomorrow with [facility]ology for possible PFO closure   -MCOT was negative for Afib. It was recommended if outpatient monitor was negative that patient be sent for loop. Will order today.   -Reviewed reduction strategies for modifiable stroke risk factors  -Reviewed s/sxs of stroke and  when to call 911 or return to the ED  -follow up in stroke clinic in 6 months     2.  HTN  -Goal BP <130/80  -/82 today     3.  Type 2 diabetes  -A1c 7.8 in the hospital, goal <7.0  -Continue to work closely with PCP to optimize glycemic control     Discussed the importance of medication compliance Aspirin 325mg daily and Atorvastatin 20 mg nightly and lifestyle modifications adequate control of blood pressure, adequate control of cholesterol (goal LDL <70), adequate control of glucose (<140, A1c goal <7), increased physical activity, and implementation of healthy diet to help reduce the risk of future cerebrovascular events.  Also discussed the signs symptoms that would warrant the patient return back to the emergency department including unilateral weakness, unilateral numbness, visual disturbances, loss of balance, speech difficulties, and/or a sudden severe headache.  Patient verbalized understanding.     Follow Up:   Return in about 6 months (around 8/27/2024).    TYLER Malone  [facility]

## 2024-04-22 ENCOUNTER — TELEPHONE (OUTPATIENT)
Dept: NEUROLOGY | Facility: CLINIC | Age: 64
End: 2024-04-22
Payer: MEDICAID

## 2024-04-22 RX ORDER — ASPIRIN 81 MG/1
81 TABLET ORAL DAILY
Qty: 30 TABLET | Refills: 11 | Status: SHIPPED | OUTPATIENT
Start: 2024-04-22 | End: 2025-04-22

## 2024-04-22 NOTE — PROGRESS NOTES
Patient and wife called asking about reducing her  mg to 81 mg.  Patient's  reports that last week during her PFO closure with Dr. Sánchez the nurse mentioned to them that her aspirin dosage may be too high because she is taking dual antiplatelet therapy.  In review of patient's chart it appears that she was taking a baby aspirin prior to her first stroke event.  She was discharged from that admission on dual antiplatelet therapy which she completed for short period and was taken off of Plavix at her first follow-up in stroke clinic with TYLER Hartman.  At that time her aspirin 81 mg was increased to aspirin 325.  Unfortunately patient returned to the hospital for second admission with new ischemic strokes found.  At that time Plavix was added to patient's aspirin 325.  DAPT with  and Plavix 75 mg was what was recommended so that is what was continued at her stroke clinic follow-up.  In my overall review of her chart it does appear that her vessels are free of significant atherosclerosis.  I feel it would be reasonable to reduce her aspirin to 81 mg as long as she continues taking Plavix as well while we are continuing her cardiac workup.  Discussed with patient's  that she was recommended by neurology and cardiology to have a loop recorder inserted.  He reports this has not been scheduled yet.  I have encouraged him to call and get this scheduled.  He is in agreement.  No further questions or concerns.

## 2024-04-22 NOTE — TELEPHONE ENCOUNTER
MEDICATION CONCERNS    Caller: ADRIANE RAMIRES    Relationship: Emergency Contact; [employer]    Best call back number: [phone]    What medications are you currently taking:   Current Outpatient Medications on File Prior to Visit   Medication Sig Dispense Refill    aspirin (Dorys Aspirin) 325 MG tablet Take 1 tablet by mouth Daily for 313 doses. 30 tablet 10    atorvastatin (Lipitor) 20 MG tablet Take 1 tablet by mouth Every Night for 315 doses. 30 tablet 10    brimonidine (ALPHAGAN) 0.2 % ophthalmic solution 1 drop 3 (Three) Times a Day.      brimonidine-timolol (COMBIGAN) 0.2-0.5 % ophthalmic solution Administer 1 drop to the right eye 2 (Two) Times a Day.      clopidogrel (PLAVIX) 75 MG tablet Take 1 tablet by mouth Daily. 30 tablet 3    Continuous Blood Gluc Sensor (Dexcom G6 Sensor) USE AS INSTRUCTED CHANGE EVERY 10 DAYS DX E11.9      Continuous Blood Gluc Transmit (Dexcom G6 Transmitter) misc USE AS DIRECTED CHANGE TRANSMITTER EVERY 90 DAYS DX E11.9      DULoxetine (CYMBALTA) 30 MG capsule Take 1 capsule by mouth Daily.      esomeprazole (nexIUM) 40 MG capsule Take 1 capsule by mouth Daily. (Patient not taking: Reported on 2/27/2024)      fluticasone (FLONASE) 50 MCG/ACT nasal spray 2 sprays into the nostril(s) as directed by provider Daily As Needed for Rhinitis or Allergies. OTC (Patient not taking: Reported on 2/26/2024)      gabapentin (NEURONTIN) 100 MG capsule Take 1 capsule by mouth 3 (Three) Times a Day.      insulin detemir (LEVEMIR) 100 UNIT/ML injection Inject 10 Units under the skin into the appropriate area as directed Daily.      Insulin Disposable Pump (Omnipod 5 G6 Pod, Gen 5,) misc USE AS INSTRUCTED CHANGE POD EVERY 2 TO 3 DAYS      Insulin Lispro (humaLOG) 100 UNIT/ML injection Inject 3-14 Units under the skin into the appropriate area as directed 4 (Four) Times a Day Before Meals & at Bedtime.      ketoconazole (NIZORAL) 2 % shampoo Please see attached for detailed directions       latanoprost (XALATAN) 0.005 % ophthalmic solution INSTILL 1 DROP INTO RIGHT EYE AT BEDTIME      losartan (COZAAR) 25 MG tablet Take 1 tablet by mouth Daily.      meclizine (ANTIVERT) 25 MG tablet Take 0.5 tablets by mouth 3 (Three) Times a Day As Needed for Dizziness. (Patient not taking: Reported on 2/26/2024) 15 tablet 0    meloxicam (MOBIC) 15 MG tablet TAKE 1 TABLET BY MOUTH EVERY DAY FOR LEG PAIN      montelukast (SINGULAIR) 10 MG tablet Take 1 tablet by mouth Every Night. (Patient not taking: Reported on 2/26/2024)      Myrbetriq 50 MG tablet sustained-release 24 hour 24 hr tablet Take 50 mg by mouth Daily.      pantoprazole (PROTONIX) 40 MG EC tablet Take 1 tablet by mouth Daily.      pregabalin (LYRICA) 100 MG capsule Take 3 capsules by mouth 3 (Three) Times a Day. (Patient not taking: Reported on 2/27/2024)      Semaglutide,0.25 or 0.5MG/DOS, (Ozempic, 0.25 or 0.5 MG/DOSE,) 2 MG/1.5ML solution pen-injector Inject 1 mg under the skin into the appropriate area as directed 1 (One) Time Per Week. Friday      Vitamin D, Cholecalciferol, 50 MCG (2000 UT) capsule Take 1 capsule by mouth Daily. OTC       No current facility-administered medications on file prior to visit.     Which medication are you concerned about: aspirin (Dorys Aspirin) 325 MG tablet    Who prescribed you this medication: TYLER BRISENO    When did you start taking these medications: FOLLOWING PT'S STROKE    What are your concerns: PT HAD HEART PROCEDURE COMPLETED W/ DR. JIMENES @ [facility] LAST WEEK. A NURSE AT THE HOSPITAL HAD MENTIONED FOLLOWING PT'S PROCEDURE THAT PT'S ASPIRIN DOSAGE MAY BE TOO HIGH, ESPECIALLY CONSIDERING PT IS TAKING PLAVIX AS WELL. IT WAS RECOMMENDED THAT SHE DISCUSS THIS WITH NEUROLOGY.    ** [facility] DOES NOT HAVE APPROPRIATE MARKS TO RELEASE INFORMATION REGARDING MEDICATIONS/PLAN OF CARE. NO INFORMATION RELEASED DURING PHONE CALL.    PLEASE REVIEW AND ADVISE.

## 2024-04-25 DIAGNOSIS — Z86.73 HISTORY OF CVA (CEREBROVASCULAR ACCIDENT): Primary | ICD-10-CM

## 2024-05-03 ENCOUNTER — HOSPITAL ENCOUNTER (OUTPATIENT)
Dept: CARDIOLOGY | Facility: HOSPITAL | Age: 64
Discharge: HOME | End: 2024-05-03
Attending: INTERNAL MEDICINE | Admitting: INTERNAL MEDICINE
Payer: MEDICAID

## 2024-05-03 ENCOUNTER — NURSE TRIAGE (OUTPATIENT)
Dept: CALL CENTER | Facility: HOSPITAL | Age: 64
End: 2024-05-03
Payer: MEDICAID

## 2024-05-03 VITALS
DIASTOLIC BLOOD PRESSURE: 88 MMHG | HEART RATE: 90 BPM | SYSTOLIC BLOOD PRESSURE: 144 MMHG | RESPIRATION RATE: 14 BRPM | HEIGHT: 55 IN | TEMPERATURE: 97.2 F | OXYGEN SATURATION: 100 % | WEIGHT: 116.4 LBS | BODY MASS INDEX: 26.94 KG/M2

## 2024-05-03 DIAGNOSIS — Z86.73 HISTORY OF STROKE: ICD-10-CM

## 2024-05-03 DIAGNOSIS — Z86.73 HISTORY OF CVA (CEREBROVASCULAR ACCIDENT): ICD-10-CM

## 2024-05-03 PROCEDURE — C1764 EVENT RECORDER, CARDIAC: HCPCS

## 2024-05-03 PROCEDURE — 33285 INSJ SUBQ CAR RHYTHM MNTR: CPT

## 2024-05-03 PROCEDURE — 33285 INSJ SUBQ CAR RHYTHM MNTR: CPT | Performed by: INTERNAL MEDICINE

## 2024-05-03 NOTE — TELEPHONE ENCOUNTER
Reason for Disposition   [1] Clear or blood-tinged fluid draining from wound AND [2] no fever    Additional Information   Negative: [1] Major abdominal surgical incision AND [2] wound gaping open AND [3] visible internal organs   Negative: Sounds like a life-threatening emergency to the triager   Negative: Patient has a Negative Pressure Wound Therapy device   Negative: Patient is followed by a wound clinic or wound specialist for this wound   Negative: [1] Bleeding from incision AND [2] won't stop after 10 minutes of direct pressure   Negative: [1] Bleeding (more than a few drops) from incision AND [2] tracheostomy or blood vessel surgery (e.g., carotid endarterectomy, femoral bypass graft, kidney dialysis fistula)   Negative: [1] Widespread rash AND [2] bright red, sunburn-like   Negative: Severe pain in the incision   Negative: [1] Incision gaping open AND [2] < 48 hours since wound re-opened   Negative: [1] Incision gaping open AND [2] length of opening > 2 inches (5 cm)   Negative: Patient sounds very sick or weak to the triager   Negative: Sounds like a serious complication to the triager   Negative: Fever > 100.4 F (38.0 C)   Negative: [1] Incision looks infected (spreading redness, pain) AND [2] fever > 99.5 F (37.5 C)   Negative: [1] Incision looks infected (spreading redness, pain) AND [2] large red area (> 2 in. or 5 cm)   Negative: [1] Incision looks infected (spreading redness, pain) AND [2] face wound   Negative: [1] Red streak runs from the incision AND [2] longer than 1 inch (2.5 cm)   Negative: [1] Pus or bad-smelling fluid draining from incision AND [2] no fever   Negative: [1] Post-op pain AND [2] not controlled with pain medications   Negative: Dressing soaked with blood or body fluid (e.g., drainage)   Negative: [1] Scant bleeding (e.g., few drops) from incision AND AND [2] tracheostomy or blood vessel surgery (e.g., carotid endarterectomy, femoral bypass graft, kidney dialysis fistula)    "Negative: [1] Raised bruise and [2] size > 2 inches (5 cm) and expanding   Negative: [1] Caller has URGENT question AND [2] triager unable to answer question   Negative: [1] INCREASING pain in incision AND [2] > 2 days (48 hours) since surgery   Negative: [1] Small red area or streak AND [2] no fever   Negative: [1] Incision gaping open AND [2] > 48 hours since wound re-opened AND [3] length of opening > 1/2 inch (12 mm)   Negative: [1] Incision on face gaping open AND [2] > 48 hours since wound re-opened AND [3] length of opening > 1/4 inch (6 mm)   Negative: Suture or staple removal is overdue   Negative: [1] Suture or staple came out early AND [2] caller wants wound checked   Negative: [1] Caller has NON-URGENT question AND [2] triager unable to answer question   Negative: Pimple where a stitch comes through the skin   Negative: Suture (or staple) came out early   Negative: Mild bruising near incision site   Negative: Suture removal date, questions about   Negative: Skin tape (e.g., Steri-Strips) removal, questions about   Negative: Sutured or stapled surgical incision, questions about   Negative: Surgical incision after sutures or staples removed, questions about    Answer Assessment - Initial Assessment Questions  1. SYMPTOM: \"What's the main symptom you're concerned about?\" (e.g., drainage, incision opening up, pain, redness)      Nickel size drainage  2. ONSET: \"When did   start?\"      After procedure  3. SURGERY: \"What surgery did you have?\"      Loop recorder  4. DATE of SURGERY: \"When was the surgery?\"       today  5. INCISION SITE: \"Where is the incision located?\"       chest  6. REDNESS: \"Is there any redness at the incision site?\" If Yes, ask: \"How wide across is the redness?\" (Inches, centimeters)       denies  7. PAIN: \"Is there any pain?\" If Yes, ask: \"How bad is it?\"  (Scale 1-10; or mild, moderate, severe)    - NONE (0): no pain    - MILD (1-3): doesn't interfere with normal activities     - " "MODERATE (4-7): interferes with normal activities or awakens from sleep     - SEVERE (8-10): excruciating pain, unable to do any normal activities      denies  8. BLEEDING: \"Is there any bleeding?\" If Yes, ask: \"How much?\" and \"Where?\"      Small nickel size drainage  9. DRAINAGE: \"Is there any drainage from the incision site?\" If Yes, ask: \"What color and how much?\" (e.g., red, cloudy, pus; drops, teaspoon)      Just seeing through band aid  10. FEVER: \"Do you have a fever?\" If Yes, ask: \"What is your temperature, how was it measured, and when did it start?\"        denies  11. OTHER SYMPTOMS: \"Do you have any other symptoms?\" (e.g., dizziness, rash elsewhere on body, shaking chills, weakness)        denies    Protocols used: Post-Op Incision Symptoms and Questions-ADULT-    "

## 2024-05-10 ENCOUNTER — OFFICE VISIT (OUTPATIENT)
Dept: CARDIOLOGY | Facility: CLINIC | Age: 64
End: 2024-05-10
Payer: MEDICAID

## 2024-05-10 DIAGNOSIS — Q21.12 PFO (PATENT FORAMEN OVALE): Primary | ICD-10-CM

## 2024-05-13 ENCOUNTER — TELEPHONE (OUTPATIENT)
Dept: CARDIOLOGY | Facility: CLINIC | Age: 64
End: 2024-05-13
Payer: MEDICAID

## 2024-05-13 NOTE — TELEPHONE ENCOUNTER
called about stitches. Patient was wondering if stitches would need to remove or if they are absorbable. Patient had wound appointment on 5/10.

## 2024-05-13 NOTE — TELEPHONE ENCOUNTER
Caller: ADRIANE RAMIRES     Relationship: SELF    Best call back number: [phone]    What is your medical concern? PATIENT HAD A LOOP PLACED ON 05.03.24. HER  HAS SOME QUESTIONS ABOUT THE STITCHES. PLEASE REACH OUT TO THE PATIENTS WIFE TO ADDRESS THIS.

## 2024-08-05 ENCOUNTER — OFFICE VISIT (OUTPATIENT)
Dept: CARDIOLOGY | Facility: CLINIC | Age: 64
End: 2024-08-05
Payer: MEDICAID

## 2024-08-05 VITALS
DIASTOLIC BLOOD PRESSURE: 96 MMHG | WEIGHT: 120.2 LBS | SYSTOLIC BLOOD PRESSURE: 136 MMHG | HEIGHT: 55 IN | OXYGEN SATURATION: 96 % | BODY MASS INDEX: 27.82 KG/M2 | HEART RATE: 102 BPM

## 2024-08-05 DIAGNOSIS — E78.5 HYPERLIPIDEMIA LDL GOAL <70: Primary | ICD-10-CM

## 2024-08-05 DIAGNOSIS — Q21.12 PFO (PATENT FORAMEN OVALE): Chronic | ICD-10-CM

## 2024-08-05 DIAGNOSIS — R00.2 PALPITATIONS: ICD-10-CM

## 2024-08-05 PROCEDURE — 99214 OFFICE O/P EST MOD 30 MIN: CPT | Performed by: INTERNAL MEDICINE

## 2024-08-05 NOTE — PROGRESS NOTES
[facility] Cardiology  Office visit  [place]  1960  [phone]  There is no work phone number on file.    VISIT DATE:  8/5/2024    PCP: Marie Morales MD  [id number]  [facility] [address]    CC:  Chief Complaint   Patient presents with    Rapid Heart Rate       Previous cardiac studies and procedures:  August 2023  TTE    Left ventricular systolic function is normal. Left ventricular ejection fraction appears to be 61 - 65%.    Saline test results are positive with valsalva manuever.  MRI brain: Negative for CVA    January 2024  MRI brain  Small patchy foci of nonhemorrhagic infarction in the left parietal lobe measuring up to 1.6 cm.     TTE    Left ventricular systolic function is normal. Calculated left ventricular EF = 64.6% Normal left ventricular cavity size and wall thickness noted. All left ventricular wall segments contract normally. Left ventricular diastolic function was normal.    Normal right ventricular cavity size, wall thickness, systolic function and septal motion noted.    The aortic valve is grossly normal in structure. No significant aortic valve regurgitation is present. No hemodynamically significant aortic valve stenosis is present.    The mitral valve is grossly normal in structure. Trace mitral valve regurgitation is present. No significant mitral valve stenosis is present.    The tricuspid valve is grossly normal in structure. Trace tricuspid valve regurgitation is present. Estimated right ventricular systolic pressure from tricuspid regurgitation is normal (<35 mmHg). No evidence of significant tricuspid valve stenosis is present.  PET myocardial perfusion imaging    Left ventricular ejection fraction is hyperdynamic (Calculated EF > 70%).    Rest EF = 74% Stress EF = 79%.    Myocardial perfusion imaging indicates a small-sized, mild-to-moderately severe area of ischemia located in the basal inferior lateral wall.     "Impressions are consistent with abnormal but overall low risk study, consistent with distal single-vessel disease, either distal circumflex or distal RCA territory.  MCOT     A relatively benign monitor study.    No atrial fibrillation.    April 2024 PFO closure    ASSESSMENT:   Diagnosis Plan   1. Hyperlipidemia LDL goal <70        2. Palpitations        3. PFO (patent foramen ovale)          Loop recorder interrogation: No arrhythmia    PLAN:  PFO: History of TIA in August 2023, had been weaned down to single platelet therapy with aspirin when she presented with recurrent CVA.  Status post PFO closure April 2024.  Continue dual antiplatelet therapy, follow-up with interventional cardiology in October.    Hypertension: Goal is 130/80 mmHg currently well-controlled.  Continue current medical therapy.    Hyperlipidemia: Goal LDL less than 70.  Continue atorvastatin 20 mg p.o. daily.    History of junctional bradycardia and small type II NSTEMI during admission for CVA in January 2004.    Subjective  No change in baseline function capacity.    No recurrent TIA or strokelike symptoms.  Compliant with medical therapy.  Blood pressures running less than 130/80 mmHg.    PHYSICAL EXAMINATION:  Vitals:    08/05/24 1347   BP: 136/96   BP Location: Left arm   Patient Position: Sitting   Cuff Size: Adult   Pulse: 102   SpO2: 96%   Weight: 54.5 kg (120 lb 3.2 oz)   Height: 139.7 cm (55\")       General Appearance:    Alert, cooperative, no distress, appears stated age   Head:    Normocephalic, without obvious abnormality, atraumatic   Eyes:    conjunctiva/corneas clear   Nose:   Nares normal, septum midline, mucosa normal, no drainage   Throat:   Lips, teeth and gums normal   Neck:   Supple, symmetrical, trachea midline, no carotid    bruit or JVD   Lungs:     Clear to auscultation bilaterally, respirations unlabored   Chest Wall:    No tenderness or deformity    Heart:    Regular rate and rhythm, S1 and S2 normal, 1-2/6 early " peaking systolic murmur right upper sternal border, no rub   or gallop, normal carotid impulse bilaterally without bruit.   Abdomen:     Soft, non-tender   Extremities:   Extremities normal, atraumatic, no cyanosis or edema   Pulses:   2+ and symmetric all extremities   Skin:   Skin color, texture, turgor normal, no rashes or lesions       Diagnostic Data:  Procedures  Lab Results   Component Value Date    TRIG 107 01/16/2024    HDL 50 01/16/2024     Lab Results   Component Value Date    GLUCOSE 164 (H) 02/27/2024    BUN 15 02/27/2024    CREATININE 0.53 (L) 02/27/2024     02/27/2024    K 4.2 02/27/2024     02/27/2024    CO2 28.5 02/27/2024     Lab Results   Component Value Date    HGBA1C 7.80 (H) 01/16/2024     Lab Results   Component Value Date    WBC 6.64 01/18/2024    HGB 13.2 01/18/2024    HCT 39.1 01/18/2024     01/18/2024       Allergies  Allergies   Allergen Reactions    Sulfa Antibiotics Anaphylaxis       Current Medications    Current Outpatient Medications:     aspirin 81 MG EC tablet, Take 1 tablet by mouth Daily., Disp: 30 tablet, Rfl: 11    atorvastatin (Lipitor) 20 MG tablet, Take 1 tablet by mouth Every Night for 315 doses., Disp: 30 tablet, Rfl: 10    brimonidine (ALPHAGAN) 0.2 % ophthalmic solution, 1 drop 3 (Three) Times a Day., Disp: , Rfl:     brimonidine-timolol (COMBIGAN) 0.2-0.5 % ophthalmic solution, Administer 1 drop to the right eye 2 (Two) Times a Day., Disp: , Rfl:     clopidogrel (PLAVIX) 75 MG tablet, Take 1 tablet by mouth Daily., Disp: 30 tablet, Rfl: 3    Continuous Blood Gluc Sensor (Dexcom G6 Sensor), USE AS INSTRUCTED CHANGE EVERY 10 DAYS DX E11.9, Disp: , Rfl:     Continuous Blood Gluc Transmit (Dexcom G6 Transmitter) misc, USE AS DIRECTED CHANGE TRANSMITTER EVERY 90 DAYS DX E11.9, Disp: , Rfl:     gabapentin (NEURONTIN) 100 MG capsule, Take 1 capsule by mouth 3 (Three) Times a Day., Disp: , Rfl:     Insulin Disposable Pump (Omnipod 5 G6 Pod, Gen 5,) misc, USE  AS INSTRUCTED CHANGE POD EVERY 2 TO 3 DAYS, Disp: , Rfl:     Insulin Lispro (humaLOG) 100 UNIT/ML injection, Inject 3-14 Units under the skin into the appropriate area as directed 4 (Four) Times a Day Before Meals & at Bedtime., Disp: , Rfl:     ketoconazole (NIZORAL) 2 % shampoo, Please see attached for detailed directions, Disp: , Rfl:     latanoprost (XALATAN) 0.005 % ophthalmic solution, INSTILL 1 DROP INTO RIGHT EYE AT BEDTIME, Disp: , Rfl:     losartan (COZAAR) 25 MG tablet, Take 2 tablets by mouth Daily., Disp: , Rfl:     meloxicam (MOBIC) 15 MG tablet, TAKE 1 TABLET BY MOUTH EVERY DAY FOR LEG PAIN, Disp: , Rfl:     Myrbetriq 50 MG tablet sustained-release 24 hour 24 hr tablet, Take 50 mg by mouth Daily., Disp: , Rfl:     pantoprazole (PROTONIX) 40 MG EC tablet, Take 1 tablet by mouth Daily., Disp: , Rfl:     Semaglutide,0.25 or 0.5MG/DOS, (Ozempic, 0.25 or 0.5 MG/DOSE,) 2 MG/1.5ML solution pen-injector, Inject 1 mg under the skin into the appropriate area as directed 1 (One) Time Per Week. Friday, Disp: , Rfl:     Vitamin D, Cholecalciferol, 50 MCG (2000 UT) capsule, Take 1 capsule by mouth Daily. OTC, Disp: , Rfl:     DULoxetine (CYMBALTA) 30 MG capsule, Take 1 capsule by mouth Daily., Disp: , Rfl:           ROS  ROS      SOCIAL HX  Social History     Socioeconomic History    Marital status:    Tobacco Use    Smoking status: Never     Passive exposure: Never    Smokeless tobacco: Never   Vaping Use    Vaping status: Never Used   Substance and Sexual Activity    Alcohol use: No    Drug use: No    Sexual activity: Defer       FAMILY HX  Family History   Problem Relation Age of Onset    Diabetes Mother     Stroke Mother     COPD Father     Stroke Father     Hypertension Sister     Diabetes Brother     Heart attack Brother     Parkinsonism Brother     No Known Problems Maternal Grandmother     Diabetes Maternal Grandfather     Stroke Maternal Grandfather     Heart failure Paternal Grandmother      Diabetes Paternal Grandmother     No Known Problems Paternal Grandfather              Rik Ruiz III, MD, FACC

## 2024-08-29 ENCOUNTER — OFFICE VISIT (OUTPATIENT)
Dept: NEUROLOGY | Facility: CLINIC | Age: 64
End: 2024-08-29
Payer: MEDICAID

## 2024-08-29 VITALS
HEIGHT: 55 IN | HEART RATE: 95 BPM | SYSTOLIC BLOOD PRESSURE: 136 MMHG | OXYGEN SATURATION: 97 % | DIASTOLIC BLOOD PRESSURE: 80 MMHG | TEMPERATURE: 98.6 F | BODY MASS INDEX: 27.4 KG/M2 | WEIGHT: 118.39 LBS

## 2024-08-29 DIAGNOSIS — Z86.73 HISTORY OF CVA (CEREBROVASCULAR ACCIDENT): Primary | ICD-10-CM

## 2024-08-29 DIAGNOSIS — Q21.12 PFO (PATENT FORAMEN OVALE): Chronic | ICD-10-CM

## 2024-08-29 DIAGNOSIS — E78.5 HYPERLIPIDEMIA LDL GOAL <70: ICD-10-CM

## 2024-08-29 RX ORDER — ASPIRIN 325 MG
325 TABLET, DELAYED RELEASE (ENTERIC COATED) ORAL EVERY 6 HOURS PRN
COMMUNITY

## 2024-08-29 NOTE — PROGRESS NOTES
Follow Up Office Visit      Encounter Date: 2024   Patient Name: Emily Grove  : 1960   MRN: [id number]   PCP: Marie Morales MD    Chief Complaint:    Chief Complaint   Patient presents with    Follow-up    Stroke       History of Present Illness: Emily Grove is a 63 y.o. female who is here today to establish care.  Pertinent medical history includes T2DM, HTN, HLD.  Patient was admitted to [facility] 2023 after presenting with expressive aphasia, dysarthria, right facial droop and left-sided weakness.  Initial NIHSS 5, /88.  CT head negative for acute abnormality.  CTA head and neck negative for significant flow-limiting stenosis, no LVO or aneurysm.  CT perfusion negative.  MRI showed left thalamic stroke; suspected etiology likely small vessel disease.  TTE showed EF 61-65%, normal left atrium, positive bubble study.  Bilateral lower extremity duplex negative for DVT.  Patient was discharged home on DAPT with plan to continue for 21 days followed by aspirin 325 mg as well as statin.     Clinic visit 2023: Patient presents today for clinic follow-up.  She notes intermittent paresthesias around her right upper lip.  Her facial droop and speech abnormalities have resolved.  She also notes mild paresthesias involving her right first and second digits that she attributes to her recent carpal tunnel surgery in September.  She tells me she has episodes of lightheadedness after eating; this occurs occasionally and she is unable to specify a frequency.  Typically she has to lay down for an hour or 2 when these events happen.  This is not accompanied by nausea vomiting, no visual disturbances, no dizziness or altered gait.  She denies any new weakness, no visual changes, no speech disturbance.  She has remained active and goes to the gym 3 days a week.  She has continued taking both aspirin and Plavix as well as her atorvastatin.  She does state that she has been having leg  pain since beginning taking Lipitor.     Clinic visit 2/27/2024: Since patient's last appointment she presented to [facility] ED on 1/15/2024 with altered mental status and mixed aphasia. She was also found to be hypoglycemic. CT head without contrast that showed hypodensity area at left parietal frontal lobe cortical, and subcortical. MRI brain revealed  acute left parietal lesion that is suggestive of cardioembolic etiology. Cardiology was consulted while admitted for opinion on PFO closure. Rope score 3. Cardiology is scheduled to undergo evaluation for possible PFO closure on outpatient basis. MCOT is negative for Afib. Patient has been maintained on  mg and Plavix 75 mg    Clinic visit 8/29/2024: Patient presents to clinic today for routine follow-up.  She is alone today.  She reports that she has been doing well.  Since her last visit she has had her PFO closed and a loop recorder placed.  She has had no evidence of atrial fibrillation.  Patient has transition from DAPT to aspirin 325.  No new or worsening strokelike symptoms.  Subjective        I have reviewed and the following portions of the patient's history were updated as appropriate: past family history, past medical history, past social history, past surgical history and problem list.    Medications:     Current Outpatient Medications:     aspirin 325 MG EC tablet, Take 1 tablet by mouth Every 6 (Six) Hours As Needed for Mild Pain., Disp: , Rfl:     atorvastatin (Lipitor) 20 MG tablet, Take 1 tablet by mouth Every Night for 315 doses., Disp: 30 tablet, Rfl: 10    brimonidine (ALPHAGAN) 0.2 % ophthalmic solution, 1 drop 3 (Three) Times a Day., Disp: , Rfl:     brimonidine-timolol (COMBIGAN) 0.2-0.5 % ophthalmic solution, Administer 1 drop to the right eye 2 (Two) Times a Day., Disp: , Rfl:     Continuous Blood Gluc Sensor (Dexcom G6 Sensor), USE AS INSTRUCTED CHANGE EVERY 10 DAYS DX E11.9, Disp: , Rfl:     Continuous Blood Gluc Transmit (Dexcom G6  "Transmitter) misc, USE AS DIRECTED CHANGE TRANSMITTER EVERY 90 DAYS DX E11.9, Disp: , Rfl:     DULoxetine (CYMBALTA) 30 MG capsule, Take 1 capsule by mouth Daily., Disp: , Rfl:     gabapentin (NEURONTIN) 100 MG capsule, Take 1 capsule by mouth 3 (Three) Times a Day., Disp: , Rfl:     Insulin Disposable Pump (Omnipod 5 G6 Pod, Gen 5,) misc, USE AS INSTRUCTED CHANGE POD EVERY 2 TO 3 DAYS, Disp: , Rfl:     Insulin Lispro (humaLOG) 100 UNIT/ML injection, Inject 3-14 Units under the skin into the appropriate area as directed 4 (Four) Times a Day Before Meals & at Bedtime., Disp: , Rfl:     ketoconazole (NIZORAL) 2 % shampoo, Please see attached for detailed directions, Disp: , Rfl:     latanoprost (XALATAN) 0.005 % ophthalmic solution, INSTILL 1 DROP INTO RIGHT EYE AT BEDTIME, Disp: , Rfl:     losartan (COZAAR) 25 MG tablet, Take 2 tablets by mouth Daily., Disp: , Rfl:     meloxicam (MOBIC) 15 MG tablet, TAKE 1 TABLET BY MOUTH EVERY DAY FOR LEG PAIN, Disp: , Rfl:     pantoprazole (PROTONIX) 40 MG EC tablet, Take 1 tablet by mouth Daily., Disp: , Rfl:     Semaglutide,0.25 or 0.5MG/DOS, (Ozempic, 0.25 or 0.5 MG/DOSE,) 2 MG/1.5ML solution pen-injector, Inject 1 mg under the skin into the appropriate area as directed 1 (One) Time Per Week. Friday, Disp: , Rfl:     Vitamin D, Cholecalciferol, 50 MCG (2000 UT) capsule, Take 1 capsule by mouth Daily. OTC, Disp: , Rfl:     Myrbetriq 50 MG tablet sustained-release 24 hour 24 hr tablet, Take 50 mg by mouth Daily., Disp: , Rfl:     Allergies:   Allergies   Allergen Reactions    Sulfa Antibiotics Anaphylaxis       Objective     Physical Exam:   Vital Signs:   Vitals:    08/29/24 1132   BP: 136/80   Pulse: 95   Temp: 98.6 °F (37 °C)   SpO2: 97%   Weight: 53.7 kg (118 lb 6.2 oz)   Height: 139.7 cm (55\")     Body mass index is 27.52 kg/m².    Physical Exam  Vitals and nursing note reviewed.   Constitutional:       General: She is awake. She is not in acute distress.     Appearance: " Normal appearance. She is normal weight. She is not ill-appearing.      Comments: 63 year old  female    HENT:      Head: Normocephalic and atraumatic.      Nose: Nose normal.      Mouth/Throat:      Mouth: Mucous membranes are moist.   Eyes:      General: Lids are normal.      Extraocular Movements: Extraocular movements intact.      Pupils: Pupils are equal, round, and reactive to light.   Cardiovascular:      Rate and Rhythm: Normal rate and regular rhythm.      Pulses: Normal pulses.   Pulmonary:      Effort: Pulmonary effort is normal. No respiratory distress.   Skin:     General: Skin is warm and dry.   Neurological:      Mental Status: She is alert and oriented to person, place, and time.      Cranial Nerves: No dysarthria.      Motor: Motor strength is normal.     Coordination: Coordination is intact.      Comments: Intermittent aphasia   Psychiatric:         Mood and Affect: Mood normal.         Behavior: Behavior normal.       Neurological Exam  Mental Status  Awake and alert. Oriented to person, place, time and situation. Oriented to person, place, and time. no dysarthria present. Expressive aphasia present. Attention and concentration are normal. Fund of knowledge is appropriate for level of education.    Cranial Nerves  CN II: Right visual acuity: Counts fingers. Left visual acuity: Counts fingers. Visual fields full to confrontation.  CN III, IV, VI: Extraocular movements intact bilaterally. Normal lids and orbits bilaterally. Pupils equal round and reactive to light bilaterally.  CN V: Facial sensation is normal.  CN VII: Full and symmetric facial movement.  CN VIII: Hearing is normal to speech .  CN XI: Shoulder shrug strength is normal.  CN XII: Tongue midline without atrophy or fasciculations.    Motor  Normal muscle bulk throughout. No fasciculations present. Normal muscle tone. Strength is 5/5 throughout all four extremities.    Sensory  Light touch is normal in upper and lower  extremities. Pinprick is normal in upper and lower extremities.     Coordination    Finger-to-nose, rapid alternating movements and heel-to-shin normal bilaterally without dysmetria.  No obvious dysmetria .    Gait  Casual gait is normal including stance, stride, and arm swing.       Modified Granville Score: 1-2        0  No Symptoms    1 No significant disability. Able to carry out all usual activities, despite some symptoms.    2 Slight disability. Able to look after own affairs without assistance, but unable to carry out all previous activities.    3 Moderate disability. Requires some help, but able to walk unassisted.    4 Moderately severe disability. Unable to attend to own bodily needs without assistance, and unable to walk unassisted.    5 Severe disability. Requires constant nursing care and attention, bedridden, incontinent.    6 Dead      PHQ-9 Depression Screening  Little interest or pleasure in doing things? 0-->not at all   Feeling down, depressed, or hopeless? 0-->not at all   Trouble falling or staying asleep, or sleeping too much?     Feeling tired or having little energy?     Poor appetite or overeating?     Feeling bad about yourself - or that you are a failure or have let yourself or your family down?     Trouble concentrating on things, such as reading the newspaper or watching television?     Moving or speaking so slowly that other people could have noticed? Or the opposite - being so fidgety or restless that you have been moving around a lot more than usual?     Thoughts that you would be better off dead, or of hurting yourself in some way?     PHQ-9 Total Score 0   If you checked off any problems, how difficult have these problems made it for you to do your work, take care of things at home, or get along with other people?       Lab Results   Component Value Date    GLUCOSE 164 (H) 02/27/2024    BUN 15 02/27/2024    CREATININE 0.53 (L) 02/27/2024     02/27/2024    K 4.2 02/27/2024    CL  102 02/27/2024    CALCIUM 9.4 02/27/2024    PROTEINTOT 6.9 02/27/2024    ALBUMIN 4.3 02/27/2024    ALT 18 02/27/2024    AST 21 02/27/2024    ALKPHOS 141 (H) 02/27/2024    BILITOT 0.3 02/27/2024    GLOB 2.6 02/27/2024    AGRATIO 1.7 02/27/2024    BCR 28.3 (H) 02/27/2024    ANIONGAP 10.5 02/27/2024    EGFR 105.4 02/27/2024     Lipid Panel          1/16/2024    05:41   Lipid Panel   Total Cholesterol 106    Triglycerides 107    HDL Cholesterol 50    VLDL Cholesterol 20    LDL Cholesterol  36    LDL/HDL Ratio 0.69       Most Recent A1C          1/16/2024    05:41   HGBA1C Most Recent   Hemoglobin A1C 7.80         Assessment / Plan      Assessment/Plan:   There are no diagnoses linked to this encounter.      History of stroke  -Left thalamic stroke (2023), Left parietal stroke (2024)  -Continue  mg  daily  -Patient was discharged on atorvastatin 20 mg, LDL was 27, goal<70.   -PFO has been closed  -Loop recorder is in place with no evidence of A-fib to date  -Reviewed reduction strategies for modifiable stroke risk factors  -Reviewed s/sxs of stroke and when to call 911 or return to the ED  -follow up in stroke clinic in 1 year     2.  HTN  -Goal BP <130/80  -/80 today     3.  Type 2 diabetes  -A1c 8.1, goal <7.0  -Continue to work closely with PCP to optimize glycemic control     Discussed the importance of medication compliance Aspirin 325mg daily and Atorvastatin 20 mg nightly and lifestyle modifications adequate control of blood pressure, adequate control of cholesterol (goal LDL <70), adequate control of glucose (<140, A1c goal <7), increased physical activity, and implementation of healthy diet to help reduce the risk of future cerebrovascular events.  Also discussed the signs symptoms that would warrant the patient return back to the emergency department including unilateral weakness, unilateral numbness, visual disturbances, loss of balance, speech difficulties, and/or a sudden severe headache.   Patient verbalized understanding.  Follow Up:   Return in about 1 year (around 8/29/2025).    TYLER Malone  [facility]

## 2024-10-08 ENCOUNTER — TELEPHONE (OUTPATIENT)
Dept: CARDIOLOGY | Facility: CLINIC | Age: 64
End: 2024-10-08
Payer: MEDICAID

## 2024-10-08 DIAGNOSIS — R94.39 ABNORMAL STRESS TEST: Primary | ICD-10-CM

## 2024-10-08 NOTE — TELEPHONE ENCOUNTER
Scheduled to have Lt Hand Surgery on 11/5/24 with Dr.William Santiago at [facility]. Requesting a Cardiac Risk Assessment. Please advise.

## 2024-10-09 NOTE — TELEPHONE ENCOUNTER
Patient has history of an abnormal stress test.  Could we arrange for her to have a coronary CTA prior to her surgery for further risk evaluation?

## 2024-10-10 ENCOUNTER — TELEPHONE (OUTPATIENT)
Dept: CARDIOLOGY | Facility: CLINIC | Age: 64
End: 2024-10-10
Payer: MEDICAID

## 2024-10-10 NOTE — TELEPHONE ENCOUNTER
Per [employer] transmission from pt's loop recorder received Monday, 10/9/2024:     Episode of Inappropriate sinus tachycardia on 10/7/2024 @ 11:40 AM.    Duration: 7 hrs, 24 min.   Mean V-rates: 123 bpm.     I spoke with the pt who denies awareness of elevated heart rate. Pt denies recent illness but reports she had elevated blood sugar levels on 10/7/2024 in the low 300s secondary to a temporary insulin pump issue. Pt states she was fatigued on Sunday, 10/8/2024 & didn't go to [facility].

## 2024-10-11 RX ORDER — METOPROLOL TARTRATE 100 MG
TABLET ORAL
Qty: 2 TABLET | Refills: 0 | Status: SHIPPED | OUTPATIENT
Start: 2024-10-11

## 2024-10-11 NOTE — TELEPHONE ENCOUNTER
Spoke with patient about recommendations. Instructed patient about metoprolol dosing for coronary CTA. Patient verbalizes understanding.

## 2024-10-22 ENCOUNTER — TELEPHONE (OUTPATIENT)
Facility: HOSPITAL | Age: 64
End: 2024-10-22
Payer: MEDICAID

## 2024-10-23 ENCOUNTER — TELEPHONE (OUTPATIENT)
Dept: CARDIOLOGY | Facility: CLINIC | Age: 64
End: 2024-10-23
Payer: MEDICAID

## 2024-10-23 ENCOUNTER — HOSPITAL ENCOUNTER (OUTPATIENT)
Facility: HOSPITAL | Age: 64
Discharge: HOME | End: 2024-10-23
Payer: MEDICAID

## 2024-10-23 ENCOUNTER — APPOINTMENT (OUTPATIENT)
Dept: CT IMAGING | Facility: HOSPITAL | Age: 64
End: 2024-10-23
Payer: MEDICAID

## 2024-10-23 VITALS
WEIGHT: 117.84 LBS | HEIGHT: 55 IN | TEMPERATURE: 98.4 F | DIASTOLIC BLOOD PRESSURE: 89 MMHG | RESPIRATION RATE: 16 BRPM | BODY MASS INDEX: 27.27 KG/M2 | OXYGEN SATURATION: 100 % | SYSTOLIC BLOOD PRESSURE: 119 MMHG | HEART RATE: 84 BPM

## 2024-10-23 DIAGNOSIS — R94.39 ABNORMAL STRESS TEST: ICD-10-CM

## 2024-10-23 DIAGNOSIS — R93.1 ABNORMAL FINDINGS ON DIAGNOSTIC IMAGING OF HEART AND CORONARY CIRCULATION: Primary | ICD-10-CM

## 2024-10-23 PROCEDURE — 25510000001 IOPAMIDOL PER 1 ML: Performed by: INTERNAL MEDICINE

## 2024-10-23 PROCEDURE — 75574 CT ANGIO HRT W/3D IMAGE: CPT | Performed by: INTERNAL MEDICINE

## 2024-10-23 PROCEDURE — 75574 CT ANGIO HRT W/3D IMAGE: CPT

## 2024-10-23 RX ORDER — METOPROLOL TARTRATE 25 MG/1
50 TABLET, FILM COATED ORAL
Status: DISCONTINUED | OUTPATIENT
Start: 2024-10-23 | End: 2024-10-24 | Stop reason: HOSPADM

## 2024-10-23 RX ORDER — NITROGLYCERIN 0.4 MG/1
0.8 TABLET SUBLINGUAL
Status: COMPLETED | OUTPATIENT
Start: 2024-10-23 | End: 2024-10-23

## 2024-10-23 RX ORDER — METOPROLOL TARTRATE 25 MG/1
25 TABLET, FILM COATED ORAL ONCE
Status: COMPLETED | OUTPATIENT
Start: 2024-10-23 | End: 2024-10-23

## 2024-10-23 RX ORDER — METOPROLOL TARTRATE 1 MG/ML
5 INJECTION, SOLUTION INTRAVENOUS
Status: DISCONTINUED | OUTPATIENT
Start: 2024-10-23 | End: 2024-10-24 | Stop reason: HOSPADM

## 2024-10-23 RX ORDER — NITROGLYCERIN 0.4 MG/1
0.4 TABLET SUBLINGUAL
Status: COMPLETED | OUTPATIENT
Start: 2024-10-23 | End: 2024-10-23

## 2024-10-23 RX ORDER — METOPROLOL TARTRATE 100 MG
200 TABLET ORAL ONCE
Status: COMPLETED | OUTPATIENT
Start: 2024-10-23 | End: 2024-10-23

## 2024-10-23 RX ORDER — METOPROLOL TARTRATE 25 MG/1
50 TABLET, FILM COATED ORAL ONCE
Status: COMPLETED | OUTPATIENT
Start: 2024-10-23 | End: 2024-10-23

## 2024-10-23 RX ORDER — IOPAMIDOL 755 MG/ML
100 INJECTION, SOLUTION INTRAVASCULAR
Status: COMPLETED | OUTPATIENT
Start: 2024-10-23 | End: 2024-10-23

## 2024-10-23 RX ORDER — METOPROLOL TARTRATE 100 MG
100 TABLET ORAL ONCE
Status: COMPLETED | OUTPATIENT
Start: 2024-10-23 | End: 2024-10-23

## 2024-10-23 RX ORDER — IVABRADINE 5 MG/1
15 TABLET, FILM COATED ORAL ONCE
Status: COMPLETED | OUTPATIENT
Start: 2024-10-23 | End: 2024-10-23

## 2024-10-23 RX ADMIN — IVABRADINE 15 MG: 5 TABLET, FILM COATED ORAL at 11:00

## 2024-10-23 RX ADMIN — METOPROLOL TARTRATE 5 MG: 5 INJECTION INTRAVENOUS at 11:50

## 2024-10-23 RX ADMIN — IOPAMIDOL 65 ML: 755 INJECTION, SOLUTION INTRAVENOUS at 12:17

## 2024-10-23 RX ADMIN — NITROGLYCERIN 0.8 MG: 0.4 TABLET SUBLINGUAL at 11:55

## 2024-10-23 RX ADMIN — METOPROLOL TARTRATE 5 MG: 5 INJECTION INTRAVENOUS at 11:52

## 2024-10-23 RX ADMIN — METOPROLOL TARTRATE 200 MG: 100 TABLET, FILM COATED ORAL at 10:05

## 2024-12-09 ENCOUNTER — PATIENT ROUNDING (BHMG ONLY) (OUTPATIENT)
Dept: URGENT CARE | Facility: CLINIC | Age: 64
End: 2024-12-09
Payer: MEDICAID

## 2025-03-20 ENCOUNTER — HOSPITAL ENCOUNTER (OUTPATIENT)
Facility: HOSPITAL | Age: 65
Setting detail: OBSERVATION
Discharge: HOME | End: 2025-03-22
Attending: EMERGENCY MEDICINE
Payer: MEDICAID

## 2025-03-20 ENCOUNTER — TELEPHONE (OUTPATIENT)
Dept: CARDIOLOGY | Facility: CLINIC | Age: 65
End: 2025-03-20
Payer: MEDICAID

## 2025-03-20 ENCOUNTER — APPOINTMENT (OUTPATIENT)
Dept: CT IMAGING | Facility: HOSPITAL | Age: 65
End: 2025-03-20
Payer: MEDICAID

## 2025-03-20 DIAGNOSIS — E86.0 DEHYDRATION: ICD-10-CM

## 2025-03-20 DIAGNOSIS — Z86.39 HISTORY OF INSULIN DEPENDENT DIABETES MELLITUS: ICD-10-CM

## 2025-03-20 DIAGNOSIS — R41.0 DISORIENTATION: ICD-10-CM

## 2025-03-20 DIAGNOSIS — E11.00 HYPEROSMOLAR HYPERGLYCEMIC STATE (HHS): Primary | ICD-10-CM

## 2025-03-20 LAB
ATMOSPHERIC PRESS: ABNORMAL MM[HG]
BACTERIA UR QL AUTO: ABNORMAL /HPF
BASE EXCESS BLDV CALC-SCNC: 3.2 MMOL/L (ref -2–2)
BASOPHILS # BLD AUTO: 0.02 10*3/MM3 (ref 0–0.2)
BASOPHILS NFR BLD AUTO: 0.3 % (ref 0–1.5)
BDY SITE: ABNORMAL
BILIRUB UR QL STRIP: NEGATIVE
BODY TEMPERATURE: 37
CLARITY UR: CLEAR
CO2 BLDA-SCNC: 29.9 MMOL/L (ref 22–33)
COHGB MFR BLD: 1 %
COLOR UR: YELLOW
D-LACTATE SERPL-SCNC: 1.9 MMOL/L (ref 0.5–2)
DEPRECATED RDW RBC AUTO: 39.7 FL (ref 37–54)
EOSINOPHIL # BLD AUTO: 0.09 10*3/MM3 (ref 0–0.4)
EOSINOPHIL NFR BLD AUTO: 1.2 % (ref 0.3–6.2)
EPAP: 0
ERYTHROCYTE [DISTWIDTH] IN BLOOD BY AUTOMATED COUNT: 12.2 % (ref 12.3–15.4)
GLUCOSE BLDC GLUCOMTR-MCNC: 485 MG/DL (ref 70–130)
GLUCOSE BLDC GLUCOMTR-MCNC: 488 MG/DL (ref 70–130)
GLUCOSE UR STRIP-MCNC: ABNORMAL MG/DL
HCO3 BLDV-SCNC: 28.5 MMOL/L (ref 22–28)
HCT VFR BLD AUTO: 40 % (ref 34–46.6)
HGB BLD-MCNC: 12.9 G/DL (ref 12–15.9)
HGB BLDA-MCNC: 13.4 G/DL (ref 14–18)
HGB UR QL STRIP.AUTO: ABNORMAL
HYALINE CASTS UR QL AUTO: ABNORMAL /LPF
IMM GRANULOCYTES # BLD AUTO: 0.02 10*3/MM3 (ref 0–0.05)
IMM GRANULOCYTES NFR BLD AUTO: 0.3 % (ref 0–0.5)
INHALED O2 CONCENTRATION: 21 %
IPAP: 0
KETONES UR QL STRIP: NEGATIVE
LEUKOCYTE ESTERASE UR QL STRIP.AUTO: NEGATIVE
LYMPHOCYTES # BLD AUTO: 1.91 10*3/MM3 (ref 0.7–3.1)
LYMPHOCYTES NFR BLD AUTO: 25.8 % (ref 19.6–45.3)
MCH RBC QN AUTO: 28.5 PG (ref 26.6–33)
MCHC RBC AUTO-ENTMCNC: 32.3 G/DL (ref 31.5–35.7)
MCV RBC AUTO: 88.5 FL (ref 79–97)
METHGB BLD QL: 0.2 %
MODALITY: ABNORMAL
MONOCYTES # BLD AUTO: 0.42 10*3/MM3 (ref 0.1–0.9)
MONOCYTES NFR BLD AUTO: 5.7 % (ref 5–12)
NEUTROPHILS NFR BLD AUTO: 4.95 10*3/MM3 (ref 1.7–7)
NEUTROPHILS NFR BLD AUTO: 66.7 % (ref 42.7–76)
NITRITE UR QL STRIP: NEGATIVE
NRBC BLD AUTO-RTO: 0 /100 WBC (ref 0–0.2)
OXYHGB MFR BLDV: 86.9 %
PAW @ PEAK INSP FLOW SETTING VENT: 0 CMH2O
PCO2 BLDV: 45.3 MM HG (ref 41–51)
PH BLDV: 7.41 PH UNITS (ref 7.31–7.41)
PH UR STRIP.AUTO: 7 [PH] (ref 5–8)
PLATELET # BLD AUTO: 166 10*3/MM3 (ref 140–450)
PMV BLD AUTO: 12.1 FL (ref 6–12)
PO2 BLDV: 55.9 MM HG (ref 27–53)
PROT UR QL STRIP: NEGATIVE
RBC # BLD AUTO: 4.52 10*6/MM3 (ref 3.77–5.28)
RBC # UR STRIP: ABNORMAL /HPF
REF LAB TEST METHOD: ABNORMAL
SP GR UR STRIP: 1.03 (ref 1–1.03)
SQUAMOUS #/AREA URNS HPF: ABNORMAL /HPF
TOTAL RATE: 0 BREATHS/MINUTE
UROBILINOGEN UR QL STRIP: ABNORMAL
WBC # UR STRIP: ABNORMAL /HPF
WBC NRBC COR # BLD AUTO: 7.41 10*3/MM3 (ref 3.4–10.8)

## 2025-03-20 PROCEDURE — 99285 EMERGENCY DEPT VISIT HI MDM: CPT

## 2025-03-20 PROCEDURE — 83605 ASSAY OF LACTIC ACID: CPT | Performed by: EMERGENCY MEDICINE

## 2025-03-20 PROCEDURE — 82077 ASSAY SPEC XCP UR&BREATH IA: CPT | Performed by: EMERGENCY MEDICINE

## 2025-03-20 PROCEDURE — 70450 CT HEAD/BRAIN W/O DYE: CPT

## 2025-03-20 PROCEDURE — 81001 URINALYSIS AUTO W/SCOPE: CPT | Performed by: EMERGENCY MEDICINE

## 2025-03-20 PROCEDURE — 84439 ASSAY OF FREE THYROXINE: CPT | Performed by: EMERGENCY MEDICINE

## 2025-03-20 PROCEDURE — 82010 KETONE BODYS QUAN: CPT | Performed by: EMERGENCY MEDICINE

## 2025-03-20 PROCEDURE — 25810000003 LACTATED RINGERS SOLUTION: Performed by: EMERGENCY MEDICINE

## 2025-03-20 PROCEDURE — 80053 COMPREHEN METABOLIC PANEL: CPT | Performed by: EMERGENCY MEDICINE

## 2025-03-20 PROCEDURE — 82805 BLOOD GASES W/O2 SATURATION: CPT

## 2025-03-20 PROCEDURE — 85025 COMPLETE CBC W/AUTO DIFF WBC: CPT | Performed by: EMERGENCY MEDICINE

## 2025-03-20 PROCEDURE — 83036 HEMOGLOBIN GLYCOSYLATED A1C: CPT | Performed by: NURSE PRACTITIONER

## 2025-03-20 PROCEDURE — 83735 ASSAY OF MAGNESIUM: CPT | Performed by: EMERGENCY MEDICINE

## 2025-03-20 PROCEDURE — 63710000001 INSULIN REGULAR HUMAN PER 5 UNITS: Performed by: EMERGENCY MEDICINE

## 2025-03-20 PROCEDURE — 84443 ASSAY THYROID STIM HORMONE: CPT | Performed by: EMERGENCY MEDICINE

## 2025-03-20 PROCEDURE — 36415 COLL VENOUS BLD VENIPUNCTURE: CPT

## 2025-03-20 PROCEDURE — 82948 REAGENT STRIP/BLOOD GLUCOSE: CPT

## 2025-03-20 RX ORDER — METOPROLOL SUCCINATE 50 MG/1
50 TABLET, EXTENDED RELEASE ORAL DAILY
Qty: 90 TABLET | Refills: 1 | Status: SHIPPED | OUTPATIENT
Start: 2025-03-20

## 2025-03-20 RX ORDER — IBUPROFEN 600 MG/1
1 TABLET ORAL
Status: DISCONTINUED | OUTPATIENT
Start: 2025-03-20 | End: 2025-03-21 | Stop reason: SDUPTHER

## 2025-03-20 RX ORDER — NICOTINE POLACRILEX 4 MG
15 LOZENGE BUCCAL
Status: DISCONTINUED | OUTPATIENT
Start: 2025-03-20 | End: 2025-03-21 | Stop reason: SDUPTHER

## 2025-03-20 RX ORDER — DEXTROSE MONOHYDRATE 25 G/50ML
25 INJECTION, SOLUTION INTRAVENOUS
Status: DISCONTINUED | OUTPATIENT
Start: 2025-03-20 | End: 2025-03-21 | Stop reason: SDUPTHER

## 2025-03-20 RX ORDER — SODIUM CHLORIDE 0.9 % (FLUSH) 0.9 %
10 SYRINGE (ML) INJECTION AS NEEDED
Status: DISCONTINUED | OUTPATIENT
Start: 2025-03-20 | End: 2025-03-22 | Stop reason: HOSPADM

## 2025-03-20 RX ADMIN — HUMAN INSULIN 14 UNITS: 100 INJECTION, SOLUTION SUBCUTANEOUS at 23:11

## 2025-03-20 RX ADMIN — SODIUM CHLORIDE, SODIUM LACTATE, POTASSIUM CHLORIDE, CALCIUM CHLORIDE 1000 ML: 20; 30; 600; 310 INJECTION, SOLUTION INTRAVENOUS at 23:12

## 2025-03-20 NOTE — TELEPHONE ENCOUNTER
----- Message from Rik Ruiz sent at 3/19/2025  4:24 PM EDT -----  Intermittent fast heart rates noted on loop recorder monitor.  Recommend starting Toprol-XL 50 mg p.o. daily.

## 2025-03-21 PROBLEM — R73.9 HYPERGLYCEMIA: Status: ACTIVE | Noted: 2025-03-21

## 2025-03-21 PROBLEM — T85.694A INSULIN PUMP MECHANICAL COMPLICATION: Status: ACTIVE | Noted: 2025-03-21

## 2025-03-21 PROBLEM — E11.65 HYPERGLYCEMIA DUE TO DIABETES MELLITUS: Status: ACTIVE | Noted: 2019-11-06

## 2025-03-21 LAB
ALBUMIN SERPL-MCNC: 4.1 G/DL (ref 3.5–5.2)
ALBUMIN/GLOB SERPL: 1.5 G/DL
ALP SERPL-CCNC: 217 U/L (ref 39–117)
ALT SERPL W P-5'-P-CCNC: 34 U/L (ref 1–33)
ANION GAP SERPL CALCULATED.3IONS-SCNC: 12 MMOL/L (ref 5–15)
ANION GAP SERPL CALCULATED.3IONS-SCNC: 9 MMOL/L (ref 5–15)
AST SERPL-CCNC: 26 U/L (ref 1–32)
B-OH-BUTYR SERPL-SCNC: 0.11 MMOL/L (ref 0.02–0.27)
BASOPHILS # BLD AUTO: 0.02 10*3/MM3 (ref 0–0.2)
BASOPHILS NFR BLD AUTO: 0.3 % (ref 0–1.5)
BILIRUB SERPL-MCNC: 0.2 MG/DL (ref 0–1.2)
BUN SERPL-MCNC: 14 MG/DL (ref 8–23)
BUN SERPL-MCNC: 18 MG/DL (ref 8–23)
BUN/CREAT SERPL: 31 (ref 7–25)
BUN/CREAT SERPL: 31.8 (ref 7–25)
CALCIUM SPEC-SCNC: 9.4 MG/DL (ref 8.6–10.5)
CALCIUM SPEC-SCNC: 9.5 MG/DL (ref 8.6–10.5)
CHLORIDE SERPL-SCNC: 101 MMOL/L (ref 98–107)
CHLORIDE SERPL-SCNC: 106 MMOL/L (ref 98–107)
CO2 SERPL-SCNC: 26 MMOL/L (ref 22–29)
CO2 SERPL-SCNC: 30 MMOL/L (ref 22–29)
CREAT SERPL-MCNC: 0.44 MG/DL (ref 0.57–1)
CREAT SERPL-MCNC: 0.58 MG/DL (ref 0.57–1)
DEPRECATED RDW RBC AUTO: 40 FL (ref 37–54)
EGFRCR SERPLBLD CKD-EPI 2021: 102.5 ML/MIN/1.73
EGFRCR SERPLBLD CKD-EPI 2021: 109.5 ML/MIN/1.73
EOSINOPHIL # BLD AUTO: 0.08 10*3/MM3 (ref 0–0.4)
EOSINOPHIL NFR BLD AUTO: 1.1 % (ref 0.3–6.2)
ERYTHROCYTE [DISTWIDTH] IN BLOOD BY AUTOMATED COUNT: 12.2 % (ref 12.3–15.4)
ETHANOL BLD-MCNC: <10 MG/DL (ref 0–10)
GLOBULIN UR ELPH-MCNC: 2.7 GM/DL
GLUCOSE BLDC GLUCOMTR-MCNC: 106 MG/DL (ref 70–130)
GLUCOSE BLDC GLUCOMTR-MCNC: 124 MG/DL (ref 70–130)
GLUCOSE BLDC GLUCOMTR-MCNC: 178 MG/DL (ref 70–130)
GLUCOSE BLDC GLUCOMTR-MCNC: 220 MG/DL (ref 70–130)
GLUCOSE BLDC GLUCOMTR-MCNC: 292 MG/DL (ref 70–130)
GLUCOSE BLDC GLUCOMTR-MCNC: 309 MG/DL (ref 70–130)
GLUCOSE BLDC GLUCOMTR-MCNC: 412 MG/DL (ref 70–130)
GLUCOSE SERPL-MCNC: 272 MG/DL (ref 65–99)
GLUCOSE SERPL-MCNC: 74 MG/DL (ref 65–99)
HBA1C MFR BLD: 9 % (ref 4.8–5.6)
HCT VFR BLD AUTO: 38.9 % (ref 34–46.6)
HGB BLD-MCNC: 12.5 G/DL (ref 12–15.9)
IMM GRANULOCYTES # BLD AUTO: 0.01 10*3/MM3 (ref 0–0.05)
IMM GRANULOCYTES NFR BLD AUTO: 0.1 % (ref 0–0.5)
LYMPHOCYTES # BLD AUTO: 1.41 10*3/MM3 (ref 0.7–3.1)
LYMPHOCYTES NFR BLD AUTO: 19.1 % (ref 19.6–45.3)
MAGNESIUM SERPL-MCNC: 2 MG/DL (ref 1.6–2.4)
MAGNESIUM SERPL-MCNC: 2 MG/DL (ref 1.6–2.4)
MCH RBC QN AUTO: 28.6 PG (ref 26.6–33)
MCHC RBC AUTO-ENTMCNC: 32.1 G/DL (ref 31.5–35.7)
MCV RBC AUTO: 89 FL (ref 79–97)
MONOCYTES # BLD AUTO: 0.56 10*3/MM3 (ref 0.1–0.9)
MONOCYTES NFR BLD AUTO: 7.6 % (ref 5–12)
NEUTROPHILS NFR BLD AUTO: 5.32 10*3/MM3 (ref 1.7–7)
NEUTROPHILS NFR BLD AUTO: 71.8 % (ref 42.7–76)
NRBC BLD AUTO-RTO: 0 /100 WBC (ref 0–0.2)
OSMOLALITY SERPL: 296 MOSM/KG (ref 275–295)
PLATELET # BLD AUTO: 155 10*3/MM3 (ref 140–450)
PMV BLD AUTO: 11.5 FL (ref 6–12)
POTASSIUM SERPL-SCNC: 3.9 MMOL/L (ref 3.5–5.2)
POTASSIUM SERPL-SCNC: 3.9 MMOL/L (ref 3.5–5.2)
PROT SERPL-MCNC: 6.8 G/DL (ref 6–8.5)
RBC # BLD AUTO: 4.37 10*6/MM3 (ref 3.77–5.28)
SODIUM SERPL-SCNC: 139 MMOL/L (ref 136–145)
SODIUM SERPL-SCNC: 145 MMOL/L (ref 136–145)
T4 FREE SERPL-MCNC: 1.16 NG/DL (ref 0.92–1.68)
TSH SERPL DL<=0.05 MIU/L-ACNC: 1.75 UIU/ML (ref 0.27–4.2)
WBC NRBC COR # BLD AUTO: 7.4 10*3/MM3 (ref 3.4–10.8)

## 2025-03-21 PROCEDURE — 82948 REAGENT STRIP/BLOOD GLUCOSE: CPT

## 2025-03-21 PROCEDURE — 99221 1ST HOSP IP/OBS SF/LOW 40: CPT | Performed by: NURSE PRACTITIONER

## 2025-03-21 PROCEDURE — 83930 ASSAY OF BLOOD OSMOLALITY: CPT | Performed by: NURSE PRACTITIONER

## 2025-03-21 PROCEDURE — 80048 BASIC METABOLIC PNL TOTAL CA: CPT | Performed by: NURSE PRACTITIONER

## 2025-03-21 PROCEDURE — 63710000001 INSULIN GLARGINE PER 5 UNITS: Performed by: NURSE PRACTITIONER

## 2025-03-21 PROCEDURE — G0378 HOSPITAL OBSERVATION PER HR: HCPCS

## 2025-03-21 PROCEDURE — 96361 HYDRATE IV INFUSION ADD-ON: CPT

## 2025-03-21 PROCEDURE — 25810000003 LACTATED RINGERS PER 1000 ML: Performed by: NURSE PRACTITIONER

## 2025-03-21 PROCEDURE — 85025 COMPLETE CBC W/AUTO DIFF WBC: CPT | Performed by: NURSE PRACTITIONER

## 2025-03-21 PROCEDURE — 63710000001 INSULIN LISPRO (HUMAN) PER 5 UNITS: Performed by: NURSE PRACTITIONER

## 2025-03-21 PROCEDURE — 63710000001 INSULIN LISPRO (HUMAN) PER 5 UNITS

## 2025-03-21 PROCEDURE — 99239 HOSP IP/OBS DSCHRG MGMT >30: CPT

## 2025-03-21 PROCEDURE — 83735 ASSAY OF MAGNESIUM: CPT | Performed by: NURSE PRACTITIONER

## 2025-03-21 PROCEDURE — 96360 HYDRATION IV INFUSION INIT: CPT

## 2025-03-21 RX ORDER — CLOTRIMAZOLE 1 G/ML
SOLUTION TOPICAL
COMMUNITY
Start: 2025-03-06

## 2025-03-21 RX ORDER — METOPROLOL SUCCINATE 50 MG/1
50 TABLET, EXTENDED RELEASE ORAL DAILY
Status: DISCONTINUED | OUTPATIENT
Start: 2025-03-21 | End: 2025-03-22 | Stop reason: HOSPADM

## 2025-03-21 RX ORDER — AMOXICILLIN 250 MG
2 CAPSULE ORAL 2 TIMES DAILY PRN
Status: DISCONTINUED | OUTPATIENT
Start: 2025-03-21 | End: 2025-03-22 | Stop reason: HOSPADM

## 2025-03-21 RX ORDER — LATANOPROST 50 UG/ML
1 SOLUTION/ DROPS OPHTHALMIC NIGHTLY
Status: DISCONTINUED | OUTPATIENT
Start: 2025-03-21 | End: 2025-03-22 | Stop reason: HOSPADM

## 2025-03-21 RX ORDER — ATORVASTATIN CALCIUM 20 MG/1
20 TABLET, FILM COATED ORAL NIGHTLY
COMMUNITY
Start: 2024-12-27

## 2025-03-21 RX ORDER — NITROGLYCERIN 0.4 MG/1
0.4 TABLET SUBLINGUAL
Status: DISCONTINUED | OUTPATIENT
Start: 2025-03-21 | End: 2025-03-22 | Stop reason: HOSPADM

## 2025-03-21 RX ORDER — CLOBETASOL PROPIONATE 0.5 MG/ML
SOLUTION TOPICAL
COMMUNITY
Start: 2025-03-05

## 2025-03-21 RX ORDER — NICOTINE POLACRILEX 4 MG
15 LOZENGE BUCCAL
Status: DISCONTINUED | OUTPATIENT
Start: 2025-03-21 | End: 2025-03-22 | Stop reason: HOSPADM

## 2025-03-21 RX ORDER — BISACODYL 10 MG
10 SUPPOSITORY, RECTAL RECTAL DAILY PRN
Status: DISCONTINUED | OUTPATIENT
Start: 2025-03-21 | End: 2025-03-22 | Stop reason: HOSPADM

## 2025-03-21 RX ORDER — ACETAMINOPHEN 160 MG/5ML
650 SOLUTION ORAL EVERY 4 HOURS PRN
Status: DISCONTINUED | OUTPATIENT
Start: 2025-03-21 | End: 2025-03-22 | Stop reason: HOSPADM

## 2025-03-21 RX ORDER — BISACODYL 5 MG/1
5 TABLET, DELAYED RELEASE ORAL DAILY PRN
Status: DISCONTINUED | OUTPATIENT
Start: 2025-03-21 | End: 2025-03-22 | Stop reason: HOSPADM

## 2025-03-21 RX ORDER — ONDANSETRON 4 MG/1
4 TABLET, ORALLY DISINTEGRATING ORAL EVERY 6 HOURS PRN
Status: DISCONTINUED | OUTPATIENT
Start: 2025-03-21 | End: 2025-03-22 | Stop reason: HOSPADM

## 2025-03-21 RX ORDER — ACETAMINOPHEN 325 MG/1
650 TABLET ORAL EVERY 4 HOURS PRN
Status: DISCONTINUED | OUTPATIENT
Start: 2025-03-21 | End: 2025-03-22 | Stop reason: HOSPADM

## 2025-03-21 RX ORDER — ONDANSETRON 2 MG/ML
4 INJECTION INTRAMUSCULAR; INTRAVENOUS EVERY 6 HOURS PRN
Status: DISCONTINUED | OUTPATIENT
Start: 2025-03-21 | End: 2025-03-22 | Stop reason: HOSPADM

## 2025-03-21 RX ORDER — SODIUM CHLORIDE 9 MG/ML
40 INJECTION, SOLUTION INTRAVENOUS AS NEEDED
Status: DISCONTINUED | OUTPATIENT
Start: 2025-03-21 | End: 2025-03-22 | Stop reason: HOSPADM

## 2025-03-21 RX ORDER — INSULIN LISPRO 100 [IU]/ML
2-9 INJECTION, SOLUTION INTRAVENOUS; SUBCUTANEOUS
Status: DISCONTINUED | OUTPATIENT
Start: 2025-03-21 | End: 2025-03-21

## 2025-03-21 RX ORDER — POLYETHYLENE GLYCOL 3350 17 G/17G
17 POWDER, FOR SOLUTION ORAL DAILY PRN
Status: DISCONTINUED | OUTPATIENT
Start: 2025-03-21 | End: 2025-03-22 | Stop reason: HOSPADM

## 2025-03-21 RX ORDER — ATORVASTATIN CALCIUM 20 MG/1
20 TABLET, FILM COATED ORAL NIGHTLY
Status: DISCONTINUED | OUTPATIENT
Start: 2025-03-21 | End: 2025-03-22 | Stop reason: HOSPADM

## 2025-03-21 RX ORDER — GABAPENTIN 100 MG/1
100 CAPSULE ORAL 3 TIMES DAILY
Status: DISCONTINUED | OUTPATIENT
Start: 2025-03-21 | End: 2025-03-22 | Stop reason: HOSPADM

## 2025-03-21 RX ORDER — DEXTROSE MONOHYDRATE 25 G/50ML
25 INJECTION, SOLUTION INTRAVENOUS
Status: DISCONTINUED | OUTPATIENT
Start: 2025-03-21 | End: 2025-03-22 | Stop reason: HOSPADM

## 2025-03-21 RX ORDER — LOSARTAN POTASSIUM 50 MG/1
50 TABLET ORAL DAILY
Status: DISCONTINUED | OUTPATIENT
Start: 2025-03-21 | End: 2025-03-22 | Stop reason: HOSPADM

## 2025-03-21 RX ORDER — PANTOPRAZOLE SODIUM 40 MG/1
40 TABLET, DELAYED RELEASE ORAL DAILY
Status: DISCONTINUED | OUTPATIENT
Start: 2025-03-21 | End: 2025-03-22 | Stop reason: HOSPADM

## 2025-03-21 RX ORDER — SODIUM CHLORIDE 0.9 % (FLUSH) 0.9 %
10 SYRINGE (ML) INJECTION AS NEEDED
Status: DISCONTINUED | OUTPATIENT
Start: 2025-03-21 | End: 2025-03-22 | Stop reason: HOSPADM

## 2025-03-21 RX ORDER — SODIUM CHLORIDE 0.9 % (FLUSH) 0.9 %
10 SYRINGE (ML) INJECTION EVERY 12 HOURS SCHEDULED
Status: DISCONTINUED | OUTPATIENT
Start: 2025-03-21 | End: 2025-03-22 | Stop reason: HOSPADM

## 2025-03-21 RX ORDER — ACETAMINOPHEN 650 MG/1
650 SUPPOSITORY RECTAL EVERY 4 HOURS PRN
Status: DISCONTINUED | OUTPATIENT
Start: 2025-03-21 | End: 2025-03-22 | Stop reason: HOSPADM

## 2025-03-21 RX ORDER — INSULIN LISPRO 100 [IU]/ML
2-7 INJECTION, SOLUTION INTRAVENOUS; SUBCUTANEOUS
Status: DISCONTINUED | OUTPATIENT
Start: 2025-03-21 | End: 2025-03-22 | Stop reason: HOSPADM

## 2025-03-21 RX ORDER — TIMOLOL MALEATE 5 MG/ML
1 SOLUTION/ DROPS OPHTHALMIC DAILY
Status: DISCONTINUED | OUTPATIENT
Start: 2025-03-21 | End: 2025-03-22 | Stop reason: HOSPADM

## 2025-03-21 RX ORDER — ASPIRIN 325 MG
325 TABLET, DELAYED RELEASE (ENTERIC COATED) ORAL DAILY
Status: DISCONTINUED | OUTPATIENT
Start: 2025-03-21 | End: 2025-03-22 | Stop reason: HOSPADM

## 2025-03-21 RX ORDER — SODIUM CHLORIDE, SODIUM LACTATE, POTASSIUM CHLORIDE, CALCIUM CHLORIDE 600; 310; 30; 20 MG/100ML; MG/100ML; MG/100ML; MG/100ML
100 INJECTION, SOLUTION INTRAVENOUS CONTINUOUS
Status: ACTIVE | OUTPATIENT
Start: 2025-03-21 | End: 2025-03-21

## 2025-03-21 RX ORDER — OXYBUTYNIN CHLORIDE 5 MG/1
10 TABLET, EXTENDED RELEASE ORAL DAILY
Status: DISCONTINUED | OUTPATIENT
Start: 2025-03-21 | End: 2025-03-22 | Stop reason: HOSPADM

## 2025-03-21 RX ORDER — MUPIROCIN 20 MG/G
OINTMENT TOPICAL
COMMUNITY
Start: 2025-03-05

## 2025-03-21 RX ORDER — TIMOLOL MALEATE 5 MG/ML
1 SOLUTION/ DROPS OPHTHALMIC DAILY
COMMUNITY
Start: 2025-02-27

## 2025-03-21 RX ORDER — DULOXETIN HYDROCHLORIDE 30 MG/1
30 CAPSULE, DELAYED RELEASE ORAL DAILY
Status: DISCONTINUED | OUTPATIENT
Start: 2025-03-21 | End: 2025-03-22 | Stop reason: HOSPADM

## 2025-03-21 RX ORDER — MELOXICAM 15 MG/1
15 TABLET ORAL DAILY
Status: DISCONTINUED | OUTPATIENT
Start: 2025-03-21 | End: 2025-03-22 | Stop reason: HOSPADM

## 2025-03-21 RX ORDER — IBUPROFEN 600 MG/1
1 TABLET ORAL
Status: DISCONTINUED | OUTPATIENT
Start: 2025-03-21 | End: 2025-03-22 | Stop reason: HOSPADM

## 2025-03-21 RX ADMIN — PANTOPRAZOLE SODIUM 40 MG: 40 TABLET, DELAYED RELEASE ORAL at 09:47

## 2025-03-21 RX ADMIN — GABAPENTIN 100 MG: 100 CAPSULE ORAL at 17:16

## 2025-03-21 RX ADMIN — LOSARTAN POTASSIUM 50 MG: 50 TABLET, FILM COATED ORAL at 09:47

## 2025-03-21 RX ADMIN — ATORVASTATIN CALCIUM 20 MG: 20 TABLET, FILM COATED ORAL at 21:55

## 2025-03-21 RX ADMIN — DULOXETINE HYDROCHLORIDE 30 MG: 30 CAPSULE, DELAYED RELEASE ORAL at 09:54

## 2025-03-21 RX ADMIN — LATANOPROST 1 DROP: 50 SOLUTION OPHTHALMIC at 21:56

## 2025-03-21 RX ADMIN — OXYBUTYNIN CHLORIDE 10 MG: 5 TABLET, EXTENDED RELEASE ORAL at 09:47

## 2025-03-21 RX ADMIN — ASPIRIN 325 MG: 325 TABLET, COATED ORAL at 09:47

## 2025-03-21 RX ADMIN — MELOXICAM 15 MG: 15 TABLET ORAL at 10:55

## 2025-03-21 RX ADMIN — GABAPENTIN 100 MG: 100 CAPSULE ORAL at 21:55

## 2025-03-21 RX ADMIN — Medication 10 ML: at 09:50

## 2025-03-21 RX ADMIN — GABAPENTIN 100 MG: 100 CAPSULE ORAL at 10:55

## 2025-03-21 RX ADMIN — METOPROLOL SUCCINATE 50 MG: 50 TABLET, EXTENDED RELEASE ORAL at 09:47

## 2025-03-21 RX ADMIN — SODIUM CHLORIDE, SODIUM LACTATE, POTASSIUM CHLORIDE, CALCIUM CHLORIDE 100 ML/HR: 20; 30; 600; 310 INJECTION, SOLUTION INTRAVENOUS at 03:19

## 2025-03-21 RX ADMIN — INSULIN LISPRO 7 UNITS: 100 INJECTION, SOLUTION INTRAVENOUS; SUBCUTANEOUS at 17:16

## 2025-03-21 RX ADMIN — INSULIN LISPRO 7 UNITS: 100 INJECTION, SOLUTION INTRAVENOUS; SUBCUTANEOUS at 11:31

## 2025-03-21 RX ADMIN — INSULIN GLARGINE 5 UNITS: 100 INJECTION, SOLUTION SUBCUTANEOUS at 09:47

## 2025-03-21 NOTE — CONSULTS
Diabetes Education    Patient Name:  Emily Grove  YOB: 1960  MRN: [id number]  Admit Date:  3/20/2025        Consult received for diabetes education. Chart reviewed. A1c is 9.0%. Patient is seen by Endocrinology at [facility]. She wears an Omnipod and Dexcom G6.     She shared her pump/sensor problems from this week. At times she was confused between the two devices. I reviewed her Omnipod controller. Looking at history and notifications, everything was working on Sunday. The sensor stopped working on Monday. She said she placed a new sensor but it would not connect. She had removed her Dexcom and did not have any of her supplies with her. It looks like she did stick her finger, count carbohydrates and enter into controller and bolus per Omnipod. She entered a repeat fingerstick but it appears her blood sugars did not decrease despite repeat bolus. I reminded her that when this happens, change out her pod. Explained that sometimes pods do not work, so when that happens she will need to stop the pod and start a new pod. Then she can call [facility] for replacement pod or trouble shooting.     Reviewed her back up plan when she cannot use her pump. She has syringes but could not tell me how much insulin to take. She does have a glucometer and supplies with her.     Patient stated she feels comfortable with counting carbohydrates.     Reviewed pump settings.   Basal:   12a-3a= 1.5 units/hr  3a-7a= 1.3 units/hr  7a-11a= 0.8 units/hr  11a-230p= 2.9 units/hr  230p-12a= 3.2 units/hr    Bolus  Carb Ratio 1:6  Correction/Sensitivity=15  Target 110  Correction above 120    Her February Endocrinology note showed 70 units for her TDD.     Patient has an appointment scheduled next Thursday with Endocrinology. She plans to call for a sooner appointment.     She plans to take insulin injections and wait to resume her pump and Dexcom until her Endocrinology appointment. She could not recall how much  insulin to take     Electronically signed by:  Chaparrita Sharma RN  03/21/25 12:31 EDT

## 2025-03-21 NOTE — PLAN OF CARE
Problem: Adult Inpatient Plan of Care  Goal: Plan of Care Review  Outcome: Progressing  Goal: Patient-Specific Goal (Individualized)  Outcome: Progressing  Goal: Absence of Hospital-Acquired Illness or Injury  Outcome: Progressing  Intervention: Identify and Manage Fall Risk  Recent Flowsheet Documentation  Taken 3/21/2025 1800 by Cynthia Vela RN  Safety Promotion/Fall Prevention:   activity supervised   assistive device/personal items within reach   clutter free environment maintained   fall prevention program maintained   nonskid shoes/slippers when out of bed   safety round/check completed  Taken 3/21/2025 1600 by Cynthia Vela RN  Safety Promotion/Fall Prevention:   activity supervised   assistive device/personal items within reach   clutter free environment maintained   fall prevention program maintained   nonskid shoes/slippers when out of bed   safety round/check completed  Taken 3/21/2025 1400 by Cynthia Vela RN  Safety Promotion/Fall Prevention:   activity supervised   assistive device/personal items within reach   clutter free environment maintained   fall prevention program maintained   nonskid shoes/slippers when out of bed   safety round/check completed  Taken 3/21/2025 1200 by Cynthia Vela RN  Safety Promotion/Fall Prevention:   activity supervised   assistive device/personal items within reach   clutter free environment maintained   fall prevention program maintained   nonskid shoes/slippers when out of bed   safety round/check completed  Taken 3/21/2025 1000 by Cynhtia Vela RN  Safety Promotion/Fall Prevention:   activity supervised   assistive device/personal items within reach   clutter free environment maintained   fall prevention program maintained   nonskid shoes/slippers when out of bed   safety round/check completed  Taken 3/21/2025 0900 by Cynthia Vela RN  Safety Promotion/Fall Prevention:   assistive device/personal items within reach   activity supervised   clutter free environment  maintained   fall prevention program maintained   nonskid shoes/slippers when out of bed   safety round/check completed  Intervention: Prevent Skin Injury  Recent Flowsheet Documentation  Taken 3/21/2025 1800 by Cynthia Vela RN  Body Position: position changed independently  Skin Protection:   incontinence pads utilized   transparent dressing maintained  Taken 3/21/2025 1600 by Cynthia Vela RN  Body Position: position changed independently  Taken 3/21/2025 1400 by Cynthia Vela RN  Body Position: position changed independently  Taken 3/21/2025 1200 by Cynthia Vela RN  Body Position: position changed independently  Taken 3/21/2025 1000 by Cynthia Vela RN  Body Position: position changed independently  Taken 3/21/2025 0900 by Cynthia Vela RN  Body Position: position changed independently  Skin Protection:   incontinence pads utilized   transparent dressing maintained  Intervention: Prevent Infection  Recent Flowsheet Documentation  Taken 3/21/2025 1800 by Cynthia Vela RN  Infection Prevention:   environmental surveillance performed   equipment surfaces disinfected   hand hygiene promoted   personal protective equipment utilized   rest/sleep promoted   visitors restricted/screened   single patient room provided  Taken 3/21/2025 1600 by Cynthia Vela RN  Infection Prevention:   environmental surveillance performed   equipment surfaces disinfected   hand hygiene promoted   personal protective equipment utilized   rest/sleep promoted   single patient room provided   visitors restricted/screened  Taken 3/21/2025 1400 by Cynthia Vela RN  Infection Prevention:   environmental surveillance performed   equipment surfaces disinfected   hand hygiene promoted   personal protective equipment utilized   rest/sleep promoted   single patient room provided   visitors restricted/screened  Taken 3/21/2025 1200 by Cynthia Vela RN  Infection Prevention:   environmental surveillance performed   equipment surfaces disinfected   hand  hygiene promoted   personal protective equipment utilized   rest/sleep promoted   single patient room provided   visitors restricted/screened  Taken 3/21/2025 1000 by Cynthia Vela RN  Infection Prevention:   environmental surveillance performed   equipment surfaces disinfected   hand hygiene promoted   personal protective equipment utilized   rest/sleep promoted   single patient room provided   visitors restricted/screened  Taken 3/21/2025 0900 by Cynthia Vela RN  Infection Prevention:   equipment surfaces disinfected   hand hygiene promoted   personal protective equipment utilized   rest/sleep promoted   single patient room provided   visitors restricted/screened   environmental surveillance performed  Goal: Optimal Comfort and Wellbeing  Outcome: Progressing  Intervention: Provide Person-Centered Care  Recent Flowsheet Documentation  Taken 3/21/2025 0900 by Cynthia Vela RN  Trust Relationship/Rapport:   care explained   choices provided   emotional support provided   empathic listening provided   questions answered   questions encouraged   reassurance provided   thoughts/feelings acknowledged  Goal: Readiness for Transition of Care  Outcome: Progressing  Intervention: Mutually Develop Transition Plan  Recent Flowsheet Documentation  Taken 3/21/2025 0900 by Cynthia Vela RN  Equipment Currently Used at Home: none  Transportation Anticipated: family or friend will provide  Patient/Family Anticipated Services at Transition: none  Patient/Family Anticipates Transition to: home with family     Problem: Comorbidity Management  Goal: Blood Glucose Level Within Target Range  Outcome: Progressing  Intervention: Monitor and Manage Glycemia  Recent Flowsheet Documentation  Taken 3/21/2025 1800 by Cynthia Vela RN  Medication Review/Management: medications reviewed  Taken 3/21/2025 1400 by Cynthia Vela RN  Medication Review/Management: medications reviewed  Taken 3/21/2025 1200 by Cynthia Vela RN  Medication  Review/Management: medications reviewed  Taken 3/21/2025 1000 by Cynthia Vela RN  Medication Review/Management: medications reviewed  Taken 3/21/2025 0900 by Cynthia Vela RN  Medication Review/Management: medications reviewed  Goal: Blood Pressure in Desired Range  Outcome: Progressing  Intervention: Maintain Blood Pressure Management  Recent Flowsheet Documentation  Taken 3/21/2025 1800 by Cynthia Vela RN  Medication Review/Management: medications reviewed  Taken 3/21/2025 1400 by Cynthia Vela RN  Medication Review/Management: medications reviewed  Taken 3/21/2025 1200 by Cynthia Vela RN  Medication Review/Management: medications reviewed  Taken 3/21/2025 1000 by Cynthia Vela RN  Medication Review/Management: medications reviewed  Taken 3/21/2025 0900 by Cynthia Vela RN  Medication Review/Management: medications reviewed   Goal Outcome Evaluation:  Patient arrived to floor at 0900. Admitted for hyperglycemia due to pump malfunction. Blood glucose 485 on arrival to ED. Diabetes educator consulted and educated to patient and patient's  how to use insulin pump correctly. New insulin pump placed on patient's L lower abd at 1600 by diabetes educator. Patient to stay overnight for observation and ensure pump is working correctly. Plan to d/c tomorrow. Monitoring blood glucose ACHS. VSS. RA. NSR. No c/o pain. No complaints at this time. Call bell within reach.

## 2025-03-21 NOTE — CONSULTS
Diabetes Education    Patient Name:  Emily Grove  YOB: 1960  MRN: [id number]  Admit Date:  3/20/2025        Per diabetes ed request, Diabetes educator rec'd call from charge nurse around 10:30 am today to advise that pt being discharged today and blood glucose levels had trended down overnight and were stable. Advised that we will provide telephone follow-up on Monday with pt.       Electronically signed by:  Samantha Villatoro RN  03/22/25 16:07 EDT

## 2025-03-21 NOTE — CONSULTS
Diabetes Education    Patient Name:  Emily Grove  YOB: 1960  MRN: [id number]  Admit Date:  3/20/2025        Diabetes education continuing to follow for insulin pump needs. Met with patient in room earlier today and spoke with [facility]. Discussed patient's recent pump issue and admission to hospital. After discussion, patient unable to correctly explain how to her bolus insulin using a carb ratio. She has syringes but would need a simple written plan for insulin administration.   [facility] preferred patient to resume pump if working correctly. They will also supply a back up plan to patient for when pump is not working (Lantus 40 units daily, Humalog 5 units plus correction at meals -1 unit for every 20 over 150).    Patient was able to call and have family bring her insulin pump supplies. After discussion with clinical staff and educators, it was agreed upon that patient should stay overnight with observation of her blood sugar and pump given recent difficulties wih the devices.  Patient was able to start a new Dexcom sensor. She needed some assistance with the devices. Her ismael had a different transmitter number than the one she was using. The correct transmitter number was entered into the omnipod controller and ismael. She was able to fill and start a new Omnipod pod independently. She needed some assistance connecting the devices. Encouraged use of automode.   Dexcom was in warm up phase for 2 hours.  She would benefit from further education in the outpatient setting on problem solving with her insulin pump. Her  is interested in education as well. She has worn the device for several years, uncertain why she has had more difficulty recently with the devices.   At 7pm her  Omnipod and Dexcom were connected. Dexcom reading was 384. Patient had administered a correction 17 unit bolus at 630 pm (RN covered dinner prior to pump -7 units). At 8 pm her Dexcom was 291 and glucometer was 292.    Updated MD that the pump and Dexcm were connected and she was administering insulin. RN updated and plans to check BG during the night and a needed. Patient did not have further questions at this time.   Patient to follow up with Endocrinology at  on Thursday.       Electronically signed by:  Chaparrita Sharma RN  03/21/25 19:51 EDT

## 2025-03-21 NOTE — H&P
"    [facility] Medicine Services  HISTORY AND PHYSICAL    Patient Name: Emily Grove  : 1960  MRN: [id number]  Primary Care Physician: Marie Morales MD  Date of admission: 3/20/2025    Subjective   Subjective     Chief Complaint:  High blood sugar, insulin pump malfunction    HPI:  Emily Grove is a 64 y.o. 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 She called her endocrine office and was told to give manual bolus doses until she could be seen later this week.     On arrival to the ED, glucose 485; now after fluids and insulin glucose is 178. A1c 9.00 tonight, previously 8.5 2025. She is also taking Ozempic, states she takes her shots on Monday's but did not take this weeks shot and per her significant other at bedside she has \"lots of extra\" pens in the fridg. Per endocrine note 2025 planned to stop Ozempic and start Mounjaro (hoping for better blood sugar control and decreased appetite) - this has not yet been approved by insurance.     She denies any other acute complaints. No nausea, vomiting; no decreased appetite or weight loss. Vitals are stable. She will be admitted to hospital medicine for further management.    Review of Systems   Neurological:  Positive for speech difficulty (chronic aphasia) and weakness (chronic s/p CVA).   All other systems reviewed and are negative.           Personal History     Past Medical History:   Diagnosis Date    Acid reflux     CVA (cerebral vascular accident) 2024    Diabetes mellitus     Hyperlipidemia     Hypertension     Overactive bladder     Stroke  "             Past Surgical History:   Procedure Laterality Date    BLADDER SURGERY      GALL BLADDER    CARPAL TUNNEL RELEASE      FINGER FUSION      FOOT SURGERY      TONSILLECTOMY         Family History:   family history includes COPD in her father; Diabetes in her brother, maternal grandfather, mother, and paternal grandmother; Heart attack in her brother; Heart failure in her paternal grandmother; Hypertension in her sister; No Known Problems in her maternal grandmother and paternal grandfather; Parkinsonism in her brother; Stroke in her father, maternal grandfather, and mother.     Social History:  reports that she has never smoked. She has never been exposed to tobacco smoke. She has never used smokeless tobacco. She reports that she does not drink alcohol and does not use drugs.  Social History     Social History Narrative    CAFFEINE 0       Medications:  DULoxetine, Dexcom G6 Sensor, Dexcom G6 Transmitter, Mirabegron ER, Omnipod 5 [id number] Pods Gen 5, Semaglutide(0.25 or 0.5MG/DOS), aspirin, atorvastatin, clobetasol, clotrimazole, gabapentin, latanoprost, losartan, meloxicam, metoprolol succinate XL, mupirocin, pantoprazole, and timolol    Allergies   Allergen Reactions    Sulfa Antibiotics Anaphylaxis       Objective   Objective     Vital Signs:   Temp:  [97.8 °F (36.6 °C)] 97.8 °F (36.6 °C)  Heart Rate:  [88] 88  Resp:  [18] 18  BP: (145)/(96) 145/96    Physical Exam  Vitals reviewed.   Constitutional:       General: She is not in acute distress.     Appearance: She is well-developed.   HENT:      Head: Normocephalic and atraumatic.      Nose: Nose normal.      Mouth/Throat:      Pharynx: Oropharynx is clear.   Eyes:      Extraocular Movements: Extraocular movements intact.      Conjunctiva/sclera: Conjunctivae normal.      Pupils: Pupils are equal, round, and reactive to light.   Cardiovascular:      Rate and Rhythm: Normal rate and regular rhythm.      Pulses: Normal pulses.      Heart sounds: Normal  heart sounds. No murmur heard.  Pulmonary:      Effort: Pulmonary effort is normal.      Breath sounds: Normal breath sounds.   Abdominal:      General: Bowel sounds are normal. There is no distension.      Palpations: Abdomen is soft.      Tenderness: There is no abdominal tenderness.   Musculoskeletal:         General: No swelling. Normal range of motion.      Cervical back: Normal range of motion and neck supple.   Skin:     General: Skin is warm and dry.      Capillary Refill: Capillary refill takes less than 2 seconds.      Findings: No rash.   Neurological:      Mental Status: She is alert and oriented to person, place, and time.      Cranial Nerves: No cranial nerve deficit.   Psychiatric:         Mood and Affect: Mood normal.         Behavior: Behavior normal.             Result Review:  I have personally reviewed the results from the time of this admission to 3/21/2025 02:21 EDT and agree with these findings:  [x]  Laboratory list / accordion  []  Microbiology  []  Radiology  []  EKG/Telemetry   []  Cardiology/Vascular   []  Pathology  []  Old records  []  Other:  Most notable findings include:      LAB RESULTS:      Lab 03/20/25 2258   WBC 7.41   HEMOGLOBIN 12.9   HEMATOCRIT 40.0   PLATELETS 166   NEUTROS ABS 4.95   IMMATURE GRANS (ABS) 0.02   LYMPHS ABS 1.91   MONOS ABS 0.42   EOS ABS 0.09   MCV 88.5   LACTATE 1.9         Lab 03/20/25  2341 03/20/25  2258   SODIUM 139  --    POTASSIUM 3.9  --    CHLORIDE 101  --    CO2 26.0  --    ANION GAP 12.0  --    BUN 18  --    CREATININE 0.58  --    EGFR 102.5  --    GLUCOSE 272*  --    CALCIUM 9.5  --    MAGNESIUM 2.0  --    HEMOGLOBIN A1C  --  9.00*   TSH 1.750  --          Lab 03/20/25  2341   TOTAL PROTEIN 6.8   ALBUMIN 4.1   GLOBULIN 2.7   ALT (SGPT) 34*   AST (SGOT) 26   BILIRUBIN 0.2   ALK PHOS 217*                     Lab 03/20/25  2309   FIO2 21   CARBOXYHEMOGLOBIN (VENOUS) 1.0     Brief Urine Lab Results  (Last result in the past 365 days)        Color    Clarity   Blood   Leuk Est   Nitrite   Protein   CREAT   Urine HCG        03/20/25 2322 Yellow   Clear   Trace   Negative   Negative   Negative                 Microbiology Results (last 10 days)       ** No results found for the last 240 hours. **            CT Head Without Contrast  Result Date: 3/20/2025  CT HEAD WO CONTRAST Date of Exam: 3/20/2025 10:40 PM EDT Indication: AMS. Comparison: 1/15/2024 Technique: Axial CT images were obtained of the head without contrast administration.  Automated exposure control and iterative construction methods were used. Findings: No acute large territorial infarct. Known old/chronic left temporoparietal infarct. There is no evidence of hemorrhage. No mass effect, edema or midline shift Mild periventricular and subcortical white matter hypodensities, nonspecific but most likely represents chronic small vessel ischemic changes. No extra-axial fluid collection. The ventricles are normal in size and configuration. The visualized orbits are unremarkable. The visualized paranasal sinuses and mastoid air cells are clear. The visualized soft tissues are unremarkable. No acute osseous abnormality.     Impression: Impression: 1.No acute intracranial abnormality identified. 2.Known old/chronic left temporoparietal infarct. 3.Mild chronic small vessel ischemic changes. Electronically Signed: Helder Hernandez DO  3/20/2025 10:48 PM EDT  Workstation ID: [id number]      Results for orders placed during the hospital encounter of 01/14/24    Adult Transthoracic Echo Complete W/ Cont if Necessary Per Protocol    Interpretation Summary    Left ventricular systolic function is normal. Calculated left ventricular EF = 64.6% Normal left ventricular cavity size and wall thickness noted. All left ventricular wall segments contract normally. Left ventricular diastolic function was normal.    Normal right ventricular cavity size, wall thickness, systolic function and septal motion noted.    The aortic  valve is grossly normal in structure. No significant aortic valve regurgitation is present. No hemodynamically significant aortic valve stenosis is present.    The mitral valve is grossly normal in structure. Trace mitral valve regurgitation is present. No significant mitral valve stenosis is present.    The tricuspid valve is grossly normal in structure. Trace tricuspid valve regurgitation is present. Estimated right ventricular systolic pressure from tricuspid regurgitation is normal (<35 mmHg). No evidence of significant tricuspid valve stenosis is present.      Assessment & Plan   Assessment & Plan       Hyperglycemia due to diabetes mellitus    Insulin pump mechanical complication    Hyperglycemia d/t T2DM w/ insulin pump malfunction  - glucose improved from 485 down to 178  - start lantus 5 units daily (was previously on levemir 10 units 1/2024 prior to insulin pump)  - fsbg achs w/ moderate dose ssi  - A1c 9.00  - diabetes educator to see  - continue IV fluids, LR @ 100 ml/hr x 1 liter       DVT prophylaxis:  SCDS    CODE STATUS:     Code Status (Patient has no pulse and is not breathing): CPR (Attempt to Resuscitate)  Medical Interventions (Patient has pulse or is breathing): Full Support      Expected Discharge    Expected Discharge Date: 3/22/2025; Expected Discharge Time:     Signature: Electronically signed by TYLER Osborn, 03/21/25, 2:21 AM EDT    Total time spent: 40 minutes  Time spent includes time reviewing chart, face-to-face time, counseling patient/family/caregiver, ordering medications/tests/procedures, communicating with other health care professionals, documenting clinical information in the electronic health record, and coordination of care.

## 2025-03-21 NOTE — ED PROVIDER NOTES
[facility]    EMERGENCY DEPARTMENT ENCOUNTER      Pt Name: Emily Grove  MRN: [id number]  YOB: 1960  Date of evaluation: 3/20/2025  Provider: Barak Wilson MD    CHIEF COMPLAINT       Chief Complaint   Patient presents with    Hyperglycemia         HISTORY OF PRESENT ILLNESS   Emily Grove is a 64 y.o. female who presents to the emergency department for evaluation of confusion and elevated blood sugar.  Patient is a known diabetic and uses an OmniPod and Dexcom monitor.  Patient's  reports that it has been running much higher than usual and the pump seems to be malfunctioning over the past couple of days, her blood sugars have been running in the 4-600 range which is unusual for her.  He notes that she has been confused, asking repetitive questions and not acting like herself.  Patient states she is feeling very fatigued but otherwise has no complaints.  She does not recognize the confusion.  She states she is not in any pain and specifically denies abdominal pain, chest pain, headache.  Denies nausea or vomiting.    Does recall that she had a mild viral illness about 2 or 3 weeks ago, had some nausea vomiting and diarrhea but that is resolved.  She has not been having any fevers or chills or cough or dysuria.    Patient and family do not know how much insulin she gets daily through her insulin pump,  states that he has no idea how the insulin pump works or whether or not there is an ismael to review this kind of data.    REVIEW OF SYSTEMS     ROS:  A chief complaint appropriate review of systems was completed and is negative except as noted in the HPI.      PAST MEDICAL HISTORY     Past Medical History:   Diagnosis Date    Acid reflux     CVA (cerebral vascular accident) 01/14/2024    Diabetes mellitus     Hyperlipidemia     Hypertension     Overactive bladder     Stroke          SURGICAL HISTORY       Past Surgical History:   Procedure Laterality Date    BLADDER SURGERY       GALL BLADDER    CARPAL TUNNEL RELEASE      FINGER FUSION      FOOT SURGERY      TONSILLECTOMY           CURRENT MEDICATIONS       Current Facility-Administered Medications:     dextrose (D50W) (25 g/50 mL) IV injection 25 g, 25 g, Intravenous, Q15 Min PRN, Baark Wilson MD    dextrose (GLUTOSE) oral gel 15 g, 15 g, Oral, Q15 Min PRN, Barak Wilson MD    glucagon (GLUCAGEN) injection 1 mg, 1 mg, Intramuscular, Q15 Min PRN, Barak Wilson MD    insulin regular (humuLIN R,novoLIN R) injection 3-14 Units, 3-14 Units, Subcutaneous, Q6H, Barak Wilson MD, 14 Units at 03/20/25 2311    lactated ringers infusion, 100 mL/hr, Intravenous, Continuous, Khushbu Faulkner, TYLER, Last Rate: 100 mL/hr at 03/21/25 0319, 100 mL/hr at 03/21/25 0319    [COMPLETED] Insert Peripheral IV, , , Once **AND** sodium chloride 0.9 % flush 10 mL, 10 mL, Intravenous, PRN, Barak Wilson MD    Current Outpatient Medications:     aspirin 325 MG EC tablet, Take 1 tablet by mouth Every 6 (Six) Hours As Needed for Mild Pain., Disp: , Rfl:     atorvastatin (LIPITOR) 20 MG tablet, Take 1 tablet by mouth Every Night., Disp: , Rfl:     clobetasol (TEMOVATE) 0.05 % external solution, Please see attached for detailed directions, Disp: , Rfl:     clotrimazole (LOTRIMIN) 1 % external solution, Please see attached for detailed directions, Disp: , Rfl:     Continuous Blood Gluc Sensor (Dexcom G6 Sensor), USE AS INSTRUCTED CHANGE EVERY 10 DAYS DX E11.9, Disp: , Rfl:     Continuous Blood Gluc Transmit (Dexcom G6 Transmitter) misc, USE AS DIRECTED CHANGE TRANSMITTER EVERY 90 DAYS DX E11.9, Disp: , Rfl:     DULoxetine (CYMBALTA) 30 MG capsule, Take 1 capsule by mouth Daily., Disp: , Rfl:     gabapentin (NEURONTIN) 100 MG capsule, Take 1 capsule by mouth 3 (Three) Times a Day., Disp: , Rfl:     Insulin Disposable Pump (Omnipod 5 G6 Pod, Gen 5,) misc, USE AS INSTRUCTED CHANGE POD EVERY 2 TO 3 DAYS, Disp: , Rfl:     latanoprost (XALATAN) 0.005 % ophthalmic solution,  INSTILL 1 DROP INTO RIGHT EYE AT BEDTIME, Disp: , Rfl:     losartan (COZAAR) 25 MG tablet, Take 2 tablets by mouth Daily., Disp: , Rfl:     meloxicam (MOBIC) 15 MG tablet, TAKE 1 TABLET BY MOUTH EVERY DAY FOR LEG PAIN, Disp: , Rfl:     metoprolol succinate XL (TOPROL-XL) 50 MG 24 hr tablet, Take 1 tablet by mouth Daily., Disp: 90 tablet, Rfl: 1    mupirocin (BACTROBAN) 2 % ointment, APPLY TO BIOPSY SITE TWICE DAILY UNTIL HEALED, Disp: , Rfl:     Myrbetriq 50 MG tablet sustained-release 24 hour 24 hr tablet, Take 50 mg by mouth Daily., Disp: , Rfl:     pantoprazole (PROTONIX) 40 MG EC tablet, Take 1 tablet by mouth Daily., Disp: , Rfl:     Semaglutide,0.25 or 0.5MG/DOS, (Ozempic, 0.25 or 0.5 MG/DOSE,) 2 MG/1.5ML solution pen-injector, Inject 1 mg under the skin into the appropriate area as directed 1 (One) Time Per Week. Monday, Disp: , Rfl:     timolol (TIMOPTIC) 0.5 % ophthalmic solution, Administer 1 drop to the right eye Daily., Disp: , Rfl:     ALLERGIES     Sulfa antibiotics    FAMILY HISTORY       Family History   Problem Relation Age of Onset    Diabetes Mother     Stroke Mother     COPD Father     Stroke Father     Hypertension Sister     Diabetes Brother     Heart attack Brother     Parkinsonism Brother     No Known Problems Maternal Grandmother     Diabetes Maternal Grandfather     Stroke Maternal Grandfather     Heart failure Paternal Grandmother     Diabetes Paternal Grandmother     No Known Problems Paternal Grandfather           SOCIAL HISTORY       Social History     Socioeconomic History    Marital status:    Tobacco Use    Smoking status: Never     Passive exposure: Never    Smokeless tobacco: Never   Vaping Use    Vaping status: Never Used   Substance and Sexual Activity    Alcohol use: No    Drug use: No    Sexual activity: Defer         PHYSICAL EXAM    (up to 7 for level 4, 8 or more for level 5)     Vitals:    03/21/25 0300 03/21/25 0305 03/21/25 0310 03/21/25 0315   BP: 131/81      BP  Location:       Patient Position:       Pulse: 83 89 85 85   Resp:       Temp:       TempSrc:       SpO2: 95% 97% 92% 96%   Weight:       Height:           Physical Exam  Constitutional:       General: She is not in acute distress.  HENT:      Head: Normocephalic and atraumatic.      Mouth/Throat:      Mouth: Mucous membranes are dry.   Eyes:      Conjunctiva/sclera: Conjunctivae normal.      Pupils: Pupils are equal, round, and reactive to light.   Cardiovascular:      Rate and Rhythm: Normal rate and regular rhythm.      Pulses: Normal pulses.      Heart sounds: No murmur heard.     No gallop.   Pulmonary:      Effort: Pulmonary effort is normal. No respiratory distress.   Abdominal:      General: Abdomen is flat. There is no distension.      Tenderness: There is no abdominal tenderness.   Musculoskeletal:         General: No swelling or deformity. Normal range of motion.   Skin:     General: Skin is warm and dry.      Capillary Refill: Capillary refill takes 2 to 3 seconds.   Neurological:      General: No focal deficit present.      Mental Status: She is alert and oriented to person, place, and time.   Psychiatric:         Mood and Affect: Mood normal.         Behavior: Behavior normal.            DIAGNOSTIC RESULTS     EKG: All EKGs are interpreted by the Emergency Department Physician who either signs or Co-signs this chart in the absence of a cardiologist.    No orders to display         RADIOLOGY:   [x] Radiologist's Report Reviewed:  CT Head Without Contrast   Final Result   Impression:   1.No acute intracranial abnormality identified.   2.Known old/chronic left temporoparietal infarct.   3.Mild chronic small vessel ischemic changes.            Electronically Signed: Helder Hernandez DO     3/20/2025 10:48 PM EDT     Workstation ID: [id number]          I ordered and independently reviewed the above noted radiographic studies.        LABS:  I independently interpreted all laboratory studies conducted during  this ED visit.  The results of these studies can be seen below and my independent interpretation in the ED course      EMERGENCY DEPARTMENT COURSE and DIFFERENTIAL DIAGNOSIS/MDM:   Vitals:  AS OF 04:05 EDT    BP - 131/81  HR - 85  TEMP - 97.8 °F (36.6 °C) (Oral)  O2 SATS - 96%        Discussion below represents my analysis of pertinent findings related to patient's condition, differential diagnosis, treatment plan and final disposition.      Differential diagnosis:  The differential diagnosis associated with the patient's presentation includes: Diabetic ketoacidosis, HHS, other metabolic derangement, dehydration, urinary tract infection, intracranial abnormality, intoxication      Independent interpretations (ECG/rhythm strip/X-ray/US/CT scan): See ED course      Additional sources:  Discussed/obtained information from independent historians:   [x] Spouse: Provides details of HPI   [] Parent:   [] Friend:   [] EMS:   [] Other:    External record review:  2/5/2025 reviewed most recent outpatient endocrinology note, patient has the OmniPod 5, total daily dose of 70 units of insulin, also takes Ozempic 0.5 mg weekly.      Patient's care impacted by:   [x] Diabetes   [] Hypertension   [] Coronary Artery Disease   [] Cancer   [] Other:     Care significantly affected by Social Determinants of Health (housing and economic circumstances, unemployment)    [] Yes     [x] No   If yes, Patient's care significantly limited by  Social Determinants of Health including:    [] Inadequate housing    [] Low income    [] Alcoholism and drug addiction in family    [] Problems related to primary support group    [] Unemployment    [] Problems related to employment    [] Other Social Determinants of Health:     I considered prescription management with:    [] Pain medication:   [] Antiviral:   [] Antibiotic:   [] Other:    Additional orders considered but not ordered:  The following testing was considered but ultimately not selected:      ED Course:    ED Course as of 03/21/25 0405   Thu Mar 20, 2025   2321 CT scan of the brain independently interpreted by myself demonstrates no acute intracranial abnormality, chronic ischemic changes are noted [KB]   Fri Mar 21, 2025   0113 Laboratory workup independently interpreted by myself notable for substantial hyperglycemia without evidence of DKA. [KB]      ED Course User Index  [KB] Barak Wilson MD         Diagnostic studies were conducted.  IV fluids and correction dose insulin were administered.    Patient's blood sugars are downtrending.  There is not evidence of DKA on her workup.  I reevaluated the patient and her mental status now normal, answers all questions appropriately,  states she is acting like her normal self.    Her OmniPod was disconnected in the ER.  I discussed an outpatient plan of care, that she would have recurrent hypoglycemia if she did not receive insulin therapy, I addressed her access and education about home insulin and I do not believe patient can reliably identify significant hyperglycemia, does not have supplies to administer insulin to herself at home.  She will need to be hospitalized for further evaluation and treatment.        PROCEDURES:  Procedures    CRITICAL CARE TIME        CONSULTS   Medicine consulted for admission    FINAL IMPRESSION      1. Hyperosmolar hyperglycemic state (HHS)    2. Dehydration    3. Disorientation    4. History of insulin dependent diabetes mellitus          DISPOSITION/PLAN     ED Disposition       ED Disposition   Decision to Admit    Condition   --    Comment   Level of Care: Telemetry [5]   Diagnosis: Hyperosmolar hyperglycemic state (HHS) [[id number]]                   Comment: Please note this report has been produced using speech recognition software.      Barak Wilson MD  Attending Emergency Physician    Recent Results (from the past 24 hours)   POC Glucose Once    Collection Time: 03/20/25 10:09 PM    Specimen: Blood   Result  Value Ref Range    Glucose 485 (C) 70 - 130 mg/dL   POC Glucose Once    Collection Time: 03/20/25 10:11 PM    Specimen: Blood   Result Value Ref Range    Glucose 488 (C) 70 - 130 mg/dL   Lactic Acid, Plasma    Collection Time: 03/20/25 10:58 PM    Specimen: Blood   Result Value Ref Range    Lactate 1.9 0.5 - 2.0 mmol/L   CBC Auto Differential    Collection Time: 03/20/25 10:58 PM    Specimen: Blood   Result Value Ref Range    WBC 7.41 3.40 - 10.80 10*3/mm3    RBC 4.52 3.77 - 5.28 10*6/mm3    Hemoglobin 12.9 12.0 - 15.9 g/dL    Hematocrit 40.0 34.0 - 46.6 %    MCV 88.5 79.0 - 97.0 fL    MCH 28.5 26.6 - 33.0 pg    MCHC 32.3 31.5 - 35.7 g/dL    RDW 12.2 (L) 12.3 - 15.4 %    RDW-SD 39.7 37.0 - 54.0 fl    MPV 12.1 (H) 6.0 - 12.0 fL    Platelets 166 140 - 450 10*3/mm3    Neutrophil % 66.7 42.7 - 76.0 %    Lymphocyte % 25.8 19.6 - 45.3 %    Monocyte % 5.7 5.0 - 12.0 %    Eosinophil % 1.2 0.3 - 6.2 %    Basophil % 0.3 0.0 - 1.5 %    Immature Grans % 0.3 0.0 - 0.5 %    Neutrophils, Absolute 4.95 1.70 - 7.00 10*3/mm3    Lymphocytes, Absolute 1.91 0.70 - 3.10 10*3/mm3    Monocytes, Absolute 0.42 0.10 - 0.90 10*3/mm3    Eosinophils, Absolute 0.09 0.00 - 0.40 10*3/mm3    Basophils, Absolute 0.02 0.00 - 0.20 10*3/mm3    Immature Grans, Absolute 0.02 0.00 - 0.05 10*3/mm3    nRBC 0.0 0.0 - 0.2 /100 WBC   Hemoglobin A1c    Collection Time: 03/20/25 10:58 PM    Specimen: Blood   Result Value Ref Range    Hemoglobin A1C 9.00 (H) 4.80 - 5.60 %   Blood Gas, Venous With Co-Ox    Collection Time: 03/20/25 11:09 PM    Specimen: Venous Blood   Result Value Ref Range    Site Nurse/Dr Draw     pH, Venous 7.407 7.310 - 7.410 pH Units    pCO2, Venous 45.3 41.0 - 51.0 mm Hg    pO2, Venous 55.9 (H) 27.0 - 53.0 mm Hg    HCO3, Venous 28.5 (H) 22.0 - 28.0 mmol/L    Base Excess, Venous 3.2 (H) -2.0 - 2.0 mmol/L    Hemoglobin, Blood Gas 13.4 (L) 14 - 18 g/dL    Oxyhemoglobin Venous 86.9 %    Methemoglobin Venous 0.2 %    Carboxyhemoglobin Venous 1.0 %     CO2 Content 29.9 22 - 33 mmol/L    Temperature 37.0     Barometric Pressure for Blood Gas      Modality Room Air     FIO2 21 %    Rate 0 Breaths/minute    PIP 0 cmH2O    IPAP 0     EPAP 0    Urinalysis With Microscopic If Indicated (No Culture) - Urine, Clean Catch    Collection Time: 03/20/25 11:22 PM    Specimen: Urine, Clean Catch   Result Value Ref Range    Color, UA Yellow Yellow, Straw    Appearance, UA Clear Clear    pH, UA 7.0 5.0 - 8.0    Specific Gravity, UA 1.032 (H) 1.001 - 1.030    Glucose, UA >=1000 mg/dL (3+) (A) Negative    Ketones, UA Negative Negative    Bilirubin, UA Negative Negative    Blood, UA Trace (A) Negative    Protein, UA Negative Negative    Leuk Esterase, UA Negative Negative    Nitrite, UA Negative Negative    Urobilinogen, UA 0.2 E.U./dL 0.2 - 1.0 E.U./dL   Urinalysis, Microscopic Only - Urine, Clean Catch    Collection Time: 03/20/25 11:22 PM    Specimen: Urine, Clean Catch   Result Value Ref Range    RBC, UA 3-5 (A) None Seen, 0-2 /HPF    WBC, UA 0-2 None Seen, 0-2 /HPF    Bacteria, UA None Seen None Seen, Trace /HPF    Squamous Epithelial Cells, UA None Seen None Seen, 0-2 /HPF    Hyaline Casts, UA None Seen 0 - 6 /LPF    Methodology Automated Microscopy    Comprehensive Metabolic Panel    Collection Time: 03/20/25 11:41 PM    Specimen: Blood   Result Value Ref Range    Glucose 272 (H) 65 - 99 mg/dL    BUN 18 8 - 23 mg/dL    Creatinine 0.58 0.57 - 1.00 mg/dL    Sodium 139 136 - 145 mmol/L    Potassium 3.9 3.5 - 5.2 mmol/L    Chloride 101 98 - 107 mmol/L    CO2 26.0 22.0 - 29.0 mmol/L    Calcium 9.5 8.6 - 10.5 mg/dL    Total Protein 6.8 6.0 - 8.5 g/dL    Albumin 4.1 3.5 - 5.2 g/dL    ALT (SGPT) 34 (H) 1 - 33 U/L    AST (SGOT) 26 1 - 32 U/L    Alkaline Phosphatase 217 (H) 39 - 117 U/L    Total Bilirubin 0.2 0.0 - 1.2 mg/dL    Globulin 2.7 gm/dL    A/G Ratio 1.5 g/dL    BUN/Creatinine Ratio 31.0 (H) 7.0 - 25.0    Anion Gap 12.0 5.0 - 15.0 mmol/L    eGFR 102.5 >60.0 mL/min/1.73    Beta Hydroxybutyrate Quantitative    Collection Time: 03/20/25 11:41 PM    Specimen: Blood   Result Value Ref Range    Beta-Hydroxybutyrate Quant 0.109 0.020 - 0.270 mmol/L   TSH    Collection Time: 03/20/25 11:41 PM    Specimen: Blood   Result Value Ref Range    TSH 1.750 0.270 - 4.200 uIU/mL   T4, Free    Collection Time: 03/20/25 11:41 PM    Specimen: Blood   Result Value Ref Range    Free T4 1.16 0.92 - 1.68 ng/dL   Ethanol    Collection Time: 03/20/25 11:41 PM    Specimen: Blood   Result Value Ref Range    Ethanol <10 0 - 10 mg/dL   Magnesium    Collection Time: 03/20/25 11:41 PM    Specimen: Blood   Result Value Ref Range    Magnesium 2.0 1.6 - 2.4 mg/dL   POC Glucose Once    Collection Time: 03/21/25 12:32 AM    Specimen: Blood   Result Value Ref Range    Glucose 178 (H) 70 - 130 mg/dL   Osmolality, Serum    Collection Time: 03/21/25  1:56 AM    Specimen: Blood   Result Value Ref Range    Osmolality 296 (H) 275 - 295 mOsm/kg   POC Glucose Once    Collection Time: 03/21/25  4:02 AM    Specimen: Blood   Result Value Ref Range    Glucose 124 70 - 130 mg/dL     Note: In addition to lab results from this visit, the labs listed above may include labs taken at another facility or during a different encounter within the last 24 hours. Please correlate lab times with ED admission and discharge times for further clarification of the services performed during this visit.                 Barak Wilson MD  03/21/25 2496

## 2025-03-21 NOTE — ED NOTES
Emily Grove    Nursing Report ED to Floor:  Mental status: aox4  Ambulatory status: standby  Oxygen Therapy:  ra  Cardiac Rhythm: nsr  Admitted from: home  Safety Concerns:  fall risk  Precautions: none  Social Issues: none  ED Room #:  17    ED Nurse Phone Extension - [phone] or may call [phone].      HPI:   Chief Complaint   Patient presents with    Hyperglycemia       Past Medical History:  Past Medical History:   Diagnosis Date    Acid reflux     CVA (cerebral vascular accident) 01/14/2024    Diabetes mellitus     Hyperlipidemia     Hypertension     Overactive bladder     Stroke         Past Surgical History:  Past Surgical History:   Procedure Laterality Date    BLADDER SURGERY      GALL BLADDER    CARPAL TUNNEL RELEASE      FINGER FUSION      FOOT SURGERY      TONSILLECTOMY          Admitting Doctor:   Adelso Cardenas MD    Consulting Provider(s):  Consults       No orders found for last 30 day(s).             Admitting Diagnosis:   The primary encounter diagnosis was Hyperosmolar hyperglycemic state (HHS). Diagnoses of Dehydration, Disorientation, and History of insulin dependent diabetes mellitus were also pertinent to this visit.    Most Recent Vitals:   Vitals:    03/21/25 0651 03/21/25 0656 03/21/25 0731 03/21/25 0801   BP:   139/89 136/94   Pulse: 79 80 80 67   Resp:       Temp:       TempSrc:       SpO2: 98% 98% 100% 99%   Weight:       Height:           Active LDAs/IV Access:   Lines, Drains & Airways       Active LDAs       Name Placement date Placement time Site Days    Peripheral IV 03/20/25 2258 Right Antecubital 03/20/25 2258  Antecubital  less than 1                    Labs (abnormal labs have a star):   Labs Reviewed   COMPREHENSIVE METABOLIC PANEL - Abnormal; Notable for the following components:       Result Value    Glucose 272 (*)     ALT (SGPT) 34 (*)     Alkaline Phosphatase 217 (*)     BUN/Creatinine Ratio 31.0 (*)     All other components within normal limits    Narrative:     GFR  Categories in Chronic Kidney Disease (CKD)      GFR Category          GFR (mL/min/1.73)    Interpretation  G1                     90 or greater         Normal or high (1)  G2                      60-89                Mild decrease (1)  G3a                   45-59                Mild to moderate decrease  G3b                   30-44                Moderate to severe decrease  G4                    15-29                Severe decrease  G5                    14 or less           Kidney failure          (1)In the absence of evidence of kidney disease, neither GFR category G1 or G2 fulfill the criteria for CKD.    eGFR calculation 2021 CKD-EPI creatinine equation, which does not include race as a factor   URINALYSIS W/ MICROSCOPIC IF INDICATED (NO CULTURE) - Abnormal; Notable for the following components:    Specific Gravity, UA 1.032 (*)     Glucose, UA >=1000 mg/dL (3+) (*)     Blood, UA Trace (*)     All other components within normal limits   CBC WITH AUTO DIFFERENTIAL - Abnormal; Notable for the following components:    RDW 12.2 (*)     MPV 12.1 (*)     All other components within normal limits   BLOOD GAS, VENOUS W/CO-OXIMETRY - Abnormal; Notable for the following components:    pO2, Venous 55.9 (*)     HCO3, Venous 28.5 (*)     Base Excess, Venous 3.2 (*)     Hemoglobin, Blood Gas 13.4 (*)     All other components within normal limits   URINALYSIS, MICROSCOPIC ONLY - Abnormal; Notable for the following components:    RBC, UA 3-5 (*)     All other components within normal limits   HEMOGLOBIN A1C - Abnormal; Notable for the following components:    Hemoglobin A1C 9.00 (*)     All other components within normal limits    Narrative:     Hemoglobin A1C Ranges:    Increased Risk for Diabetes  5.7% to 6.4%  Diabetes                     >= 6.5%  Diabetic Goal                < 7.0%   OSMOLALITY, SERUM - Abnormal; Notable for the following components:    Osmolality 296 (*)     All other components within normal limits    BASIC METABOLIC PANEL - Abnormal; Notable for the following components:    Creatinine 0.44 (*)     CO2 30.0 (*)     BUN/Creatinine Ratio 31.8 (*)     All other components within normal limits    Narrative:     GFR Categories in Chronic Kidney Disease (CKD)      GFR Category          GFR (mL/min/1.73)    Interpretation  G1                     90 or greater         Normal or high (1)  G2                      60-89                Mild decrease (1)  G3a                   45-59                Mild to moderate decrease  G3b                   30-44                Moderate to severe decrease  G4                    15-29                Severe decrease  G5                    14 or less           Kidney failure          (1)In the absence of evidence of kidney disease, neither GFR category G1 or G2 fulfill the criteria for CKD.    eGFR calculation 2021 CKD-EPI creatinine equation, which does not include race as a factor   CBC WITH AUTO DIFFERENTIAL - Abnormal; Notable for the following components:    RDW 12.2 (*)     Lymphocyte % 19.1 (*)     All other components within normal limits   POCT GLUCOSE FINGERSTICK - Abnormal; Notable for the following components:    Glucose 485 (*)     All other components within normal limits   POCT GLUCOSE FINGERSTICK - Abnormal; Notable for the following components:    Glucose 488 (*)     All other components within normal limits   POCT GLUCOSE FINGERSTICK - Abnormal; Notable for the following components:    Glucose 178 (*)     All other components within normal limits   LACTIC ACID, PLASMA - Normal   BETA HYDROXYBUTYRATE QUANTITATIVE - Normal    Narrative:     In the assessment of possible diabetic ketoacidosis, the test should be interpreted along with other clinical and laboratory findings.  A level greater than 1 mmol/L should require further evaluation and levels of more than 3 mmol/L require immediate medical review.   TSH - Normal   T4, FREE - Normal   ETHANOL - Normal    Narrative:      Elevated lactic acid concentration and lactate dehydrogenase(LD) activity may falsely elevate enzymatically determined ethanol levels. Not for legal purposes.    MAGNESIUM - Normal   MAGNESIUM - Normal   POCT GLUCOSE FINGERSTICK - Normal   BLOOD GAS, VENOUS   POCT GLUCOSE FINGERSTICK   POCT GLUCOSE FINGERSTICK   POCT GLUCOSE FINGERSTICK   POCT GLUCOSE FINGERSTICK   POCT GLUCOSE FINGERSTICK   POCT GLUCOSE FINGERSTICK   CBC AND DIFFERENTIAL    Narrative:     The following orders were created for panel order CBC & Differential.  Procedure                               Abnormality         Status                     ---------                               -----------         ------                     CBC Auto Differential[[id number]]        Abnormal            Final result                 Please view results for these tests on the individual orders.       Meds Given in ED:   Medications   sodium chloride 0.9 % flush 10 mL (has no administration in time range)   glucagon (GLUCAGEN) injection 1 mg (has no administration in time range)   aspirin EC tablet 325 mg (has no administration in time range)   atorvastatin (LIPITOR) tablet 20 mg (has no administration in time range)   DULoxetine (CYMBALTA) DR capsule 30 mg (has no administration in time range)   gabapentin (NEURONTIN) capsule 100 mg (has no administration in time range)   latanoprost (XALATAN) 0.005 % ophthalmic solution 1 drop (has no administration in time range)   losartan (COZAAR) tablet 50 mg (has no administration in time range)   meloxicam (MOBIC) tablet 15 mg (has no administration in time range)   metoprolol succinate XL (TOPROL-XL) 24 hr tablet 50 mg (has no administration in time range)   oxybutynin XL (DITROPAN-XL) 24 hr tablet 10 mg (has no administration in time range)   pantoprazole (PROTONIX) EC tablet 40 mg (has no administration in time range)   timolol (TIMOPTIC) 0.5 % ophthalmic solution 1 drop (has no administration in time range)   sodium  chloride 0.9 % flush 10 mL (has no administration in time range)   sodium chloride 0.9 % flush 10 mL (has no administration in time range)   sodium chloride 0.9 % infusion 40 mL (has no administration in time range)   dextrose (GLUTOSE) oral gel 15 g (has no administration in time range)   dextrose (D50W) (25 g/50 mL) IV injection 25 g (has no administration in time range)   insulin glargine (LANTUS, SEMGLEE) injection 5 Units (has no administration in time range)   Insulin Lispro (humaLOG) injection 2-9 Units (has no administration in time range)   nitroglycerin (NITROSTAT) SL tablet 0.4 mg (has no administration in time range)   Potassium Replacement - Follow Nurse / BPA Driven Protocol (has no administration in time range)   Magnesium Standard Dose Replacement - Follow Nurse / BPA Driven Protocol (has no administration in time range)   Phosphorus Replacement - Follow Nurse / BPA Driven Protocol (has no administration in time range)   Calcium Replacement - Follow Nurse / BPA Driven Protocol (has no administration in time range)   acetaminophen (TYLENOL) tablet 650 mg (has no administration in time range)     Or   acetaminophen (TYLENOL) 160 MG/5ML oral solution 650 mg (has no administration in time range)     Or   acetaminophen (TYLENOL) suppository 650 mg (has no administration in time range)   sennosides-docusate (PERICOLACE) 8.6-50 MG per tablet 2 tablet (has no administration in time range)     And   polyethylene glycol (MIRALAX) packet 17 g (has no administration in time range)     And   bisacodyl (DULCOLAX) EC tablet 5 mg (has no administration in time range)     And   bisacodyl (DULCOLAX) suppository 10 mg (has no administration in time range)   melatonin tablet 5 mg (has no administration in time range)   ondansetron ODT (ZOFRAN-ODT) disintegrating tablet 4 mg (has no administration in time range)     Or   ondansetron (ZOFRAN) injection 4 mg (has no administration in time range)   lactated ringers infusion  (100 mL/hr Intravenous New Bag 3/21/25 0319)   lactated ringers bolus 1,000 mL (0 mL Intravenous Stopped 3/21/25 0129)     lactated ringers, 100 mL/hr, Last Rate: 100 mL/hr (03/21/25 0319)         Last NIH score:                                                          Dysphagia screening results:  Patient Factors Component (Dysphagia:Stroke or Rule-out)  Best Eye Response: 4-->(E4) spontaneous (03/20/25 2315)  Best Motor Response: 6-->(M6) obeys commands (03/20/25 2315)  Best Verbal Response: 5-->(V5) oriented (03/20/25 2315)  [place] Coma Scale Score: 15 (03/20/25 2315)     [place] Coma Scale:  No data recorded     CIWA:        Restraint Type:            Isolation Status:  No active isolations

## 2025-03-22 VITALS
RESPIRATION RATE: 18 BRPM | OXYGEN SATURATION: 100 % | TEMPERATURE: 97.9 F | DIASTOLIC BLOOD PRESSURE: 99 MMHG | WEIGHT: 115.96 LBS | BODY MASS INDEX: 26.84 KG/M2 | HEIGHT: 55 IN | HEART RATE: 70 BPM | SYSTOLIC BLOOD PRESSURE: 133 MMHG

## 2025-03-22 LAB
GLUCOSE BLDC GLUCOMTR-MCNC: 118 MG/DL (ref 70–130)
GLUCOSE BLDC GLUCOMTR-MCNC: 369 MG/DL (ref 70–130)

## 2025-03-22 PROCEDURE — 82948 REAGENT STRIP/BLOOD GLUCOSE: CPT

## 2025-03-22 PROCEDURE — G0378 HOSPITAL OBSERVATION PER HR: HCPCS

## 2025-03-22 RX ADMIN — Medication 10 ML: at 09:07

## 2025-03-22 RX ADMIN — TIMOLOL MALEATE 1 DROP: 5 SOLUTION/ DROPS OPHTHALMIC at 09:04

## 2025-03-22 RX ADMIN — DULOXETINE HYDROCHLORIDE 30 MG: 30 CAPSULE, DELAYED RELEASE ORAL at 09:04

## 2025-03-22 RX ADMIN — OXYBUTYNIN CHLORIDE 10 MG: 5 TABLET, EXTENDED RELEASE ORAL at 09:04

## 2025-03-22 RX ADMIN — PANTOPRAZOLE SODIUM 40 MG: 40 TABLET, DELAYED RELEASE ORAL at 09:04

## 2025-03-22 RX ADMIN — METOPROLOL SUCCINATE 50 MG: 50 TABLET, EXTENDED RELEASE ORAL at 09:04

## 2025-03-22 RX ADMIN — MELOXICAM 15 MG: 15 TABLET ORAL at 09:04

## 2025-03-22 RX ADMIN — GABAPENTIN 100 MG: 100 CAPSULE ORAL at 09:04

## 2025-03-22 RX ADMIN — LOSARTAN POTASSIUM 50 MG: 50 TABLET, FILM COATED ORAL at 09:04

## 2025-03-22 RX ADMIN — ASPIRIN 325 MG: 325 TABLET, COATED ORAL at 09:04

## 2025-03-22 NOTE — PROGRESS NOTES
[facility] Medicine Services  PROGRESS NOTE    Patient Name: Emily Grove  : 1960  MRN: [id number]    Date of Admission: 3/20/2025  Primary Care Physician: Marie Morales MD    Subjective   Subjective     CC:  Elevated glucose    HPI:  Resting in bed, NAD. Feels ready to go home, insulin pump was functioning well overnight. No new concerns or questions. See DC sum from 3/21/25      Objective   Objective     Vital Signs:   Temp:  [97.9 °F (36.6 °C)-98.1 °F (36.7 °C)] 97.9 °F (36.6 °C)  Heart Rate:  [] 70  Resp:  [16-20] 18  BP: (126-144)/(79-99) 133/99     Physical Exam:  Constitutional: No acute distress, awake, alert  HENT: NCAT, mucous membranes moist  Respiratory: Clear to auscultation bilaterally, respiratory effort normal   Cardiovascular: RRR, no murmurs, rubs, or gallops  Gastrointestinal: Positive bowel sounds, soft, nontender, nondistended  Musculoskeletal: No bilateral ankle edema  Psychiatric: Appropriate affect, cooperative  Neurologic: Oriented x 3, GARCIA, speech mild aphasia (chronic)  Skin: No rashes     Results Reviewed:  LAB RESULTS:      Lab 25  0555 25  2258   WBC 7.40 7.41   HEMOGLOBIN 12.5 12.9   HEMATOCRIT 38.9 40.0   PLATELETS 155 166   NEUTROS ABS 5.32 4.95   IMMATURE GRANS (ABS) 0.01 0.02   LYMPHS ABS 1.41 1.91   MONOS ABS 0.56 0.42   EOS ABS 0.08 0.09   MCV 89.0 88.5   LACTATE  --  1.9         Lab 25  0555 25  2341 25  2258   SODIUM 145 139  --    POTASSIUM 3.9 3.9  --    CHLORIDE 106 101  --    CO2 30.0* 26.0  --    ANION GAP 9.0 12.0  --    BUN 14 18  --    CREATININE 0.44* 0.58  --    EGFR 109.5 102.5  --    GLUCOSE 74 272*  --    CALCIUM 9.4 9.5  --    MAGNESIUM 2.0 2.0  --    HEMOGLOBIN A1C  --   --  9.00*   TSH  --  1.750  --          Lab 03/20/25  2341   TOTAL PROTEIN 6.8   ALBUMIN 4.1   GLOBULIN 2.7   ALT (SGPT) 34*   AST (SGOT) 26   BILIRUBIN 0.2   ALK PHOS 217*                     Lab 25  2302    FIO2 21   CARBOXYHEMOGLOBIN (VENOUS) 1.0     Brief Urine Lab Results  (Last result in the past 365 days)        Color   Clarity   Blood   Leuk Est   Nitrite   Protein   CREAT   Urine HCG        03/20/25 2322 Yellow   Clear   Trace   Negative   Negative   Negative                   Microbiology Results Abnormal       None            CT Head Without Contrast  Result Date: 3/20/2025  CT HEAD WO CONTRAST Date of Exam: 3/20/2025 10:40 PM EDT Indication: AMS. Comparison: 1/15/2024 Technique: Axial CT images were obtained of the head without contrast administration.  Automated exposure control and iterative construction methods were used. Findings: No acute large territorial infarct. Known old/chronic left temporoparietal infarct. There is no evidence of hemorrhage. No mass effect, edema or midline shift Mild periventricular and subcortical white matter hypodensities, nonspecific but most likely represents chronic small vessel ischemic changes. No extra-axial fluid collection. The ventricles are normal in size and configuration. The visualized orbits are unremarkable. The visualized paranasal sinuses and mastoid air cells are clear. The visualized soft tissues are unremarkable. No acute osseous abnormality.     Impression: Impression: 1.No acute intracranial abnormality identified. 2.Known old/chronic left temporoparietal infarct. 3.Mild chronic small vessel ischemic changes. Electronically Signed: Helder Hernandez DO  3/20/2025 10:48 PM EDT  Workstation ID: [id number]      Results for orders placed during the hospital encounter of 01/14/24    Adult Transthoracic Echo Complete W/ Cont if Necessary Per Protocol    Interpretation Summary    Left ventricular systolic function is normal. Calculated left ventricular EF = 64.6% Normal left ventricular cavity size and wall thickness noted. All left ventricular wall segments contract normally. Left ventricular diastolic function was normal.    Normal right ventricular cavity  size, wall thickness, systolic function and septal motion noted.    The aortic valve is grossly normal in structure. No significant aortic valve regurgitation is present. No hemodynamically significant aortic valve stenosis is present.    The mitral valve is grossly normal in structure. Trace mitral valve regurgitation is present. No significant mitral valve stenosis is present.    The tricuspid valve is grossly normal in structure. Trace tricuspid valve regurgitation is present. Estimated right ventricular systolic pressure from tricuspid regurgitation is normal (<35 mmHg). No evidence of significant tricuspid valve stenosis is present.      Current medications:  Scheduled Meds:aspirin, 325 mg, Oral, Daily  atorvastatin, 20 mg, Oral, Nightly  DULoxetine, 30 mg, Oral, Daily  gabapentin, 100 mg, Oral, TID  insulin lispro, 2-7 Units, Subcutaneous, 4x Daily AC & at Bedtime  latanoprost, 1 drop, Right Eye, Nightly  losartan, 50 mg, Oral, Daily  meloxicam, 15 mg, Oral, Daily  metoprolol succinate XL, 50 mg, Oral, Daily  oxybutynin XL, 10 mg, Oral, Daily  pantoprazole, 40 mg, Oral, Daily  sodium chloride, 10 mL, Intravenous, Q12H  timolol, 1 drop, Right Eye, Daily      Continuous Infusions:insulin,       PRN Meds:.  acetaminophen **OR** acetaminophen **OR** acetaminophen    senna-docusate sodium **AND** polyethylene glycol **AND** bisacodyl **AND** bisacodyl    Calcium Replacement - Follow Nurse / BPA Driven Protocol    dextrose    dextrose    dextrose    dextrose    dextrose    dextrose    glucagon (human recombinant)    Magnesium Standard Dose Replacement - Follow Nurse / BPA Driven Protocol    melatonin    nitroglycerin    ondansetron ODT **OR** ondansetron    Phosphorus Replacement - Follow Nurse / BPA Driven Protocol    Potassium Replacement - Follow Nurse / BPA Driven Protocol    [COMPLETED] Insert Peripheral IV **AND** sodium chloride    sodium chloride    sodium chloride    Assessment & Plan   Assessment & Plan      Active Hospital Problems    Diagnosis  POA    **Hyperglycemia due to diabetes mellitus [E11.65]  Yes    Insulin pump mechanical complication [T85.694A]  Unknown    Hyperglycemia [R73.9]  Yes      Resolved Hospital Problems   No resolved problems to display.        Brief Hospital Course to date:  Emily Grove is a 64 y.o. female ***    ***    *** (Consider using .[employer] HouseTIMEHP or .[employer]M then remove this message)    Expected Discharge Location and Transportation: ***  Expected Discharge ***  Expected Discharge Date: 3/22/2025; Expected Discharge Time:      VTE Prophylaxis:  Mechanical VTE prophylaxis orders are present.         AM-PAC 6 Clicks Score (PT): 24 (03/21/25 2000)    CODE STATUS:   Code Status and Medical Interventions: CPR (Attempt to Resuscitate); Full Support   Ordered at: 03/21/25 0206     Code Status (Patient has no pulse and is not breathing):    CPR (Attempt to Resuscitate)     Medical Interventions (Patient has pulse or is breathing):    Full Support       Cynthia Chandler, TYLER  03/22/25         Resuscitate); Full Support   Ordered at: 03/21/25 0206     Code Status (Patient has no pulse and is not breathing):    CPR (Attempt to Resuscitate)     Medical Interventions (Patient has pulse or is breathing):    Full Support       Cynthia Chandler, APRN  03/22/25

## 2025-03-22 NOTE — PLAN OF CARE
Goal Outcome Evaluation:   Pt progressing well. Calm, Cooperative and accepting of plan of care.  Blood sugars checked intermittently through the night, recorded on diabetic log. Pt able to ambulate in room without issue. Call light within reach, needs met.

## 2025-03-24 ENCOUNTER — EDUCATION (OUTPATIENT)
Dept: DIABETES SERVICES | Facility: HOSPITAL | Age: 65
End: 2025-03-24
Payer: MEDICAID

## 2025-03-24 NOTE — CONSULTS
Diabetes Education    Patient Name:  Emily Grove  YOB: 1960  MRN: [id number]  Admit Date:  (Not on file)      Telephoned Ms. Grove today as a courtesy follow-up from her hospital stay. She states that her pump and her dexcom are working properly, her blood sugar is in the 200s right now. Encouraged her to keep her upcoming follow-up, has questions about the time. Per record of upcoming visits, this appt is on 3/27 at 3pm at [facility]. She was provided with this information.     Electronically signed by:  Samantha Villatoro RN, [facility]  03/24/25 10:22 EDT

## 2025-03-27 ENCOUNTER — HOSPITAL ENCOUNTER (EMERGENCY)
Facility: HOSPITAL | Age: 65
Discharge: HOME | End: 2025-03-28
Attending: EMERGENCY MEDICINE
Payer: MEDICAID

## 2025-03-27 VITALS
OXYGEN SATURATION: 99 % | HEART RATE: 78 BPM | BODY MASS INDEX: 26.85 KG/M2 | WEIGHT: 116 LBS | HEIGHT: 55 IN | TEMPERATURE: 98.2 F | SYSTOLIC BLOOD PRESSURE: 150 MMHG | RESPIRATION RATE: 22 BRPM | DIASTOLIC BLOOD PRESSURE: 104 MMHG

## 2025-03-27 DIAGNOSIS — K76.0 FATTY LIVER: ICD-10-CM

## 2025-03-27 DIAGNOSIS — R73.9 HYPERGLYCEMIA: Primary | ICD-10-CM

## 2025-03-27 DIAGNOSIS — E78.5 HYPERLIPIDEMIA LDL GOAL <70: ICD-10-CM

## 2025-03-27 LAB
ALBUMIN SERPL-MCNC: 4.3 G/DL (ref 3.5–5.2)
ALBUMIN/GLOB SERPL: 1.3 G/DL
ALP SERPL-CCNC: 224 U/L (ref 39–117)
ALT SERPL W P-5'-P-CCNC: 51 U/L (ref 1–33)
ANION GAP SERPL CALCULATED.3IONS-SCNC: 13 MMOL/L (ref 5–15)
AST SERPL-CCNC: 42 U/L (ref 1–32)
ATMOSPHERIC PRESS: ABNORMAL MM[HG]
B-OH-BUTYR SERPL-SCNC: 0.18 MMOL/L (ref 0.02–0.27)
BASE EXCESS BLDV CALC-SCNC: 3.9 MMOL/L (ref -2–2)
BASOPHILS # BLD AUTO: 0.04 10*3/MM3 (ref 0–0.2)
BASOPHILS NFR BLD AUTO: 0.5 % (ref 0–1.5)
BDY SITE: ABNORMAL
BILIRUB SERPL-MCNC: 0.2 MG/DL (ref 0–1.2)
BILIRUB UR QL STRIP: NEGATIVE
BODY TEMPERATURE: 37
BUN SERPL-MCNC: 24 MG/DL (ref 8–23)
BUN/CREAT SERPL: 38.1 (ref 7–25)
CALCIUM SPEC-SCNC: 9.7 MG/DL (ref 8.6–10.5)
CHLORIDE SERPL-SCNC: 97 MMOL/L (ref 98–107)
CLARITY UR: CLEAR
CO2 BLDA-SCNC: 30.4 MMOL/L (ref 22–33)
CO2 SERPL-SCNC: 27 MMOL/L (ref 22–29)
COHGB MFR BLD: 0.3 %
COLOR UR: YELLOW
CREAT SERPL-MCNC: 0.63 MG/DL (ref 0.57–1)
DEPRECATED RDW RBC AUTO: 39.7 FL (ref 37–54)
EGFRCR SERPLBLD CKD-EPI 2021: 100.4 ML/MIN/1.73
EOSINOPHIL # BLD AUTO: 0.23 10*3/MM3 (ref 0–0.4)
EOSINOPHIL NFR BLD AUTO: 3.1 % (ref 0.3–6.2)
EPAP: 0
ERYTHROCYTE [DISTWIDTH] IN BLOOD BY AUTOMATED COUNT: 12.5 % (ref 12.3–15.4)
GLOBULIN UR ELPH-MCNC: 3.2 GM/DL
GLUCOSE BLDC GLUCOMTR-MCNC: 506 MG/DL (ref 70–130)
GLUCOSE SERPL-MCNC: 521 MG/DL (ref 65–99)
GLUCOSE UR STRIP-MCNC: ABNORMAL MG/DL
HCO3 BLDV-SCNC: 29 MMOL/L (ref 22–28)
HCT VFR BLD AUTO: 38.8 % (ref 34–46.6)
HGB BLD-MCNC: 12.8 G/DL (ref 12–15.9)
HGB BLDA-MCNC: 12.8 G/DL (ref 14–18)
HGB UR QL STRIP.AUTO: NEGATIVE
IMM GRANULOCYTES # BLD AUTO: 0.02 10*3/MM3 (ref 0–0.05)
IMM GRANULOCYTES NFR BLD AUTO: 0.3 % (ref 0–0.5)
INHALED O2 CONCENTRATION: 21 %
IPAP: 0
KETONES UR QL STRIP: NEGATIVE
LEUKOCYTE ESTERASE UR QL STRIP.AUTO: NEGATIVE
LIPASE SERPL-CCNC: 22 U/L (ref 13–60)
LYMPHOCYTES # BLD AUTO: 1.8 10*3/MM3 (ref 0.7–3.1)
LYMPHOCYTES NFR BLD AUTO: 24 % (ref 19.6–45.3)
MCH RBC QN AUTO: 28.8 PG (ref 26.6–33)
MCHC RBC AUTO-ENTMCNC: 33 G/DL (ref 31.5–35.7)
MCV RBC AUTO: 87.4 FL (ref 79–97)
METHGB BLD QL: 0.2 %
MODALITY: ABNORMAL
MONOCYTES # BLD AUTO: 0.49 10*3/MM3 (ref 0.1–0.9)
MONOCYTES NFR BLD AUTO: 6.5 % (ref 5–12)
NEUTROPHILS NFR BLD AUTO: 4.93 10*3/MM3 (ref 1.7–7)
NEUTROPHILS NFR BLD AUTO: 65.6 % (ref 42.7–76)
NITRITE UR QL STRIP: NEGATIVE
NRBC BLD AUTO-RTO: 0 /100 WBC (ref 0–0.2)
OXYHGB MFR BLDV: 92.3 %
PAW @ PEAK INSP FLOW SETTING VENT: 0 CMH2O
PCO2 BLDV: 44.5 MM HG (ref 41–51)
PH BLDV: 7.42 PH UNITS (ref 7.31–7.41)
PH UR STRIP.AUTO: 6 [PH] (ref 5–8)
PLATELET # BLD AUTO: 155 10*3/MM3 (ref 140–450)
PMV BLD AUTO: 12.2 FL (ref 6–12)
PO2 BLDV: 70.6 MM HG (ref 27–53)
POTASSIUM SERPL-SCNC: 4.6 MMOL/L (ref 3.5–5.2)
PROT SERPL-MCNC: 7.5 G/DL (ref 6–8.5)
PROT UR QL STRIP: NEGATIVE
RBC # BLD AUTO: 4.44 10*6/MM3 (ref 3.77–5.28)
SODIUM SERPL-SCNC: 137 MMOL/L (ref 136–145)
SP GR UR STRIP: >=1.03 (ref 1–1.03)
TOTAL RATE: 0 BREATHS/MINUTE
UROBILINOGEN UR QL STRIP: ABNORMAL
VENTILATOR MODE: ABNORMAL
WBC NRBC COR # BLD AUTO: 7.51 10*3/MM3 (ref 3.4–10.8)

## 2025-03-27 PROCEDURE — 85025 COMPLETE CBC W/AUTO DIFF WBC: CPT | Performed by: EMERGENCY MEDICINE

## 2025-03-27 PROCEDURE — 63710000001 INSULIN REGULAR HUMAN PER 5 UNITS: Performed by: EMERGENCY MEDICINE

## 2025-03-27 PROCEDURE — 25810000003 SODIUM CHLORIDE 0.9 % SOLUTION: Performed by: EMERGENCY MEDICINE

## 2025-03-27 PROCEDURE — 82010 KETONE BODYS QUAN: CPT | Performed by: EMERGENCY MEDICINE

## 2025-03-27 PROCEDURE — 82805 BLOOD GASES W/O2 SATURATION: CPT

## 2025-03-27 PROCEDURE — 82948 REAGENT STRIP/BLOOD GLUCOSE: CPT

## 2025-03-27 PROCEDURE — 81003 URINALYSIS AUTO W/O SCOPE: CPT | Performed by: EMERGENCY MEDICINE

## 2025-03-27 PROCEDURE — 80053 COMPREHEN METABOLIC PANEL: CPT | Performed by: EMERGENCY MEDICINE

## 2025-03-27 PROCEDURE — 36415 COLL VENOUS BLD VENIPUNCTURE: CPT

## 2025-03-27 PROCEDURE — 99283 EMERGENCY DEPT VISIT LOW MDM: CPT

## 2025-03-27 PROCEDURE — 93005 ELECTROCARDIOGRAM TRACING: CPT | Performed by: EMERGENCY MEDICINE

## 2025-03-27 PROCEDURE — 83690 ASSAY OF LIPASE: CPT | Performed by: EMERGENCY MEDICINE

## 2025-03-27 RX ORDER — SODIUM CHLORIDE 0.9 % (FLUSH) 0.9 %
10 SYRINGE (ML) INJECTION AS NEEDED
Status: DISCONTINUED | OUTPATIENT
Start: 2025-03-27 | End: 2025-03-28 | Stop reason: HOSPADM

## 2025-03-27 RX ADMIN — INSULIN HUMAN 10 UNITS: 100 INJECTION, SOLUTION PARENTERAL at 23:48

## 2025-03-27 RX ADMIN — SODIUM CHLORIDE 1000 ML: 9 INJECTION, SOLUTION INTRAVENOUS at 23:02

## 2025-03-28 LAB — GLUCOSE BLDC GLUCOMTR-MCNC: 342 MG/DL (ref 70–130)

## 2025-03-28 PROCEDURE — 82948 REAGENT STRIP/BLOOD GLUCOSE: CPT

## 2025-03-28 NOTE — DISCHARGE INSTRUCTIONS
Continue to take medications as directed.  Maintain adequate hydration.  Follow-up with your primary physician or specialist within the next 24 hours.  Return to the emergency department for any change in symptoms.

## 2025-03-28 NOTE — ED PROVIDER NOTES
Subjective   History of Present Illness  This 64-year-old female with past medical history of diabetes presenting to the emergency department with elevated blood sugar.  Patient is been seen numerous times for this before in the past.  It seems that she has been having some issues with her  insulin device.  She was admitted last week for similar symptoms.  Patient states that she saw her endocrinologist today and was found to be in good standing.  She checked her readings this evening and is found to be greater than 400.  Patient states that she did eat some carbohydrate rich foods for dinner.  Otherwise she is asymptomatic.  Denies any fevers or chills.  No headache or change in vision.  No focal weakness.  No chest pain or shortness of breath.  No abdominal pain or vomiting    History provided by:  Patient and spouse   used: No        Review of Systems   Constitutional:  Negative for chills and fever.   HENT:  Negative for congestion, ear pain and sore throat.    Eyes:  Negative for visual disturbance.   Respiratory:  Negative for shortness of breath.    Cardiovascular:  Negative for chest pain.   Gastrointestinal:  Negative for abdominal pain.   Genitourinary:  Negative for difficulty urinating.   Musculoskeletal:  Negative for arthralgias.   Skin:  Negative for rash.   Neurological:  Negative for dizziness, weakness and numbness.   Psychiatric/Behavioral:  Negative for agitation.        Past Medical History:   Diagnosis Date    Acid reflux     CVA (cerebral vascular accident) 01/14/2024    Diabetes mellitus     Hyperlipidemia     Hypertension     Overactive bladder     Stroke        Allergies   Allergen Reactions    Sulfa Antibiotics Anaphylaxis       Past Surgical History:   Procedure Laterality Date    BLADDER SURGERY      GALL BLADDER    CARPAL TUNNEL RELEASE      FINGER FUSION      FOOT SURGERY      TONSILLECTOMY         Family History   Problem Relation Age of Onset    Diabetes Mother      Stroke Mother     COPD Father     Stroke Father     Hypertension Sister     Diabetes Brother     Heart attack Brother     Parkinsonism Brother     No Known Problems Maternal Grandmother     Diabetes Maternal Grandfather     Stroke Maternal Grandfather     Heart failure Paternal Grandmother     Diabetes Paternal Grandmother     No Known Problems Paternal Grandfather        Social History     Socioeconomic History    Marital status:    Tobacco Use    Smoking status: Never     Passive exposure: Never    Smokeless tobacco: Never   Vaping Use    Vaping status: Never Used   Substance and Sexual Activity    Alcohol use: No    Drug use: No    Sexual activity: Defer           Objective   Physical Exam  Vitals and nursing note reviewed.   Constitutional:       General: She is not in acute distress.     Appearance: She is not ill-appearing or toxic-appearing.   HENT:      Mouth/Throat:      Pharynx: No posterior oropharyngeal erythema.   Eyes:      Conjunctiva/sclera: Conjunctivae normal.      Pupils: Pupils are equal, round, and reactive to light.   Cardiovascular:      Rate and Rhythm: Normal rate and regular rhythm.   Pulmonary:      Effort: Pulmonary effort is normal. No respiratory distress.   Abdominal:      General: Abdomen is flat. There is no distension.      Palpations: There is no mass.      Tenderness: There is no abdominal tenderness. There is no guarding or rebound.   Musculoskeletal:         General: No deformity. Normal range of motion.   Skin:     General: Skin is warm.      Findings: No rash.   Neurological:      General: No focal deficit present.      Mental Status: She is alert and oriented to person, place, and time.      Motor: No weakness.         ECG 12 Lead      Date/Time: 3/27/2025 10:08 PM    Performed by: Ray Young MD  Authorized by: Ray Young MD  Interpreted by ED physician  Comparison: compared with previous ECG   Similar to previous ECG  Rhythm: sinus rhythm  Rate:  normal  BPM: 84  QRS axis: normal  Conduction: right bundle branch block  Other: no other findings  Clinical impression: non-specific ECG  Comments: Interpretation:  EKG was directly visualized by myself, interpretations as documented in hospital course.               ED Course  ED Course as of 03/28/25 0043   Thu Mar 27, 2025   2213 BP(!): 147/102 [JK]   2213 Temp: 98.2 °F (36.8 °C) [JK]   2214 Temp src: Oral [JK]   2214 Heart Rate: 92 [JK]   2214 Resp: 22 [JK]   2214 SpO2: 96 %  Interpretation:  Patient's repeat vitals, telemetry tracing, and pulse oximetry tracing were directly viewed and interpreted by myself.   O2 sat 96% on room air, interpreted as normal.  Telemetry rhythm strip revealed a rate of 92 bpm, interpreted as normal sinus rhythm [JK]   Fri Mar 28, 2025   0041 Blood Gas, Venous With Co-Ox(!)  Interpretation:  Patient's Blood Gas was directly viewed and interpreted by myself.   Unremarkable [JK]   0041 Comprehensive Metabolic Panel(!!) [JK]   0041 CBC & Differential(!) [JK]   0041 Urinalysis With Culture If Indicated - Urine, Clean Catch(!) [JK]   0041 Lipase [JK]   0041 Beta Hydroxybutyrate Quantitative  Interpretation:  Laboratory studies were reviewed and interpreted directly by myself.  CBC was normal, CMP showed some hyperglycemia with glucose of 521, minor elevation in ALT at 51 AST 42 and alk phos of 224, anion gap normal, urinalysis shows glucose, beta hydroxy normal [JK]   0042 Patient's repeat blood glucose was 340 [JK]   0042 On reevaluation, patient remains asymptomatic.  Blood sugars improving.  We did have discussion about appropriate use of her Dexcom and management of her blood sugar.  She is to follow-up with her primary physician in 48 hours.  Given strict return precautions.  Verbalized understanding [JK]   0042 I had a discussion with the patient/family regarding diagnosis, diagnostic results, treatment plan, and medications. The patient/family indicated understanding of these  instructions. I spent adequate time at the bedside prior to discharge necessary to discuss the aftercare instructions, giving patient education, providing explanations of the results of our evaluations/findings, and my decision making to assure that the patient/family understand the plan of care. Time was allotted to answer questions at that time and throughout the ED course. Patient is required to maintain timely follow up, as discussed. I also discussed the potential for the development of an acute emergent condition requiring further evaluation, return to the ER, admission, or even surgical intervention.  I encouraged the patient to return to the emergency department immediately for any concerns, worsening symptoms, new complaints, or if symptoms persist and they are unable to seek follow-up in a timely fashion. The patient/family expressed understanding and agreement with this plan    Shared decision making:   After full review of the patient's clinical presentation, review of any work-up including but not limited to laboratory studies and radiology obtained, I had a discussion with the patient.  Treatment options were discussed as well as the risks, benefits and consequences.  I discussed all findings with the patient and family members if available.  During the discussion, treatment goals were understood by all as well as any misconceptions which were addressed with the patient.  Ample time was given for any questions they may have had.  They are in agreement with the treatment plan as well as final disposition. [JK]      ED Course User Index  [JK] Ray Young MD                                                       Medical Decision Making  This is a 64-year-old female with a history of diabetes presenting with some elevated blood sugar.  Patient has had recurrent episodes of elevated blood sugar.  She remains asymptomatic.  However has a history of DKA in the past.  I am concerned for this at this  time..  Overall, the patient is nontoxic.  Afebrile.  IV access was established in the patient.  Placed on continuous telemetry monitoring.  Given the patient's presentation, differential is broad and will require further evaluation.  Workup initiated.      Differential diagnosis: DKA, hyperglycemia, insulin noncompliance, acute kidney injury, electrolyte abnormality, UTI, anemia      Amount and/or Complexity of Data Reviewed  External Data Reviewed: labs, radiology, ECG and notes.     Details: External laboratories, imaging as well as notes were reviewed personally by myself.  All relevant studies were used to guide decision making.     Date of previous record: 3/20/2025    Source of note: Admission Record    Summary:  Patient was seen and admitted for DKA.  I did review basic laboratory studies on file as well as a previous chest x-ray and EKG.  Records reviewed      Labs: ordered. Decision-making details documented in ED Course.  ECG/medicine tests: ordered and independent interpretation performed. Decision-making details documented in ED Course.    Risk  OTC drugs.  Prescription drug management.        Final diagnoses:   Hyperglycemia   Fatty liver   Hyperlipidemia LDL goal <70       ED Disposition  ED Disposition       ED Disposition   Discharge    Condition   Stable    Comment   --               Marie Morales MD  [address] [place] [address]  [phone]    Call in 1 day           Medication List      No changes were made to your prescriptions during this visit.            Ray Young MD  03/28/25 0043

## 2025-03-30 LAB
QT INTERVAL: 398 MS
QTC INTERVAL: 470 MS

## 2025-04-24 ENCOUNTER — HOSPITAL ENCOUNTER (OUTPATIENT)
Dept: GENERAL RADIOLOGY | Facility: HOSPITAL | Age: 65
Discharge: HOME | End: 2025-04-24
Admitting: INTERNAL MEDICINE
Payer: COMMERCIAL

## 2025-04-24 ENCOUNTER — TRANSCRIBE ORDERS (OUTPATIENT)
Dept: ADMINISTRATIVE | Facility: HOSPITAL | Age: 65
End: 2025-04-24
Payer: MEDICAID

## 2025-04-24 DIAGNOSIS — R05.9 COUGH, UNSPECIFIED TYPE: Primary | ICD-10-CM

## 2025-04-24 PROCEDURE — 71046 X-RAY EXAM CHEST 2 VIEWS: CPT

## 2025-05-08 ENCOUNTER — APPOINTMENT (OUTPATIENT)
Dept: GENERAL RADIOLOGY | Facility: HOSPITAL | Age: 65
End: 2025-05-08
Payer: COMMERCIAL

## 2025-05-08 ENCOUNTER — HOSPITAL ENCOUNTER (EMERGENCY)
Facility: HOSPITAL | Age: 65
Discharge: HOME | End: 2025-05-08
Attending: EMERGENCY MEDICINE
Payer: COMMERCIAL

## 2025-05-08 VITALS
DIASTOLIC BLOOD PRESSURE: 92 MMHG | RESPIRATION RATE: 16 BRPM | HEIGHT: 55 IN | TEMPERATURE: 98.3 F | OXYGEN SATURATION: 100 % | BODY MASS INDEX: 29.16 KG/M2 | WEIGHT: 126 LBS | HEART RATE: 87 BPM | SYSTOLIC BLOOD PRESSURE: 149 MMHG

## 2025-05-08 DIAGNOSIS — E10.65 UNCONTROLLED TYPE 1 DIABETES MELLITUS WITH HYPERGLYCEMIA: Primary | ICD-10-CM

## 2025-05-08 DIAGNOSIS — R79.89 ELEVATED LFTS: ICD-10-CM

## 2025-05-08 DIAGNOSIS — E86.0 MILD DEHYDRATION: ICD-10-CM

## 2025-05-08 LAB
ALBUMIN SERPL-MCNC: 4.4 G/DL (ref 3.5–5.2)
ALBUMIN/GLOB SERPL: 1.3 G/DL
ALP SERPL-CCNC: 229 U/L (ref 39–117)
ALT SERPL W P-5'-P-CCNC: 45 U/L (ref 1–33)
ANION GAP SERPL CALCULATED.3IONS-SCNC: 15 MMOL/L (ref 5–15)
AST SERPL-CCNC: 38 U/L (ref 1–32)
ATMOSPHERIC PRESS: ABNORMAL MM[HG]
B-OH-BUTYR SERPL-SCNC: 0.41 MMOL/L (ref 0.02–0.27)
BASE EXCESS BLDV CALC-SCNC: 4.5 MMOL/L (ref -2–2)
BASOPHILS # BLD AUTO: 0.04 10*3/MM3 (ref 0–0.2)
BASOPHILS NFR BLD AUTO: 0.5 % (ref 0–1.5)
BDY SITE: ABNORMAL
BILIRUB SERPL-MCNC: 0.2 MG/DL (ref 0–1.2)
BILIRUB UR QL STRIP: NEGATIVE
BODY TEMPERATURE: 37
BUN SERPL-MCNC: 17 MG/DL (ref 8–23)
BUN/CREAT SERPL: 23.6 (ref 7–25)
CALCIUM SPEC-SCNC: 10.2 MG/DL (ref 8.6–10.5)
CHLORIDE SERPL-SCNC: 97 MMOL/L (ref 98–107)
CLARITY UR: CLEAR
CO2 BLDA-SCNC: 31.1 MMOL/L (ref 22–33)
CO2 SERPL-SCNC: 25 MMOL/L (ref 22–29)
COHGB MFR BLD: 1 %
COLOR UR: YELLOW
CREAT SERPL-MCNC: 0.72 MG/DL (ref 0.57–1)
DEPRECATED RDW RBC AUTO: 39.9 FL (ref 37–54)
EGFRCR SERPLBLD CKD-EPI 2021: 94.7 ML/MIN/1.73
EOSINOPHIL # BLD AUTO: 0.07 10*3/MM3 (ref 0–0.4)
EOSINOPHIL NFR BLD AUTO: 0.9 % (ref 0.3–6.2)
ERYTHROCYTE [DISTWIDTH] IN BLOOD BY AUTOMATED COUNT: 12.6 % (ref 12.3–15.4)
FLUAV RNA RESP QL NAA+PROBE: NOT DETECTED
FLUBV RNA RESP QL NAA+PROBE: NOT DETECTED
GLOBULIN UR ELPH-MCNC: 3.4 GM/DL
GLUCOSE BLDC GLUCOMTR-MCNC: 422 MG/DL (ref 70–130)
GLUCOSE BLDC GLUCOMTR-MCNC: 576 MG/DL (ref 70–130)
GLUCOSE SERPL-MCNC: 543 MG/DL (ref 65–99)
GLUCOSE UR STRIP-MCNC: ABNORMAL MG/DL
HBA1C MFR BLD: 9.3 % (ref 4.8–5.6)
HCO3 BLDV-SCNC: 29.7 MMOL/L (ref 22–28)
HCT VFR BLD AUTO: 39.7 % (ref 34–46.6)
HGB BLD-MCNC: 13 G/DL (ref 12–15.9)
HGB BLDA-MCNC: 13.6 G/DL (ref 14–18)
HGB UR QL STRIP.AUTO: NEGATIVE
IMM GRANULOCYTES # BLD AUTO: 0.02 10*3/MM3 (ref 0–0.05)
IMM GRANULOCYTES NFR BLD AUTO: 0.2 % (ref 0–0.5)
INHALED O2 CONCENTRATION: 21 %
KETONES UR QL STRIP: ABNORMAL
LEUKOCYTE ESTERASE UR QL STRIP.AUTO: NEGATIVE
LYMPHOCYTES # BLD AUTO: 1.7 10*3/MM3 (ref 0.7–3.1)
LYMPHOCYTES NFR BLD AUTO: 20.7 % (ref 19.6–45.3)
MCH RBC QN AUTO: 28.8 PG (ref 26.6–33)
MCHC RBC AUTO-ENTMCNC: 32.7 G/DL (ref 31.5–35.7)
MCV RBC AUTO: 87.8 FL (ref 79–97)
METHGB BLD QL: 0.4 %
MODALITY: ABNORMAL
MONOCYTES # BLD AUTO: 0.48 10*3/MM3 (ref 0.1–0.9)
MONOCYTES NFR BLD AUTO: 5.8 % (ref 5–12)
NEUTROPHILS NFR BLD AUTO: 5.92 10*3/MM3 (ref 1.7–7)
NEUTROPHILS NFR BLD AUTO: 71.9 % (ref 42.7–76)
NITRITE UR QL STRIP: NEGATIVE
NRBC BLD AUTO-RTO: 0 /100 WBC (ref 0–0.2)
OXYHGB MFR BLDV: 87.9 %
PCO2 BLDV: 45.6 MM HG (ref 41–51)
PH BLDV: 7.42 PH UNITS (ref 7.31–7.41)
PH UR STRIP.AUTO: 7.5 [PH] (ref 5–8)
PLATELET # BLD AUTO: 171 10*3/MM3 (ref 140–450)
PMV BLD AUTO: 11.8 FL (ref 6–12)
PO2 BLDV: 57.2 MM HG (ref 27–53)
POTASSIUM SERPL-SCNC: 4.7 MMOL/L (ref 3.5–5.2)
PROT SERPL-MCNC: 7.8 G/DL (ref 6–8.5)
PROT UR QL STRIP: NEGATIVE
RBC # BLD AUTO: 4.52 10*6/MM3 (ref 3.77–5.28)
RSV RNA RESP QL NAA+PROBE: NOT DETECTED
SARS-COV-2 RNA RESP QL NAA+PROBE: NOT DETECTED
SODIUM SERPL-SCNC: 137 MMOL/L (ref 136–145)
SP GR UR STRIP: 1.03 (ref 1–1.03)
UROBILINOGEN UR QL STRIP: ABNORMAL
VENTILATOR MODE: ABNORMAL
WBC NRBC COR # BLD AUTO: 8.23 10*3/MM3 (ref 3.4–10.8)

## 2025-05-08 PROCEDURE — 82948 REAGENT STRIP/BLOOD GLUCOSE: CPT

## 2025-05-08 PROCEDURE — 82010 KETONE BODYS QUAN: CPT | Performed by: EMERGENCY MEDICINE

## 2025-05-08 PROCEDURE — 85025 COMPLETE CBC W/AUTO DIFF WBC: CPT | Performed by: EMERGENCY MEDICINE

## 2025-05-08 PROCEDURE — 82805 BLOOD GASES W/O2 SATURATION: CPT

## 2025-05-08 PROCEDURE — 83036 HEMOGLOBIN GLYCOSYLATED A1C: CPT | Performed by: EMERGENCY MEDICINE

## 2025-05-08 PROCEDURE — 81003 URINALYSIS AUTO W/O SCOPE: CPT | Performed by: EMERGENCY MEDICINE

## 2025-05-08 PROCEDURE — 25810000003 SODIUM CHLORIDE 0.9 % SOLUTION: Performed by: EMERGENCY MEDICINE

## 2025-05-08 PROCEDURE — 36415 COLL VENOUS BLD VENIPUNCTURE: CPT

## 2025-05-08 PROCEDURE — 80053 COMPREHEN METABOLIC PANEL: CPT | Performed by: EMERGENCY MEDICINE

## 2025-05-08 PROCEDURE — 87637 SARSCOV2&INF A&B&RSV AMP PRB: CPT | Performed by: EMERGENCY MEDICINE

## 2025-05-08 PROCEDURE — 71045 X-RAY EXAM CHEST 1 VIEW: CPT

## 2025-05-08 PROCEDURE — 99283 EMERGENCY DEPT VISIT LOW MDM: CPT

## 2025-05-08 RX ADMIN — SODIUM CHLORIDE 1000 ML: 9 INJECTION, SOLUTION INTRAVENOUS at 20:36

## 2025-05-08 NOTE — ED PROVIDER NOTES
" EMERGENCY DEPARTMENT ENCOUNTER    Pt Name: Emily Grove  MRN: [id number]  Pt :   1960  Room Number:    Date of encounter:  2025  PCP: Marie Morales MD  ED Provider: Luisito Sands MD    Historian: Patient and her       HPI:  Chief Complaint: Markedly elevated blood sugars        Context: Emily Grove is a 64 y.o. female who presents to the ED c/o markedly elevated blood sugars throughout the day today with readings of 516 and 553 at its highest.  The patient is a Dex drawn 6 insulin delivery system but the device is currently reading \"no pod communication\".  The patient and her  are confused about what this means and why her blood sugars are so elevated.  She saw her endocrinologist Brooke Boyd yesterday afternoon but the patient is still confused about her elevated blood sugars.  The patient feels a little dehydrated but otherwise has no complaints.      PAST MEDICAL HISTORY  Past Medical History:   Diagnosis Date    Acid reflux     CVA (cerebral vascular accident) 2024    Diabetes mellitus     Hyperlipidemia     Hypertension     Overactive bladder     Stroke          PAST SURGICAL HISTORY  Past Surgical History:   Procedure Laterality Date    BLADDER SURGERY      GALL BLADDER    CARPAL TUNNEL RELEASE      FINGER FUSION      FOOT SURGERY      TONSILLECTOMY           FAMILY HISTORY  Family History   Problem Relation Age of Onset    Diabetes Mother     Stroke Mother     COPD Father     Stroke Father     Hypertension Sister     Diabetes Brother     Heart attack Brother     Parkinsonism Brother     No Known Problems Maternal Grandmother     Diabetes Maternal Grandfather     Stroke Maternal Grandfather     Heart failure Paternal Grandmother     Diabetes Paternal Grandmother     No Known Problems Paternal Grandfather          SOCIAL HISTORY  Social History     Socioeconomic History    Marital status:    Tobacco Use    Smoking status: Never     Passive " exposure: Never    Smokeless tobacco: Never   Vaping Use    Vaping status: Never Used   Substance and Sexual Activity    Alcohol use: No    Drug use: No    Sexual activity: Defer         ALLERGIES  Sulfa antibiotics        REVIEW OF SYSTEMS  Review of Systems       All systems reviewed and negative except for those discussed in HPI.       PHYSICAL EXAM    I have reviewed the triage vital signs and nursing notes.    ED Triage Vitals [05/08/25 1611]   Temp Heart Rate Resp BP SpO2   98.3 °F (36.8 °C) 92 16 150/86 96 %      Temp src Heart Rate Source Patient Position BP Location FiO2 (%)   Oral Monitor Sitting Left arm --       Physical Exam  GENERAL:   Appears pleasant nontoxic.  I evaluate her in our triage area as we are over capacity for emergency department.  HENT: Nares patent.  Slightly dry mucous membranes  EYES: No scleral icterus.  CV: Regular rhythm, regular rate.  No murmurs gallops rubs  RESPIRATORY: Normal effort.  No audible wheezes, rales or rhonchi.  Clear to auscultation  ABDOMEN: Soft, nontender to deep palpation  MUSCULOSKELETAL: No deformities.   NEURO: Alert, moves all extremities, follows commands.  SKIN: Warm, dry, no rash visualized.      LAB RESULTS  Recent Results (from the past 24 hours)   POC Glucose Once    Collection Time: 05/08/25  4:14 PM    Specimen: Blood   Result Value Ref Range    Glucose 576 (C) 70 - 130 mg/dL   Comprehensive Metabolic Panel    Collection Time: 05/08/25  5:04 PM    Specimen: Blood   Result Value Ref Range    Glucose 543 (C) 65 - 99 mg/dL    BUN 17 8 - 23 mg/dL    Creatinine 0.72 0.57 - 1.00 mg/dL    Sodium 137 136 - 145 mmol/L    Potassium 4.7 3.5 - 5.2 mmol/L    Chloride 97 (L) 98 - 107 mmol/L    CO2 25.0 22.0 - 29.0 mmol/L    Calcium 10.2 8.6 - 10.5 mg/dL    Total Protein 7.8 6.0 - 8.5 g/dL    Albumin 4.4 3.5 - 5.2 g/dL    ALT (SGPT) 45 (H) 1 - 33 U/L    AST (SGOT) 38 (H) 1 - 32 U/L    Alkaline Phosphatase 229 (H) 39 - 117 U/L    Total Bilirubin 0.2 0.0 - 1.2  mg/dL    Globulin 3.4 gm/dL    A/G Ratio 1.3 g/dL    BUN/Creatinine Ratio 23.6 7.0 - 25.0    Anion Gap 15.0 5.0 - 15.0 mmol/L    eGFR 94.7 >60.0 mL/min/1.73   Hemoglobin A1c    Collection Time: 05/08/25  5:04 PM    Specimen: Blood   Result Value Ref Range    Hemoglobin A1C 9.30 (H) 4.80 - 5.60 %   CBC Auto Differential    Collection Time: 05/08/25  5:04 PM    Specimen: Blood   Result Value Ref Range    WBC 8.23 3.40 - 10.80 10*3/mm3    RBC 4.52 3.77 - 5.28 10*6/mm3    Hemoglobin 13.0 12.0 - 15.9 g/dL    Hematocrit 39.7 34.0 - 46.6 %    MCV 87.8 79.0 - 97.0 fL    MCH 28.8 26.6 - 33.0 pg    MCHC 32.7 31.5 - 35.7 g/dL    RDW 12.6 12.3 - 15.4 %    RDW-SD 39.9 37.0 - 54.0 fl    MPV 11.8 6.0 - 12.0 fL    Platelets 171 140 - 450 10*3/mm3    Neutrophil % 71.9 42.7 - 76.0 %    Lymphocyte % 20.7 19.6 - 45.3 %    Monocyte % 5.8 5.0 - 12.0 %    Eosinophil % 0.9 0.3 - 6.2 %    Basophil % 0.5 0.0 - 1.5 %    Immature Grans % 0.2 0.0 - 0.5 %    Neutrophils, Absolute 5.92 1.70 - 7.00 10*3/mm3    Lymphocytes, Absolute 1.70 0.70 - 3.10 10*3/mm3    Monocytes, Absolute 0.48 0.10 - 0.90 10*3/mm3    Eosinophils, Absolute 0.07 0.00 - 0.40 10*3/mm3    Basophils, Absolute 0.04 0.00 - 0.20 10*3/mm3    Immature Grans, Absolute 0.02 0.00 - 0.05 10*3/mm3    nRBC 0.0 0.0 - 0.2 /100 WBC   Beta Hydroxybutyrate Quantitative    Collection Time: 05/08/25  5:04 PM    Specimen: Blood   Result Value Ref Range    Beta-Hydroxybutyrate Quant 0.408 (H) 0.020 - 0.270 mmol/L   COVID-19, FLU A/B, RSV PCR 1 HR TAT - Swab, Nasopharynx    Collection Time: 05/08/25  5:06 PM    Specimen: Nasopharynx; Swab   Result Value Ref Range    COVID19 Not Detected Not Detected - Ref. Range    Influenza A PCR Not Detected Not Detected    Influenza B PCR Not Detected Not Detected    RSV, PCR Not Detected Not Detected   Blood Gas, Venous With Co-Ox    Collection Time: 05/08/25  5:20 PM    Specimen: Venous Blood   Result Value Ref Range    Site Nurse/Dr Draw     pH, Venous 7.422  (H) 7.310 - 7.410 pH Units    pCO2, Venous 45.6 41.0 - 51.0 mm Hg    pO2, Venous 57.2 (H) 27.0 - 53.0 mm Hg    HCO3, Venous 29.7 (H) 22.0 - 28.0 mmol/L    Base Excess, Venous 4.5 (H) -2.0 - 2.0 mmol/L    Hemoglobin, Blood Gas 13.6 (L) 14 - 18 g/dL    Oxyhemoglobin Venous 87.9 %    Methemoglobin Venous 0.4 %    Carboxyhemoglobin Venous 1.0 %    CO2 Content 31.1 22 - 33 mmol/L    Temperature 37.0     Barometric Pressure for Blood Gas      Modality Room Air     FIO2 21 %    Ventilator Mode     Urinalysis With Microscopic If Indicated (No Culture) - Urine, Clean Catch    Collection Time: 05/08/25  6:52 PM    Specimen: Urine, Clean Catch   Result Value Ref Range    Color, UA Yellow Yellow, Straw    Appearance, UA Clear Clear    pH, UA 7.5 5.0 - 8.0    Specific Gravity, UA 1.032 (H) 1.001 - 1.030    Glucose, UA >=1000 mg/dL (3+) (A) Negative    Ketones, UA 15 mg/dL (1+) (A) Negative    Bilirubin, UA Negative Negative    Blood, UA Negative Negative    Protein, UA Negative Negative    Leuk Esterase, UA Negative Negative    Nitrite, UA Negative Negative    Urobilinogen, UA 0.2 E.U./dL 0.2 - 1.0 E.U./dL   POC Glucose Once    Collection Time: 05/08/25  8:28 PM    Specimen: Blood   Result Value Ref Range    Glucose 422 (C) 70 - 130 mg/dL       If labs were ordered, I independently reviewed the results and considered them in treating the patient.        RADIOLOGY  XR Chest 1 View  Result Date: 5/8/2025  XR CHEST 1 VW Date of Exam: 5/8/2025 5:43 PM EDT Indication: uncontrolled diabetes Comparison: Chest radiograph 4/24/2025 Findings: Loop recorder projects over the left chest. PFO closure device projects over right chest. Mediastinum: Cardiac silhouette appears unchanged including postoperative changes. Lungs: The lungs appear clear without focal consolidation appreciated. Pleura: No pleural effusion or pneumothorax. Bones and soft tissues: No acute, displaced fracture seen.     Impression: No radiographic evidence of an acute  cardiopulmonary abnormality. Electronically Signed: Iraj Wen MD  5/8/2025 6:16 PM EDT  Workstation ID: [id number]      I ordered and independently reviewed the above noted radiographic studies.      I viewed images of chest x-ray which showed no acute disease per my independent interpretation.    See radiologist's dictation for official interpretation.        PROCEDURES    Procedures    No orders to display       MEDICATIONS GIVEN IN ER    Medications   sodium chloride 0.9 % bolus 1,000 mL (1,000 mL Intravenous New Bag 5/8/25 2036)         MEDICAL DECISION MAKING, PROGRESS, and CONSULTS    All labs, if obtained, have been independently reviewed by me.  All radiology studies, if obtained, have been reviewed by me and the radiologist dictating the report.  All EKGs, if obtained, have been independently viewed and interpreted by me/my attending physician.      Discussion below represents my analysis of pertinent findings related to patient's condition, differential diagnosis, treatment plan and final disposition.                                          Differential diagnosis:    Hyperglycemia with consideration of DKA, dehydration, occult infection, etc.      Additional sources:    - Discussed/ obtained information from independent historians: I spoke with the patient's  in detail at bedside and in our triage area.  See HPI for some of those details.    - External (non-ED) record review: I reviewed a telephone encounter from earlier today when the patient was calling her doctor because her blood sugars were so elevated.    I reviewed discharge summary dated 3/21/2025 when the patient had been admitted for hyperglycemia due to diabetes with insulin pump mechanical complication.    I reviewed chest x-ray outpatient by PCP dated 4/24/2025 which showed no acute disease.    - Chronic or social conditions impacting care: Lifelong non-smoker.  .        Orders placed during this visit:  Orders Placed This  Encounter   Procedures    COVID PRE-OP / PRE-PROCEDURE SCREENING ORDER (NO ISOLATION) - Swab, Nasopharynx    COVID-19, FLU A/B, RSV PCR 1 HR TAT - Swab, Nasopharynx    XR Chest 1 View    Comprehensive Metabolic Panel    Hemoglobin A1c    Urinalysis With Microscopic If Indicated (No Culture) - Urine, Clean Catch    CBC Auto Differential    Blood Gas, Venous -With Co-Ox Panel: Yes    Beta Hydroxybutyrate Quantitative    Blood Gas, Venous With Co-Ox    POC Glucose Once    POC Glucose Once    POC Glucose Once    CBC & Differential         Additional orders considered but not ordered:  Arterial blood gas    ED Course:    Consultants: Emergency department pharmacist    ED Course as of 05/08/25 2220   Thu May 08, 2025   1637 I personally called nurse practitioner Oliver from our triage area.  Office staff are unable to locate her yet but will call me back once they have more information.   [MS]   1852 Still no callback from [facility].   [MS]   2024 I have requested recommendations from emergency department pharmacist for insulin therapy, analysis of patient's current delivery device, etc. [MS]   2212 Our emergency department pharmacists and nursing staff were able to educate the patient on the use of her insulin delivery system.  She has now been bolused and is on a basal rate.  I have strongly suggested that she get an tomorrow with her endocrinologist even though she does not have an appointment for another week.  I asked her  to call very first thing in the morning to try to get over for additional education as they have some difficulty understanding the electronics of the delivery system. [MS]      ED Course User Index  [MS] Luisito Sands MD              Shared Decision Making:  After my consideration of clinical presentation and any laboratory/radiology studies obtained, I discussed the findings with the patient/patient representative who is in agreement with the treatment plan and the final  disposition.   Risks and benefits of discharge and/or observation/admission were discussed.       AS OF 22:20 EDT VITALS:    BP - 149/92  HR - 87  TEMP - 98.3 °F (36.8 °C) (Oral)  O2 SATS - 100%                  DIAGNOSIS  Final diagnoses:   Uncontrolled type 1 diabetes mellitus with hyperglycemia   Mild dehydration   Elevated LFTs         DISPOSITION  DISCHARGE    Patient discharged in stable condition.    Reviewed implications of results, diagnosis, meds, responsibility to follow up, warning signs and symptoms of possible worsening, potential complications and reasons to return to ER.    Patient/Family voiced understanding of above instructions.    Discussed plan for discharge, as there is no emergent indication for admission.  Pt/family is agreeable and understands need for follow up and possible repeat testing.  Pt/family is aware that discharge does not mean that nothing is wrong but that it indicates no emergency is currently present that requires admission and they must continue care with follow-up as given below or with a physician of their choice.     FOLLOW-UP  Brooke Boyd, APRN  [address] [facility]  [address]  [place] [id number] [phone]    In 1 day      Marie Morales MD  [address] [facility] [address]  [place] [id number] [phone]      NEXT AVAILABLE APPOINTMENT - RECHECK OF CONDITION    [facility] EMERGENCY DEPARTMENT  [address] [facility] [address] [phone]    IF YOU HAVE ANY CONCERN OF WORSENING CONDITION         Medication List      No changes were made to your prescriptions during this visit.             Please note that portions of this document were completed with voice recognition software.

## 2025-05-09 NOTE — DISCHARGE INSTRUCTIONS
Call your endocrinologist tomorrow morning early to attempt to schedule a same-day appointment so that you can get more training on your insulin delivery device.

## 2025-06-18 ENCOUNTER — TRANSCRIBE ORDERS (OUTPATIENT)
Dept: ADMINISTRATIVE | Facility: HOSPITAL | Age: 65
End: 2025-06-18
Payer: COMMERCIAL

## 2025-06-18 DIAGNOSIS — R94.5 ABNORMAL LIVER FUNCTION: Primary | ICD-10-CM

## 2025-06-27 ENCOUNTER — TRANSCRIBE ORDERS (OUTPATIENT)
Dept: ADMINISTRATIVE | Facility: HOSPITAL | Age: 65
End: 2025-06-27
Payer: COMMERCIAL

## 2025-06-27 DIAGNOSIS — R94.5 ABNORMAL RESULTS OF LIVER FUNCTION STUDIES: Primary | ICD-10-CM

## 2025-07-07 LAB
MDC_IDC_MSMT_BATTERY_STATUS: NORMAL
MDC_IDC_PG_IMPLANT_DTM: NORMAL
MDC_IDC_PG_MFG: NORMAL
MDC_IDC_PG_MODEL: NORMAL
MDC_IDC_PG_SERIAL: NORMAL
MDC_IDC_PG_TYPE: NORMAL
MDC_IDC_SESS_DTM: NORMAL
MDC_IDC_SESS_TYPE: NORMAL
MDC_IDC_STAT_AT_BURDEN_PERCENT: 0

## 2025-07-29 NOTE — PROGRESS NOTES
Subjective:     Encounter Date:08/01/2025      Patient ID: Emily Grove is a 64 y.o.  -American female from [place], [place].    PCP: Marie Morales MD  CARDIOLOGIST: Rik Ruiz MD  NEUROLOGIST: TYLER Vital    Chief Complaint:   Chief Complaint   Patient presents with    Hyperlipidemia LDL goal <70     Pt denies current symptoms      Problem List:  Palpitations  Echocardiogram August 2023: LVEF 61 to 65%, saline test results positive with Valsalva  Echocardiogram January 2024: LVEF 64.6%, trace TR, trace MR  Echocardiogram April 2024 at [facility]: LVEF 55-60%, normal-appearing PFO closure device with no evidence of residual shunt  Echocardiogram October 2024 at [facility]: LVEF 55-60%, agitated saline demonstrates no evidence of intracardiac or intrapulmonary shunt, normal-appearing PFO closure device with no evidence of residual shunt  Coronary artery disease   Cardiac PET January 2024: Rest EF 74%, stress EF 79%, small sized mild to moderately severe area of ischemia in the basal inferolateral wall. Impressions are consistent with abnormal but overall low risk study, consistent with distal single-vessel disease, either distal circumflex or distal RCA territory.   CTA coronaries 10/23/2024: Total cardiac calcium score 287.9 (left main 91.7, LAD 61.2, left circumflex 12.2, .9), no significant blockages noted on cardiac CT scan per [facility].   FFR PA denied.  Hypertension  Hyperlipidemia  Type 1 diabetes mellitus; hemoglobin A1c 7.8% January 2024, [facility] overnight admission March 2025 for hyperglycemia, hemoglobin A1c 9.3% May 2025  GERD  At risk for sleep apnea with daytime sleepiness/fatigue  History of TIA/CVA/PFO  MRI brain negative for CVA August 2023  MRI brain January 2024:Small patchy foci of nonhemorrhagic infarction in the left parietal lobe measuring up to 1.6 cm.   MCOT January 2024: Relatively benign monitor study, no atrial fibrillation  PFO closure April 2024 at [facility]    ILR implant May 2024    Allergies   Allergen Reactions    Sulfa Antibiotics Anaphylaxis       Current Outpatient Medications   Medication Instructions    aspirin 325 mg, Every 6 Hours PRN    atorvastatin (LIPITOR) 20 mg, Nightly    clobetasol (TEMOVATE) 0.05 % external solution Please see attached for detailed directions    clotrimazole (LOTRIMIN) 1 % external solution Please see attached for detailed directions    Continuous Blood Gluc Sensor (Dexcom G6 Sensor) USE AS INSTRUCTED CHANGE EVERY 10 DAYS DX E11.9    Continuous Blood Gluc Transmit (Dexcom G6 Transmitter) misc USE AS DIRECTED CHANGE TRANSMITTER EVERY 90 DAYS DX E11.9    DULoxetine (CYMBALTA) 30 mg, Daily    gabapentin (NEURONTIN) 100 mg, 3 Times Daily    Insulin Disposable Pump (Omnipod 5 G6 Pod, Gen 5,) misc USE AS INSTRUCTED CHANGE POD EVERY 2 TO 3 DAYS    latanoprost (XALATAN) 0.005 % ophthalmic solution INSTILL 1 DROP INTO RIGHT EYE AT BEDTIME    losartan (COZAAR) 50 mg, Daily    meloxicam (MOBIC) 15 MG tablet TAKE 1 TABLET BY MOUTH EVERY DAY FOR LEG PAIN    metoprolol succinate XL (TOPROL-XL) 50 mg, Oral, Daily    mupirocin (BACTROBAN) 2 % ointment APPLY TO BIOPSY SITE TWICE DAILY UNTIL HEALED    Myrbetriq 50 MG tablet sustained-release 24 hour 24 hr tablet 1 tablet, Daily    Ozempic (0.25 or 0.5 MG/DOSE) 1 mg, Weekly    pantoprazole (PROTONIX) 40 mg, Daily    timolol (TIMOPTIC) 0.5 % ophthalmic solution 1 drop, Daily         HISTORY OF PRESENT ILLNESS:  The patient is here for a 1 year follow-up and is a patient of Dr. Ruiz.  In the interim she had an overnight admission in March 2025 and the ED visit May 2025 for hyperglycemia.  Her blood sugars have been in the 180s-190s.  She had an episode the other day when she had some chest discomfort.  She thinks it may have been sharp and it lasted for about 20 minutes.  She said that she was upset about something at the time.  She denies any radiation, shortness of breath, palpitations, nausea,  "vomiting, presyncope, or syncope with the episode.  She has a lot of daytime sleepiness and says that she used to sleep good but she does not anymore.  She has never had a sleep study.  She feels like when she gets up in the morning she is still tired.  She is not sure if she snores or not.  She can feel under her skin where the implantable loop recorder is.  It was interrogated a couple days ago with no atrial fibrillation.  She is not overly active at home.  She has a physical with her primary care physician in 2 months.  She will monitor her blood pressure and heart rate for the next week and call back with readings.    ROS   All other systems reviewed and otherwise negative.    Procedures       Objective:       Vitals:    08/01/25 1037 08/01/25 1039   BP: 126/84 128/88   BP Location: Right arm Right arm   Patient Position: Sitting Standing   Cuff Size: Adult Adult   Pulse: 91 94   SpO2: 98% 98%   Weight: 54.7 kg (120 lb 9.6 oz)    Height: 139.7 cm (55\")      Body mass index is 28.03 kg/m².  Wt Readings from Last 2 Encounters:   08/01/25 54.7 kg (120 lb 9.6 oz)   05/08/25 57.2 kg (126 lb)        Constitutional:       Appearance: Healthy appearance. Not in distress.   Neck:      Vascular: No JVR. JVD normal.   Pulmonary:      Effort: Pulmonary effort is normal.      Breath sounds: Normal breath sounds. No wheezing. No rhonchi. No rales.   Chest:      Chest wall: Not tender to palpatation.   Cardiovascular:      PMI at left midclavicular line. Normal rate. Regular rhythm. Normal S1. Normal S2.       Murmurs: There is no murmur.      No gallop.  No click. No rub.   Pulses:     Intact distal pulses.   Edema:     Peripheral edema absent.   Abdominal:      General: Bowel sounds are normal.      Palpations: Abdomen is soft.      Tenderness: There is no abdominal tenderness.   Musculoskeletal: Normal range of motion.         General: No tenderness. Skin:     General: Skin is warm and dry.   Neurological:      General: No " focal deficit present.      Mental Status: Alert and oriented to person, place and time.           Lab Review:   Lab Results   Component Value Date    GLUCOSE 543 (C) 05/08/2025    BUN 17 05/08/2025    CREATININE 0.72 05/08/2025    EGFRIFAFRI 107 04/16/2024    BCR 23.6 05/08/2025    CO2 25.0 05/08/2025    CALCIUM 10.2 05/08/2025    ALBUMIN 4.4 05/08/2025    AST 38 (H) 05/08/2025    ALT 45 (H) 05/08/2025       Lab Results   Component Value Date    WBC 8.23 05/08/2025    HGB 13.0 05/08/2025    HCT 39.7 05/08/2025    MCV 87.8 05/08/2025     05/08/2025       Lab Results   Component Value Date    HGBA1C 9.30 (H) 05/08/2025       Lab Results   Component Value Date    TSH 1.750 03/20/2025       Lab Results   Component Value Date    CHOL 106 01/16/2024    CHOL 100 08/25/2023     Lab Results   Component Value Date    TRIG 107 01/16/2024    TRIG 130 08/25/2023     Lab Results   Component Value Date    HDL 50 01/16/2024    HDL 50 08/25/2023     Lab Results   Component Value Date    LDL 36 01/16/2024    LDL 27 08/25/2023             Results for orders placed during the hospital encounter of 01/14/24    Adult Transthoracic Echo Complete W/ Cont if Necessary Per Protocol 01/15/2024  3:48 PM    Interpretation Summary    Left ventricular systolic function is normal. Calculated left ventricular EF = 64.6% Normal left ventricular cavity size and wall thickness noted. All left ventricular wall segments contract normally. Left ventricular diastolic function was normal.    Normal right ventricular cavity size, wall thickness, systolic function and septal motion noted.    The aortic valve is grossly normal in structure. No significant aortic valve regurgitation is present. No hemodynamically significant aortic valve stenosis is present.    The mitral valve is grossly normal in structure. Trace mitral valve regurgitation is present. No significant mitral valve stenosis is present.    The tricuspid valve is grossly normal in  structure. Trace tricuspid valve regurgitation is present. Estimated right ventricular systolic pressure from tricuspid regurgitation is normal (<35 mmHg). No evidence of significant tricuspid valve stenosis is present.       Results for orders placed during the hospital encounter of 01/14/24    Duplex Venous Lower Extremity - Bilateral CAR 01/17/2024  6:45 AM    Interpretation Summary    Normal bilateral lower extremity venous duplex scan.       Advance Care Planning   ACP discussion was held with the patient during this visit. Patient has an advance directive (not in EMR), copy requested.      Assessment:     Patient with fair functional status on limited activity. We will defer additional diagnostic or therapeutic intervention from a cardiac perspective at this time.  Echocardiogram October 2024 showed LVEF 55-60%, agitated saline demonstrates no evidence of intracardiac or intrapulmonary shunt, normal-appearing PFO closure device with no evidence of residual shunt.CTA coronaries 10/23/2024 demonstrated total cardiac calcium score 287.9 (left main 91.7, LAD 61.2, left circumflex 12.2, .9), no significant blockages noted on cardiac CT scan per Dr. Ruiz.   FFR PA denied.  I will provide the patient with nitroglycerin sublingual as needed for recurrent chest pain.     Diagnosis Plan   1. Precordial pain  Nitroglycerin sublingual as needed      2. Palpitations  Stable      3. Hyperlipidemia LDL goal <70  No new labs to review, continue atorvastatin   4.  Daytime sleepiness/fatigue: Outpatient sleep study          Plan:         Patient to continue current medications and close follow up with the above providers.  Tentative cardiology follow up in February 2026 with SU[facility] or patient may return sooner PRN.  Nitroglycerin sublingual as needed for recurrent chest pain  Outpatient sleep study  She will monitor her blood pressure and heart rate for the next week and call back with readings.      Electronically signed  by Lenore Al, TYLER, 08/01/25, 11:15 AM EDT.

## 2025-08-01 ENCOUNTER — OFFICE VISIT (OUTPATIENT)
Dept: CARDIOLOGY | Facility: CLINIC | Age: 65
End: 2025-08-01
Payer: COMMERCIAL

## 2025-08-01 VITALS
SYSTOLIC BLOOD PRESSURE: 128 MMHG | BODY MASS INDEX: 27.91 KG/M2 | WEIGHT: 120.6 LBS | HEIGHT: 55 IN | HEART RATE: 94 BPM | OXYGEN SATURATION: 98 % | DIASTOLIC BLOOD PRESSURE: 88 MMHG

## 2025-08-01 DIAGNOSIS — R07.2 PRECORDIAL PAIN: Primary | ICD-10-CM

## 2025-08-01 DIAGNOSIS — E78.5 HYPERLIPIDEMIA LDL GOAL <70: ICD-10-CM

## 2025-08-01 DIAGNOSIS — R00.2 PALPITATIONS: ICD-10-CM

## 2025-08-01 PROCEDURE — 99214 OFFICE O/P EST MOD 30 MIN: CPT | Performed by: NURSE PRACTITIONER

## 2025-08-01 RX ORDER — NITROGLYCERIN 0.3 MG/1
TABLET SUBLINGUAL
Qty: 25 TABLET | Refills: 11 | Status: SHIPPED | OUTPATIENT
Start: 2025-08-01

## 2025-08-06 ENCOUNTER — OFFICE VISIT (OUTPATIENT)
Dept: NEUROLOGY | Facility: CLINIC | Age: 65
End: 2025-08-06
Payer: COMMERCIAL

## 2025-08-06 VITALS
DIASTOLIC BLOOD PRESSURE: 76 MMHG | HEIGHT: 55 IN | SYSTOLIC BLOOD PRESSURE: 122 MMHG | HEART RATE: 66 BPM | TEMPERATURE: 96.9 F | BODY MASS INDEX: 27.77 KG/M2 | WEIGHT: 120 LBS | OXYGEN SATURATION: 96 %

## 2025-08-06 DIAGNOSIS — Z86.73 HISTORY OF CVA (CEREBROVASCULAR ACCIDENT): Primary | ICD-10-CM

## 2025-08-06 DIAGNOSIS — E78.5 HYPERLIPIDEMIA LDL GOAL <70: ICD-10-CM

## 2025-08-06 RX ORDER — MONTELUKAST SODIUM 10 MG/1
1 TABLET ORAL DAILY
COMMUNITY
Start: 2025-07-29

## 2025-08-15 ENCOUNTER — TELEPHONE (OUTPATIENT)
Dept: CARDIOLOGY | Facility: CLINIC | Age: 65
End: 2025-08-15
Payer: COMMERCIAL